# Patient Record
Sex: FEMALE | Race: WHITE | Employment: OTHER | URBAN - METROPOLITAN AREA
[De-identification: names, ages, dates, MRNs, and addresses within clinical notes are randomized per-mention and may not be internally consistent; named-entity substitution may affect disease eponyms.]

---

## 2017-07-13 PROBLEM — I50.9 CHF (CONGESTIVE HEART FAILURE) (HCC): Status: ACTIVE | Noted: 2017-07-13

## 2017-07-13 PROBLEM — I50.33 ACUTE ON CHRONIC DIASTOLIC (CONGESTIVE) HEART FAILURE (HCC): Status: ACTIVE | Noted: 2017-07-13

## 2018-02-11 PROBLEM — R06.09 DYSPNEA ON EXERTION: Status: ACTIVE | Noted: 2018-02-11

## 2018-02-12 PROBLEM — I50.30 (HFPEF) HEART FAILURE WITH PRESERVED EJECTION FRACTION (HCC): Status: ACTIVE | Noted: 2017-07-13

## 2018-02-13 LAB
LEFT VENTRICULAR EJECTION FRACTION MODE: NORMAL
LV EF: 55 %

## 2018-02-15 PROBLEM — I48.91 ATRIAL FIBRILLATION (HCC): Status: ACTIVE | Noted: 2018-02-15

## 2018-02-15 PROBLEM — R09.02 HYPOXEMIA: Status: ACTIVE | Noted: 2018-02-15

## 2018-02-17 PROBLEM — R09.02 HYPOXEMIA: Status: RESOLVED | Noted: 2018-02-15 | Resolved: 2018-02-17

## 2018-02-17 PROBLEM — R06.09 DYSPNEA ON EXERTION: Status: RESOLVED | Noted: 2018-02-11 | Resolved: 2018-02-17

## 2018-02-17 PROBLEM — I50.30 (HFPEF) HEART FAILURE WITH PRESERVED EJECTION FRACTION (HCC): Status: RESOLVED | Noted: 2017-07-13 | Resolved: 2018-02-17

## 2018-05-22 ENCOUNTER — OFFICE VISIT (OUTPATIENT)
Dept: CARDIOLOGY CLINIC | Age: 83
End: 2018-05-22
Payer: MEDICARE

## 2018-05-22 VITALS
WEIGHT: 184.4 LBS | SYSTOLIC BLOOD PRESSURE: 98 MMHG | BODY MASS INDEX: 33.93 KG/M2 | RESPIRATION RATE: 16 BRPM | DIASTOLIC BLOOD PRESSURE: 60 MMHG | HEART RATE: 81 BPM | HEIGHT: 62 IN

## 2018-05-22 DIAGNOSIS — Z79.01 CHRONIC ANTICOAGULATION: ICD-10-CM

## 2018-05-22 DIAGNOSIS — I50.32 CHRONIC DIASTOLIC CHF (CONGESTIVE HEART FAILURE) (HCC): Primary | ICD-10-CM

## 2018-05-22 DIAGNOSIS — I48.19 PERSISTENT ATRIAL FIBRILLATION (HCC): ICD-10-CM

## 2018-05-22 DIAGNOSIS — I50.33 ACUTE ON CHRONIC DIASTOLIC CONGESTIVE HEART FAILURE (HCC): ICD-10-CM

## 2018-05-22 DIAGNOSIS — I25.10 CAD IN NATIVE ARTERY: Chronic | ICD-10-CM

## 2018-05-22 DIAGNOSIS — I10 ESSENTIAL HYPERTENSION: ICD-10-CM

## 2018-05-22 PROCEDURE — 1090F PRES/ABSN URINE INCON ASSESS: CPT | Performed by: INTERNAL MEDICINE

## 2018-05-22 PROCEDURE — G8417 CALC BMI ABV UP PARAM F/U: HCPCS | Performed by: INTERNAL MEDICINE

## 2018-05-22 PROCEDURE — 93000 ELECTROCARDIOGRAM COMPLETE: CPT | Performed by: INTERNAL MEDICINE

## 2018-05-22 PROCEDURE — 1123F ACP DISCUSS/DSCN MKR DOCD: CPT | Performed by: INTERNAL MEDICINE

## 2018-05-22 PROCEDURE — G8427 DOCREV CUR MEDS BY ELIG CLIN: HCPCS | Performed by: INTERNAL MEDICINE

## 2018-05-22 PROCEDURE — 99214 OFFICE O/P EST MOD 30 MIN: CPT | Performed by: INTERNAL MEDICINE

## 2018-05-22 PROCEDURE — 1036F TOBACCO NON-USER: CPT | Performed by: INTERNAL MEDICINE

## 2018-05-22 PROCEDURE — G8598 ASA/ANTIPLAT THER USED: HCPCS | Performed by: INTERNAL MEDICINE

## 2018-05-22 PROCEDURE — 4040F PNEUMOC VAC/ADMIN/RCVD: CPT | Performed by: INTERNAL MEDICINE

## 2018-05-22 RX ORDER — AMIODARONE HYDROCHLORIDE 400 MG/1
400 TABLET ORAL DAILY
Qty: 30 TABLET | Refills: 5 | Status: SHIPPED | OUTPATIENT
Start: 2018-05-22 | End: 2018-06-08

## 2018-05-29 ENCOUNTER — NURSE ONLY (OUTPATIENT)
Dept: CARDIOLOGY CLINIC | Age: 83
End: 2018-05-29
Payer: MEDICARE

## 2018-05-29 ENCOUNTER — TELEPHONE (OUTPATIENT)
Dept: CARDIOLOGY CLINIC | Age: 83
End: 2018-05-29

## 2018-05-29 DIAGNOSIS — I48.0 PAROXYSMAL ATRIAL FIBRILLATION (HCC): Primary | ICD-10-CM

## 2018-05-29 PROCEDURE — 93000 ELECTROCARDIOGRAM COMPLETE: CPT | Performed by: INTERNAL MEDICINE

## 2018-06-08 ENCOUNTER — OFFICE VISIT (OUTPATIENT)
Dept: CARDIOLOGY CLINIC | Age: 83
End: 2018-06-08
Payer: MEDICARE

## 2018-06-08 VITALS
WEIGHT: 184.8 LBS | HEIGHT: 62 IN | RESPIRATION RATE: 18 BRPM | DIASTOLIC BLOOD PRESSURE: 72 MMHG | SYSTOLIC BLOOD PRESSURE: 132 MMHG | HEART RATE: 59 BPM | BODY MASS INDEX: 34.01 KG/M2

## 2018-06-08 DIAGNOSIS — E78.2 MIXED HYPERLIPIDEMIA: Chronic | ICD-10-CM

## 2018-06-08 DIAGNOSIS — I48.19 PERSISTENT ATRIAL FIBRILLATION (HCC): Primary | ICD-10-CM

## 2018-06-08 DIAGNOSIS — I10 ESSENTIAL HYPERTENSION: ICD-10-CM

## 2018-06-08 DIAGNOSIS — G47.33 OSA (OBSTRUCTIVE SLEEP APNEA): Chronic | ICD-10-CM

## 2018-06-08 DIAGNOSIS — I50.33 ACUTE ON CHRONIC DIASTOLIC CONGESTIVE HEART FAILURE (HCC): ICD-10-CM

## 2018-06-08 DIAGNOSIS — I50.32 CHRONIC DIASTOLIC CHF (CONGESTIVE HEART FAILURE) (HCC): ICD-10-CM

## 2018-06-08 PROCEDURE — 4040F PNEUMOC VAC/ADMIN/RCVD: CPT | Performed by: INTERNAL MEDICINE

## 2018-06-08 PROCEDURE — 93000 ELECTROCARDIOGRAM COMPLETE: CPT | Performed by: INTERNAL MEDICINE

## 2018-06-08 PROCEDURE — G8598 ASA/ANTIPLAT THER USED: HCPCS | Performed by: INTERNAL MEDICINE

## 2018-06-08 PROCEDURE — 1123F ACP DISCUSS/DSCN MKR DOCD: CPT | Performed by: INTERNAL MEDICINE

## 2018-06-08 PROCEDURE — 1036F TOBACCO NON-USER: CPT | Performed by: INTERNAL MEDICINE

## 2018-06-08 PROCEDURE — G8427 DOCREV CUR MEDS BY ELIG CLIN: HCPCS | Performed by: INTERNAL MEDICINE

## 2018-06-08 PROCEDURE — G8417 CALC BMI ABV UP PARAM F/U: HCPCS | Performed by: INTERNAL MEDICINE

## 2018-06-08 PROCEDURE — 99214 OFFICE O/P EST MOD 30 MIN: CPT | Performed by: INTERNAL MEDICINE

## 2018-06-08 PROCEDURE — 1090F PRES/ABSN URINE INCON ASSESS: CPT | Performed by: INTERNAL MEDICINE

## 2018-06-08 RX ORDER — AMIODARONE HYDROCHLORIDE 200 MG/1
200 TABLET ORAL DAILY
Qty: 30 TABLET | Refills: 11 | Status: SHIPPED | OUTPATIENT
Start: 2018-06-08 | End: 2019-01-01 | Stop reason: SDUPTHER

## 2018-06-08 RX ORDER — METOPROLOL SUCCINATE 25 MG/1
25 TABLET, EXTENDED RELEASE ORAL 2 TIMES DAILY
Qty: 60 TABLET | Refills: 11 | Status: ON HOLD | OUTPATIENT
Start: 2018-06-08 | End: 2019-01-01 | Stop reason: HOSPADM

## 2018-07-12 ENCOUNTER — OFFICE VISIT (OUTPATIENT)
Dept: ENT CLINIC | Age: 83
End: 2018-07-12
Payer: MEDICARE

## 2018-07-12 VITALS
BODY MASS INDEX: 32.2 KG/M2 | OXYGEN SATURATION: 91 % | HEART RATE: 60 BPM | SYSTOLIC BLOOD PRESSURE: 100 MMHG | DIASTOLIC BLOOD PRESSURE: 53 MMHG | WEIGHT: 175 LBS | HEIGHT: 62 IN

## 2018-07-12 DIAGNOSIS — K14.8 TONGUE LESION: Primary | ICD-10-CM

## 2018-07-12 DIAGNOSIS — J30.9 CHRONIC ALLERGIC RHINITIS, UNSPECIFIED SEASONALITY, UNSPECIFIED TRIGGER: ICD-10-CM

## 2018-07-12 PROCEDURE — 1123F ACP DISCUSS/DSCN MKR DOCD: CPT | Performed by: OTOLARYNGOLOGY

## 2018-07-12 PROCEDURE — G8427 DOCREV CUR MEDS BY ELIG CLIN: HCPCS | Performed by: OTOLARYNGOLOGY

## 2018-07-12 PROCEDURE — 4040F PNEUMOC VAC/ADMIN/RCVD: CPT | Performed by: OTOLARYNGOLOGY

## 2018-07-12 PROCEDURE — 1090F PRES/ABSN URINE INCON ASSESS: CPT | Performed by: OTOLARYNGOLOGY

## 2018-07-12 PROCEDURE — 1101F PT FALLS ASSESS-DOCD LE1/YR: CPT | Performed by: OTOLARYNGOLOGY

## 2018-07-12 PROCEDURE — 1036F TOBACCO NON-USER: CPT | Performed by: OTOLARYNGOLOGY

## 2018-07-12 PROCEDURE — G8417 CALC BMI ABV UP PARAM F/U: HCPCS | Performed by: OTOLARYNGOLOGY

## 2018-07-12 PROCEDURE — 99213 OFFICE O/P EST LOW 20 MIN: CPT | Performed by: OTOLARYNGOLOGY

## 2018-07-12 PROCEDURE — G8598 ASA/ANTIPLAT THER USED: HCPCS | Performed by: OTOLARYNGOLOGY

## 2018-07-12 RX ORDER — AZELASTINE 1 MG/ML
2 SPRAY, METERED NASAL 2 TIMES DAILY
Qty: 1 BOTTLE | Refills: 3 | Status: SHIPPED | OUTPATIENT
Start: 2018-07-12 | End: 2019-01-01 | Stop reason: ALTCHOICE

## 2018-07-12 ASSESSMENT — ENCOUNTER SYMPTOMS
RESPIRATORY NEGATIVE: 1
GASTROINTESTINAL NEGATIVE: 1
EYES NEGATIVE: 1
ABDOMINAL PAIN: 0
SHORTNESS OF BREATH: 0
COLOR CHANGE: 0

## 2018-07-12 NOTE — PROGRESS NOTES
Subjective:      Patient ID:  Suki Murphy is a 80 y.o. female. HPI:  Pt returns for recheck of allergies and tongue lesion   Nasal Steroid: Yes   Name: fluticasone (Flonase)   Still taking: Yes   reason for stopping:     Other therapy: nothing    Pt has been on therapy for 1.5 year(s) and is doing well. Her tongue lesion hasn't getting any larger since a year ago. The patient is complaining of nasal congestion and rhinorrhea    The patients worst time of year is summer, fall, winter and spring      Patient's medications, allergies, past medical, surgical, social and family histories were reviewed and updated as appropriate. Review of Systems   Constitutional: Negative. HENT: Positive for hearing loss. Eyes: Negative. Negative for visual disturbance. Respiratory: Negative. Negative for shortness of breath. Cardiovascular: Negative. Negative for chest pain. Gastrointestinal: Negative. Negative for abdominal pain. Genitourinary: Negative. Musculoskeletal: Negative. Skin: Negative. Negative for color change. Neurological: Negative. Psychiatric/Behavioral: Negative. Negative for behavioral problems and hallucinations. All other systems reviewed and are negative. Objective:   Physical Exam   Constitutional: She is oriented to person, place, and time. She appears well-developed and well-nourished. HENT:   Head: Normocephalic and atraumatic. Right Ear: Hearing, tympanic membrane, external ear and ear canal normal. No middle ear effusion. No decreased hearing is noted. Left Ear: Hearing, tympanic membrane, external ear and ear canal normal.  No middle ear effusion. No decreased hearing is noted. Nose: Mucosal edema and rhinorrhea present.    Mouth/Throat: Uvula is midline, oropharynx is clear and moist and mucous membranes are normal.       Right antirior tongue - small vascular lesion about 0.5cm - no change from last visit   Eyes: Conjunctivae and

## 2018-09-21 ENCOUNTER — OFFICE VISIT (OUTPATIENT)
Dept: CARDIOLOGY CLINIC | Age: 83
End: 2018-09-21
Payer: MEDICARE

## 2018-09-21 VITALS
SYSTOLIC BLOOD PRESSURE: 102 MMHG | HEART RATE: 69 BPM | WEIGHT: 188.4 LBS | DIASTOLIC BLOOD PRESSURE: 60 MMHG | HEIGHT: 62 IN | BODY MASS INDEX: 34.67 KG/M2 | RESPIRATION RATE: 16 BRPM

## 2018-09-21 DIAGNOSIS — I10 ESSENTIAL HYPERTENSION: ICD-10-CM

## 2018-09-21 DIAGNOSIS — I48.19 PERSISTENT ATRIAL FIBRILLATION (HCC): Primary | ICD-10-CM

## 2018-09-21 DIAGNOSIS — I25.10 CAD IN NATIVE ARTERY: Chronic | ICD-10-CM

## 2018-09-21 DIAGNOSIS — I50.32 CHRONIC DIASTOLIC (CONGESTIVE) HEART FAILURE (HCC): ICD-10-CM

## 2018-09-21 PROCEDURE — 1036F TOBACCO NON-USER: CPT | Performed by: INTERNAL MEDICINE

## 2018-09-21 PROCEDURE — 1123F ACP DISCUSS/DSCN MKR DOCD: CPT | Performed by: INTERNAL MEDICINE

## 2018-09-21 PROCEDURE — 93000 ELECTROCARDIOGRAM COMPLETE: CPT | Performed by: INTERNAL MEDICINE

## 2018-09-21 PROCEDURE — G8427 DOCREV CUR MEDS BY ELIG CLIN: HCPCS | Performed by: INTERNAL MEDICINE

## 2018-09-21 PROCEDURE — G8417 CALC BMI ABV UP PARAM F/U: HCPCS | Performed by: INTERNAL MEDICINE

## 2018-09-21 PROCEDURE — 1101F PT FALLS ASSESS-DOCD LE1/YR: CPT | Performed by: INTERNAL MEDICINE

## 2018-09-21 PROCEDURE — 1090F PRES/ABSN URINE INCON ASSESS: CPT | Performed by: INTERNAL MEDICINE

## 2018-09-21 PROCEDURE — 4040F PNEUMOC VAC/ADMIN/RCVD: CPT | Performed by: INTERNAL MEDICINE

## 2018-09-21 PROCEDURE — 99214 OFFICE O/P EST MOD 30 MIN: CPT | Performed by: INTERNAL MEDICINE

## 2018-09-21 PROCEDURE — G8598 ASA/ANTIPLAT THER USED: HCPCS | Performed by: INTERNAL MEDICINE

## 2018-09-21 NOTE — PROGRESS NOTES
taking differently: Take 137 mcg by mouth every morning (before breakfast) ) 30 tablet 3    Lactobacillus (PROBIOTIC ACIDOPHILUS) CAPS Take 1 capsule by mouth every morning      Simethicone (GAS-X ULTRA STRENGTH) 180 MG CAPS Take 1 capsule by mouth as needed      acetaminophen (TYLENOL) 325 MG tablet Take 2 tablets by mouth every 4 hours as needed for Pain 120 tablet 3    apixaban (ELIQUIS) 5 MG TABS tablet Take 1 tablet by mouth 2 times daily 60 tablet 0    docusate sodium (COLACE, DULCOLAX) 100 MG CAPS Take 100 mg by mouth 2 times daily (Patient taking differently: Take 200 mg by mouth nightly ) 30 capsule 0    furosemide (LASIX) 40 MG tablet Take 1 tablet by mouth 2 times daily 60 tablet 3    linaclotide (LINZESS) 145 MCG capsule Take 1 capsule by mouth every morning (before breakfast) 30 capsule 0    Calcium Carb-Cholecalciferol (CALCIUM-VITAMIN D) 600-400 MG-UNIT TABS Take 1 tablet by mouth every morning       hydrocortisone-pramoxine (ANALPRAM HC) 2.5-1 % rectal cream Place rectally 3 times daily 1 Tube 2    fluticasone (FLONASE) 50 MCG/ACT nasal spray 2 sprays by Nasal route daily 2 sprays in each nostril daily (Patient taking differently: 2 sprays by Nasal route nightly ) 1 Bottle 5    ranitidine (ZANTAC) 300 MG tablet TAKE ONE TABLET BY MOUTH EVERY morning  1    HYDROcodone-acetaminophen (NORCO) 7.5-325 MG per tablet Take 1 tablet by mouth every 6 hours as needed       raloxifene (EVISTA) 60 MG tablet Take 60 mg by mouth every morning       OXYGEN Inhale 2 L into the lungs nightly Indications: BMS      magnesium oxide (MAG-OX) 400 (240 MG) MG tablet Take 1 tablet by mouth daily. (Patient taking differently: Take 240 mg by mouth every morning ) 30 tablet 2    ferrous sulfate 325 (65 FE) MG tablet Take 325 mg by mouth daily (with breakfast).       lisinopril (PRINIVIL;ZESTRIL) 40 MG tablet Take 40 mg by mouth every morning       rOPINIRole (REQUIP) 0.25 MG tablet Take 0.25 mg by mouth nightly 02/18/2018     Lab Results   Component Value Date    ALT 7 02/20/2018    AST 13 02/20/2018    ALKPHOS 69 02/20/2018    BILITOT 0.8 02/20/2018     Lab Results   Component Value Date    WBC 5.2 02/21/2018    HGB 13.3 02/21/2018    HCT 40.8 02/21/2018    MCV 93.6 02/21/2018     02/21/2018     Lab Results   Component Value Date    CKTOTAL 71 10/24/2013    CKMB 0.8 10/24/2013    TROPONINI <0.01 02/15/2018    TROPONINI <0.01 02/11/2018    TROPONINI <0.01 07/13/2017     Lab Results   Component Value Date    INR 1.6 02/15/2018    INR 1.1 07/12/2017    INR 1.2 04/20/2015    PROTIME 18.0 (H) 02/15/2018    PROTIME 12.1 07/12/2017    PROTIME 13.0 (H) 04/20/2015     Lab Results   Component Value Date    TSH 0.216 (L) 02/15/2018     Lab Results   Component Value Date    CHOL 136 10/25/2013    CHOL 176 10/05/2011    CHOL 146 07/07/2011     Lab Results   Component Value Date    TRIG 108 10/25/2013    TRIG 169 (H) 10/05/2011    TRIG 108 07/07/2011     Lab Results   Component Value Date    HDL 37.0 (A) 10/25/2013    HDL 36.0 (A) 10/05/2011    HDL 34.0 (A) 07/07/2011     Lab Results   Component Value Date    LDLCALC 77 10/25/2013    LDLCALC 106 (H) 10/05/2011    [address] 90 07/07/2011     Cardiac Tests:  EKG: SR, PAC, rate 69, LAFB    Echocardiogram: 8/15/13  Technically difficult study with suboptimal echo windows. Definity contrast was used to improve endomyocardial border definition. Ejection fraction is visually estimated at 55-60%. There is doppler evidence of stage II diastolic dysfunction. Mild left ventricular concentric hypertrophy noted. The left atrium is moderately dilated. The right ventricle is not completely visualized but appears at least mildly dilated. The right ventricle appears mildly hypertrophied. Mild mitral regurgitation is present. There is mild to moderate aortic regurgitation. Mild to moderate tricuspid regurgitation.   Estimated right ventricular systolic pressure 30 mmHg assuming a right Toprol XL to 25 mg BID at last office visit (continue). 2. Chronic diastolic CHF (hospitalized in 7/2017 for acute on chronic CHF exacerbation) - lasix increased to 40 mg BID on 8/4/17 with clinical improvement, monitor renal function and electrolytes. LLE ultrasound on 7/13/17 negative for DVT. 3. Valvular heart disease - moderate AI and MR on 12/31/14 echocardiogram --> results of repeat echocardiogram (7/13/17) outlined above (stable)  4. CAD s/p MC to mid LAD in 2011 --> no new flow limiting disease on 10/24/13 cardiac catheterization. Continue current medications. 5. HTN - controlled, continue BB and ACE-I, BP today 102/60 (BP 98/60, 120/70, and 132/72 at prior office visits)  6. HLD - on statin  7. Reported ascending aortic aneurysm -- no aneurysm noted on 7/12/17 CTA chest  8. Hypothyroidism - on synthroid, normal TSH in 7/2017, TSH 0.216 in 2/2018  9. TARIK - AHI 5 on 12/2014 study (no treatment at this time)  10. Chronic LAFB  11. Hospitalized in 4/2015 for treatment of lumbar stenosis --> s/p prior epidural injections  12. Hypoxia -- CTA chest with no PE, +\"density in the lung fields compatible with atelectasis and/or possibly pneumonia. There is associated chronic pleural thickening seen. Stable pulmonary nodule. \" -- follows with pulmonary     Jayme Vallejo MD  [facility]) Cardiology

## 2018-10-27 ENCOUNTER — APPOINTMENT (OUTPATIENT)
Dept: GENERAL RADIOLOGY | Age: 83
End: 2018-10-27
Payer: MEDICARE

## 2018-10-27 ENCOUNTER — APPOINTMENT (OUTPATIENT)
Dept: ULTRASOUND IMAGING | Age: 83
End: 2018-10-27
Payer: MEDICARE

## 2018-10-27 ENCOUNTER — HOSPITAL ENCOUNTER (EMERGENCY)
Age: 83
Discharge: HOME | End: 2018-10-27
Attending: EMERGENCY MEDICINE
Payer: MEDICARE

## 2018-10-27 VITALS
HEART RATE: 62 BPM | RESPIRATION RATE: 16 BRPM | TEMPERATURE: 98.4 F | WEIGHT: 188 LBS | HEIGHT: 62 IN | DIASTOLIC BLOOD PRESSURE: 64 MMHG | SYSTOLIC BLOOD PRESSURE: 133 MMHG | BODY MASS INDEX: 34.6 KG/M2 | OXYGEN SATURATION: 96 %

## 2018-10-27 DIAGNOSIS — M79.604 RIGHT LEG PAIN: Primary | ICD-10-CM

## 2018-10-27 PROCEDURE — 93971 EXTREMITY STUDY: CPT

## 2018-10-27 PROCEDURE — 73502 X-RAY EXAM HIP UNI 2-3 VIEWS: CPT

## 2018-10-27 PROCEDURE — 6360000002 HC RX W HCPCS

## 2018-10-27 PROCEDURE — 99284 EMERGENCY DEPT VISIT MOD MDM: CPT

## 2018-10-27 PROCEDURE — 96372 THER/PROPH/DIAG INJ SC/IM: CPT

## 2018-10-27 RX ORDER — ONDANSETRON 4 MG/1
TABLET, ORALLY DISINTEGRATING ORAL
Status: COMPLETED
Start: 2018-10-27 | End: 2018-10-27

## 2018-10-27 RX ORDER — METHYLPREDNISOLONE 4 MG/1
TABLET ORAL
Qty: 21 TABLET | Status: SHIPPED | OUTPATIENT
Start: 2018-10-27 | End: 2018-11-02

## 2018-10-27 RX ORDER — ONDANSETRON 4 MG/1
4 TABLET, ORALLY DISINTEGRATING ORAL ONCE
Status: COMPLETED | OUTPATIENT
Start: 2018-10-27 | End: 2018-10-27

## 2018-10-27 RX ORDER — MORPHINE SULFATE 2 MG/ML
4 INJECTION, SOLUTION INTRAMUSCULAR; INTRAVENOUS ONCE
Status: DISCONTINUED | OUTPATIENT
Start: 2018-10-27 | End: 2018-10-27

## 2018-10-27 RX ORDER — MORPHINE SULFATE 2 MG/ML
INJECTION, SOLUTION INTRAMUSCULAR; INTRAVENOUS
Status: COMPLETED
Start: 2018-10-27 | End: 2018-10-27

## 2018-10-27 RX ORDER — ONDANSETRON 2 MG/ML
4 INJECTION INTRAMUSCULAR; INTRAVENOUS ONCE
Status: DISCONTINUED | OUTPATIENT
Start: 2018-10-27 | End: 2018-10-27

## 2018-10-27 RX ORDER — MORPHINE SULFATE 2 MG/ML
4 INJECTION, SOLUTION INTRAMUSCULAR; INTRAVENOUS ONCE
Status: COMPLETED | OUTPATIENT
Start: 2018-10-27 | End: 2018-10-27

## 2018-10-27 RX ADMIN — MORPHINE SULFATE 4 MG: 2 INJECTION, SOLUTION INTRAMUSCULAR; INTRAVENOUS at 21:18

## 2018-10-27 RX ADMIN — ONDANSETRON 4 MG: 4 TABLET, ORALLY DISINTEGRATING ORAL at 21:19

## 2018-10-27 ASSESSMENT — ENCOUNTER SYMPTOMS
ALLERGIC/IMMUNOLOGIC NEGATIVE: 1
DIARRHEA: 0
ABDOMINAL PAIN: 0
CHEST TIGHTNESS: 0
NAUSEA: 0
COUGH: 0
SHORTNESS OF BREATH: 0
VOMITING: 0
CONSTIPATION: 0

## 2018-10-27 ASSESSMENT — PAIN SCALES - GENERAL
PAINLEVEL_OUTOF10: 9
PAINLEVEL_OUTOF10: 9

## 2018-10-28 NOTE — ED PROVIDER NOTES
This is a 80-year-old female with extensive past medical history to include atrial fibrillation, coronary artery disease, CHF, breast cancer and chronic lower back pain who presents the emergency department with acute on chronic right leg pain. The patient is the primary historian. She reports over the past 3 days worsening right lower extremity pain and swelling. She notes the pain began behind her knee she also has pain in her calf. She reports it appears to be more swollen to her than normal. She reports recently having a epidural steroid injection by her pain management team this past week and was off of her eliquis for a few days for this. She is subsequently restarted her eliquis but this was after her pain had artery begun. She also reports there is tenderness to palpation over the greater trochanter of the right hip. She reports no recent falls or trauma in the affected area. Review of Systems   Constitutional: Negative for chills and fever. HENT: Negative for congestion. Eyes: Negative for visual disturbance. Respiratory: Negative for cough, chest tightness and shortness of breath. Cardiovascular: Positive for leg swelling. Negative for chest pain and palpitations. Gastrointestinal: Negative for abdominal pain, constipation, diarrhea, nausea and vomiting. Endocrine: Negative. Genitourinary: Negative for dysuria, frequency, hematuria and urgency. Musculoskeletal: Positive for arthralgias and myalgias. Skin: Negative. Allergic/Immunologic: Negative. Neurological: Negative for dizziness, weakness and headaches. Hematological: Negative. Psychiatric/Behavioral: Negative. Physical Exam   Constitutional: She is oriented to person, place, and time. She appears well-developed and well-nourished. No distress. HENT:   Head: Normocephalic and atraumatic.    Right Ear: External ear normal.   Left Ear: External ear normal.   Nose: Nose normal.   Mouth/Throat:

## 2019-01-01 ENCOUNTER — HOSPITAL ENCOUNTER (OUTPATIENT)
Age: 84
Discharge: HOME | End: 2019-05-13
Payer: MEDICARE

## 2019-01-01 ENCOUNTER — OFFICE VISIT (OUTPATIENT)
Dept: CARDIOLOGY CLINIC | Age: 84
End: 2019-01-01
Payer: MEDICARE

## 2019-01-01 ENCOUNTER — APPOINTMENT (OUTPATIENT)
Dept: GENERAL RADIOLOGY | Age: 84
DRG: 175 | End: 2019-01-01
Payer: MEDICARE

## 2019-01-01 ENCOUNTER — HOSPITAL ENCOUNTER (OUTPATIENT)
Age: 84
Discharge: HOME | End: 2019-07-05
Payer: MEDICARE

## 2019-01-01 ENCOUNTER — APPOINTMENT (OUTPATIENT)
Dept: CT IMAGING | Age: 84
End: 2019-01-01
Payer: MEDICARE

## 2019-01-01 ENCOUNTER — APPOINTMENT (OUTPATIENT)
Dept: ULTRASOUND IMAGING | Age: 84
DRG: 291 | End: 2019-01-01
Payer: MEDICARE

## 2019-01-01 ENCOUNTER — HOSPITAL ENCOUNTER (OUTPATIENT)
Dept: SLEEP CENTER | Age: 84
Discharge: HOME | End: 2019-05-26
Payer: MEDICARE

## 2019-01-01 ENCOUNTER — OFFICE VISIT (OUTPATIENT)
Dept: PODIATRY | Age: 84
End: 2019-01-01
Payer: MEDICARE

## 2019-01-01 ENCOUNTER — TELEPHONE (OUTPATIENT)
Dept: OTHER | Facility: CLINIC | Age: 84
End: 2019-01-01

## 2019-01-01 ENCOUNTER — TELEPHONE (OUTPATIENT)
Dept: CARDIOLOGY CLINIC | Age: 84
End: 2019-01-01

## 2019-01-01 ENCOUNTER — HOSPITAL ENCOUNTER (OUTPATIENT)
Age: 84
Discharge: HOME | DRG: 291 | End: 2019-08-22
Payer: MEDICARE

## 2019-01-01 ENCOUNTER — APPOINTMENT (OUTPATIENT)
Dept: CT IMAGING | Age: 84
DRG: 175 | End: 2019-01-01
Payer: MEDICARE

## 2019-01-01 ENCOUNTER — HOSPITAL ENCOUNTER (OUTPATIENT)
Dept: OTHER | Age: 84
Setting detail: THERAPIES SERIES
Discharge: HOME | End: 2019-04-25
Payer: MEDICARE

## 2019-01-01 ENCOUNTER — HOSPITAL ENCOUNTER (OUTPATIENT)
Dept: GENERAL RADIOLOGY | Age: 84
Discharge: HOME | End: 2019-07-05
Payer: MEDICARE

## 2019-01-01 ENCOUNTER — APPOINTMENT (OUTPATIENT)
Dept: GENERAL RADIOLOGY | Age: 84
DRG: 683 | End: 2019-01-01
Payer: MEDICARE

## 2019-01-01 ENCOUNTER — HOSPITAL ENCOUNTER (OUTPATIENT)
Age: 84
Discharge: HOME | End: 2019-04-03
Payer: MEDICARE

## 2019-01-01 ENCOUNTER — APPOINTMENT (OUTPATIENT)
Dept: GENERAL RADIOLOGY | Age: 84
End: 2019-01-01
Payer: MEDICARE

## 2019-01-01 ENCOUNTER — PROCEDURE VISIT (OUTPATIENT)
Dept: AUDIOLOGY | Age: 84
End: 2019-01-01
Payer: MEDICARE

## 2019-01-01 ENCOUNTER — HOSPITAL ENCOUNTER (OUTPATIENT)
Dept: SLEEP CENTER | Age: 84
Discharge: HOME | End: 2019-06-19
Payer: MEDICARE

## 2019-01-01 ENCOUNTER — HOSPITAL ENCOUNTER (OUTPATIENT)
Dept: AUDIOLOGY | Age: 84
Discharge: HOME | End: 2019-10-17
Payer: MEDICARE

## 2019-01-01 ENCOUNTER — HOSPITAL ENCOUNTER (OUTPATIENT)
Dept: SLEEP CENTER | Age: 84
Discharge: HOME | End: 2019-12-30
Payer: COMMERCIAL

## 2019-01-01 ENCOUNTER — CARE COORDINATION (OUTPATIENT)
Dept: CASE MANAGEMENT | Age: 84
End: 2019-01-01

## 2019-01-01 ENCOUNTER — HOSPITAL ENCOUNTER (OUTPATIENT)
Dept: SLEEP CENTER | Age: 84
Discharge: HOME | End: 2019-05-06
Payer: MEDICARE

## 2019-01-01 ENCOUNTER — HOSPITAL ENCOUNTER (OUTPATIENT)
Age: 84
Discharge: HOME | End: 2019-04-29
Payer: MEDICARE

## 2019-01-01 ENCOUNTER — APPOINTMENT (OUTPATIENT)
Dept: MRI IMAGING | Age: 84
DRG: 291 | End: 2019-01-01
Payer: MEDICARE

## 2019-01-01 ENCOUNTER — HOSPITAL ENCOUNTER (OUTPATIENT)
Age: 84
Discharge: HOME | End: 2019-06-18
Payer: MEDICARE

## 2019-01-01 ENCOUNTER — APPOINTMENT (OUTPATIENT)
Dept: GENERAL RADIOLOGY | Age: 84
DRG: 291 | End: 2019-01-01
Payer: MEDICARE

## 2019-01-01 ENCOUNTER — TELEPHONE (OUTPATIENT)
Dept: OTHER | Age: 84
End: 2019-01-01

## 2019-01-01 ENCOUNTER — HOSPITAL ENCOUNTER (INPATIENT)
Age: 84
LOS: 4 days | Discharge: NURSING, REHAB OR LONG-TERM CARE | DRG: 291 | End: 2019-08-30
Attending: EMERGENCY MEDICINE | Admitting: FAMILY MEDICINE
Payer: MEDICARE

## 2019-01-01 ENCOUNTER — HOSPITAL ENCOUNTER (OUTPATIENT)
Age: 84
Discharge: HOME | End: 2019-05-06
Payer: MEDICARE

## 2019-01-01 ENCOUNTER — HOSPITAL ENCOUNTER (EMERGENCY)
Age: 84
Discharge: HOME | End: 2019-05-05
Attending: EMERGENCY MEDICINE
Payer: MEDICARE

## 2019-01-01 ENCOUNTER — HOSPITAL ENCOUNTER (EMERGENCY)
Age: 84
Discharge: HOME | End: 2019-03-28
Attending: EMERGENCY MEDICINE
Payer: MEDICARE

## 2019-01-01 ENCOUNTER — HOSPITAL ENCOUNTER (EMERGENCY)
Age: 84
Discharge: HOME | End: 2019-03-11
Attending: EMERGENCY MEDICINE
Payer: MEDICARE

## 2019-01-01 ENCOUNTER — APPOINTMENT (OUTPATIENT)
Dept: CT IMAGING | Age: 84
DRG: 291 | End: 2019-01-01
Payer: MEDICARE

## 2019-01-01 ENCOUNTER — TELEPHONE (OUTPATIENT)
Dept: CARDIOLOGY | Age: 84
End: 2019-01-01

## 2019-01-01 ENCOUNTER — HOSPITAL ENCOUNTER (OUTPATIENT)
Dept: CT IMAGING | Age: 84
Discharge: HOME | End: 2019-04-03
Payer: MEDICARE

## 2019-01-01 ENCOUNTER — OFFICE VISIT (OUTPATIENT)
Dept: ENT CLINIC | Age: 84
End: 2019-01-01
Payer: MEDICARE

## 2019-01-01 ENCOUNTER — HOSPITAL ENCOUNTER (INPATIENT)
Age: 84
LOS: 15 days | Discharge: NURSING, REHAB OR LONG-TERM CARE | DRG: 175 | End: 2019-11-30
Attending: EMERGENCY MEDICINE | Admitting: FAMILY MEDICINE
Payer: MEDICARE

## 2019-01-01 ENCOUNTER — HOSPITAL ENCOUNTER (EMERGENCY)
Age: 84
Discharge: HOME | End: 2019-08-08
Attending: EMERGENCY MEDICINE
Payer: MEDICARE

## 2019-01-01 ENCOUNTER — HOSPITAL ENCOUNTER (OUTPATIENT)
Age: 84
Setting detail: OBSERVATION
Discharge: HOME | DRG: 291 | End: 2019-08-24
Attending: EMERGENCY MEDICINE | Admitting: FAMILY MEDICINE
Payer: MEDICARE

## 2019-01-01 ENCOUNTER — HOSPITAL ENCOUNTER (OUTPATIENT)
Dept: OTHER | Age: 84
Setting detail: THERAPIES SERIES
Discharge: HOME | End: 2019-05-08
Payer: MEDICARE

## 2019-01-01 ENCOUNTER — HOSPITAL ENCOUNTER (INPATIENT)
Age: 84
LOS: 7 days | Discharge: HOME | DRG: 291 | End: 2019-04-16
Attending: EMERGENCY MEDICINE | Admitting: FAMILY MEDICINE
Payer: MEDICARE

## 2019-01-01 ENCOUNTER — APPOINTMENT (OUTPATIENT)
Dept: GENERAL RADIOLOGY | Age: 84
DRG: 293 | End: 2019-01-01
Payer: MEDICARE

## 2019-01-01 ENCOUNTER — TELEPHONE (OUTPATIENT)
Dept: NON INVASIVE DIAGNOSTICS | Age: 84
End: 2019-01-01

## 2019-01-01 ENCOUNTER — APPOINTMENT (OUTPATIENT)
Dept: ULTRASOUND IMAGING | Age: 84
DRG: 175 | End: 2019-01-01
Payer: MEDICARE

## 2019-01-01 ENCOUNTER — HOSPITAL ENCOUNTER (INPATIENT)
Age: 84
LOS: 1 days | Discharge: HOME | DRG: 683 | End: 2019-05-09
Attending: EMERGENCY MEDICINE | Admitting: FAMILY MEDICINE
Payer: MEDICARE

## 2019-01-01 ENCOUNTER — HOSPITAL ENCOUNTER (OUTPATIENT)
Dept: AUDIOLOGY | Age: 84
Discharge: HOME | End: 2019-08-16
Payer: MEDICARE

## 2019-01-01 ENCOUNTER — HOSPITAL ENCOUNTER (INPATIENT)
Age: 84
LOS: 3 days | Discharge: HOME | DRG: 293 | End: 2019-03-22
Attending: EMERGENCY MEDICINE | Admitting: FAMILY MEDICINE
Payer: MEDICARE

## 2019-01-01 VITALS
OXYGEN SATURATION: 96 % | HEART RATE: 63 BPM | WEIGHT: 197.4 LBS | TEMPERATURE: 98 F | HEIGHT: 59 IN | DIASTOLIC BLOOD PRESSURE: 64 MMHG | RESPIRATION RATE: 18 BRPM | BODY MASS INDEX: 39.8 KG/M2 | SYSTOLIC BLOOD PRESSURE: 106 MMHG

## 2019-01-01 VITALS
HEIGHT: 62 IN | HEART RATE: 78 BPM | WEIGHT: 180 LBS | RESPIRATION RATE: 16 BRPM | TEMPERATURE: 97.6 F | SYSTOLIC BLOOD PRESSURE: 135 MMHG | BODY MASS INDEX: 33.13 KG/M2 | OXYGEN SATURATION: 96 % | DIASTOLIC BLOOD PRESSURE: 68 MMHG

## 2019-01-01 VITALS
DIASTOLIC BLOOD PRESSURE: 61 MMHG | HEART RATE: 61 BPM | TEMPERATURE: 98.1 F | RESPIRATION RATE: 16 BRPM | SYSTOLIC BLOOD PRESSURE: 140 MMHG | OXYGEN SATURATION: 96 %

## 2019-01-01 VITALS
RESPIRATION RATE: 16 BRPM | WEIGHT: 193 LBS | BODY MASS INDEX: 35.51 KG/M2 | SYSTOLIC BLOOD PRESSURE: 118 MMHG | HEIGHT: 62 IN | DIASTOLIC BLOOD PRESSURE: 82 MMHG | HEART RATE: 61 BPM

## 2019-01-01 VITALS
DIASTOLIC BLOOD PRESSURE: 79 MMHG | WEIGHT: 180 LBS | SYSTOLIC BLOOD PRESSURE: 123 MMHG | HEIGHT: 62 IN | BODY MASS INDEX: 33.13 KG/M2 | OXYGEN SATURATION: 92 % | HEART RATE: 66 BPM

## 2019-01-01 VITALS
RESPIRATION RATE: 14 BRPM | SYSTOLIC BLOOD PRESSURE: 104 MMHG | TEMPERATURE: 98.7 F | DIASTOLIC BLOOD PRESSURE: 62 MMHG | WEIGHT: 185 LBS | OXYGEN SATURATION: 96 % | HEART RATE: 74 BPM | HEIGHT: 62 IN | BODY MASS INDEX: 34.04 KG/M2

## 2019-01-01 VITALS
WEIGHT: 189.4 LBS | SYSTOLIC BLOOD PRESSURE: 98 MMHG | HEART RATE: 66 BPM | RESPIRATION RATE: 88 BRPM | DIASTOLIC BLOOD PRESSURE: 53 MMHG | BODY MASS INDEX: 34.64 KG/M2

## 2019-01-01 VITALS
WEIGHT: 196.9 LBS | HEART RATE: 121 BPM | HEIGHT: 60 IN | DIASTOLIC BLOOD PRESSURE: 59 MMHG | OXYGEN SATURATION: 92 % | TEMPERATURE: 97.8 F | BODY MASS INDEX: 38.66 KG/M2 | SYSTOLIC BLOOD PRESSURE: 115 MMHG | RESPIRATION RATE: 16 BRPM

## 2019-01-01 VITALS
SYSTOLIC BLOOD PRESSURE: 164 MMHG | DIASTOLIC BLOOD PRESSURE: 78 MMHG | OXYGEN SATURATION: 98 % | BODY MASS INDEX: 33.13 KG/M2 | HEART RATE: 64 BPM | TEMPERATURE: 98.1 F | RESPIRATION RATE: 16 BRPM | HEIGHT: 62 IN | WEIGHT: 180 LBS

## 2019-01-01 VITALS — HEART RATE: 67 BPM | SYSTOLIC BLOOD PRESSURE: 115 MMHG | RESPIRATION RATE: 18 BRPM | DIASTOLIC BLOOD PRESSURE: 58 MMHG

## 2019-01-01 VITALS
SYSTOLIC BLOOD PRESSURE: 84 MMHG | HEIGHT: 60 IN | BODY MASS INDEX: 38.87 KG/M2 | WEIGHT: 198 LBS | RESPIRATION RATE: 20 BRPM | HEART RATE: 87 BPM | DIASTOLIC BLOOD PRESSURE: 60 MMHG

## 2019-01-01 VITALS
DIASTOLIC BLOOD PRESSURE: 60 MMHG | SYSTOLIC BLOOD PRESSURE: 110 MMHG | BODY MASS INDEX: 36.22 KG/M2 | HEIGHT: 62 IN | RESPIRATION RATE: 16 BRPM | WEIGHT: 196.8 LBS | HEART RATE: 57 BPM

## 2019-01-01 VITALS
OXYGEN SATURATION: 93 % | WEIGHT: 188.6 LBS | SYSTOLIC BLOOD PRESSURE: 95 MMHG | BODY MASS INDEX: 34.71 KG/M2 | DIASTOLIC BLOOD PRESSURE: 50 MMHG | TEMPERATURE: 98.2 F | HEIGHT: 62 IN | RESPIRATION RATE: 18 BRPM | HEART RATE: 80 BPM

## 2019-01-01 VITALS — SYSTOLIC BLOOD PRESSURE: 122 MMHG | OXYGEN SATURATION: 52 % | HEART RATE: 89 BPM | DIASTOLIC BLOOD PRESSURE: 77 MMHG

## 2019-01-01 VITALS
DIASTOLIC BLOOD PRESSURE: 78 MMHG | BODY MASS INDEX: 35.94 KG/M2 | WEIGHT: 184 LBS | SYSTOLIC BLOOD PRESSURE: 122 MMHG | TEMPERATURE: 97.8 F

## 2019-01-01 VITALS
DIASTOLIC BLOOD PRESSURE: 76 MMHG | SYSTOLIC BLOOD PRESSURE: 124 MMHG | HEIGHT: 60 IN | HEART RATE: 75 BPM | BODY MASS INDEX: 37.11 KG/M2 | RESPIRATION RATE: 16 BRPM | WEIGHT: 189 LBS

## 2019-01-01 VITALS
HEIGHT: 60 IN | DIASTOLIC BLOOD PRESSURE: 54 MMHG | HEART RATE: 62 BPM | SYSTOLIC BLOOD PRESSURE: 107 MMHG | RESPIRATION RATE: 16 BRPM | OXYGEN SATURATION: 96 % | WEIGHT: 191.6 LBS | BODY MASS INDEX: 37.62 KG/M2 | TEMPERATURE: 98 F

## 2019-01-01 VITALS
HEART RATE: 64 BPM | DIASTOLIC BLOOD PRESSURE: 62 MMHG | OXYGEN SATURATION: 98 % | WEIGHT: 189 LBS | SYSTOLIC BLOOD PRESSURE: 104 MMHG | RESPIRATION RATE: 16 BRPM | HEIGHT: 62 IN | BODY MASS INDEX: 34.78 KG/M2

## 2019-01-01 VITALS
HEIGHT: 62 IN | OXYGEN SATURATION: 95 % | BODY MASS INDEX: 33.55 KG/M2 | RESPIRATION RATE: 18 BRPM | HEART RATE: 60 BPM | DIASTOLIC BLOOD PRESSURE: 60 MMHG | SYSTOLIC BLOOD PRESSURE: 128 MMHG | TEMPERATURE: 97.6 F | WEIGHT: 182.3 LBS

## 2019-01-01 VITALS
HEART RATE: 63 BPM | BODY MASS INDEX: 36.99 KG/M2 | RESPIRATION RATE: 12 BRPM | WEIGHT: 201 LBS | HEIGHT: 62 IN | SYSTOLIC BLOOD PRESSURE: 108 MMHG | DIASTOLIC BLOOD PRESSURE: 72 MMHG

## 2019-01-01 VITALS — TEMPERATURE: 97.4 F | SYSTOLIC BLOOD PRESSURE: 133 MMHG | DIASTOLIC BLOOD PRESSURE: 78 MMHG

## 2019-01-01 VITALS
DIASTOLIC BLOOD PRESSURE: 70 MMHG | SYSTOLIC BLOOD PRESSURE: 108 MMHG | HEIGHT: 62 IN | WEIGHT: 196.8 LBS | RESPIRATION RATE: 16 BRPM | HEART RATE: 59 BPM | BODY MASS INDEX: 36.22 KG/M2

## 2019-01-01 VITALS
WEIGHT: 187 LBS | BODY MASS INDEX: 35.3 KG/M2 | SYSTOLIC BLOOD PRESSURE: 105 MMHG | HEART RATE: 52 BPM | HEIGHT: 61 IN | DIASTOLIC BLOOD PRESSURE: 54 MMHG | OXYGEN SATURATION: 95 %

## 2019-01-01 VITALS
TEMPERATURE: 97.7 F | RESPIRATION RATE: 18 BRPM | DIASTOLIC BLOOD PRESSURE: 52 MMHG | WEIGHT: 195.3 LBS | HEIGHT: 59 IN | BODY MASS INDEX: 39.37 KG/M2 | OXYGEN SATURATION: 95 % | HEART RATE: 68 BPM | SYSTOLIC BLOOD PRESSURE: 97 MMHG

## 2019-01-01 VITALS
WEIGHT: 193.2 LBS | HEIGHT: 60 IN | BODY MASS INDEX: 37.93 KG/M2 | RESPIRATION RATE: 16 BRPM | SYSTOLIC BLOOD PRESSURE: 112 MMHG | DIASTOLIC BLOOD PRESSURE: 64 MMHG | HEART RATE: 97 BPM

## 2019-01-01 DIAGNOSIS — S09.90XA CLOSED HEAD INJURY, INITIAL ENCOUNTER: Primary | ICD-10-CM

## 2019-01-01 DIAGNOSIS — G89.29 CHRONIC MIDLINE LOW BACK PAIN WITH RIGHT-SIDED SCIATICA: ICD-10-CM

## 2019-01-01 DIAGNOSIS — I50.30 DIASTOLIC CONGESTIVE HEART FAILURE, UNSPECIFIED HF CHRONICITY (HCC): Primary | ICD-10-CM

## 2019-01-01 DIAGNOSIS — I38 VHD (VALVULAR HEART DISEASE): ICD-10-CM

## 2019-01-01 DIAGNOSIS — I50.43 CHF (CONGESTIVE HEART FAILURE), NYHA CLASS I, ACUTE ON CHRONIC, COMBINED (HCC): ICD-10-CM

## 2019-01-01 DIAGNOSIS — E03.9 HYPOTHYROIDISM, UNSPECIFIED TYPE: ICD-10-CM

## 2019-01-01 DIAGNOSIS — M54.41 CHRONIC MIDLINE LOW BACK PAIN WITH RIGHT-SIDED SCIATICA: ICD-10-CM

## 2019-01-01 DIAGNOSIS — I48.19 PERSISTENT ATRIAL FIBRILLATION (HCC): ICD-10-CM

## 2019-01-01 DIAGNOSIS — I48.0 PAROXYSMAL ATRIAL FIBRILLATION (HCC): ICD-10-CM

## 2019-01-01 DIAGNOSIS — I34.0 NON-RHEUMATIC MITRAL REGURGITATION: ICD-10-CM

## 2019-01-01 DIAGNOSIS — I10 ESSENTIAL HYPERTENSION: ICD-10-CM

## 2019-01-01 DIAGNOSIS — I48.19 PERSISTENT ATRIAL FIBRILLATION (HCC): Primary | ICD-10-CM

## 2019-01-01 DIAGNOSIS — I50.30 (HFPEF) HEART FAILURE WITH PRESERVED EJECTION FRACTION (HCC): Primary | ICD-10-CM

## 2019-01-01 DIAGNOSIS — I25.10 CAD IN NATIVE ARTERY: ICD-10-CM

## 2019-01-01 DIAGNOSIS — M47.816 DEGENERATIVE JOINT DISEASE OF CERVICAL AND LUMBAR SPINE: ICD-10-CM
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
## 2019-01-01 DIAGNOSIS — H90.3 SENSORINEURAL HEARING LOSS (SNHL) OF BOTH EARS: Primary | ICD-10-CM

## 2019-01-01 DIAGNOSIS — I50.43 CHF (CONGESTIVE HEART FAILURE), NYHA CLASS I, ACUTE ON CHRONIC, COMBINED (HCC): Primary | ICD-10-CM

## 2019-01-01 DIAGNOSIS — I25.10 CORONARY ARTERY DISEASE INVOLVING NATIVE CORONARY ARTERY OF NATIVE HEART WITHOUT ANGINA PECTORIS: Primary | ICD-10-CM

## 2019-01-01 DIAGNOSIS — I25.10 CAD IN NATIVE ARTERY: Chronic | ICD-10-CM

## 2019-01-01 DIAGNOSIS — I50.32 CHRONIC HEART FAILURE WITH PRESERVED EJECTION FRACTION (HCC): Primary | ICD-10-CM

## 2019-01-01 DIAGNOSIS — R93.5 ABNORMAL CT OF THE ABDOMEN: ICD-10-CM

## 2019-01-01 DIAGNOSIS — I50.32 CHRONIC HEART FAILURE WITH PRESERVED EJECTION FRACTION (HCC): ICD-10-CM

## 2019-01-01 DIAGNOSIS — M54.9 INTRACTABLE BACK PAIN: ICD-10-CM

## 2019-01-01 DIAGNOSIS — I48.19 ATRIAL FIBRILLATION, PERSISTENT (HCC): ICD-10-CM

## 2019-01-01 DIAGNOSIS — I48.0 PAROXYSMAL ATRIAL FIBRILLATION (HCC): Primary | ICD-10-CM

## 2019-01-01 DIAGNOSIS — I50.30 (HFPEF) HEART FAILURE WITH PRESERVED EJECTION FRACTION (HCC): ICD-10-CM

## 2019-01-01 DIAGNOSIS — I50.30 DIASTOLIC CONGESTIVE HEART FAILURE, UNSPECIFIED HF CHRONICITY (HCC): ICD-10-CM

## 2019-01-01 DIAGNOSIS — L97.511 SKIN ULCER OF RIGHT FOOT, LIMITED TO BREAKDOWN OF SKIN (HCC): Primary | ICD-10-CM

## 2019-01-01 DIAGNOSIS — I95.9 HYPOTENSION, UNSPECIFIED HYPOTENSION TYPE: ICD-10-CM

## 2019-01-01 DIAGNOSIS — G47.33 OSA (OBSTRUCTIVE SLEEP APNEA): Chronic | ICD-10-CM

## 2019-01-01 DIAGNOSIS — E86.0 DEHYDRATION: ICD-10-CM

## 2019-01-01 DIAGNOSIS — N18.9 ACUTE RENAL FAILURE SUPERIMPOSED ON CHRONIC KIDNEY DISEASE, UNSPECIFIED CKD STAGE, UNSPECIFIED ACUTE RENAL FAILURE TYPE (HCC): Primary | ICD-10-CM

## 2019-01-01 DIAGNOSIS — E87.6 HYPOKALEMIA: ICD-10-CM

## 2019-01-01 DIAGNOSIS — I48.0 PAF (PAROXYSMAL ATRIAL FIBRILLATION) (HCC): ICD-10-CM

## 2019-01-01 DIAGNOSIS — I50.33 ACUTE ON CHRONIC DIASTOLIC CHF (CONGESTIVE HEART FAILURE) (HCC): ICD-10-CM

## 2019-01-01 DIAGNOSIS — R06.02 SHORTNESS OF BREATH: ICD-10-CM

## 2019-01-01 DIAGNOSIS — G47.33 OSA (OBSTRUCTIVE SLEEP APNEA): ICD-10-CM

## 2019-01-01 DIAGNOSIS — S80.01XA CONTUSION OF RIGHT KNEE, INITIAL ENCOUNTER: Primary | ICD-10-CM

## 2019-01-01 DIAGNOSIS — M25.532 LEFT WRIST PAIN: ICD-10-CM

## 2019-01-01 DIAGNOSIS — N18.9 CHRONIC KIDNEY DISEASE, UNSPECIFIED CKD STAGE: ICD-10-CM

## 2019-01-01 DIAGNOSIS — R91.1 NODULE OF RIGHT LUNG: ICD-10-CM

## 2019-01-01 DIAGNOSIS — H11.31 SUBCONJUNCTIVAL BLEED, RIGHT: Primary | ICD-10-CM

## 2019-01-01 DIAGNOSIS — J30.2 SEASONAL ALLERGIC RHINITIS, UNSPECIFIED TRIGGER: ICD-10-CM

## 2019-01-01 DIAGNOSIS — Z86.711 HX PULMONARY EMBOLISM: ICD-10-CM

## 2019-01-01 DIAGNOSIS — N18.9 CHRONIC KIDNEY DISEASE, UNSPECIFIED CKD STAGE: Primary | ICD-10-CM

## 2019-01-01 DIAGNOSIS — N17.9 AKI (ACUTE KIDNEY INJURY) (HCC): Primary | ICD-10-CM

## 2019-01-01 DIAGNOSIS — I50.33 ACUTE ON CHRONIC DIASTOLIC CONGESTIVE HEART FAILURE (HCC): Primary | ICD-10-CM

## 2019-01-01 DIAGNOSIS — M15.9 OSTEOARTHRITIS OF MULTIPLE JOINTS, UNSPECIFIED OSTEOARTHRITIS TYPE: ICD-10-CM

## 2019-01-01 DIAGNOSIS — I25.10 CORONARY ARTERY DISEASE INVOLVING NATIVE CORONARY ARTERY OF NATIVE HEART WITHOUT ANGINA PECTORIS: ICD-10-CM

## 2019-01-01 DIAGNOSIS — R94.31 ACUTE ELECTROCARDIOGRAM CHANGES: ICD-10-CM

## 2019-01-01 DIAGNOSIS — R52 PAIN: ICD-10-CM

## 2019-01-01 DIAGNOSIS — M79.602 LEFT ARM PAIN: ICD-10-CM

## 2019-01-01 DIAGNOSIS — R07.9 CHEST PAIN, UNSPECIFIED TYPE: ICD-10-CM

## 2019-01-01 DIAGNOSIS — G47.33 OBSTRUCTIVE SLEEP APNEA: ICD-10-CM

## 2019-01-01 DIAGNOSIS — I28.8 DILATATION OF PULMONIC ARTERY (HCC): ICD-10-CM

## 2019-01-01 DIAGNOSIS — N17.9 ACUTE RENAL FAILURE SUPERIMPOSED ON CHRONIC KIDNEY DISEASE, UNSPECIFIED CKD STAGE, UNSPECIFIED ACUTE RENAL FAILURE TYPE (HCC): Primary | ICD-10-CM

## 2019-01-01 DIAGNOSIS — I38 VALVULAR HEART DISEASE: ICD-10-CM

## 2019-01-01 DIAGNOSIS — M47.812 DEGENERATIVE JOINT DISEASE OF CERVICAL AND LUMBAR SPINE: ICD-10-CM

## 2019-01-01 DIAGNOSIS — I35.1 NONRHEUMATIC AORTIC VALVE INSUFFICIENCY: ICD-10-CM

## 2019-01-01 DIAGNOSIS — Z79.01 CHRONIC ANTICOAGULATION: ICD-10-CM

## 2019-01-01 DIAGNOSIS — I26.99 PULMONARY EMBOLISM, OTHER, UNSPECIFIED CHRONICITY, UNSPECIFIED WHETHER ACUTE COR PULMONALE PRESENT (HCC): ICD-10-CM

## 2019-01-01 DIAGNOSIS — S70.01XA CONTUSION OF RIGHT HIP, INITIAL ENCOUNTER: ICD-10-CM

## 2019-01-01 DIAGNOSIS — N39.0 URINARY TRACT INFECTION IN FEMALE: ICD-10-CM

## 2019-01-01 DIAGNOSIS — R06.02 SOB (SHORTNESS OF BREATH): ICD-10-CM

## 2019-01-01 DIAGNOSIS — K14.8 TONGUE LESION: ICD-10-CM

## 2019-01-01 DIAGNOSIS — S00.03XA CONTUSION OF SCALP, INITIAL ENCOUNTER: ICD-10-CM

## 2019-01-01 DIAGNOSIS — Z79.01 ANTICOAGULANT LONG-TERM USE: ICD-10-CM

## 2019-01-01 DIAGNOSIS — S20.229A CONTUSION OF BACK, UNSPECIFIED LATERALITY, INITIAL ENCOUNTER: ICD-10-CM

## 2019-01-01 DIAGNOSIS — I50.9 ACUTE ON CHRONIC CONGESTIVE HEART FAILURE, UNSPECIFIED HEART FAILURE TYPE (HCC): Primary | ICD-10-CM

## 2019-01-01 DIAGNOSIS — M81.0 SENILE OSTEOPOROSIS: ICD-10-CM

## 2019-01-01 DIAGNOSIS — J96.01 ACUTE RESPIRATORY FAILURE WITH HYPOXIA (HCC): Primary | ICD-10-CM

## 2019-01-01 DIAGNOSIS — M54.5 LOW BACK PAIN, UNSPECIFIED BACK PAIN LATERALITY, UNSPECIFIED CHRONICITY, WITH SCIATICA PRESENCE UNSPECIFIED: ICD-10-CM

## 2019-01-01 LAB
ABO/RH: NORMAL
ADENOVIRUS BY PCR: NOT DETECTED
ADENOVIRUS BY PCR: NOT DETECTED
ALBUMIN SERPL-MCNC: 3.1 G/DL (ref 3.5–5.2)
ALBUMIN SERPL-MCNC: 3.3 G/DL (ref 3.5–5.2)
ALBUMIN SERPL-MCNC: 3.4 G/DL (ref 3.5–5.2)
ALBUMIN SERPL-MCNC: 3.6 G/DL (ref 3.5–5.2)
ALBUMIN SERPL-MCNC: 3.7 G/DL (ref 3.5–5.2)
ALBUMIN SERPL-MCNC: 3.8 G/DL (ref 3.5–5.2)
ALBUMIN SERPL-MCNC: 3.8 G/DL (ref 3.5–5.2)
ALBUMIN SERPL-MCNC: 3.9 G/DL (ref 3.5–5.2)
ALBUMIN SERPL-MCNC: 3.9 G/DL (ref 3.5–5.2)
ALP BLD-CCNC: 100 U/L (ref 35–104)
ALP BLD-CCNC: 106 U/L (ref 35–104)
ALP BLD-CCNC: 117 U/L (ref 35–104)
ALP BLD-CCNC: 52 U/L (ref 35–104)
ALP BLD-CCNC: 53 U/L (ref 35–104)
ALP BLD-CCNC: 55 U/L (ref 35–104)
ALP BLD-CCNC: 56 U/L (ref 35–104)
ALP BLD-CCNC: 57 U/L (ref 35–104)
ALP BLD-CCNC: 59 U/L (ref 35–104)
ALP BLD-CCNC: 61 U/L (ref 35–104)
ALP BLD-CCNC: 68 U/L (ref 35–104)
ALP BLD-CCNC: 74 U/L (ref 35–104)
ALP BLD-CCNC: 77 U/L (ref 35–104)
ALP BLD-CCNC: 86 U/L (ref 35–104)
ALP BLD-CCNC: 89 U/L (ref 35–104)
ALP BLD-CCNC: 92 U/L (ref 35–104)
ALP BLD-CCNC: 96 U/L (ref 35–104)
ALT SERPL-CCNC: 11 U/L (ref 0–32)
ALT SERPL-CCNC: 13 U/L (ref 0–32)
ALT SERPL-CCNC: 14 U/L (ref 0–32)
ALT SERPL-CCNC: 15 U/L (ref 0–32)
ALT SERPL-CCNC: 15 U/L (ref 0–32)
ALT SERPL-CCNC: 16 U/L (ref 0–32)
ALT SERPL-CCNC: 17 U/L (ref 0–32)
ALT SERPL-CCNC: 17 U/L (ref 0–32)
ALT SERPL-CCNC: 19 U/L (ref 0–32)
ALT SERPL-CCNC: 19 U/L (ref 0–32)
ALT SERPL-CCNC: 23 U/L (ref 0–32)
ALT SERPL-CCNC: 9 U/L (ref 0–32)
ANION GAP SERPL CALCULATED.3IONS-SCNC: 10 MMOL/L (ref 7–16)
ANION GAP SERPL CALCULATED.3IONS-SCNC: 11 MMOL/L (ref 7–16)
ANION GAP SERPL CALCULATED.3IONS-SCNC: 12 MMOL/L (ref 7–16)
ANION GAP SERPL CALCULATED.3IONS-SCNC: 13 MMOL/L (ref 7–16)
ANION GAP SERPL CALCULATED.3IONS-SCNC: 14 MMOL/L (ref 7–16)
ANION GAP SERPL CALCULATED.3IONS-SCNC: 14 MMOL/L (ref 7–16)
ANION GAP SERPL CALCULATED.3IONS-SCNC: 15 MMOL/L (ref 7–16)
ANION GAP SERPL CALCULATED.3IONS-SCNC: 6 MMOL/L (ref 7–16)
ANION GAP SERPL CALCULATED.3IONS-SCNC: 7 MMOL/L (ref 7–16)
ANION GAP SERPL CALCULATED.3IONS-SCNC: 7 MMOL/L (ref 7–16)
ANION GAP SERPL CALCULATED.3IONS-SCNC: 8 MMOL/L (ref 7–16)
ANION GAP SERPL CALCULATED.3IONS-SCNC: 9 MMOL/L (ref 7–16)
ANTIBODY SCREEN: NORMAL
APTT: 133 SEC (ref 24.5–35.1)
APTT: 153 SEC (ref 24.5–35.1)
APTT: 23.9 SEC (ref 24.5–35.1)
APTT: 28.9 SEC (ref 24.5–35.1)
APTT: 56.3 SEC (ref 24.5–35.1)
APTT: 61 SEC (ref 24.5–35.1)
APTT: 61.9 SEC (ref 24.5–35.1)
APTT: 70.6 SEC (ref 24.5–35.1)
APTT: 83.4 SEC (ref 24.5–35.1)
APTT: >240 SEC (ref 24.5–35.1)
AST SERPL-CCNC: 12 U/L (ref 0–31)
AST SERPL-CCNC: 13 U/L (ref 0–31)
AST SERPL-CCNC: 14 U/L (ref 0–31)
AST SERPL-CCNC: 14 U/L (ref 0–31)
AST SERPL-CCNC: 15 U/L (ref 0–31)
AST SERPL-CCNC: 17 U/L (ref 0–31)
AST SERPL-CCNC: 18 U/L (ref 0–31)
AST SERPL-CCNC: 18 U/L (ref 0–31)
AST SERPL-CCNC: 19 U/L (ref 0–31)
AST SERPL-CCNC: 21 U/L (ref 0–31)
AST SERPL-CCNC: 28 U/L (ref 0–31)
B.E.: 4 MMOL/L (ref -3–3)
BACTERIA: ABNORMAL /HPF
BACTERIA: ABNORMAL /HPF
BACTERIA: NORMAL /HPF
BASOPHILS ABSOLUTE: 0 E9/L (ref 0–0.2)
BASOPHILS ABSOLUTE: 0.01 E9/L (ref 0–0.2)
BASOPHILS ABSOLUTE: 0.01 E9/L (ref 0–0.2)
BASOPHILS ABSOLUTE: 0.02 E9/L (ref 0–0.2)
BASOPHILS ABSOLUTE: 0.03 E9/L (ref 0–0.2)
BASOPHILS ABSOLUTE: 0.05 E9/L (ref 0–0.2)
BASOPHILS RELATIVE PERCENT: 0 % (ref 0–2)
BASOPHILS RELATIVE PERCENT: 0.1 % (ref 0–2)
BASOPHILS RELATIVE PERCENT: 0.1 % (ref 0–2)
BASOPHILS RELATIVE PERCENT: 0.2 % (ref 0–2)
BASOPHILS RELATIVE PERCENT: 0.3 % (ref 0–2)
BASOPHILS RELATIVE PERCENT: 0.4 % (ref 0–2)
BASOPHILS RELATIVE PERCENT: 0.4 % (ref 0–2)
BASOPHILS RELATIVE PERCENT: 1 % (ref 0–2)
BILIRUB SERPL-MCNC: 0.7 MG/DL (ref 0–1.2)
BILIRUB SERPL-MCNC: 0.8 MG/DL (ref 0–1.2)
BILIRUB SERPL-MCNC: 0.9 MG/DL (ref 0–1.2)
BILIRUB SERPL-MCNC: 0.9 MG/DL (ref 0–1.2)
BILIRUB SERPL-MCNC: 1 MG/DL (ref 0–1.2)
BILIRUB SERPL-MCNC: 1.1 MG/DL (ref 0–1.2)
BILIRUB SERPL-MCNC: 1.1 MG/DL (ref 0–1.2)
BILIRUB SERPL-MCNC: 1.2 MG/DL (ref 0–1.2)
BILIRUB SERPL-MCNC: 1.2 MG/DL (ref 0–1.2)
BILIRUBIN DIRECT: 0.3 MG/DL (ref 0–0.3)
BILIRUBIN URINE: NEGATIVE
BILIRUBIN, INDIRECT: 0.7 MG/DL (ref 0–1)
BLOOD CULTURE, ROUTINE: NORMAL
BLOOD, URINE: ABNORMAL
BLOOD, URINE: ABNORMAL
BLOOD, URINE: NEGATIVE
BLOOD, URINE: NEGATIVE
BORDETELLA PARAPERTUSSIS BY PCR: NOT DETECTED
BORDETELLA PARAPERTUSSIS BY PCR: NOT DETECTED
BORDETELLA PERTUSSIS BY PCR: NOT DETECTED
BORDETELLA PERTUSSIS BY PCR: NOT DETECTED
BUN BLDV-MCNC: 16 MG/DL (ref 8–23)
BUN BLDV-MCNC: 17 MG/DL (ref 8–23)
BUN BLDV-MCNC: 17 MG/DL (ref 8–23)
BUN BLDV-MCNC: 18 MG/DL (ref 8–23)
BUN BLDV-MCNC: 18 MG/DL (ref 8–23)
BUN BLDV-MCNC: 19 MG/DL (ref 8–23)
BUN BLDV-MCNC: 20 MG/DL (ref 8–23)
BUN BLDV-MCNC: 20 MG/DL (ref 8–23)
BUN BLDV-MCNC: 21 MG/DL (ref 8–23)
BUN BLDV-MCNC: 23 MG/DL (ref 8–23)
BUN BLDV-MCNC: 23 MG/DL (ref 8–23)
BUN BLDV-MCNC: 24 MG/DL (ref 8–23)
BUN BLDV-MCNC: 24 MG/DL (ref 8–23)
BUN BLDV-MCNC: 27 MG/DL (ref 8–23)
BUN BLDV-MCNC: 27 MG/DL (ref 8–23)
BUN BLDV-MCNC: 28 MG/DL (ref 8–23)
BUN BLDV-MCNC: 28 MG/DL (ref 8–23)
BUN BLDV-MCNC: 29 MG/DL (ref 8–23)
BUN BLDV-MCNC: 32 MG/DL (ref 8–23)
BUN BLDV-MCNC: 33 MG/DL (ref 8–23)
BUN BLDV-MCNC: 33 MG/DL (ref 8–23)
BUN BLDV-MCNC: 34 MG/DL (ref 8–23)
BUN BLDV-MCNC: 35 MG/DL (ref 8–23)
BUN BLDV-MCNC: 37 MG/DL (ref 8–23)
BUN BLDV-MCNC: 39 MG/DL (ref 8–23)
BUN BLDV-MCNC: 39 MG/DL (ref 8–23)
BUN BLDV-MCNC: 41 MG/DL (ref 8–23)
BUN BLDV-MCNC: 41 MG/DL (ref 8–23)
BUN BLDV-MCNC: 43 MG/DL (ref 8–23)
BUN BLDV-MCNC: 45 MG/DL (ref 8–23)
BUN BLDV-MCNC: 45 MG/DL (ref 8–23)
BUN BLDV-MCNC: 47 MG/DL (ref 8–23)
BUN BLDV-MCNC: 48 MG/DL (ref 8–23)
BUN BLDV-MCNC: 50 MG/DL (ref 8–23)
BUN BLDV-MCNC: 53 MG/DL (ref 8–23)
BUN BLDV-MCNC: 58 MG/DL (ref 8–23)
BUN BLDV-MCNC: 59 MG/DL (ref 8–23)
CALCIUM IONIZED: 1.16 MMOL/L (ref 1.15–1.33)
CALCIUM IONIZED: 1.22 MMOL/L (ref 1.15–1.33)
CALCIUM SERPL-MCNC: 8.2 MG/DL (ref 8.6–10.2)
CALCIUM SERPL-MCNC: 8.3 MG/DL (ref 8.6–10.2)
CALCIUM SERPL-MCNC: 8.4 MG/DL (ref 8.6–10.2)
CALCIUM SERPL-MCNC: 8.5 MG/DL (ref 8.6–10.2)
CALCIUM SERPL-MCNC: 8.6 MG/DL (ref 8.6–10.2)
CALCIUM SERPL-MCNC: 8.7 MG/DL (ref 8.6–10.2)
CALCIUM SERPL-MCNC: 8.8 MG/DL (ref 8.6–10.2)
CALCIUM SERPL-MCNC: 8.9 MG/DL (ref 8.6–10.2)
CALCIUM SERPL-MCNC: 9 MG/DL (ref 8.6–10.2)
CALCIUM SERPL-MCNC: 9.1 MG/DL (ref 8.6–10.2)
CALCIUM SERPL-MCNC: 9.2 MG/DL (ref 8.6–10.2)
CALCIUM SERPL-MCNC: 9.3 MG/DL (ref 8.6–10.2)
CALCIUM SERPL-MCNC: 9.4 MG/DL (ref 8.6–10.2)
CALCIUM SERPL-MCNC: 9.4 MG/DL (ref 8.6–10.2)
CALCIUM SERPL-MCNC: 9.5 MG/DL (ref 8.6–10.2)
CASTS: ABNORMAL /LPF
CASTS: NORMAL /LPF
CHLAMYDOPHILIA PNEUMONIAE BY PCR: NOT DETECTED
CHLAMYDOPHILIA PNEUMONIAE BY PCR: NOT DETECTED
CHLORIDE BLD-SCNC: 100 MMOL/L (ref 98–107)
CHLORIDE BLD-SCNC: 101 MMOL/L (ref 98–107)
CHLORIDE BLD-SCNC: 102 MMOL/L (ref 98–107)
CHLORIDE BLD-SCNC: 102 MMOL/L (ref 98–107)
CHLORIDE BLD-SCNC: 103 MMOL/L (ref 98–107)
CHLORIDE BLD-SCNC: 104 MMOL/L (ref 98–107)
CHLORIDE BLD-SCNC: 105 MMOL/L (ref 98–107)
CHLORIDE BLD-SCNC: 89 MMOL/L (ref 98–107)
CHLORIDE BLD-SCNC: 91 MMOL/L (ref 98–107)
CHLORIDE BLD-SCNC: 92 MMOL/L (ref 98–107)
CHLORIDE BLD-SCNC: 93 MMOL/L (ref 98–107)
CHLORIDE BLD-SCNC: 94 MMOL/L (ref 98–107)
CHLORIDE BLD-SCNC: 95 MMOL/L (ref 98–107)
CHLORIDE BLD-SCNC: 95 MMOL/L (ref 98–107)
CHLORIDE BLD-SCNC: 96 MMOL/L (ref 98–107)
CHLORIDE BLD-SCNC: 97 MMOL/L (ref 98–107)
CHLORIDE BLD-SCNC: 98 MMOL/L (ref 98–107)
CHLORIDE BLD-SCNC: 99 MMOL/L (ref 98–107)
CHLORIDE URINE RANDOM: 85 MMOL/L
CLARITY: CLEAR
CO2: 24 MMOL/L (ref 22–29)
CO2: 24 MMOL/L (ref 22–29)
CO2: 25 MMOL/L (ref 22–29)
CO2: 27 MMOL/L (ref 22–29)
CO2: 27 MMOL/L (ref 22–29)
CO2: 28 MMOL/L (ref 22–29)
CO2: 29 MMOL/L (ref 22–29)
CO2: 30 MMOL/L (ref 22–29)
CO2: 31 MMOL/L (ref 22–29)
CO2: 32 MMOL/L (ref 22–29)
CO2: 33 MMOL/L (ref 22–29)
CO2: 34 MMOL/L (ref 22–29)
CO2: 35 MMOL/L (ref 22–29)
CO2: 36 MMOL/L (ref 22–29)
CO2: 37 MMOL/L (ref 22–29)
CO2: 39 MMOL/L (ref 22–29)
COHB: 1 % (ref 0–1.5)
COLOR: YELLOW
CORONAVIRUS 229E BY PCR: NOT DETECTED
CORONAVIRUS 229E BY PCR: NOT DETECTED
CORONAVIRUS HKU1 BY PCR: NOT DETECTED
CORONAVIRUS HKU1 BY PCR: NOT DETECTED
CORONAVIRUS NL63 BY PCR: NOT DETECTED
CORONAVIRUS NL63 BY PCR: NOT DETECTED
CORONAVIRUS OC43 BY PCR: NOT DETECTED
CORONAVIRUS OC43 BY PCR: NOT DETECTED
CORTISOL TOTAL: 13.92 MCG/DL (ref 2.68–18.4)
CREAT SERPL-MCNC: 0.9 MG/DL (ref 0.5–1)
CREAT SERPL-MCNC: 0.9 MG/DL (ref 0.5–1)
CREAT SERPL-MCNC: 1 MG/DL (ref 0.5–1)
CREAT SERPL-MCNC: 1.1 MG/DL (ref 0.5–1)
CREAT SERPL-MCNC: 1.2 MG/DL (ref 0.5–1)
CREAT SERPL-MCNC: 1.3 MG/DL (ref 0.5–1)
CREAT SERPL-MCNC: 1.4 MG/DL (ref 0.5–1)
CREAT SERPL-MCNC: 1.5 MG/DL (ref 0.5–1)
CREAT SERPL-MCNC: 1.6 MG/DL (ref 0.5–1)
CREAT SERPL-MCNC: 1.7 MG/DL (ref 0.5–1)
CREAT SERPL-MCNC: 1.8 MG/DL (ref 0.5–1)
CREAT SERPL-MCNC: 1.9 MG/DL (ref 0.5–1)
CREAT SERPL-MCNC: 2.2 MG/DL (ref 0.5–1)
CREAT SERPL-MCNC: 2.7 MG/DL (ref 0.5–1)
CREATININE URINE: 90 MG/DL (ref 29–226)
CRITICAL: ABNORMAL
CULTURE, BLOOD 2: NORMAL
DATE ANALYZED: ABNORMAL
DATE OF COLLECTION: ABNORMAL
EKG ATRIAL RATE: 118 BPM
EKG ATRIAL RATE: 49 BPM
EKG ATRIAL RATE: 52 BPM
EKG ATRIAL RATE: 58 BPM
EKG ATRIAL RATE: 61 BPM
EKG ATRIAL RATE: 72 BPM
EKG ATRIAL RATE: 75 BPM
EKG P AXIS: 34 DEGREES
EKG P AXIS: 55 DEGREES
EKG P AXIS: 56 DEGREES
EKG P AXIS: 60 DEGREES
EKG P AXIS: 62 DEGREES
EKG P AXIS: 70 DEGREES
EKG P AXIS: 74 DEGREES
EKG P-R INTERVAL: 156 MS
EKG P-R INTERVAL: 164 MS
EKG P-R INTERVAL: 166 MS
EKG P-R INTERVAL: 170 MS
EKG P-R INTERVAL: 232 MS
EKG Q-T INTERVAL: 286 MS
EKG Q-T INTERVAL: 364 MS
EKG Q-T INTERVAL: 438 MS
EKG Q-T INTERVAL: 452 MS
EKG Q-T INTERVAL: 510 MS
EKG Q-T INTERVAL: 514 MS
EKG Q-T INTERVAL: 552 MS
EKG QRS DURATION: 106 MS
EKG QRS DURATION: 106 MS
EKG QRS DURATION: 108 MS
EKG QRS DURATION: 108 MS
EKG QRS DURATION: 110 MS
EKG QRS DURATION: 118 MS
EKG QRS DURATION: 118 MS
EKG QTC CALCULATION (BAZETT): 366 MS
EKG QTC CALCULATION (BAZETT): 400 MS
EKG QTC CALCULATION (BAZETT): 464 MS
EKG QTC CALCULATION (BAZETT): 474 MS
EKG QTC CALCULATION (BAZETT): 489 MS
EKG QTC CALCULATION (BAZETT): 494 MS
EKG QTC CALCULATION (BAZETT): 541 MS
EKG R AXIS: -46 DEGREES
EKG R AXIS: -49 DEGREES
EKG R AXIS: -50 DEGREES
EKG R AXIS: -51 DEGREES
EKG R AXIS: -51 DEGREES
EKG R AXIS: -55 DEGREES
EKG R AXIS: -62 DEGREES
EKG T AXIS: 131 DEGREES
EKG T AXIS: 14 DEGREES
EKG T AXIS: 20 DEGREES
EKG T AXIS: 23 DEGREES
EKG T AXIS: 29 DEGREES
EKG T AXIS: 31 DEGREES
EKG T AXIS: 82 DEGREES
EKG VENTRICULAR RATE: 118 BPM
EKG VENTRICULAR RATE: 49 BPM
EKG VENTRICULAR RATE: 52 BPM
EKG VENTRICULAR RATE: 58 BPM
EKG VENTRICULAR RATE: 61 BPM
EKG VENTRICULAR RATE: 72 BPM
EKG VENTRICULAR RATE: 75 BPM
EOSINOPHILS ABSOLUTE: 0 E9/L (ref 0.05–0.5)
EOSINOPHILS ABSOLUTE: 0 E9/L (ref 0.05–0.5)
EOSINOPHILS ABSOLUTE: 0.01 E9/L (ref 0.05–0.5)
EOSINOPHILS ABSOLUTE: 0.02 E9/L (ref 0.05–0.5)
EOSINOPHILS ABSOLUTE: 0.02 E9/L (ref 0.05–0.5)
EOSINOPHILS ABSOLUTE: 0.03 E9/L (ref 0.05–0.5)
EOSINOPHILS ABSOLUTE: 0.06 E9/L (ref 0.05–0.5)
EOSINOPHILS ABSOLUTE: 0.07 E9/L (ref 0.05–0.5)
EOSINOPHILS RELATIVE PERCENT: 0 % (ref 0–6)
EOSINOPHILS RELATIVE PERCENT: 0 % (ref 0–6)
EOSINOPHILS RELATIVE PERCENT: 0.1 % (ref 0–6)
EOSINOPHILS RELATIVE PERCENT: 0.3 % (ref 0–6)
EOSINOPHILS RELATIVE PERCENT: 0.3 % (ref 0–6)
EOSINOPHILS RELATIVE PERCENT: 0.4 % (ref 0–6)
EOSINOPHILS RELATIVE PERCENT: 0.6 % (ref 0–6)
EOSINOPHILS RELATIVE PERCENT: 1.3 % (ref 0–6)
EPITHELIAL CELLS, UA: ABNORMAL /HPF
FERRITIN: 854 NG/ML
FOLATE: 15.5 NG/ML (ref 4.8–24.2)
GFR AFRICAN AMERICAN: 20
GFR AFRICAN AMERICAN: 25
GFR AFRICAN AMERICAN: 30
GFR AFRICAN AMERICAN: 32
GFR AFRICAN AMERICAN: 34
GFR AFRICAN AMERICAN: 36
GFR AFRICAN AMERICAN: 36
GFR AFRICAN AMERICAN: 37
GFR AFRICAN AMERICAN: 39
GFR AFRICAN AMERICAN: 43
GFR AFRICAN AMERICAN: 46
GFR AFRICAN AMERICAN: 51
GFR AFRICAN AMERICAN: 56
GFR AFRICAN AMERICAN: >60
GFR NON-AFRICAN AMERICAN: 17 ML/MIN/1.73
GFR NON-AFRICAN AMERICAN: 21 ML/MIN/1.73
GFR NON-AFRICAN AMERICAN: 25 ML/MIN/1.73
GFR NON-AFRICAN AMERICAN: 26 ML/MIN/1.73
GFR NON-AFRICAN AMERICAN: 28 ML/MIN/1.73
GFR NON-AFRICAN AMERICAN: 30 ML/MIN/1.73
GFR NON-AFRICAN AMERICAN: 33 ML/MIN/1.73
GFR NON-AFRICAN AMERICAN: 35 ML/MIN/1.73
GFR NON-AFRICAN AMERICAN: 38 ML/MIN/1.73
GFR NON-AFRICAN AMERICAN: 42 ML/MIN/1.73
GFR NON-AFRICAN AMERICAN: 46 ML/MIN/1.73
GFR NON-AFRICAN AMERICAN: 47 ML/MIN/1.73
GFR NON-AFRICAN AMERICAN: 47 ML/MIN/1.73
GFR NON-AFRICAN AMERICAN: 52 ML/MIN/1.73
GFR NON-AFRICAN AMERICAN: 59 ML/MIN/1.73
GFR NON-AFRICAN AMERICAN: 59 ML/MIN/1.73
GLUCOSE BLD-MCNC: 100 MG/DL (ref 74–99)
GLUCOSE BLD-MCNC: 101 MG/DL (ref 74–99)
GLUCOSE BLD-MCNC: 101 MG/DL (ref 74–99)
GLUCOSE BLD-MCNC: 102 MG/DL (ref 74–99)
GLUCOSE BLD-MCNC: 103 MG/DL (ref 74–99)
GLUCOSE BLD-MCNC: 105 MG/DL (ref 74–99)
GLUCOSE BLD-MCNC: 107 MG/DL (ref 74–99)
GLUCOSE BLD-MCNC: 116 MG/DL (ref 74–99)
GLUCOSE BLD-MCNC: 117 MG/DL (ref 74–99)
GLUCOSE BLD-MCNC: 118 MG/DL (ref 74–99)
GLUCOSE BLD-MCNC: 121 MG/DL (ref 74–99)
GLUCOSE BLD-MCNC: 131 MG/DL (ref 74–99)
GLUCOSE BLD-MCNC: 133 MG/DL (ref 74–99)
GLUCOSE BLD-MCNC: 139 MG/DL (ref 74–99)
GLUCOSE BLD-MCNC: 142 MG/DL (ref 74–99)
GLUCOSE BLD-MCNC: 143 MG/DL (ref 74–99)
GLUCOSE BLD-MCNC: 145 MG/DL (ref 74–99)
GLUCOSE BLD-MCNC: 146 MG/DL (ref 74–99)
GLUCOSE BLD-MCNC: 156 MG/DL (ref 74–99)
GLUCOSE BLD-MCNC: 157 MG/DL (ref 74–99)
GLUCOSE BLD-MCNC: 158 MG/DL (ref 74–99)
GLUCOSE BLD-MCNC: 160 MG/DL (ref 74–99)
GLUCOSE BLD-MCNC: 161 MG/DL (ref 74–99)
GLUCOSE BLD-MCNC: 162 MG/DL (ref 74–99)
GLUCOSE BLD-MCNC: 163 MG/DL (ref 74–99)
GLUCOSE BLD-MCNC: 164 MG/DL (ref 74–99)
GLUCOSE BLD-MCNC: 166 MG/DL (ref 74–99)
GLUCOSE BLD-MCNC: 172 MG/DL (ref 74–99)
GLUCOSE BLD-MCNC: 172 MG/DL (ref 74–99)
GLUCOSE BLD-MCNC: 173 MG/DL (ref 74–99)
GLUCOSE BLD-MCNC: 174 MG/DL (ref 74–99)
GLUCOSE BLD-MCNC: 178 MG/DL (ref 74–99)
GLUCOSE BLD-MCNC: 180 MG/DL (ref 74–99)
GLUCOSE BLD-MCNC: 193 MG/DL (ref 74–99)
GLUCOSE BLD-MCNC: 207 MG/DL (ref 74–99)
GLUCOSE BLD-MCNC: 209 MG/DL (ref 74–99)
GLUCOSE BLD-MCNC: 210 MG/DL (ref 74–99)
GLUCOSE BLD-MCNC: 219 MG/DL (ref 74–99)
GLUCOSE BLD-MCNC: 236 MG/DL (ref 74–99)
GLUCOSE BLD-MCNC: 244 MG/DL (ref 74–99)
GLUCOSE BLD-MCNC: 253 MG/DL (ref 74–99)
GLUCOSE BLD-MCNC: 255 MG/DL (ref 74–99)
GLUCOSE BLD-MCNC: 260 MG/DL (ref 74–99)
GLUCOSE BLD-MCNC: 270 MG/DL (ref 74–99)
GLUCOSE BLD-MCNC: 276 MG/DL (ref 74–99)
GLUCOSE BLD-MCNC: 83 MG/DL (ref 74–99)
GLUCOSE BLD-MCNC: 99 MG/DL (ref 74–99)
GLUCOSE URINE: NEGATIVE MG/DL
HCO3: 28.2 MMOL/L (ref 22–26)
HCT VFR BLD CALC: 26.7 % (ref 34–48)
HCT VFR BLD CALC: 27.7 % (ref 34–48)
HCT VFR BLD CALC: 27.9 % (ref 34–48)
HCT VFR BLD CALC: 28.1 % (ref 34–48)
HCT VFR BLD CALC: 28.6 % (ref 34–48)
HCT VFR BLD CALC: 28.9 % (ref 34–48)
HCT VFR BLD CALC: 28.9 % (ref 34–48)
HCT VFR BLD CALC: 29 % (ref 34–48)
HCT VFR BLD CALC: 29.8 % (ref 34–48)
HCT VFR BLD CALC: 29.8 % (ref 34–48)
HCT VFR BLD CALC: 30 % (ref 34–48)
HCT VFR BLD CALC: 30.1 % (ref 34–48)
HCT VFR BLD CALC: 31.1 % (ref 34–48)
HCT VFR BLD CALC: 31.1 % (ref 34–48)
HCT VFR BLD CALC: 31.6 % (ref 34–48)
HCT VFR BLD CALC: 31.7 % (ref 34–48)
HCT VFR BLD CALC: 32.9 % (ref 34–48)
HCT VFR BLD CALC: 34.5 % (ref 34–48)
HCT VFR BLD CALC: 34.5 % (ref 34–48)
HCT VFR BLD CALC: 34.7 % (ref 34–48)
HCT VFR BLD CALC: 35.2 % (ref 34–48)
HCT VFR BLD CALC: 35.5 % (ref 34–48)
HCT VFR BLD CALC: 37.4 % (ref 34–48)
HCT VFR BLD CALC: 37.7 % (ref 34–48)
HCT VFR BLD CALC: 37.8 % (ref 34–48)
HCT VFR BLD CALC: 37.9 % (ref 34–48)
HCT VFR BLD CALC: 38.2 % (ref 34–48)
HCT VFR BLD CALC: 38.6 % (ref 34–48)
HCT VFR BLD CALC: 39.7 % (ref 34–48)
HCT VFR BLD CALC: 40.7 % (ref 34–48)
HCT VFR BLD CALC: 42.1 % (ref 34–48)
HEMOGLOBIN: 10 G/DL (ref 11.5–15.5)
HEMOGLOBIN: 10.1 G/DL (ref 11.5–15.5)
HEMOGLOBIN: 10.5 G/DL (ref 11.5–15.5)
HEMOGLOBIN: 10.7 G/DL (ref 11.5–15.5)
HEMOGLOBIN: 11 G/DL (ref 11.5–15.5)
HEMOGLOBIN: 11.1 G/DL (ref 11.5–15.5)
HEMOGLOBIN: 11.2 G/DL (ref 11.5–15.5)
HEMOGLOBIN: 11.3 G/DL (ref 11.5–15.5)
HEMOGLOBIN: 11.4 G/DL (ref 11.5–15.5)
HEMOGLOBIN: 11.9 G/DL (ref 11.5–15.5)
HEMOGLOBIN: 11.9 G/DL (ref 11.5–15.5)
HEMOGLOBIN: 12 G/DL (ref 11.5–15.5)
HEMOGLOBIN: 12.1 G/DL (ref 11.5–15.5)
HEMOGLOBIN: 12.1 G/DL (ref 11.5–15.5)
HEMOGLOBIN: 12.2 G/DL (ref 11.5–15.5)
HEMOGLOBIN: 12.2 G/DL (ref 11.5–15.5)
HEMOGLOBIN: 12.5 G/DL (ref 11.5–15.5)
HEMOGLOBIN: 12.8 G/DL (ref 11.5–15.5)
HEMOGLOBIN: 13 G/DL (ref 11.5–15.5)
HEMOGLOBIN: 13 G/DL (ref 11.5–15.5)
HEMOGLOBIN: 8.5 G/DL (ref 11.5–15.5)
HEMOGLOBIN: 8.7 G/DL (ref 11.5–15.5)
HEMOGLOBIN: 8.9 G/DL (ref 11.5–15.5)
HEMOGLOBIN: 9 G/DL (ref 11.5–15.5)
HEMOGLOBIN: 9 G/DL (ref 11.5–15.5)
HEMOGLOBIN: 9.2 G/DL (ref 11.5–15.5)
HEMOGLOBIN: 9.2 G/DL (ref 11.5–15.5)
HEMOGLOBIN: 9.3 G/DL (ref 11.5–15.5)
HEMOGLOBIN: 9.4 G/DL (ref 11.5–15.5)
HEMOGLOBIN: 9.4 G/DL (ref 11.5–15.5)
HEMOGLOBIN: 9.6 G/DL (ref 11.5–15.5)
HEMOGLOBIN: 9.8 G/DL (ref 11.5–15.5)
HEMOGLOBIN: 9.8 G/DL (ref 11.5–15.5)
HHB: 6.9 % (ref 0–5)
HUMAN METAPNEUMOVIRUS BY PCR: NOT DETECTED
HUMAN METAPNEUMOVIRUS BY PCR: NOT DETECTED
HUMAN RHINOVIRUS/ENTEROVIRUS BY PCR: NOT DETECTED
HUMAN RHINOVIRUS/ENTEROVIRUS BY PCR: NOT DETECTED
IMMATURE GRANULOCYTES #: 0.06 E9/L
IMMATURE GRANULOCYTES #: 0.08 E9/L
IMMATURE GRANULOCYTES #: 0.11 E9/L
IMMATURE GRANULOCYTES #: 0.15 E9/L
IMMATURE GRANULOCYTES #: 0.15 E9/L
IMMATURE GRANULOCYTES #: 0.17 E9/L
IMMATURE GRANULOCYTES #: 0.18 E9/L
IMMATURE GRANULOCYTES #: 0.2 E9/L
IMMATURE GRANULOCYTES %: 1.1 % (ref 0–5)
IMMATURE GRANULOCYTES %: 1.1 % (ref 0–5)
IMMATURE GRANULOCYTES %: 1.4 % (ref 0–5)
IMMATURE GRANULOCYTES %: 1.5 % (ref 0–5)
IMMATURE GRANULOCYTES %: 1.7 % (ref 0–5)
IMMATURE GRANULOCYTES %: 1.9 % (ref 0–5)
IMMATURE GRANULOCYTES %: 2 % (ref 0–5)
IMMATURE GRANULOCYTES %: 2.1 % (ref 0–5)
INFLUENZA A BY PCR: NOT DETECTED
INFLUENZA B BY PCR: NOT DETECTED
INR BLD: 1
INR BLD: 1.9
IRON SATURATION: 24 % (ref 15–50)
IRON: 48 MCG/DL (ref 37–145)
KETONES, URINE: NEGATIVE MG/DL
LAB: ABNORMAL
LACTIC ACID, SEPSIS: 1.8 MMOL/L (ref 0.5–1.9)
LACTIC ACID, SEPSIS: 3.6 MMOL/L (ref 0.5–1.9)
LACTIC ACID: 0.8 MMOL/L (ref 0.5–2.2)
LACTIC ACID: 1.3 MMOL/L (ref 0.5–2.2)
LACTIC ACID: 2.1 MMOL/L (ref 0.5–2.2)
LEUKOCYTE ESTERASE, URINE: ABNORMAL
LEUKOCYTE ESTERASE, URINE: ABNORMAL
LEUKOCYTE ESTERASE, URINE: NEGATIVE
LEUKOCYTE ESTERASE, URINE: NEGATIVE
LV EF: 45 %
LV EF: 55 %
LVEF MODALITY: NORMAL
LVEF MODALITY: NORMAL
LYMPHOCYTES ABSOLUTE: 0.3 E9/L (ref 1.5–4)
LYMPHOCYTES ABSOLUTE: 0.44 E9/L (ref 1.5–4)
LYMPHOCYTES ABSOLUTE: 0.67 E9/L (ref 1.5–4)
LYMPHOCYTES ABSOLUTE: 0.7 E9/L (ref 1.5–4)
LYMPHOCYTES ABSOLUTE: 0.86 E9/L (ref 1.5–4)
LYMPHOCYTES ABSOLUTE: 0.87 E9/L (ref 1.5–4)
LYMPHOCYTES ABSOLUTE: 1.1 E9/L (ref 1.5–4)
LYMPHOCYTES ABSOLUTE: 1.17 E9/L (ref 1.5–4)
LYMPHOCYTES ABSOLUTE: 1.21 E9/L (ref 1.5–4)
LYMPHOCYTES ABSOLUTE: 1.43 E9/L (ref 1.5–4)
LYMPHOCYTES RELATIVE PERCENT: 1.9 % (ref 20–42)
LYMPHOCYTES RELATIVE PERCENT: 11.6 % (ref 20–42)
LYMPHOCYTES RELATIVE PERCENT: 16.1 % (ref 20–42)
LYMPHOCYTES RELATIVE PERCENT: 16.8 % (ref 20–42)
LYMPHOCYTES RELATIVE PERCENT: 27.2 % (ref 20–42)
LYMPHOCYTES RELATIVE PERCENT: 4 % (ref 20–42)
LYMPHOCYTES RELATIVE PERCENT: 7.1 % (ref 20–42)
LYMPHOCYTES RELATIVE PERCENT: 8 % (ref 20–42)
LYMPHOCYTES RELATIVE PERCENT: 8.9 % (ref 20–42)
LYMPHOCYTES RELATIVE PERCENT: 9 % (ref 20–42)
Lab: ABNORMAL
MAGNESIUM: 1.7 MG/DL (ref 1.6–2.6)
MAGNESIUM: 1.7 MG/DL (ref 1.6–2.6)
MAGNESIUM: 1.8 MG/DL (ref 1.6–2.6)
MAGNESIUM: 1.8 MG/DL (ref 1.6–2.6)
MAGNESIUM: 1.9 MG/DL (ref 1.6–2.6)
MAGNESIUM: 2 MG/DL (ref 1.6–2.6)
MAGNESIUM: 2.1 MG/DL (ref 1.6–2.6)
MAGNESIUM: 2.2 MG/DL (ref 1.6–2.6)
MAGNESIUM: 2.3 MG/DL (ref 1.6–2.6)
MAGNESIUM: 2.3 MG/DL (ref 1.6–2.6)
MAGNESIUM: 2.4 MG/DL (ref 1.6–2.6)
MAGNESIUM: 2.5 MG/DL (ref 1.6–2.6)
MAGNESIUM: 2.8 MG/DL (ref 1.6–2.6)
MAGNESIUM: 3 MG/DL (ref 1.6–2.6)
MCH RBC QN AUTO: 30.6 PG (ref 26–35)
MCH RBC QN AUTO: 30.7 PG (ref 26–35)
MCH RBC QN AUTO: 30.9 PG (ref 26–35)
MCH RBC QN AUTO: 31.1 PG (ref 26–35)
MCH RBC QN AUTO: 31.1 PG (ref 26–35)
MCH RBC QN AUTO: 31.2 PG (ref 26–35)
MCH RBC QN AUTO: 31.3 PG (ref 26–35)
MCH RBC QN AUTO: 31.4 PG (ref 26–35)
MCH RBC QN AUTO: 31.4 PG (ref 26–35)
MCH RBC QN AUTO: 31.5 PG (ref 26–35)
MCH RBC QN AUTO: 32.1 PG (ref 26–35)
MCH RBC QN AUTO: 32.1 PG (ref 26–35)
MCH RBC QN AUTO: 32.2 PG (ref 26–35)
MCH RBC QN AUTO: 32.3 PG (ref 26–35)
MCH RBC QN AUTO: 32.4 PG (ref 26–35)
MCH RBC QN AUTO: 32.5 PG (ref 26–35)
MCH RBC QN AUTO: 32.6 PG (ref 26–35)
MCH RBC QN AUTO: 32.7 PG (ref 26–35)
MCH RBC QN AUTO: 32.8 PG (ref 26–35)
MCH RBC QN AUTO: 32.9 PG (ref 26–35)
MCH RBC QN AUTO: 33.1 PG (ref 26–35)
MCHC RBC AUTO-ENTMCNC: 30.9 % (ref 32–34.5)
MCHC RBC AUTO-ENTMCNC: 30.9 % (ref 32–34.5)
MCHC RBC AUTO-ENTMCNC: 31.1 % (ref 32–34.5)
MCHC RBC AUTO-ENTMCNC: 31.3 % (ref 32–34.5)
MCHC RBC AUTO-ENTMCNC: 31.3 % (ref 32–34.5)
MCHC RBC AUTO-ENTMCNC: 31.4 % (ref 32–34.5)
MCHC RBC AUTO-ENTMCNC: 31.5 % (ref 32–34.5)
MCHC RBC AUTO-ENTMCNC: 31.6 % (ref 32–34.5)
MCHC RBC AUTO-ENTMCNC: 31.8 % (ref 32–34.5)
MCHC RBC AUTO-ENTMCNC: 31.9 % (ref 32–34.5)
MCHC RBC AUTO-ENTMCNC: 32 % (ref 32–34.5)
MCHC RBC AUTO-ENTMCNC: 32.1 % (ref 32–34.5)
MCHC RBC AUTO-ENTMCNC: 32.1 % (ref 32–34.5)
MCHC RBC AUTO-ENTMCNC: 32.2 % (ref 32–34.5)
MCHC RBC AUTO-ENTMCNC: 32.3 % (ref 32–34.5)
MCHC RBC AUTO-ENTMCNC: 32.4 % (ref 32–34.5)
MCHC RBC AUTO-ENTMCNC: 32.4 % (ref 32–34.5)
MCHC RBC AUTO-ENTMCNC: 32.5 % (ref 32–34.5)
MCHC RBC AUTO-ENTMCNC: 32.5 % (ref 32–34.5)
MCHC RBC AUTO-ENTMCNC: 32.7 % (ref 32–34.5)
MCHC RBC AUTO-ENTMCNC: 32.8 % (ref 32–34.5)
MCHC RBC AUTO-ENTMCNC: 33.8 % (ref 32–34.5)
MCV RBC AUTO: 100.3 FL (ref 80–99.9)
MCV RBC AUTO: 100.3 FL (ref 80–99.9)
MCV RBC AUTO: 100.5 FL (ref 80–99.9)
MCV RBC AUTO: 100.6 FL (ref 80–99.9)
MCV RBC AUTO: 101.1 FL (ref 80–99.9)
MCV RBC AUTO: 101.1 FL (ref 80–99.9)
MCV RBC AUTO: 101.2 FL (ref 80–99.9)
MCV RBC AUTO: 101.3 FL (ref 80–99.9)
MCV RBC AUTO: 101.4 FL (ref 80–99.9)
MCV RBC AUTO: 101.5 FL (ref 80–99.9)
MCV RBC AUTO: 101.6 FL (ref 80–99.9)
MCV RBC AUTO: 102.4 FL (ref 80–99.9)
MCV RBC AUTO: 102.5 FL (ref 80–99.9)
MCV RBC AUTO: 103.6 FL (ref 80–99.9)
MCV RBC AUTO: 104.2 FL (ref 80–99.9)
MCV RBC AUTO: 95.9 FL (ref 80–99.9)
MCV RBC AUTO: 96 FL (ref 80–99.9)
MCV RBC AUTO: 96 FL (ref 80–99.9)
MCV RBC AUTO: 96.2 FL (ref 80–99.9)
MCV RBC AUTO: 96.2 FL (ref 80–99.9)
MCV RBC AUTO: 97.3 FL (ref 80–99.9)
MCV RBC AUTO: 97.4 FL (ref 80–99.9)
MCV RBC AUTO: 97.5 FL (ref 80–99.9)
MCV RBC AUTO: 97.8 FL (ref 80–99.9)
MCV RBC AUTO: 98.2 FL (ref 80–99.9)
MCV RBC AUTO: 98.9 FL (ref 80–99.9)
MCV RBC AUTO: 98.9 FL (ref 80–99.9)
MCV RBC AUTO: 99.3 FL (ref 80–99.9)
MCV RBC AUTO: 99.5 FL (ref 80–99.9)
MCV RBC AUTO: 99.7 FL (ref 80–99.9)
MCV RBC AUTO: 99.7 FL (ref 80–99.9)
METAMYELOCYTES RELATIVE PERCENT: 0.9 % (ref 0–1)
METAMYELOCYTES RELATIVE PERCENT: 1 % (ref 0–1)
METER GLUCOSE: 117 MG/DL (ref 74–99)
METER GLUCOSE: 132 MG/DL (ref 74–99)
METER GLUCOSE: 138 MG/DL (ref 74–99)
METER GLUCOSE: 146 MG/DL (ref 74–99)
METER GLUCOSE: 153 MG/DL (ref 74–99)
METER GLUCOSE: 161 MG/DL (ref 74–99)
METER GLUCOSE: 162 MG/DL (ref 74–99)
METER GLUCOSE: 166 MG/DL (ref 74–99)
METER GLUCOSE: 168 MG/DL (ref 74–99)
METER GLUCOSE: 177 MG/DL (ref 74–99)
METER GLUCOSE: 189 MG/DL (ref 74–99)
METER GLUCOSE: 199 MG/DL (ref 74–99)
METER GLUCOSE: 199 MG/DL (ref 74–99)
METER GLUCOSE: 207 MG/DL (ref 74–99)
METER GLUCOSE: 213 MG/DL (ref 74–99)
METER GLUCOSE: 219 MG/DL (ref 74–99)
METER GLUCOSE: 241 MG/DL (ref 74–99)
METER GLUCOSE: 242 MG/DL (ref 74–99)
METER GLUCOSE: 248 MG/DL (ref 74–99)
METER GLUCOSE: 254 MG/DL (ref 74–99)
METER GLUCOSE: 257 MG/DL (ref 74–99)
METER GLUCOSE: 261 MG/DL (ref 74–99)
METER GLUCOSE: 271 MG/DL (ref 74–99)
METER GLUCOSE: 272 MG/DL (ref 74–99)
METER GLUCOSE: 272 MG/DL (ref 74–99)
METER GLUCOSE: 274 MG/DL (ref 74–99)
METER GLUCOSE: 275 MG/DL (ref 74–99)
METER GLUCOSE: 276 MG/DL (ref 74–99)
METER GLUCOSE: 276 MG/DL (ref 74–99)
METER GLUCOSE: 279 MG/DL (ref 74–99)
METER GLUCOSE: 288 MG/DL (ref 74–99)
METER GLUCOSE: 292 MG/DL (ref 74–99)
METER GLUCOSE: 294 MG/DL (ref 74–99)
METER GLUCOSE: 300 MG/DL (ref 74–99)
METER GLUCOSE: 322 MG/DL (ref 74–99)
METER GLUCOSE: 332 MG/DL (ref 74–99)
METER GLUCOSE: 341 MG/DL (ref 74–99)
METER GLUCOSE: 381 MG/DL (ref 74–99)
METHB: 0.1 % (ref 0–1.5)
MODE: ABNORMAL
MONOCYTES ABSOLUTE: 0.3 E9/L (ref 0.1–0.95)
MONOCYTES ABSOLUTE: 0.39 E9/L (ref 0.1–0.95)
MONOCYTES ABSOLUTE: 0.44 E9/L (ref 0.1–0.95)
MONOCYTES ABSOLUTE: 0.5 E9/L (ref 0.1–0.95)
MONOCYTES ABSOLUTE: 0.54 E9/L (ref 0.1–0.95)
MONOCYTES ABSOLUTE: 0.54 E9/L (ref 0.1–0.95)
MONOCYTES ABSOLUTE: 0.57 E9/L (ref 0.1–0.95)
MONOCYTES ABSOLUTE: 0.71 E9/L (ref 0.1–0.95)
MONOCYTES ABSOLUTE: 1.16 E9/L (ref 0.1–0.95)
MONOCYTES ABSOLUTE: 1.22 E9/L (ref 0.1–0.95)
MONOCYTES RELATIVE PERCENT: 1.8 % (ref 2–12)
MONOCYTES RELATIVE PERCENT: 10.3 % (ref 2–12)
MONOCYTES RELATIVE PERCENT: 12.9 % (ref 2–12)
MONOCYTES RELATIVE PERCENT: 4 % (ref 2–12)
MONOCYTES RELATIVE PERCENT: 5 % (ref 2–12)
MONOCYTES RELATIVE PERCENT: 5.4 % (ref 2–12)
MONOCYTES RELATIVE PERCENT: 6.7 % (ref 2–12)
MONOCYTES RELATIVE PERCENT: 7.3 % (ref 2–12)
MONOCYTES RELATIVE PERCENT: 9.6 % (ref 2–12)
MONOCYTES RELATIVE PERCENT: 9.8 % (ref 2–12)
MRSA CULTURE ONLY: NORMAL
MYCOPLASMA PNEUMONIAE BY PCR: NOT DETECTED
MYCOPLASMA PNEUMONIAE BY PCR: NOT DETECTED
NEUTROPHILS ABSOLUTE: 10.21 E9/L (ref 1.8–7.3)
NEUTROPHILS ABSOLUTE: 14.59 E9/L (ref 1.8–7.3)
NEUTROPHILS ABSOLUTE: 3.11 E9/L (ref 1.8–7.3)
NEUTROPHILS ABSOLUTE: 5.15 E9/L (ref 1.8–7.3)
NEUTROPHILS ABSOLUTE: 5.55 E9/L (ref 1.8–7.3)
NEUTROPHILS ABSOLUTE: 6.15 E9/L (ref 1.8–7.3)
NEUTROPHILS ABSOLUTE: 6.31 E9/L (ref 1.8–7.3)
NEUTROPHILS ABSOLUTE: 6.87 E9/L (ref 1.8–7.3)
NEUTROPHILS ABSOLUTE: 9.22 E9/L (ref 1.8–7.3)
NEUTROPHILS ABSOLUTE: 9.86 E9/L (ref 1.8–7.3)
NEUTROPHILS RELATIVE PERCENT: 59.1 % (ref 43–80)
NEUTROPHILS RELATIVE PERCENT: 71.5 % (ref 43–80)
NEUTROPHILS RELATIVE PERCENT: 72.7 % (ref 43–80)
NEUTROPHILS RELATIVE PERCENT: 76.5 % (ref 43–80)
NEUTROPHILS RELATIVE PERCENT: 81.4 % (ref 43–80)
NEUTROPHILS RELATIVE PERCENT: 81.6 % (ref 43–80)
NEUTROPHILS RELATIVE PERCENT: 82.1 % (ref 43–80)
NEUTROPHILS RELATIVE PERCENT: 84.9 % (ref 43–80)
NEUTROPHILS RELATIVE PERCENT: 91 % (ref 43–80)
NEUTROPHILS RELATIVE PERCENT: 95.4 % (ref 43–80)
NITRITE, URINE: NEGATIVE
NUCLEATED RED BLOOD CELLS: 0 /100 WBC
O2 CONTENT: 15.6 ML/DL
O2 SATURATION: 93 % (ref 92–98.5)
O2HB: 92 % (ref 94–97)
OPERATOR ID: 316
ORGANISM: ABNORMAL
OVALOCYTES: ABNORMAL
PARAINFLUENZA VIRUS 1 BY PCR: DETECTED
PARAINFLUENZA VIRUS 1 BY PCR: NOT DETECTED
PARAINFLUENZA VIRUS 2 BY PCR: NOT DETECTED
PARAINFLUENZA VIRUS 2 BY PCR: NOT DETECTED
PARAINFLUENZA VIRUS 3 BY PCR: NOT DETECTED
PARAINFLUENZA VIRUS 3 BY PCR: NOT DETECTED
PARAINFLUENZA VIRUS 4 BY PCR: NOT DETECTED
PARAINFLUENZA VIRUS 4 BY PCR: NOT DETECTED
PARATHYROID HORMONE INTACT: 45 PG/ML (ref 15–65)
PATIENT TEMP: 37 C
PCO2: 41.1 MMHG (ref 35–45)
PDW BLD-RTO: 12.3 FL (ref 11.5–15)
PDW BLD-RTO: 12.4 FL (ref 11.5–15)
PDW BLD-RTO: 12.5 FL (ref 11.5–15)
PDW BLD-RTO: 12.6 FL (ref 11.5–15)
PDW BLD-RTO: 12.7 FL (ref 11.5–15)
PDW BLD-RTO: 12.8 FL (ref 11.5–15)
PDW BLD-RTO: 12.9 FL (ref 11.5–15)
PDW BLD-RTO: 13.1 FL (ref 11.5–15)
PDW BLD-RTO: 13.1 FL (ref 11.5–15)
PDW BLD-RTO: 13.2 FL (ref 11.5–15)
PDW BLD-RTO: 13.6 FL (ref 11.5–15)
PDW BLD-RTO: 13.7 FL (ref 11.5–15)
PDW BLD-RTO: 13.8 FL (ref 11.5–15)
PDW BLD-RTO: 14 FL (ref 11.5–15)
PDW BLD-RTO: 14.3 FL (ref 11.5–15)
PDW BLD-RTO: 14.8 FL (ref 11.5–15)
PDW BLD-RTO: 14.9 FL (ref 11.5–15)
PDW BLD-RTO: 15.1 FL (ref 11.5–15)
PDW BLD-RTO: 15.6 FL (ref 11.5–15)
PDW BLD-RTO: 15.9 FL (ref 11.5–15)
PH BLOOD GAS: 7.46 (ref 7.35–7.45)
PH UA: 5.5 (ref 5–9)
PH UA: 7.5 (ref 5–9)
PHOSPHORUS: 2.3 MG/DL (ref 2.5–4.5)
PHOSPHORUS: 2.3 MG/DL (ref 2.5–4.5)
PHOSPHORUS: 2.8 MG/DL (ref 2.5–4.5)
PHOSPHORUS: 2.9 MG/DL (ref 2.5–4.5)
PHOSPHORUS: 3 MG/DL (ref 2.5–4.5)
PHOSPHORUS: 3 MG/DL (ref 2.5–4.5)
PHOSPHORUS: 3.2 MG/DL (ref 2.5–4.5)
PHOSPHORUS: 3.2 MG/DL (ref 2.5–4.5)
PHOSPHORUS: 3.5 MG/DL (ref 2.5–4.5)
PHOSPHORUS: 3.7 MG/DL (ref 2.5–4.5)
PHOSPHORUS: 3.8 MG/DL (ref 2.5–4.5)
PHOSPHORUS: 3.9 MG/DL (ref 2.5–4.5)
PHOSPHORUS: 4.4 MG/DL (ref 2.5–4.5)
PHOSPHORUS: 4.6 MG/DL (ref 2.5–4.5)
PHOSPHORUS: 4.8 MG/DL (ref 2.5–4.5)
PLATELET # BLD: 143 E9/L (ref 130–450)
PLATELET # BLD: 146 E9/L (ref 130–450)
PLATELET # BLD: 147 E9/L (ref 130–450)
PLATELET # BLD: 148 E9/L (ref 130–450)
PLATELET # BLD: 149 E9/L (ref 130–450)
PLATELET # BLD: 150 E9/L (ref 130–450)
PLATELET # BLD: 150 E9/L (ref 130–450)
PLATELET # BLD: 158 E9/L (ref 130–450)
PLATELET # BLD: 160 E9/L (ref 130–450)
PLATELET # BLD: 160 E9/L (ref 130–450)
PLATELET # BLD: 162 E9/L (ref 130–450)
PLATELET # BLD: 163 E9/L (ref 130–450)
PLATELET # BLD: 163 E9/L (ref 130–450)
PLATELET # BLD: 165 E9/L (ref 130–450)
PLATELET # BLD: 168 E9/L (ref 130–450)
PLATELET # BLD: 171 E9/L (ref 130–450)
PLATELET # BLD: 173 E9/L (ref 130–450)
PLATELET # BLD: 176 E9/L (ref 130–450)
PLATELET # BLD: 178 E9/L (ref 130–450)
PLATELET # BLD: 178 E9/L (ref 130–450)
PLATELET # BLD: 180 E9/L (ref 130–450)
PLATELET # BLD: 181 E9/L (ref 130–450)
PLATELET # BLD: 181 E9/L (ref 130–450)
PLATELET # BLD: 183 E9/L (ref 130–450)
PLATELET # BLD: 187 E9/L (ref 130–450)
PLATELET # BLD: 194 E9/L (ref 130–450)
PLATELET # BLD: 195 E9/L (ref 130–450)
PLATELET # BLD: 195 E9/L (ref 130–450)
PLATELET # BLD: 196 E9/L (ref 130–450)
PLATELET # BLD: 197 E9/L (ref 130–450)
PLATELET # BLD: 197 E9/L (ref 130–450)
PLATELET # BLD: 200 E9/L (ref 130–450)
PLATELET # BLD: 211 E9/L (ref 130–450)
PMV BLD AUTO: 10.4 FL (ref 7–12)
PMV BLD AUTO: 10.5 FL (ref 7–12)
PMV BLD AUTO: 10.7 FL (ref 7–12)
PMV BLD AUTO: 10.8 FL (ref 7–12)
PMV BLD AUTO: 10.9 FL (ref 7–12)
PMV BLD AUTO: 11 FL (ref 7–12)
PMV BLD AUTO: 11.1 FL (ref 7–12)
PMV BLD AUTO: 11.2 FL (ref 7–12)
PMV BLD AUTO: 11.3 FL (ref 7–12)
PMV BLD AUTO: 11.5 FL (ref 7–12)
PO2: 66.1 MMHG (ref 60–100)
POIKILOCYTES: ABNORMAL
POTASSIUM REFLEX MAGNESIUM: 3.3 MMOL/L (ref 3.5–5)
POTASSIUM REFLEX MAGNESIUM: 3.5 MMOL/L (ref 3.5–5)
POTASSIUM REFLEX MAGNESIUM: 3.5 MMOL/L (ref 3.5–5)
POTASSIUM REFLEX MAGNESIUM: 3.7 MMOL/L (ref 3.5–5)
POTASSIUM REFLEX MAGNESIUM: 4.1 MMOL/L (ref 3.5–5)
POTASSIUM SERPL-SCNC: 2.8 MMOL/L (ref 3.5–5)
POTASSIUM SERPL-SCNC: 2.9 MMOL/L (ref 3.5–5)
POTASSIUM SERPL-SCNC: 2.9 MMOL/L (ref 3.5–5)
POTASSIUM SERPL-SCNC: 3.1 MMOL/L (ref 3.5–5)
POTASSIUM SERPL-SCNC: 3.2 MMOL/L (ref 3.5–5)
POTASSIUM SERPL-SCNC: 3.3 MMOL/L (ref 3.5–5)
POTASSIUM SERPL-SCNC: 3.4 MMOL/L (ref 3.5–5)
POTASSIUM SERPL-SCNC: 3.6 MMOL/L (ref 3.5–5)
POTASSIUM SERPL-SCNC: 3.6 MMOL/L (ref 3.5–5)
POTASSIUM SERPL-SCNC: 3.7 MMOL/L (ref 3.5–5)
POTASSIUM SERPL-SCNC: 3.8 MMOL/L (ref 3.5–5)
POTASSIUM SERPL-SCNC: 3.9 MMOL/L (ref 3.5–5)
POTASSIUM SERPL-SCNC: 3.9 MMOL/L (ref 3.5–5)
POTASSIUM SERPL-SCNC: 4 MMOL/L (ref 3.5–5)
POTASSIUM SERPL-SCNC: 4.1 MMOL/L (ref 3.5–5)
POTASSIUM SERPL-SCNC: 4.2 MMOL/L (ref 3.5–5)
POTASSIUM SERPL-SCNC: 4.3 MMOL/L (ref 3.5–5)
POTASSIUM SERPL-SCNC: 4.4 MMOL/L (ref 3.5–5)
POTASSIUM SERPL-SCNC: 4.7 MMOL/L (ref 3.5–5)
POTASSIUM SERPL-SCNC: 4.8 MMOL/L (ref 3.5–5)
POTASSIUM, UR: 74.3 MMOL/L
PRO-BNP: 1249 PG/ML (ref 0–450)
PRO-BNP: 1294 PG/ML (ref 0–450)
PRO-BNP: 1792 PG/ML (ref 0–450)
PRO-BNP: 3529 PG/ML (ref 0–450)
PRO-BNP: 468 PG/ML (ref 0–450)
PRO-BNP: 483 PG/ML (ref 0–450)
PRO-BNP: 554 PG/ML (ref 0–450)
PRO-BNP: 752 PG/ML (ref 0–450)
PRO-BNP: 823 PG/ML (ref 0–450)
PRO-BNP: 865 PG/ML (ref 0–450)
PRO-BNP: 866 PG/ML (ref 0–450)
PRO-BNP: 894 PG/ML (ref 0–450)
PRO-BNP: 920 PG/ML (ref 0–450)
PRO-BNP: 969 PG/ML (ref 0–450)
PRO-BNP: 9962 PG/ML (ref 0–450)
PROCALCITONIN: 0.05 NG/ML (ref 0–0.08)
PROCALCITONIN: 0.12 NG/ML (ref 0–0.08)
PROTEIN UA: NEGATIVE MG/DL
PROTHROMBIN TIME: 11.6 SEC (ref 9.3–12.4)
PROTHROMBIN TIME: 21.3 SEC (ref 9.3–12.4)
RBC # BLD: 2.7 E12/L (ref 3.5–5.5)
RBC # BLD: 2.8 E12/L (ref 3.5–5.5)
RBC # BLD: 2.84 E12/L (ref 3.5–5.5)
RBC # BLD: 2.88 E12/L (ref 3.5–5.5)
RBC # BLD: 2.88 E12/L (ref 3.5–5.5)
RBC # BLD: 2.93 E12/L (ref 3.5–5.5)
RBC # BLD: 2.94 E12/L (ref 3.5–5.5)
RBC # BLD: 2.97 E12/L (ref 3.5–5.5)
RBC # BLD: 3.02 E12/L (ref 3.5–5.5)
RBC # BLD: 3.06 E12/L (ref 3.5–5.5)
RBC # BLD: 3.12 E12/L (ref 3.5–5.5)
RBC # BLD: 3.13 E12/L (ref 3.5–5.5)
RBC # BLD: 3.19 E12/L (ref 3.5–5.5)
RBC # BLD: 3.24 E12/L (ref 3.5–5.5)
RBC # BLD: 3.24 E12/L (ref 3.5–5.5)
RBC # BLD: 3.25 E12/L (ref 3.5–5.5)
RBC # BLD: 3.27 E12/L (ref 3.5–5.5)
RBC # BLD: 3.41 E12/L (ref 3.5–5.5)
RBC # BLD: 3.42 E12/L (ref 3.5–5.5)
RBC # BLD: 3.46 E12/L (ref 3.5–5.5)
RBC # BLD: 3.51 E12/L (ref 3.5–5.5)
RBC # BLD: 3.6 E12/L (ref 3.5–5.5)
RBC # BLD: 3.66 E12/L (ref 3.5–5.5)
RBC # BLD: 3.68 E12/L (ref 3.5–5.5)
RBC # BLD: 3.72 E12/L (ref 3.5–5.5)
RBC # BLD: 3.76 E12/L (ref 3.5–5.5)
RBC # BLD: 3.76 E12/L (ref 3.5–5.5)
RBC # BLD: 3.77 E12/L (ref 3.5–5.5)
RBC # BLD: 3.78 E12/L (ref 3.5–5.5)
RBC # BLD: 3.78 E12/L (ref 3.5–5.5)
RBC # BLD: 3.97 E12/L (ref 3.5–5.5)
RBC # BLD: 3.99 E12/L (ref 3.5–5.5)
RBC # BLD: 4.04 E12/L (ref 3.5–5.5)
RBC # BLD: NORMAL 10*6/UL
RBC UA: ABNORMAL /HPF (ref 0–2)
RBC UA: ABNORMAL /HPF (ref 0–2)
RBC UA: NORMAL /HPF (ref 0–2)
REASON FOR REJECTION: NORMAL
REJECTED TEST: NORMAL
RESPIRATORY SYNCYTIAL VIRUS BY PCR: NOT DETECTED
RESPIRATORY SYNCYTIAL VIRUS BY PCR: NOT DETECTED
SODIUM BLD-SCNC: 133 MMOL/L (ref 132–146)
SODIUM BLD-SCNC: 133 MMOL/L (ref 132–146)
SODIUM BLD-SCNC: 134 MMOL/L (ref 132–146)
SODIUM BLD-SCNC: 135 MMOL/L (ref 132–146)
SODIUM BLD-SCNC: 136 MMOL/L (ref 132–146)
SODIUM BLD-SCNC: 137 MMOL/L (ref 132–146)
SODIUM BLD-SCNC: 138 MMOL/L (ref 132–146)
SODIUM BLD-SCNC: 139 MMOL/L (ref 132–146)
SODIUM BLD-SCNC: 140 MMOL/L (ref 132–146)
SODIUM BLD-SCNC: 141 MMOL/L (ref 132–146)
SODIUM BLD-SCNC: 141 MMOL/L (ref 132–146)
SODIUM BLD-SCNC: 142 MMOL/L (ref 132–146)
SODIUM BLD-SCNC: 143 MMOL/L (ref 132–146)
SODIUM BLD-SCNC: 143 MMOL/L (ref 132–146)
SODIUM URINE: 45 MMOL/L
SOURCE, BLOOD GAS: ABNORMAL
SPECIFIC GRAVITY UA: 1.01 (ref 1–1.03)
T4 FREE: 2.09 NG/DL (ref 0.93–1.7)
THB: 12 G/DL (ref 11.5–16.5)
TIME ANALYZED: 1210
TOTAL IRON BINDING CAPACITY: 203 MCG/DL (ref 250–450)
TOTAL PROTEIN: 5.5 G/DL (ref 6.4–8.3)
TOTAL PROTEIN: 5.6 G/DL (ref 6.4–8.3)
TOTAL PROTEIN: 5.7 G/DL (ref 6.4–8.3)
TOTAL PROTEIN: 5.8 G/DL (ref 6.4–8.3)
TOTAL PROTEIN: 5.9 G/DL (ref 6.4–8.3)
TOTAL PROTEIN: 5.9 G/DL (ref 6.4–8.3)
TOTAL PROTEIN: 6 G/DL (ref 6.4–8.3)
TOTAL PROTEIN: 6 G/DL (ref 6.4–8.3)
TOTAL PROTEIN: 6.1 G/DL (ref 6.4–8.3)
TOTAL PROTEIN: 6.3 G/DL (ref 6.4–8.3)
TOTAL PROTEIN: 6.3 G/DL (ref 6.4–8.3)
TOTAL PROTEIN: 6.5 G/DL (ref 6.4–8.3)
TOTAL PROTEIN: 6.6 G/DL (ref 6.4–8.3)
TOTAL PROTEIN: 6.6 G/DL (ref 6.4–8.3)
TOTAL PROTEIN: 6.8 G/DL (ref 6.4–8.3)
TROPONIN: 0.02 NG/ML (ref 0–0.03)
TROPONIN: <0.01 NG/ML (ref 0–0.03)
TSH SERPL DL<=0.05 MIU/L-ACNC: 0.65 UIU/ML (ref 0.27–4.2)
TSH SERPL DL<=0.05 MIU/L-ACNC: 3.25 UIU/ML (ref 0.27–4.2)
UREA NITROGEN, UR: 479 MG/DL (ref 800–1666)
URINE CULTURE, ROUTINE: ABNORMAL
URINE CULTURE, ROUTINE: NORMAL
URINE CULTURE, ROUTINE: NORMAL
UROBILINOGEN, URINE: 0.2 E.U./DL
VITAMIN B-12: 511 PG/ML (ref 211–946)
VITAMIN D 25-HYDROXY: 31 NG/ML (ref 30–100)
WBC # BLD: 10.1 E9/L (ref 4.5–11.5)
WBC # BLD: 10.1 E9/L (ref 4.5–11.5)
WBC # BLD: 10.7 E9/L (ref 4.5–11.5)
WBC # BLD: 10.9 E9/L (ref 4.5–11.5)
WBC # BLD: 11.1 E9/L (ref 4.5–11.5)
WBC # BLD: 11.4 E9/L (ref 4.5–11.5)
WBC # BLD: 11.4 E9/L (ref 4.5–11.5)
WBC # BLD: 12.1 E9/L (ref 4.5–11.5)
WBC # BLD: 12.6 E9/L (ref 4.5–11.5)
WBC # BLD: 15.2 E9/L (ref 4.5–11.5)
WBC # BLD: 5.3 E9/L (ref 4.5–11.5)
WBC # BLD: 6 E9/L (ref 4.5–11.5)
WBC # BLD: 6.7 E9/L (ref 4.5–11.5)
WBC # BLD: 7.2 E9/L (ref 4.5–11.5)
WBC # BLD: 7.3 E9/L (ref 4.5–11.5)
WBC # BLD: 7.4 E9/L (ref 4.5–11.5)
WBC # BLD: 7.4 E9/L (ref 4.5–11.5)
WBC # BLD: 7.5 E9/L (ref 4.5–11.5)
WBC # BLD: 7.5 E9/L (ref 4.5–11.5)
WBC # BLD: 7.8 E9/L (ref 4.5–11.5)
WBC # BLD: 8 E9/L (ref 4.5–11.5)
WBC # BLD: 8 E9/L (ref 4.5–11.5)
WBC # BLD: 8.3 E9/L (ref 4.5–11.5)
WBC # BLD: 8.5 E9/L (ref 4.5–11.5)
WBC # BLD: 8.8 E9/L (ref 4.5–11.5)
WBC # BLD: 9.1 E9/L (ref 4.5–11.5)
WBC # BLD: 9.1 E9/L (ref 4.5–11.5)
WBC # BLD: 9.2 E9/L (ref 4.5–11.5)
WBC # BLD: 9.5 E9/L (ref 4.5–11.5)
WBC # BLD: 9.6 E9/L (ref 4.5–11.5)
WBC # BLD: 9.6 E9/L (ref 4.5–11.5)
WBC UA: ABNORMAL /HPF (ref 0–5)
WBC UA: ABNORMAL /HPF (ref 0–5)
WBC UA: NORMAL /HPF (ref 0–5)

## 2019-01-01 PROCEDURE — 73552 X-RAY EXAM OF FEMUR 2/>: CPT

## 2019-01-01 PROCEDURE — 6370000000 HC RX 637 (ALT 250 FOR IP): Performed by: INTERNAL MEDICINE

## 2019-01-01 PROCEDURE — 2580000003 HC RX 258: Performed by: FAMILY MEDICINE

## 2019-01-01 PROCEDURE — 99203 OFFICE O/P NEW LOW 30 MIN: CPT | Performed by: PODIATRIST

## 2019-01-01 PROCEDURE — 84484 ASSAY OF TROPONIN QUANT: CPT

## 2019-01-01 PROCEDURE — 6370000000 HC RX 637 (ALT 250 FOR IP): Performed by: FAMILY MEDICINE

## 2019-01-01 PROCEDURE — 99223 1ST HOSP IP/OBS HIGH 75: CPT | Performed by: INTERNAL MEDICINE

## 2019-01-01 PROCEDURE — 2700000000 HC OXYGEN THERAPY PER DAY

## 2019-01-01 PROCEDURE — 97535 SELF CARE MNGMENT TRAINING: CPT

## 2019-01-01 PROCEDURE — 6360000002 HC RX W HCPCS: Performed by: STUDENT IN AN ORGANIZED HEALTH CARE EDUCATION/TRAINING PROGRAM

## 2019-01-01 PROCEDURE — 6360000002 HC RX W HCPCS: Performed by: INTERNAL MEDICINE

## 2019-01-01 PROCEDURE — 4040F PNEUMOC VAC/ADMIN/RCVD: CPT | Performed by: INTERNAL MEDICINE

## 2019-01-01 PROCEDURE — 80053 COMPREHEN METABOLIC PANEL: CPT

## 2019-01-01 PROCEDURE — 2580000003 HC RX 258: Performed by: INTERNAL MEDICINE

## 2019-01-01 PROCEDURE — 6370000000 HC RX 637 (ALT 250 FOR IP): Performed by: EMERGENCY MEDICINE

## 2019-01-01 PROCEDURE — 36415 COLL VENOUS BLD VENIPUNCTURE: CPT

## 2019-01-01 PROCEDURE — 1111F DSCHRG MED/CURRENT MED MERGE: CPT | Performed by: NURSE PRACTITIONER

## 2019-01-01 PROCEDURE — 76770 US EXAM ABDO BACK WALL COMP: CPT

## 2019-01-01 PROCEDURE — 73502 X-RAY EXAM HIP UNI 2-3 VIEWS: CPT

## 2019-01-01 PROCEDURE — 82330 ASSAY OF CALCIUM: CPT

## 2019-01-01 PROCEDURE — 99213 OFFICE O/P EST LOW 20 MIN: CPT | Performed by: PODIATRIST

## 2019-01-01 PROCEDURE — 6360000002 HC RX W HCPCS: Performed by: EMERGENCY MEDICINE

## 2019-01-01 PROCEDURE — 72131 CT LUMBAR SPINE W/O DYE: CPT

## 2019-01-01 PROCEDURE — 93306 TTE W/DOPPLER COMPLETE: CPT

## 2019-01-01 PROCEDURE — 85027 COMPLETE CBC AUTOMATED: CPT

## 2019-01-01 PROCEDURE — 94660 CPAP INITIATION&MGMT: CPT

## 2019-01-01 PROCEDURE — 87502 INFLUENZA DNA AMP PROBE: CPT

## 2019-01-01 PROCEDURE — 85025 COMPLETE CBC W/AUTO DIFF WBC: CPT

## 2019-01-01 PROCEDURE — 1123F ACP DISCUSS/DSCN MKR DOCD: CPT | Performed by: INTERNAL MEDICINE

## 2019-01-01 PROCEDURE — 2500000003 HC RX 250 WO HCPCS: Performed by: INTERNAL MEDICINE

## 2019-01-01 PROCEDURE — 84145 PROCALCITONIN (PCT): CPT

## 2019-01-01 PROCEDURE — 36592 COLLECT BLOOD FROM PICC: CPT

## 2019-01-01 PROCEDURE — 93005 ELECTROCARDIOGRAM TRACING: CPT | Performed by: EMERGENCY MEDICINE

## 2019-01-01 PROCEDURE — G8484 FLU IMMUNIZE NO ADMIN: HCPCS | Performed by: PODIATRIST

## 2019-01-01 PROCEDURE — G0378 HOSPITAL OBSERVATION PER HR: HCPCS

## 2019-01-01 PROCEDURE — 96361 HYDRATE IV INFUSION ADD-ON: CPT

## 2019-01-01 PROCEDURE — 83880 ASSAY OF NATRIURETIC PEPTIDE: CPT

## 2019-01-01 PROCEDURE — 4040F PNEUMOC VAC/ADMIN/RCVD: CPT | Performed by: PHYSICIAN ASSISTANT

## 2019-01-01 PROCEDURE — G8599 NO ASA/ANTIPLAT THER USE RNG: HCPCS | Performed by: INTERNAL MEDICINE

## 2019-01-01 PROCEDURE — 2060000000 HC ICU INTERMEDIATE R&B

## 2019-01-01 PROCEDURE — 36591 DRAW BLOOD OFF VENOUS DEVICE: CPT

## 2019-01-01 PROCEDURE — 94640 AIRWAY INHALATION TREATMENT: CPT

## 2019-01-01 PROCEDURE — P9047 ALBUMIN (HUMAN), 25%, 50ML: HCPCS | Performed by: NURSE PRACTITIONER

## 2019-01-01 PROCEDURE — 1090F PRES/ABSN URINE INCON ASSESS: CPT | Performed by: INTERNAL MEDICINE

## 2019-01-01 PROCEDURE — 83735 ASSAY OF MAGNESIUM: CPT

## 2019-01-01 PROCEDURE — 36600 WITHDRAWAL OF ARTERIAL BLOOD: CPT

## 2019-01-01 PROCEDURE — 1123F ACP DISCUSS/DSCN MKR DOCD: CPT | Performed by: NURSE PRACTITIONER

## 2019-01-01 PROCEDURE — 84100 ASSAY OF PHOSPHORUS: CPT

## 2019-01-01 PROCEDURE — 96376 TX/PRO/DX INJ SAME DRUG ADON: CPT

## 2019-01-01 PROCEDURE — 93000 ELECTROCARDIOGRAM COMPLETE: CPT | Performed by: PHYSICIAN ASSISTANT

## 2019-01-01 PROCEDURE — 6370000000 HC RX 637 (ALT 250 FOR IP): Performed by: STUDENT IN AN ORGANIZED HEALTH CARE EDUCATION/TRAINING PROGRAM

## 2019-01-01 PROCEDURE — 97165 OT EVAL LOW COMPLEX 30 MIN: CPT

## 2019-01-01 PROCEDURE — 6360000004 HC RX CONTRAST MEDICATION: Performed by: RADIOLOGY

## 2019-01-01 PROCEDURE — 72158 MRI LUMBAR SPINE W/O & W/DYE: CPT

## 2019-01-01 PROCEDURE — 6360000002 HC RX W HCPCS: Performed by: NURSE PRACTITIONER

## 2019-01-01 PROCEDURE — 82962 GLUCOSE BLOOD TEST: CPT

## 2019-01-01 PROCEDURE — 97530 THERAPEUTIC ACTIVITIES: CPT

## 2019-01-01 PROCEDURE — 70450 CT HEAD/BRAIN W/O DYE: CPT

## 2019-01-01 PROCEDURE — 96374 THER/PROPH/DIAG INJ IV PUSH: CPT

## 2019-01-01 PROCEDURE — 94762 N-INVAS EAR/PLS OXIMTRY CONT: CPT

## 2019-01-01 PROCEDURE — 80048 BASIC METABOLIC PNL TOTAL CA: CPT

## 2019-01-01 PROCEDURE — G8598 ASA/ANTIPLAT THER USED: HCPCS | Performed by: NURSE PRACTITIONER

## 2019-01-01 PROCEDURE — 99233 SBSQ HOSP IP/OBS HIGH 50: CPT | Performed by: INTERNAL MEDICINE

## 2019-01-01 PROCEDURE — 94669 MECHANICAL CHEST WALL OSCILL: CPT

## 2019-01-01 PROCEDURE — 2500000003 HC RX 250 WO HCPCS: Performed by: FAMILY MEDICINE

## 2019-01-01 PROCEDURE — 93005 ELECTROCARDIOGRAM TRACING: CPT | Performed by: INTERNAL MEDICINE

## 2019-01-01 PROCEDURE — G8427 DOCREV CUR MEDS BY ELIG CLIN: HCPCS | Performed by: INTERNAL MEDICINE

## 2019-01-01 PROCEDURE — 93000 ELECTROCARDIOGRAM COMPLETE: CPT | Performed by: INTERNAL MEDICINE

## 2019-01-01 PROCEDURE — 6360000002 HC RX W HCPCS: Performed by: FAMILY MEDICINE

## 2019-01-01 PROCEDURE — 1123F ACP DISCUSS/DSCN MKR DOCD: CPT | Performed by: PODIATRIST

## 2019-01-01 PROCEDURE — 74176 CT ABD & PELVIS W/O CONTRAST: CPT

## 2019-01-01 PROCEDURE — 97110 THERAPEUTIC EXERCISES: CPT

## 2019-01-01 PROCEDURE — 1036F TOBACCO NON-USER: CPT | Performed by: INTERNAL MEDICINE

## 2019-01-01 PROCEDURE — G8482 FLU IMMUNIZE ORDER/ADMIN: HCPCS | Performed by: INTERNAL MEDICINE

## 2019-01-01 PROCEDURE — 99214 OFFICE O/P EST MOD 30 MIN: CPT | Performed by: NURSE PRACTITIONER

## 2019-01-01 PROCEDURE — 0BJ08ZZ INSPECTION OF TRACHEOBRONCHIAL TREE, VIA NATURAL OR ARTIFICIAL OPENING ENDOSCOPIC: ICD-10-PCS | Performed by: INTERNAL MEDICINE

## 2019-01-01 PROCEDURE — 93000 ELECTROCARDIOGRAM COMPLETE: CPT | Performed by: NURSE PRACTITIONER

## 2019-01-01 PROCEDURE — G0399 HOME SLEEP TEST/TYPE 3 PORTA: HCPCS

## 2019-01-01 PROCEDURE — 85730 THROMBOPLASTIN TIME PARTIAL: CPT

## 2019-01-01 PROCEDURE — 99232 SBSQ HOSP IP/OBS MODERATE 35: CPT | Performed by: INTERNAL MEDICINE

## 2019-01-01 PROCEDURE — 71045 X-RAY EXAM CHEST 1 VIEW: CPT

## 2019-01-01 PROCEDURE — 84443 ASSAY THYROID STIM HORMONE: CPT

## 2019-01-01 PROCEDURE — 99214 OFFICE O/P EST MOD 30 MIN: CPT | Performed by: INTERNAL MEDICINE

## 2019-01-01 PROCEDURE — 82607 VITAMIN B-12: CPT

## 2019-01-01 PROCEDURE — 99283 EMERGENCY DEPT VISIT LOW MDM: CPT

## 2019-01-01 PROCEDURE — 02HV33Z INSERTION OF INFUSION DEVICE INTO SUPERIOR VENA CAVA, PERCUTANEOUS APPROACH: ICD-10-PCS | Performed by: INTERNAL MEDICINE

## 2019-01-01 PROCEDURE — 0100U HC RESPIRPTHGN MULT REV TRANS & AMP PRB TECH 21 TRGT: CPT

## 2019-01-01 PROCEDURE — G8417 CALC BMI ABV UP PARAM F/U: HCPCS | Performed by: PODIATRIST

## 2019-01-01 PROCEDURE — APPSS60 APP SPLIT SHARED TIME 46-60 MINUTES: Performed by: NURSE PRACTITIONER

## 2019-01-01 PROCEDURE — 87186 SC STD MICRODIL/AGAR DIL: CPT

## 2019-01-01 PROCEDURE — 4040F PNEUMOC VAC/ADMIN/RCVD: CPT | Performed by: NURSE PRACTITIONER

## 2019-01-01 PROCEDURE — 73630 X-RAY EXAM OF FOOT: CPT

## 2019-01-01 PROCEDURE — 51702 INSERT TEMP BLADDER CATH: CPT

## 2019-01-01 PROCEDURE — 99214 OFFICE O/P EST MOD 30 MIN: CPT

## 2019-01-01 PROCEDURE — 1200000000 HC SEMI PRIVATE

## 2019-01-01 PROCEDURE — G8417 CALC BMI ABV UP PARAM F/U: HCPCS | Performed by: PHYSICIAN ASSISTANT

## 2019-01-01 PROCEDURE — G8417 CALC BMI ABV UP PARAM F/U: HCPCS | Performed by: NURSE PRACTITIONER

## 2019-01-01 PROCEDURE — G8417 CALC BMI ABV UP PARAM F/U: HCPCS | Performed by: INTERNAL MEDICINE

## 2019-01-01 PROCEDURE — 81003 URINALYSIS AUTO W/O SCOPE: CPT

## 2019-01-01 PROCEDURE — 83605 ASSAY OF LACTIC ACID: CPT

## 2019-01-01 PROCEDURE — 87088 URINE BACTERIA CULTURE: CPT

## 2019-01-01 PROCEDURE — 1090F PRES/ABSN URINE INCON ASSESS: CPT | Performed by: OTOLARYNGOLOGY

## 2019-01-01 PROCEDURE — 2580000003 HC RX 258: Performed by: EMERGENCY MEDICINE

## 2019-01-01 PROCEDURE — 87081 CULTURE SCREEN ONLY: CPT

## 2019-01-01 PROCEDURE — 1090F PRES/ABSN URINE INCON ASSESS: CPT | Performed by: PHYSICIAN ASSISTANT

## 2019-01-01 PROCEDURE — 2580000003 HC RX 258: Performed by: RADIOLOGY

## 2019-01-01 PROCEDURE — 99231 SBSQ HOSP IP/OBS SF/LOW 25: CPT | Performed by: INTERNAL MEDICINE

## 2019-01-01 PROCEDURE — G8598 ASA/ANTIPLAT THER USED: HCPCS | Performed by: INTERNAL MEDICINE

## 2019-01-01 PROCEDURE — 2000000000 HC ICU R&B

## 2019-01-01 PROCEDURE — 86850 RBC ANTIBODY SCREEN: CPT

## 2019-01-01 PROCEDURE — 1090F PRES/ABSN URINE INCON ASSESS: CPT | Performed by: NURSE PRACTITIONER

## 2019-01-01 PROCEDURE — 84133 ASSAY OF URINE POTASSIUM: CPT

## 2019-01-01 PROCEDURE — A9577 INJ MULTIHANCE: HCPCS | Performed by: RADIOLOGY

## 2019-01-01 PROCEDURE — 1090F PRES/ABSN URINE INCON ASSESS: CPT | Performed by: PODIATRIST

## 2019-01-01 PROCEDURE — V5160 DISPENSING FEE BINAURAL: HCPCS | Performed by: AUDIOLOGIST

## 2019-01-01 PROCEDURE — 2500000003 HC RX 250 WO HCPCS: Performed by: EMERGENCY MEDICINE

## 2019-01-01 PROCEDURE — 73564 X-RAY EXAM KNEE 4 OR MORE: CPT

## 2019-01-01 PROCEDURE — 6370000000 HC RX 637 (ALT 250 FOR IP): Performed by: NURSE PRACTITIONER

## 2019-01-01 PROCEDURE — 1036F TOBACCO NON-USER: CPT | Performed by: PHYSICIAN ASSISTANT

## 2019-01-01 PROCEDURE — 96368 THER/DIAG CONCURRENT INF: CPT

## 2019-01-01 PROCEDURE — 95810 POLYSOM 6/> YRS 4/> PARAM: CPT

## 2019-01-01 PROCEDURE — 1111F DSCHRG MED/CURRENT MED MERGE: CPT | Performed by: INTERNAL MEDICINE

## 2019-01-01 PROCEDURE — G8598 ASA/ANTIPLAT THER USED: HCPCS | Performed by: PHYSICIAN ASSISTANT

## 2019-01-01 PROCEDURE — 82436 ASSAY OF URINE CHLORIDE: CPT

## 2019-01-01 PROCEDURE — 82746 ASSAY OF FOLIC ACID SERUM: CPT

## 2019-01-01 PROCEDURE — 95811 POLYSOM 6/>YRS CPAP 4/> PARM: CPT

## 2019-01-01 PROCEDURE — 1123F ACP DISCUSS/DSCN MKR DOCD: CPT | Performed by: PHYSICIAN ASSISTANT

## 2019-01-01 PROCEDURE — 85610 PROTHROMBIN TIME: CPT

## 2019-01-01 PROCEDURE — 96365 THER/PROPH/DIAG IV INF INIT: CPT

## 2019-01-01 PROCEDURE — 73030 X-RAY EXAM OF SHOULDER: CPT

## 2019-01-01 PROCEDURE — 6370000000 HC RX 637 (ALT 250 FOR IP)

## 2019-01-01 PROCEDURE — 96375 TX/PRO/DX INJ NEW DRUG ADDON: CPT

## 2019-01-01 PROCEDURE — G8417 CALC BMI ABV UP PARAM F/U: HCPCS | Performed by: OTOLARYNGOLOGY

## 2019-01-01 PROCEDURE — 82728 ASSAY OF FERRITIN: CPT

## 2019-01-01 PROCEDURE — 93010 ELECTROCARDIOGRAM REPORT: CPT | Performed by: INTERNAL MEDICINE

## 2019-01-01 PROCEDURE — 97161 PT EVAL LOW COMPLEX 20 MIN: CPT

## 2019-01-01 PROCEDURE — 84439 ASSAY OF FREE THYROXINE: CPT

## 2019-01-01 PROCEDURE — 93971 EXTREMITY STUDY: CPT

## 2019-01-01 PROCEDURE — V5140 BEHIND EAR BINAUR HEARING AI: HCPCS | Performed by: AUDIOLOGIST

## 2019-01-01 PROCEDURE — 81001 URINALYSIS AUTO W/SCOPE: CPT

## 2019-01-01 PROCEDURE — 99284 EMERGENCY DEPT VISIT MOD MDM: CPT

## 2019-01-01 PROCEDURE — G8598 ASA/ANTIPLAT THER USED: HCPCS | Performed by: OTOLARYNGOLOGY

## 2019-01-01 PROCEDURE — C9113 INJ PANTOPRAZOLE SODIUM, VIA: HCPCS | Performed by: STUDENT IN AN ORGANIZED HEALTH CARE EDUCATION/TRAINING PROGRAM

## 2019-01-01 PROCEDURE — 86901 BLOOD TYPING SEROLOGIC RH(D): CPT

## 2019-01-01 PROCEDURE — 9990000010 HC NO CHARGE VISIT: Performed by: AUDIOLOGIST

## 2019-01-01 PROCEDURE — 4040F PNEUMOC VAC/ADMIN/RCVD: CPT | Performed by: OTOLARYNGOLOGY

## 2019-01-01 PROCEDURE — 93005 ELECTROCARDIOGRAM TRACING: CPT | Performed by: STUDENT IN AN ORGANIZED HEALTH CARE EDUCATION/TRAINING PROGRAM

## 2019-01-01 PROCEDURE — 82306 VITAMIN D 25 HYDROXY: CPT

## 2019-01-01 PROCEDURE — 99285 EMERGENCY DEPT VISIT HI MDM: CPT

## 2019-01-01 PROCEDURE — G8598 ASA/ANTIPLAT THER USED: HCPCS | Performed by: PODIATRIST

## 2019-01-01 PROCEDURE — 1101F PT FALLS ASSESS-DOCD LE1/YR: CPT | Performed by: INTERNAL MEDICINE

## 2019-01-01 PROCEDURE — 72125 CT NECK SPINE W/O DYE: CPT

## 2019-01-01 PROCEDURE — 97116 GAIT TRAINING THERAPY: CPT

## 2019-01-01 PROCEDURE — 94664 DEMO&/EVAL PT USE INHALER: CPT

## 2019-01-01 PROCEDURE — 3609027000 HC BRONCHOSCOPY: Performed by: INTERNAL MEDICINE

## 2019-01-01 PROCEDURE — 1036F TOBACCO NON-USER: CPT | Performed by: OTOLARYNGOLOGY

## 2019-01-01 PROCEDURE — 71110 X-RAY EXAM RIBS BIL 3 VIEWS: CPT

## 2019-01-01 PROCEDURE — 87040 BLOOD CULTURE FOR BACTERIA: CPT

## 2019-01-01 PROCEDURE — 83550 IRON BINDING TEST: CPT

## 2019-01-01 PROCEDURE — 4040F PNEUMOC VAC/ADMIN/RCVD: CPT | Performed by: PODIATRIST

## 2019-01-01 PROCEDURE — 99214 OFFICE O/P EST MOD 30 MIN: CPT | Performed by: PHYSICIAN ASSISTANT

## 2019-01-01 PROCEDURE — 80076 HEPATIC FUNCTION PANEL: CPT

## 2019-01-01 PROCEDURE — 99204 OFFICE O/P NEW MOD 45 MIN: CPT

## 2019-01-01 PROCEDURE — 82570 ASSAY OF URINE CREATININE: CPT

## 2019-01-01 PROCEDURE — 36569 INSJ PICC 5 YR+ W/O IMAGING: CPT

## 2019-01-01 PROCEDURE — 2580000003 HC RX 258: Performed by: STUDENT IN AN ORGANIZED HEALTH CARE EDUCATION/TRAINING PROGRAM

## 2019-01-01 PROCEDURE — 71275 CT ANGIOGRAPHY CHEST: CPT

## 2019-01-01 PROCEDURE — G8484 FLU IMMUNIZE NO ADMIN: HCPCS | Performed by: PHYSICIAN ASSISTANT

## 2019-01-01 PROCEDURE — 73200 CT UPPER EXTREMITY W/O DYE: CPT

## 2019-01-01 PROCEDURE — 2580000003 HC RX 258

## 2019-01-01 PROCEDURE — 1111F DSCHRG MED/CURRENT MED MERGE: CPT | Performed by: PHYSICIAN ASSISTANT

## 2019-01-01 PROCEDURE — 2709999900 HC NON-CHARGEABLE SUPPLY: Performed by: INTERNAL MEDICINE

## 2019-01-01 PROCEDURE — 1123F ACP DISCUSS/DSCN MKR DOCD: CPT | Performed by: OTOLARYNGOLOGY

## 2019-01-01 PROCEDURE — 82533 TOTAL CORTISOL: CPT

## 2019-01-01 PROCEDURE — 99213 OFFICE O/P EST LOW 20 MIN: CPT | Performed by: INTERNAL MEDICINE

## 2019-01-01 PROCEDURE — APPSS60 APP SPLIT SHARED TIME 46-60 MINUTES: Performed by: CLINICAL NURSE SPECIALIST

## 2019-01-01 PROCEDURE — 73090 X-RAY EXAM OF FOREARM: CPT

## 2019-01-01 PROCEDURE — 84300 ASSAY OF URINE SODIUM: CPT

## 2019-01-01 PROCEDURE — G8427 DOCREV CUR MEDS BY ELIG CLIN: HCPCS | Performed by: PODIATRIST

## 2019-01-01 PROCEDURE — 1036F TOBACCO NON-USER: CPT | Performed by: NURSE PRACTITIONER

## 2019-01-01 PROCEDURE — 84540 ASSAY OF URINE/UREA-N: CPT

## 2019-01-01 PROCEDURE — APPSS30 APP SPLIT SHARED TIME 16-30 MINUTES: Performed by: NURSE PRACTITIONER

## 2019-01-01 PROCEDURE — 1036F TOBACCO NON-USER: CPT | Performed by: PODIATRIST

## 2019-01-01 PROCEDURE — 92567 TYMPANOMETRY: CPT | Performed by: AUDIOLOGIST

## 2019-01-01 PROCEDURE — 6360000002 HC RX W HCPCS

## 2019-01-01 PROCEDURE — 83540 ASSAY OF IRON: CPT

## 2019-01-01 PROCEDURE — 99213 OFFICE O/P EST LOW 20 MIN: CPT | Performed by: OTOLARYNGOLOGY

## 2019-01-01 PROCEDURE — 73110 X-RAY EXAM OF WRIST: CPT

## 2019-01-01 PROCEDURE — G8427 DOCREV CUR MEDS BY ELIG CLIN: HCPCS | Performed by: PHYSICIAN ASSISTANT

## 2019-01-01 PROCEDURE — 71046 X-RAY EXAM CHEST 2 VIEWS: CPT

## 2019-01-01 PROCEDURE — 6360000002 HC RX W HCPCS: Performed by: CLINICAL NURSE SPECIALIST

## 2019-01-01 PROCEDURE — 6360000004 HC RX CONTRAST MEDICATION: Performed by: CLINICAL NURSE SPECIALIST

## 2019-01-01 PROCEDURE — 99291 CRITICAL CARE FIRST HOUR: CPT

## 2019-01-01 PROCEDURE — 93308 TTE F-UP OR LMTD: CPT

## 2019-01-01 PROCEDURE — 74177 CT ABD & PELVIS W/CONTRAST: CPT

## 2019-01-01 PROCEDURE — 92557 COMPREHENSIVE HEARING TEST: CPT | Performed by: AUDIOLOGIST

## 2019-01-01 PROCEDURE — 86900 BLOOD TYPING SEROLOGIC ABO: CPT

## 2019-01-01 PROCEDURE — 83970 ASSAY OF PARATHORMONE: CPT

## 2019-01-01 PROCEDURE — G8427 DOCREV CUR MEDS BY ELIG CLIN: HCPCS | Performed by: OTOLARYNGOLOGY

## 2019-01-01 PROCEDURE — G8427 DOCREV CUR MEDS BY ELIG CLIN: HCPCS | Performed by: NURSE PRACTITIONER

## 2019-01-01 PROCEDURE — 96366 THER/PROPH/DIAG IV INF ADDON: CPT

## 2019-01-01 PROCEDURE — 82805 BLOOD GASES W/O2 SATURATION: CPT

## 2019-01-01 PROCEDURE — 99222 1ST HOSP IP/OBS MODERATE 55: CPT | Performed by: INTERNAL MEDICINE

## 2019-01-01 PROCEDURE — APPSS30 APP SPLIT SHARED TIME 16-30 MINUTES: Performed by: PHYSICIAN ASSISTANT

## 2019-01-01 PROCEDURE — L3260 AMBULATORY SURGICAL BOOT EAC: HCPCS | Performed by: PODIATRIST

## 2019-01-01 RX ORDER — HEPARIN SODIUM 10000 [USP'U]/100ML
18 INJECTION, SOLUTION INTRAVENOUS CONTINUOUS
Status: DISCONTINUED | OUTPATIENT
Start: 2019-01-01 | End: 2019-01-01

## 2019-01-01 RX ORDER — SODIUM CHLORIDE 0.9 % (FLUSH) 0.9 %
10 SYRINGE (ML) INJECTION 2 TIMES DAILY
Status: DISCONTINUED | OUTPATIENT
Start: 2019-01-01 | End: 2019-01-01 | Stop reason: HOSPADM

## 2019-01-01 RX ORDER — FUROSEMIDE 10 MG/ML
20 INJECTION INTRAMUSCULAR; INTRAVENOUS ONCE
Status: COMPLETED | OUTPATIENT
Start: 2019-01-01 | End: 2019-01-01

## 2019-01-01 RX ORDER — ALBUTEROL SULFATE 2.5 MG/3ML
2.5 SOLUTION RESPIRATORY (INHALATION)
Qty: 120 EACH | Refills: 3 | DISCHARGE
Start: 2019-01-01 | End: 2019-01-01

## 2019-01-01 RX ORDER — FAMOTIDINE 20 MG/1
40 TABLET, FILM COATED ORAL DAILY
Status: DISCONTINUED | OUTPATIENT
Start: 2019-01-01 | End: 2019-01-01 | Stop reason: DRUGHIGH

## 2019-01-01 RX ORDER — KETOROLAC TROMETHAMINE 30 MG/ML
15 INJECTION, SOLUTION INTRAMUSCULAR; INTRAVENOUS ONCE
Status: COMPLETED | OUTPATIENT
Start: 2019-01-01 | End: 2019-01-01

## 2019-01-01 RX ORDER — FUROSEMIDE 40 MG/1
40 TABLET ORAL 2 TIMES DAILY
Status: DISCONTINUED | OUTPATIENT
Start: 2019-01-01 | End: 2019-01-01 | Stop reason: HOSPADM

## 2019-01-01 RX ORDER — ROPINIROLE 0.25 MG/1
0.25 TABLET, FILM COATED ORAL NIGHTLY
Status: DISCONTINUED | OUTPATIENT
Start: 2019-01-01 | End: 2019-01-01 | Stop reason: HOSPADM

## 2019-01-01 RX ORDER — POTASSIUM CHLORIDE 20 MEQ/1
40 TABLET, EXTENDED RELEASE ORAL ONCE
Status: COMPLETED | OUTPATIENT
Start: 2019-01-01 | End: 2019-01-01

## 2019-01-01 RX ORDER — FUROSEMIDE 40 MG/1
40 TABLET ORAL 2 TIMES DAILY
Status: DISCONTINUED | OUTPATIENT
Start: 2019-01-01 | End: 2019-01-01

## 2019-01-01 RX ORDER — METOPROLOL SUCCINATE 25 MG/1
25 TABLET, EXTENDED RELEASE ORAL 2 TIMES DAILY
Qty: 30 TABLET | Refills: 3 | Status: ON HOLD | OUTPATIENT
Start: 2019-01-01 | End: 2019-01-01 | Stop reason: HOSPADM

## 2019-01-01 RX ORDER — SIMVASTATIN 20 MG
10 TABLET ORAL NIGHTLY
Status: DISCONTINUED | OUTPATIENT
Start: 2019-01-01 | End: 2019-01-01 | Stop reason: HOSPADM

## 2019-01-01 RX ORDER — FUROSEMIDE 10 MG/ML
40 INJECTION INTRAMUSCULAR; INTRAVENOUS ONCE
Status: COMPLETED | OUTPATIENT
Start: 2019-01-01 | End: 2019-01-01

## 2019-01-01 RX ORDER — ASPIRIN 81 MG/1
81 TABLET, CHEWABLE ORAL DAILY
Qty: 30 TABLET | Refills: 3 | Status: SHIPPED | OUTPATIENT
Start: 2019-01-01 | End: 2019-01-01 | Stop reason: ALTCHOICE

## 2019-01-01 RX ORDER — LACTOBACILLUS RHAMNOSUS GG 10B CELL
1 CAPSULE ORAL DAILY
Status: DISCONTINUED | OUTPATIENT
Start: 2019-01-01 | End: 2019-01-01 | Stop reason: HOSPADM

## 2019-01-01 RX ORDER — ISOSORBIDE MONONITRATE 30 MG/1
30 TABLET, EXTENDED RELEASE ORAL DAILY
Status: DISCONTINUED | OUTPATIENT
Start: 2019-01-01 | End: 2019-01-01 | Stop reason: HOSPADM

## 2019-01-01 RX ORDER — ACETAMINOPHEN 325 MG/1
650 TABLET ORAL EVERY 4 HOURS PRN
Status: DISCONTINUED | OUTPATIENT
Start: 2019-01-01 | End: 2019-01-01 | Stop reason: HOSPADM

## 2019-01-01 RX ORDER — SODIUM CHLORIDE 0.9 % (FLUSH) 0.9 %
10 SYRINGE (ML) INJECTION PRN
Status: DISCONTINUED | OUTPATIENT
Start: 2019-01-01 | End: 2019-01-01 | Stop reason: HOSPADM

## 2019-01-01 RX ORDER — HYDROCODONE BITARTRATE AND ACETAMINOPHEN 5; 325 MG/1; MG/1
1 TABLET ORAL EVERY 8 HOURS
Refills: 0 | Status: ON HOLD | COMMUNITY
Start: 2019-01-01 | End: 2019-01-01 | Stop reason: HOSPADM

## 2019-01-01 RX ORDER — POTASSIUM CHLORIDE 20 MEQ/1
40 TABLET, EXTENDED RELEASE ORAL 2 TIMES DAILY
Qty: 60 TABLET | Refills: 0 | Status: SHIPPED | OUTPATIENT
Start: 2019-01-01 | End: 2019-01-01

## 2019-01-01 RX ORDER — ALBUTEROL SULFATE 2.5 MG/3ML
2.5 SOLUTION RESPIRATORY (INHALATION)
Status: DISCONTINUED | OUTPATIENT
Start: 2019-01-01 | End: 2019-01-01 | Stop reason: HOSPADM

## 2019-01-01 RX ORDER — FUROSEMIDE 10 MG/ML
20 INJECTION INTRAMUSCULAR; INTRAVENOUS 2 TIMES DAILY
Status: DISCONTINUED | OUTPATIENT
Start: 2019-01-01 | End: 2019-01-01

## 2019-01-01 RX ORDER — ROPINIROLE 0.5 MG/1
0.5 TABLET, FILM COATED ORAL DAILY
Status: DISCONTINUED | OUTPATIENT
Start: 2019-01-01 | End: 2019-01-01 | Stop reason: HOSPADM

## 2019-01-01 RX ORDER — 0.9 % SODIUM CHLORIDE 0.9 %
500 INTRAVENOUS SOLUTION INTRAVENOUS ONCE
Status: COMPLETED | OUTPATIENT
Start: 2019-01-01 | End: 2019-01-01

## 2019-01-01 RX ORDER — LEVOTHYROXINE SODIUM 0.12 MG/1
125 TABLET ORAL DAILY
Status: DISCONTINUED | OUTPATIENT
Start: 2019-01-01 | End: 2019-01-01 | Stop reason: HOSPADM

## 2019-01-01 RX ORDER — POTASSIUM CHLORIDE 20 MEQ/1
40 TABLET, EXTENDED RELEASE ORAL 2 TIMES DAILY
Status: DISCONTINUED | OUTPATIENT
Start: 2019-01-01 | End: 2019-01-01 | Stop reason: HOSPADM

## 2019-01-01 RX ORDER — SODIUM CHLORIDE 9 MG/ML
INJECTION, SOLUTION INTRAVENOUS CONTINUOUS
Status: DISCONTINUED | OUTPATIENT
Start: 2019-01-01 | End: 2019-01-01 | Stop reason: HOSPADM

## 2019-01-01 RX ORDER — POLYETHYLENE GLYCOL 3350 17 G/17G
17 POWDER, FOR SOLUTION ORAL DAILY
COMMUNITY
End: 2019-01-01

## 2019-01-01 RX ORDER — CLOTRIMAZOLE AND BETAMETHASONE DIPROPIONATE 10; .64 MG/G; MG/G
CREAM TOPICAL 2 TIMES DAILY
COMMUNITY
End: 2020-01-01

## 2019-01-01 RX ORDER — HYDROCODONE BITARTRATE AND ACETAMINOPHEN 5; 325 MG/1; MG/1
TABLET ORAL
Status: COMPLETED
Start: 2019-01-01 | End: 2019-01-01

## 2019-01-01 RX ORDER — POTASSIUM CHLORIDE 20 MEQ/1
40 TABLET, EXTENDED RELEASE ORAL DAILY
Qty: 60 TABLET | Refills: 3 | Status: ON HOLD | OUTPATIENT
Start: 2019-01-01 | End: 2019-01-01 | Stop reason: HOSPADM

## 2019-01-01 RX ORDER — CALCIUM CARBONATE 200(500)MG
1000 TABLET,CHEWABLE ORAL EVERY 4 HOURS PRN
Status: DISCONTINUED | OUTPATIENT
Start: 2019-01-01 | End: 2019-01-01 | Stop reason: HOSPADM

## 2019-01-01 RX ORDER — LISINOPRIL 20 MG/1
20 TABLET ORAL
Qty: 30 TABLET | Refills: 3 | Status: ON HOLD | OUTPATIENT
Start: 2019-01-01 | End: 2019-01-01 | Stop reason: HOSPADM

## 2019-01-01 RX ORDER — OXYCODONE HYDROCHLORIDE 5 MG/1
5 TABLET ORAL
Status: DISCONTINUED | OUTPATIENT
Start: 2019-01-01 | End: 2019-01-01

## 2019-01-01 RX ORDER — FENTANYL CITRATE 50 UG/ML
25 INJECTION, SOLUTION INTRAMUSCULAR; INTRAVENOUS ONCE
Status: COMPLETED | OUTPATIENT
Start: 2019-01-01 | End: 2019-01-01

## 2019-01-01 RX ORDER — SIMETHICONE 80 MG
160 TABLET,CHEWABLE ORAL
Status: DISCONTINUED | OUTPATIENT
Start: 2019-01-01 | End: 2019-01-01 | Stop reason: HOSPADM

## 2019-01-01 RX ORDER — PREDNISONE 1 MG/1
10 TABLET ORAL EVERY MORNING
Status: DISCONTINUED | OUTPATIENT
Start: 2019-01-01 | End: 2019-01-01 | Stop reason: HOSPADM

## 2019-01-01 RX ORDER — FENTANYL CITRATE 50 UG/ML
50 INJECTION, SOLUTION INTRAMUSCULAR; INTRAVENOUS ONCE
Status: COMPLETED | OUTPATIENT
Start: 2019-01-01 | End: 2019-01-01

## 2019-01-01 RX ORDER — 0.9 % SODIUM CHLORIDE 0.9 %
250 INTRAVENOUS SOLUTION INTRAVENOUS ONCE
Status: COMPLETED | OUTPATIENT
Start: 2019-01-01 | End: 2019-01-01

## 2019-01-01 RX ORDER — PREDNISONE 1 MG/1
10 TABLET ORAL DAILY
Status: DISCONTINUED | OUTPATIENT
Start: 2019-01-01 | End: 2019-01-01 | Stop reason: HOSPADM

## 2019-01-01 RX ORDER — LACTOBACILLUS RHAMNOSUS GG 10B CELL
1 CAPSULE ORAL
Status: DISCONTINUED | OUTPATIENT
Start: 2019-01-01 | End: 2019-01-01 | Stop reason: HOSPADM

## 2019-01-01 RX ORDER — ALBUTEROL SULFATE 2.5 MG/3ML
2.5 SOLUTION RESPIRATORY (INHALATION)
Status: COMPLETED | OUTPATIENT
Start: 2019-01-01 | End: 2019-01-01

## 2019-01-01 RX ORDER — OXYCODONE HYDROCHLORIDE 5 MG/1
5 TABLET ORAL 3 TIMES DAILY
Status: ON HOLD | COMMUNITY
End: 2019-01-01 | Stop reason: HOSPADM

## 2019-01-01 RX ORDER — HYDROCODONE BITARTRATE AND ACETAMINOPHEN 7.5; 325 MG/1; MG/1
1 TABLET ORAL EVERY 6 HOURS PRN
Status: DISCONTINUED | OUTPATIENT
Start: 2019-01-01 | End: 2019-01-01 | Stop reason: HOSPADM

## 2019-01-01 RX ORDER — CLOTRIMAZOLE 1 %
CREAM (GRAM) TOPICAL 2 TIMES DAILY PRN
Status: DISCONTINUED | OUTPATIENT
Start: 2019-01-01 | End: 2019-01-01 | Stop reason: HOSPADM

## 2019-01-01 RX ORDER — METHYLPREDNISOLONE SODIUM SUCCINATE 40 MG/ML
20 INJECTION, POWDER, LYOPHILIZED, FOR SOLUTION INTRAMUSCULAR; INTRAVENOUS EVERY 12 HOURS
Status: DISCONTINUED | OUTPATIENT
Start: 2019-01-01 | End: 2019-01-01

## 2019-01-01 RX ORDER — AMIODARONE HYDROCHLORIDE 200 MG/1
200 TABLET ORAL EVERY MORNING
Status: DISCONTINUED | OUTPATIENT
Start: 2019-01-01 | End: 2019-01-01 | Stop reason: HOSPADM

## 2019-01-01 RX ORDER — POTASSIUM CHLORIDE 20 MEQ/1
40 TABLET, EXTENDED RELEASE ORAL
Status: DISCONTINUED | OUTPATIENT
Start: 2019-01-01 | End: 2019-01-01 | Stop reason: HOSPADM

## 2019-01-01 RX ORDER — CHOLECALCIFEROL (VITAMIN D3) 50 MCG
2000 TABLET ORAL
Status: DISCONTINUED | OUTPATIENT
Start: 2019-01-01 | End: 2019-01-01 | Stop reason: HOSPADM

## 2019-01-01 RX ORDER — POTASSIUM CHLORIDE 7.45 MG/ML
10 INJECTION INTRAVENOUS ONCE
Status: COMPLETED | OUTPATIENT
Start: 2019-01-01 | End: 2019-01-01

## 2019-01-01 RX ORDER — HYDROCODONE BITARTRATE AND ACETAMINOPHEN 5; 325 MG/1; MG/1
2 TABLET ORAL EVERY 6 HOURS PRN
Status: DISCONTINUED | OUTPATIENT
Start: 2019-01-01 | End: 2019-01-01 | Stop reason: HOSPADM

## 2019-01-01 RX ORDER — METOLAZONE 2.5 MG/1
2.5 TABLET ORAL
Status: DISCONTINUED | OUTPATIENT
Start: 2019-01-01 | End: 2019-01-01 | Stop reason: HOSPADM

## 2019-01-01 RX ORDER — ONDANSETRON 4 MG/1
4 TABLET, FILM COATED ORAL EVERY 8 HOURS PRN
COMMUNITY

## 2019-01-01 RX ORDER — METOPROLOL SUCCINATE 50 MG/1
50 TABLET, EXTENDED RELEASE ORAL 2 TIMES DAILY
Qty: 30 TABLET | Refills: 3 | Status: ON HOLD | OUTPATIENT
Start: 2019-01-01 | End: 2019-01-01 | Stop reason: HOSPADM

## 2019-01-01 RX ORDER — NICOTINE POLACRILEX 4 MG
15 LOZENGE BUCCAL PRN
Status: DISCONTINUED | OUTPATIENT
Start: 2019-01-01 | End: 2019-01-01 | Stop reason: HOSPADM

## 2019-01-01 RX ORDER — HYDROCODONE BITARTRATE AND ACETAMINOPHEN 5; 325 MG/1; MG/1
1 TABLET ORAL ONCE
Status: DISCONTINUED | OUTPATIENT
Start: 2019-01-01 | End: 2019-01-01 | Stop reason: HOSPADM

## 2019-01-01 RX ORDER — ALPRAZOLAM 0.25 MG/1
0.25 TABLET ORAL NIGHTLY PRN
Status: DISCONTINUED | OUTPATIENT
Start: 2019-01-01 | End: 2019-01-01 | Stop reason: HOSPADM

## 2019-01-01 RX ORDER — IPRATROPIUM BROMIDE AND ALBUTEROL SULFATE 2.5; .5 MG/3ML; MG/3ML
3 SOLUTION RESPIRATORY (INHALATION) ONCE
Status: COMPLETED | OUTPATIENT
Start: 2019-01-01 | End: 2019-01-01

## 2019-01-01 RX ORDER — ISOSORBIDE MONONITRATE 30 MG/1
30 TABLET, EXTENDED RELEASE ORAL DAILY
Qty: 30 TABLET | Refills: 3 | Status: SHIPPED | OUTPATIENT
Start: 2019-01-01 | End: 2019-01-01 | Stop reason: SINTOL

## 2019-01-01 RX ORDER — HEPARIN SODIUM 1000 [USP'U]/ML
80 INJECTION, SOLUTION INTRAVENOUS; SUBCUTANEOUS PRN
Status: DISCONTINUED | OUTPATIENT
Start: 2019-01-01 | End: 2019-01-01

## 2019-01-01 RX ORDER — RALOXIFENE HYDROCHLORIDE 60 MG/1
60 TABLET, FILM COATED ORAL EVERY MORNING
Status: DISCONTINUED | OUTPATIENT
Start: 2019-01-01 | End: 2019-01-01 | Stop reason: HOSPADM

## 2019-01-01 RX ORDER — CALCIUM CARBONATE/VITAMIN D3 600 MG-10
1 TABLET ORAL
COMMUNITY

## 2019-01-01 RX ORDER — ACETAMINOPHEN 500 MG
1000 TABLET ORAL ONCE
Status: COMPLETED | OUTPATIENT
Start: 2019-01-01 | End: 2019-01-01

## 2019-01-01 RX ORDER — FERROUS SULFATE 325(65) MG
325 TABLET ORAL
Status: DISCONTINUED | OUTPATIENT
Start: 2019-01-01 | End: 2019-01-01 | Stop reason: HOSPADM

## 2019-01-01 RX ORDER — PANTOPRAZOLE SODIUM 40 MG/1
40 TABLET, DELAYED RELEASE ORAL
Status: DISCONTINUED | OUTPATIENT
Start: 2019-01-01 | End: 2019-01-01 | Stop reason: HOSPADM

## 2019-01-01 RX ORDER — FAMOTIDINE 20 MG/1
40 TABLET, FILM COATED ORAL DAILY
Status: DISCONTINUED | OUTPATIENT
Start: 2019-01-01 | End: 2019-01-01 | Stop reason: HOSPADM

## 2019-01-01 RX ORDER — LISINOPRIL 5 MG/1
5 TABLET ORAL NIGHTLY
Status: DISCONTINUED | OUTPATIENT
Start: 2019-01-01 | End: 2019-01-01 | Stop reason: HOSPADM

## 2019-01-01 RX ORDER — FAMOTIDINE 20 MG/1
40 TABLET, FILM COATED ORAL 2 TIMES DAILY
Status: DISCONTINUED | OUTPATIENT
Start: 2019-01-01 | End: 2019-01-01 | Stop reason: DRUGHIGH

## 2019-01-01 RX ORDER — DEXTROSE MONOHYDRATE 25 G/50ML
12.5 INJECTION, SOLUTION INTRAVENOUS PRN
Status: DISCONTINUED | OUTPATIENT
Start: 2019-01-01 | End: 2019-01-01 | Stop reason: HOSPADM

## 2019-01-01 RX ORDER — LEVOTHYROXINE SODIUM 0.12 MG/1
125 TABLET ORAL DAILY
COMMUNITY

## 2019-01-01 RX ORDER — 0.9 % SODIUM CHLORIDE 0.9 %
250 INTRAVENOUS SOLUTION INTRAVENOUS ONCE
Status: DISCONTINUED | OUTPATIENT
Start: 2019-01-01 | End: 2019-01-01

## 2019-01-01 RX ORDER — POTASSIUM CHLORIDE 7.45 MG/ML
10 INJECTION INTRAVENOUS
Status: COMPLETED | OUTPATIENT
Start: 2019-01-01 | End: 2019-01-01

## 2019-01-01 RX ORDER — METOLAZONE 2.5 MG/1
2.5 TABLET ORAL DAILY
Status: DISCONTINUED | OUTPATIENT
Start: 2019-01-01 | End: 2019-01-01 | Stop reason: HOSPADM

## 2019-01-01 RX ORDER — FUROSEMIDE 40 MG/1
40 TABLET ORAL 2 TIMES DAILY
Qty: 60 TABLET | Refills: 3 | Status: SHIPPED | OUTPATIENT
Start: 2019-01-01

## 2019-01-01 RX ORDER — OXYCODONE HYDROCHLORIDE 5 MG/1
10 TABLET ORAL
Status: DISCONTINUED | OUTPATIENT
Start: 2019-01-01 | End: 2019-01-01 | Stop reason: HOSPADM

## 2019-01-01 RX ORDER — SIMETHICONE 180 MG
1 CAPSULE ORAL
Status: DISCONTINUED | OUTPATIENT
Start: 2019-01-01 | End: 2019-01-01 | Stop reason: CLARIF

## 2019-01-01 RX ORDER — FUROSEMIDE 10 MG/ML
40 INJECTION INTRAMUSCULAR; INTRAVENOUS 2 TIMES DAILY
Status: DISPENSED | OUTPATIENT
Start: 2019-01-01 | End: 2019-01-01

## 2019-01-01 RX ORDER — HEPARIN SODIUM 1000 [USP'U]/ML
40 INJECTION, SOLUTION INTRAVENOUS; SUBCUTANEOUS PRN
Status: DISCONTINUED | OUTPATIENT
Start: 2019-01-01 | End: 2019-01-01

## 2019-01-01 RX ORDER — FLUTICASONE PROPIONATE 50 MCG
1 SPRAY, SUSPENSION (ML) NASAL DAILY
Status: DISCONTINUED | OUTPATIENT
Start: 2019-01-01 | End: 2019-01-01 | Stop reason: HOSPADM

## 2019-01-01 RX ORDER — POTASSIUM CHLORIDE 7.45 MG/ML
10 INJECTION INTRAVENOUS
Status: DISCONTINUED | OUTPATIENT
Start: 2019-01-01 | End: 2019-01-01

## 2019-01-01 RX ORDER — PREDNISONE 1 MG/1
10 TABLET ORAL DAILY
Status: DISCONTINUED | OUTPATIENT
Start: 2019-01-01 | End: 2019-01-01

## 2019-01-01 RX ORDER — OXYCODONE HYDROCHLORIDE 5 MG/1
5 TABLET ORAL EVERY 4 HOURS PRN
Status: DISCONTINUED | OUTPATIENT
Start: 2019-01-01 | End: 2019-01-01

## 2019-01-01 RX ORDER — DIGOXIN 0.25 MG/ML
500 INJECTION INTRAMUSCULAR; INTRAVENOUS ONCE
Status: COMPLETED | OUTPATIENT
Start: 2019-01-01 | End: 2019-01-01

## 2019-01-01 RX ORDER — HYDROCODONE BITARTRATE AND ACETAMINOPHEN 5; 325 MG/1; MG/1
1 TABLET ORAL ONCE
Status: COMPLETED | OUTPATIENT
Start: 2019-01-01 | End: 2019-01-01

## 2019-01-01 RX ORDER — OYSTER SHELL CALCIUM WITH VITAMIN D 500; 200 MG/1; [IU]/1
1 TABLET, FILM COATED ORAL
Status: DISCONTINUED | OUTPATIENT
Start: 2019-01-01 | End: 2019-01-01 | Stop reason: HOSPADM

## 2019-01-01 RX ORDER — MAGNESIUM SULFATE IN WATER 40 MG/ML
2 INJECTION, SOLUTION INTRAVENOUS ONCE
Status: COMPLETED | OUTPATIENT
Start: 2019-01-01 | End: 2019-01-01

## 2019-01-01 RX ORDER — FENTANYL CITRATE 50 UG/ML
50 INJECTION, SOLUTION INTRAMUSCULAR; INTRAVENOUS EVERY 4 HOURS
Status: DISCONTINUED | OUTPATIENT
Start: 2019-01-01 | End: 2019-01-01

## 2019-01-01 RX ORDER — HYDROCODONE BITARTRATE AND ACETAMINOPHEN 5; 325 MG/1; MG/1
1 TABLET ORAL EVERY 8 HOURS
Status: DISCONTINUED | OUTPATIENT
Start: 2019-01-01 | End: 2019-01-01 | Stop reason: HOSPADM

## 2019-01-01 RX ORDER — PREDNISONE 20 MG/1
20 TABLET ORAL DAILY
Status: COMPLETED | OUTPATIENT
Start: 2019-01-01 | End: 2019-01-01

## 2019-01-01 RX ORDER — FENTANYL CITRATE 50 UG/ML
50 INJECTION, SOLUTION INTRAMUSCULAR; INTRAVENOUS EVERY 4 HOURS PRN
Status: DISCONTINUED | OUTPATIENT
Start: 2019-01-01 | End: 2019-01-01

## 2019-01-01 RX ORDER — PREDNISONE 20 MG/1
20 TABLET ORAL DAILY
Status: DISCONTINUED | OUTPATIENT
Start: 2019-01-01 | End: 2019-01-01 | Stop reason: HOSPADM

## 2019-01-01 RX ORDER — METHYLPREDNISOLONE SODIUM SUCCINATE 125 MG/2ML
125 INJECTION, POWDER, LYOPHILIZED, FOR SOLUTION INTRAMUSCULAR; INTRAVENOUS ONCE
Status: COMPLETED | OUTPATIENT
Start: 2019-01-01 | End: 2019-01-01

## 2019-01-01 RX ORDER — HYDROCORTISONE ACETATE 25 MG/1
25 SUPPOSITORY RECTAL DAILY
Status: DISCONTINUED | OUTPATIENT
Start: 2019-01-01 | End: 2019-01-01 | Stop reason: HOSPADM

## 2019-01-01 RX ORDER — OXYCODONE HYDROCHLORIDE 5 MG/1
5 TABLET ORAL EVERY 4 HOURS PRN
Status: ON HOLD | COMMUNITY
End: 2019-01-01 | Stop reason: HOSPADM

## 2019-01-01 RX ORDER — SODIUM CHLORIDE 0.9 % (FLUSH) 0.9 %
10 SYRINGE (ML) INJECTION 2 TIMES DAILY
Status: DISCONTINUED | OUTPATIENT
Start: 2019-01-01 | End: 2019-01-01 | Stop reason: SDUPTHER

## 2019-01-01 RX ORDER — SENNA PLUS 8.6 MG/1
1 TABLET ORAL EVERY MORNING
Status: DISCONTINUED | OUTPATIENT
Start: 2019-01-01 | End: 2019-01-01 | Stop reason: HOSPADM

## 2019-01-01 RX ORDER — METOPROLOL SUCCINATE 25 MG/1
25 TABLET, EXTENDED RELEASE ORAL 2 TIMES DAILY
Status: DISCONTINUED | OUTPATIENT
Start: 2019-01-01 | End: 2019-01-01 | Stop reason: HOSPADM

## 2019-01-01 RX ORDER — RALOXIFENE HYDROCHLORIDE 60 MG/1
60 TABLET, FILM COATED ORAL EVERY MORNING
Status: DISCONTINUED | OUTPATIENT
Start: 2019-01-01 | End: 2019-01-01 | Stop reason: CLARIF

## 2019-01-01 RX ORDER — AMIODARONE HYDROCHLORIDE 200 MG/1
TABLET ORAL
Qty: 30 TABLET | Refills: 10 | Status: ON HOLD | OUTPATIENT
Start: 2019-01-01 | End: 2019-01-01 | Stop reason: HOSPADM

## 2019-01-01 RX ORDER — CALCIUM CARBONATE/VITAMIN D3 600 MG-10
1 TABLET ORAL
Status: DISCONTINUED | OUTPATIENT
Start: 2019-01-01 | End: 2019-01-01 | Stop reason: CLARIF

## 2019-01-01 RX ORDER — AMIODARONE HYDROCHLORIDE 200 MG/1
200 TABLET ORAL EVERY MORNING
Status: DISCONTINUED | OUTPATIENT
Start: 2019-01-01 | End: 2019-01-01

## 2019-01-01 RX ORDER — NYSTATIN 100000 [USP'U]/G
POWDER TOPICAL 4 TIMES DAILY
Status: ON HOLD | COMMUNITY
End: 2019-01-01 | Stop reason: HOSPADM

## 2019-01-01 RX ORDER — ROPINIROLE 0.5 MG/1
0.5 TABLET, FILM COATED ORAL ONCE
Status: COMPLETED | OUTPATIENT
Start: 2019-01-01 | End: 2019-01-01

## 2019-01-01 RX ORDER — HYDROCORTISONE ACETATE 25 MG/1
25 SUPPOSITORY RECTAL 2 TIMES DAILY PRN
DISCHARGE
Start: 2019-01-01

## 2019-01-01 RX ORDER — LISINOPRIL 20 MG/1
20 TABLET ORAL
Status: DISCONTINUED | OUTPATIENT
Start: 2019-01-01 | End: 2019-01-01 | Stop reason: HOSPADM

## 2019-01-01 RX ORDER — SODIUM CHLORIDE 9 MG/ML
INJECTION, SOLUTION INTRAVENOUS CONTINUOUS
Status: ACTIVE | OUTPATIENT
Start: 2019-01-01 | End: 2019-01-01

## 2019-01-01 RX ORDER — DEXTROSE MONOHYDRATE 50 MG/ML
100 INJECTION, SOLUTION INTRAVENOUS PRN
Status: DISCONTINUED | OUTPATIENT
Start: 2019-01-01 | End: 2019-01-01 | Stop reason: HOSPADM

## 2019-01-01 RX ORDER — ONDANSETRON 2 MG/ML
4 INJECTION INTRAMUSCULAR; INTRAVENOUS ONCE
Status: COMPLETED | OUTPATIENT
Start: 2019-01-01 | End: 2019-01-01

## 2019-01-01 RX ORDER — HYDROCODONE BITARTRATE AND ACETAMINOPHEN 10; 325 MG/1; MG/1
1 TABLET ORAL EVERY 6 HOURS PRN
Qty: 1 TABLET | Refills: 0
Start: 2019-01-01 | End: 2019-01-01

## 2019-01-01 RX ORDER — ASPIRIN 81 MG/1
324 TABLET, CHEWABLE ORAL ONCE
Status: COMPLETED | OUTPATIENT
Start: 2019-01-01 | End: 2019-01-01

## 2019-01-01 RX ORDER — FAMOTIDINE 20 MG/1
10 TABLET, FILM COATED ORAL DAILY
Status: DISCONTINUED | OUTPATIENT
Start: 2019-01-01 | End: 2019-01-01 | Stop reason: HOSPADM

## 2019-01-01 RX ORDER — SODIUM CHLORIDE 0.9 % (FLUSH) 0.9 %
10 SYRINGE (ML) INJECTION PRN
Status: COMPLETED | OUTPATIENT
Start: 2019-01-01 | End: 2019-01-01

## 2019-01-01 RX ORDER — MIDAZOLAM HYDROCHLORIDE 1 MG/ML
INJECTION INTRAMUSCULAR; INTRAVENOUS
Status: DISCONTINUED
Start: 2019-01-01 | End: 2019-01-01

## 2019-01-01 RX ORDER — HYDROCORTISONE ACETATE 25 MG/1
25 SUPPOSITORY RECTAL DAILY
Status: DISCONTINUED | OUTPATIENT
Start: 2019-01-01 | End: 2019-01-01

## 2019-01-01 RX ORDER — HYDROCODONE BITARTRATE AND ACETAMINOPHEN 10; 325 MG/1; MG/1
1 TABLET ORAL EVERY 6 HOURS PRN
Status: DISCONTINUED | OUTPATIENT
Start: 2019-01-01 | End: 2019-01-01 | Stop reason: HOSPADM

## 2019-01-01 RX ORDER — SODIUM CHLORIDE 0.9 % (FLUSH) 0.9 %
10 SYRINGE (ML) INJECTION PRN
Status: DISCONTINUED | OUTPATIENT
Start: 2019-01-01 | End: 2019-01-01 | Stop reason: SDUPTHER

## 2019-01-01 RX ORDER — POTASSIUM CHLORIDE 29.8 MG/ML
10 INJECTION INTRAVENOUS
Status: COMPLETED | OUTPATIENT
Start: 2019-01-01 | End: 2019-01-01

## 2019-01-01 RX ORDER — FUROSEMIDE 10 MG/ML
INJECTION INTRAMUSCULAR; INTRAVENOUS
Status: COMPLETED
Start: 2019-01-01 | End: 2019-01-01

## 2019-01-01 RX ORDER — BISACODYL 10 MG
10 SUPPOSITORY, RECTAL RECTAL DAILY PRN
DISCHARGE
Start: 2019-01-01 | End: 2019-01-01

## 2019-01-01 RX ORDER — FUROSEMIDE 10 MG/ML
40 INJECTION INTRAMUSCULAR; INTRAVENOUS 2 TIMES DAILY
Status: DISCONTINUED | OUTPATIENT
Start: 2019-01-01 | End: 2019-01-01

## 2019-01-01 RX ORDER — METOPROLOL SUCCINATE 50 MG/1
50 TABLET, EXTENDED RELEASE ORAL 2 TIMES DAILY
Status: DISCONTINUED | OUTPATIENT
Start: 2019-01-01 | End: 2019-01-01 | Stop reason: HOSPADM

## 2019-01-01 RX ORDER — PREDNISONE 1 MG/1
10 TABLET ORAL SEE ADMIN INSTRUCTIONS
Status: ON HOLD | COMMUNITY
End: 2019-01-01 | Stop reason: HOSPADM

## 2019-01-01 RX ORDER — OXYCODONE HYDROCHLORIDE 10 MG/1
10 TABLET ORAL
Qty: 9 TABLET | Refills: 0 | Status: SHIPPED | OUTPATIENT
Start: 2019-01-01 | End: 2019-01-01

## 2019-01-01 RX ORDER — BUDESONIDE 0.5 MG/2ML
1000 INHALANT ORAL 2 TIMES DAILY
Status: DISCONTINUED | OUTPATIENT
Start: 2019-01-01 | End: 2019-01-01 | Stop reason: HOSPADM

## 2019-01-01 RX ORDER — SODIUM CHLORIDE 0.9 % (FLUSH) 0.9 %
10 SYRINGE (ML) INJECTION EVERY 12 HOURS SCHEDULED
Status: DISCONTINUED | OUTPATIENT
Start: 2019-01-01 | End: 2019-01-01 | Stop reason: SDUPTHER

## 2019-01-01 RX ORDER — METHYLPREDNISOLONE SODIUM SUCCINATE 40 MG/ML
40 INJECTION, POWDER, LYOPHILIZED, FOR SOLUTION INTRAMUSCULAR; INTRAVENOUS EVERY 12 HOURS
Status: DISCONTINUED | OUTPATIENT
Start: 2019-01-01 | End: 2019-01-01

## 2019-01-01 RX ORDER — IPRATROPIUM BROMIDE AND ALBUTEROL SULFATE 2.5; .5 MG/3ML; MG/3ML
1 SOLUTION RESPIRATORY (INHALATION) EVERY 4 HOURS
Status: DISCONTINUED | OUTPATIENT
Start: 2019-01-01 | End: 2019-01-01

## 2019-01-01 RX ORDER — FORMOTEROL FUMARATE 20 UG/2ML
20 SOLUTION RESPIRATORY (INHALATION) EVERY 12 HOURS
DISCHARGE
Start: 2019-01-01

## 2019-01-01 RX ORDER — BUMETANIDE 0.25 MG/ML
1 INJECTION, SOLUTION INTRAMUSCULAR; INTRAVENOUS ONCE
Status: COMPLETED | OUTPATIENT
Start: 2019-01-01 | End: 2019-01-01

## 2019-01-01 RX ORDER — ROPINIROLE 0.5 MG/1
0.5 TABLET, FILM COATED ORAL DAILY
Status: ON HOLD | COMMUNITY
End: 2019-01-01 | Stop reason: HOSPADM

## 2019-01-01 RX ORDER — ASPIRIN 81 MG/1
81 TABLET, CHEWABLE ORAL DAILY
Status: DISCONTINUED | OUTPATIENT
Start: 2019-01-01 | End: 2019-01-01 | Stop reason: HOSPADM

## 2019-01-01 RX ORDER — PREDNISONE 1 MG/1
10 TABLET ORAL SEE ADMIN INSTRUCTIONS
Status: DISCONTINUED | OUTPATIENT
Start: 2019-01-01 | End: 2019-01-01

## 2019-01-01 RX ORDER — HYDROCODONE BITARTRATE AND ACETAMINOPHEN 10; 325 MG/1; MG/1
1 TABLET ORAL EVERY 6 HOURS PRN
Qty: 30 TABLET | Refills: 0
Start: 2019-01-01 | End: 2019-01-01 | Stop reason: DRUGHIGH

## 2019-01-01 RX ORDER — SODIUM CHLORIDE 9 MG/ML
INJECTION, SOLUTION INTRAVENOUS ONCE
Status: COMPLETED | OUTPATIENT
Start: 2019-01-01 | End: 2019-01-01

## 2019-01-01 RX ORDER — HYDROCODONE BITARTRATE AND ACETAMINOPHEN 5; 325 MG/1; MG/1
1 TABLET ORAL EVERY 6 HOURS PRN
Status: DISCONTINUED | OUTPATIENT
Start: 2019-01-01 | End: 2019-01-01 | Stop reason: HOSPADM

## 2019-01-01 RX ORDER — HYDROCODONE BITARTRATE AND ACETAMINOPHEN 5; 325 MG/1; MG/1
1 TABLET ORAL EVERY 6 HOURS PRN
Qty: 1 TABLET | Refills: 0
Start: 2019-01-01 | End: 2019-01-01

## 2019-01-01 RX ORDER — DOXYCYCLINE HYCLATE 100 MG/1
100 CAPSULE ORAL 2 TIMES DAILY
COMMUNITY
End: 2019-01-01

## 2019-01-01 RX ORDER — BUDESONIDE 0.5 MG/2ML
1000 INHALANT ORAL 2 TIMES DAILY
Qty: 60 AMPULE | Refills: 3 | DISCHARGE
Start: 2019-01-01

## 2019-01-01 RX ORDER — METOLAZONE 2.5 MG/1
2.5 TABLET ORAL WEEKLY
Status: DISCONTINUED | OUTPATIENT
Start: 2019-01-01 | End: 2019-01-01

## 2019-01-01 RX ORDER — ONDANSETRON 2 MG/ML
4 INJECTION INTRAMUSCULAR; INTRAVENOUS EVERY 6 HOURS PRN
Status: DISCONTINUED | OUTPATIENT
Start: 2019-01-01 | End: 2019-01-01 | Stop reason: HOSPADM

## 2019-01-01 RX ORDER — LACTULOSE 10 G/15ML
20 SOLUTION ORAL NIGHTLY PRN
Status: DISCONTINUED | OUTPATIENT
Start: 2019-01-01 | End: 2019-01-01 | Stop reason: HOSPADM

## 2019-01-01 RX ORDER — PREDNISONE 10 MG/1
10 TABLET ORAL DAILY
COMMUNITY

## 2019-01-01 RX ORDER — CETIRIZINE HYDROCHLORIDE 10 MG/1
10 TABLET ORAL DAILY
Status: DISCONTINUED | OUTPATIENT
Start: 2019-01-01 | End: 2019-01-01 | Stop reason: HOSPADM

## 2019-01-01 RX ORDER — FUROSEMIDE 40 MG/1
40 TABLET ORAL DAILY
Status: DISCONTINUED | OUTPATIENT
Start: 2019-01-01 | End: 2019-01-01

## 2019-01-01 RX ORDER — LEVOTHYROXINE SODIUM 0.12 MG/1
125 TABLET ORAL DAILY
Status: ON HOLD | COMMUNITY
End: 2019-01-01 | Stop reason: HOSPADM

## 2019-01-01 RX ORDER — AMIODARONE HYDROCHLORIDE 200 MG/1
200 TABLET ORAL EVERY OTHER DAY
Qty: 30 TABLET | Refills: 3 | Status: SHIPPED | OUTPATIENT
Start: 2019-01-01 | End: 2019-01-01 | Stop reason: DRUGHIGH

## 2019-01-01 RX ORDER — METOPROLOL TARTRATE 5 MG/5ML
2.5 INJECTION INTRAVENOUS ONCE
Status: COMPLETED | OUTPATIENT
Start: 2019-01-01 | End: 2019-01-01

## 2019-01-01 RX ORDER — FEXOFENADINE HCL 180 MG/1
TABLET ORAL
Refills: 5 | Status: ON HOLD | COMMUNITY
Start: 2019-01-01 | End: 2019-01-01 | Stop reason: HOSPADM

## 2019-01-01 RX ORDER — LISINOPRIL 20 MG/1
40 TABLET ORAL NIGHTLY
Status: DISCONTINUED | OUTPATIENT
Start: 2019-01-01 | End: 2019-01-01 | Stop reason: HOSPADM

## 2019-01-01 RX ORDER — FUROSEMIDE 10 MG/ML
40 INJECTION INTRAMUSCULAR; INTRAVENOUS DAILY
Status: DISCONTINUED | OUTPATIENT
Start: 2019-01-01 | End: 2019-01-01

## 2019-01-01 RX ORDER — FOLIC ACID/VIT B COMPLEX AND C 0.8 MG
1 TABLET ORAL EVERY MORNING
COMMUNITY

## 2019-01-01 RX ORDER — TRAMADOL HYDROCHLORIDE 50 MG/1
25 TABLET ORAL EVERY 6 HOURS PRN
Status: DISCONTINUED | OUTPATIENT
Start: 2019-01-01 | End: 2019-01-01 | Stop reason: HOSPADM

## 2019-01-01 RX ORDER — FAMOTIDINE 20 MG/1
40 TABLET, FILM COATED ORAL EVERY EVENING
Status: DISCONTINUED | OUTPATIENT
Start: 2019-01-01 | End: 2019-01-01 | Stop reason: HOSPADM

## 2019-01-01 RX ORDER — PANTOPRAZOLE SODIUM 40 MG/10ML
40 INJECTION, POWDER, LYOPHILIZED, FOR SOLUTION INTRAVENOUS DAILY
Status: DISCONTINUED | OUTPATIENT
Start: 2019-01-01 | End: 2019-01-01

## 2019-01-01 RX ORDER — AMIODARONE HYDROCHLORIDE 200 MG/1
200 TABLET ORAL DAILY
Qty: 30 TABLET | Refills: 0 | Status: SHIPPED
Start: 2019-01-01

## 2019-01-01 RX ORDER — AMIODARONE HYDROCHLORIDE 200 MG/1
200 TABLET ORAL DAILY
Status: DISCONTINUED | OUTPATIENT
Start: 2019-01-01 | End: 2019-01-01

## 2019-01-01 RX ORDER — TRAMADOL HYDROCHLORIDE 50 MG/1
25 TABLET ORAL EVERY 6 HOURS PRN
Qty: 5 TABLET | Refills: 0 | Status: SHIPPED | OUTPATIENT
Start: 2019-01-01 | End: 2019-01-01

## 2019-01-01 RX ORDER — OXYCODONE AND ACETAMINOPHEN 10; 325 MG/1; MG/1
1 TABLET ORAL EVERY 4 HOURS PRN
COMMUNITY
End: 2020-01-01

## 2019-01-01 RX ORDER — PREDNISONE 20 MG/1
40 TABLET ORAL DAILY
Status: COMPLETED | OUTPATIENT
Start: 2019-01-01 | End: 2019-01-01

## 2019-01-01 RX ORDER — BUMETANIDE 2 MG/1
2 TABLET ORAL DAILY
Qty: 30 TABLET | Refills: 3 | Status: ON HOLD | OUTPATIENT
Start: 2019-01-01 | End: 2019-01-01 | Stop reason: HOSPADM

## 2019-01-01 RX ORDER — KETOROLAC TROMETHAMINE 30 MG/ML
15 INJECTION, SOLUTION INTRAMUSCULAR; INTRAVENOUS ONCE
Status: DISPENSED | OUTPATIENT
Start: 2019-01-01 | End: 2019-01-01

## 2019-01-01 RX ORDER — DOCUSATE SODIUM 100 MG/1
200 CAPSULE, LIQUID FILLED ORAL EVERY MORNING
Status: DISCONTINUED | OUTPATIENT
Start: 2019-01-01 | End: 2019-01-01 | Stop reason: HOSPADM

## 2019-01-01 RX ORDER — PREDNISONE 10 MG/1
10 TABLET ORAL DAILY
Qty: 3 TABLET | Refills: 0 | Status: SHIPPED | OUTPATIENT
Start: 2019-01-01 | End: 2019-01-01

## 2019-01-01 RX ORDER — BISACODYL 10 MG
10 SUPPOSITORY, RECTAL RECTAL DAILY PRN
Status: DISCONTINUED | OUTPATIENT
Start: 2019-01-01 | End: 2019-01-01 | Stop reason: HOSPADM

## 2019-01-01 RX ORDER — OXYCODONE HYDROCHLORIDE 5 MG/1
10 TABLET ORAL
Status: DISCONTINUED | OUTPATIENT
Start: 2019-01-01 | End: 2019-01-01

## 2019-01-01 RX ORDER — ROPINIROLE 0.5 MG/1
0.5 TABLET, FILM COATED ORAL DAILY
Qty: 90 TABLET | Refills: 3 | DISCHARGE
Start: 2019-01-01

## 2019-01-01 RX ORDER — HEPARIN SODIUM 1000 [USP'U]/ML
80 INJECTION, SOLUTION INTRAVENOUS; SUBCUTANEOUS ONCE
Status: COMPLETED | OUTPATIENT
Start: 2019-01-01 | End: 2019-01-01

## 2019-01-01 RX ORDER — CALCIUM CARBONATE 500(1250)
500 TABLET ORAL 2 TIMES DAILY
Status: DISCONTINUED | OUTPATIENT
Start: 2019-01-01 | End: 2019-01-01 | Stop reason: HOSPADM

## 2019-01-01 RX ORDER — PHENOL 1.4 %
1 AEROSOL, SPRAY (ML) MUCOUS MEMBRANE
COMMUNITY
End: 2019-01-01

## 2019-01-01 RX ORDER — FLUTICASONE PROPIONATE 50 MCG
1 SPRAY, SUSPENSION (ML) NASAL DAILY
Qty: 1 BOTTLE | Refills: 3 | DISCHARGE
Start: 2019-01-01

## 2019-01-01 RX ORDER — SODIUM CHLORIDE 0.9 % (FLUSH) 0.9 %
10 SYRINGE (ML) INJECTION EVERY 12 HOURS SCHEDULED
Status: DISCONTINUED | OUTPATIENT
Start: 2019-01-01 | End: 2019-01-01 | Stop reason: HOSPADM

## 2019-01-01 RX ORDER — NITROGLYCERIN 0.4 MG/1
0.4 TABLET SUBLINGUAL ONCE
Status: COMPLETED | OUTPATIENT
Start: 2019-01-01 | End: 2019-01-01

## 2019-01-01 RX ORDER — FORMOTEROL FUMARATE 20 UG/2ML
20 SOLUTION RESPIRATORY (INHALATION) EVERY 12 HOURS
Status: DISCONTINUED | OUTPATIENT
Start: 2019-01-01 | End: 2019-01-01 | Stop reason: HOSPADM

## 2019-01-01 RX ORDER — ROPINIROLE 0.25 MG/1
0.25 TABLET, FILM COATED ORAL ONCE
Status: COMPLETED | OUTPATIENT
Start: 2019-01-01 | End: 2019-01-01

## 2019-01-01 RX ORDER — METHYLPREDNISOLONE SODIUM SUCCINATE 40 MG/ML
40 INJECTION, POWDER, LYOPHILIZED, FOR SOLUTION INTRAMUSCULAR; INTRAVENOUS EVERY 6 HOURS
Status: COMPLETED | OUTPATIENT
Start: 2019-01-01 | End: 2019-01-01

## 2019-01-01 RX ORDER — AMIODARONE HYDROCHLORIDE 200 MG/1
200 TABLET ORAL DAILY
Status: DISCONTINUED | OUTPATIENT
Start: 2019-01-01 | End: 2019-01-01 | Stop reason: HOSPADM

## 2019-01-01 RX ORDER — METOPROLOL TARTRATE 5 MG/5ML
2.5 INJECTION INTRAVENOUS EVERY 6 HOURS
Status: DISCONTINUED | OUTPATIENT
Start: 2019-01-01 | End: 2019-01-01

## 2019-01-01 RX ORDER — DOXYCYCLINE HYCLATE 100 MG/1
100 CAPSULE ORAL 2 TIMES DAILY
Status: ON HOLD | COMMUNITY
Start: 2019-01-01 | End: 2019-01-01 | Stop reason: HOSPADM

## 2019-01-01 RX ORDER — POVIDONE-IODINE 10 MG/G
OINTMENT TOPICAL DAILY
Status: DISCONTINUED | OUTPATIENT
Start: 2019-01-01 | End: 2019-01-01 | Stop reason: HOSPADM

## 2019-01-01 RX ORDER — CHOLECALCIFEROL (VITAMIN D3) 125 MCG
1 CAPSULE ORAL NIGHTLY
Status: DISCONTINUED | OUTPATIENT
Start: 2019-01-01 | End: 2019-01-01 | Stop reason: CLARIF

## 2019-01-01 RX ORDER — METOLAZONE 2.5 MG/1
2.5 TABLET ORAL EVERY OTHER DAY
Qty: 30 TABLET | Refills: 3 | Status: ON HOLD | OUTPATIENT
Start: 2019-01-01 | End: 2019-01-01 | Stop reason: ALTCHOICE

## 2019-01-01 RX ORDER — POTASSIUM CHLORIDE 20 MEQ/1
20 TABLET, EXTENDED RELEASE ORAL 2 TIMES DAILY
Status: DISCONTINUED | OUTPATIENT
Start: 2019-01-01 | End: 2019-01-01

## 2019-01-01 RX ORDER — METOLAZONE 2.5 MG/1
5 TABLET ORAL DAILY
Status: DISCONTINUED | OUTPATIENT
Start: 2019-01-01 | End: 2019-01-01

## 2019-01-01 RX ORDER — LISINOPRIL 20 MG/1
40 TABLET ORAL
Status: DISCONTINUED | OUTPATIENT
Start: 2019-01-01 | End: 2019-01-01

## 2019-01-01 RX ORDER — METHYLPREDNISOLONE SODIUM SUCCINATE 40 MG/ML
40 INJECTION, POWDER, LYOPHILIZED, FOR SOLUTION INTRAMUSCULAR; INTRAVENOUS DAILY
Status: DISCONTINUED | OUTPATIENT
Start: 2019-01-01 | End: 2019-01-01

## 2019-01-01 RX ORDER — 0.9 % SODIUM CHLORIDE 0.9 %
1000 INTRAVENOUS SOLUTION INTRAVENOUS ONCE
Status: COMPLETED | OUTPATIENT
Start: 2019-01-01 | End: 2019-01-01

## 2019-01-01 RX ORDER — METOLAZONE 2.5 MG/1
2.5 TABLET ORAL DAILY
Qty: 30 TABLET | Refills: 3 | Status: SHIPPED | OUTPATIENT
Start: 2019-01-01 | End: 2019-01-01 | Stop reason: SDUPTHER

## 2019-01-01 RX ORDER — LEVOTHYROXINE SODIUM 137 UG/1
125 TABLET ORAL EVERY MORNING
COMMUNITY
End: 2019-01-01

## 2019-01-01 RX ORDER — POTASSIUM CHLORIDE 20 MEQ/1
TABLET, EXTENDED RELEASE ORAL
Status: COMPLETED
Start: 2019-01-01 | End: 2019-01-01

## 2019-01-01 RX ORDER — METHYLPREDNISOLONE SODIUM SUCCINATE 125 MG/2ML
80 INJECTION, POWDER, LYOPHILIZED, FOR SOLUTION INTRAMUSCULAR; INTRAVENOUS ONCE
Status: COMPLETED | OUTPATIENT
Start: 2019-01-01 | End: 2019-01-01

## 2019-01-01 RX ORDER — NORTRIPTYLINE HYDROCHLORIDE 10 MG/1
20 CAPSULE ORAL NIGHTLY
Qty: 30 CAPSULE | Refills: 3 | Status: SHIPPED | OUTPATIENT
Start: 2019-01-01 | End: 2019-01-01

## 2019-01-01 RX ORDER — POTASSIUM CHLORIDE 20 MEQ/1
20 TABLET, EXTENDED RELEASE ORAL ONCE
Status: COMPLETED | OUTPATIENT
Start: 2019-01-01 | End: 2019-01-01

## 2019-01-01 RX ORDER — SENNA PLUS 8.6 MG/1
1 TABLET ORAL EVERY MORNING
COMMUNITY
End: 2020-01-01

## 2019-01-01 RX ORDER — METOLAZONE 2.5 MG/1
2.5 TABLET ORAL SEE ADMIN INSTRUCTIONS
Qty: 30 TABLET | Refills: 3 | Status: ON HOLD | OUTPATIENT
Start: 2019-01-01 | End: 2019-01-01 | Stop reason: HOSPADM

## 2019-01-01 RX ORDER — AMIODARONE HYDROCHLORIDE 200 MG/1
100 TABLET ORAL EVERY MORNING
Status: DISCONTINUED | OUTPATIENT
Start: 2019-01-01 | End: 2019-01-01 | Stop reason: HOSPADM

## 2019-01-01 RX ORDER — ALBUTEROL SULFATE 2.5 MG/3ML
2.5 SOLUTION RESPIRATORY (INHALATION) EVERY 6 HOURS PRN
Status: COMPLETED | OUTPATIENT
Start: 2019-01-01 | End: 2019-01-01

## 2019-01-01 RX ORDER — HYDROCODONE BITARTRATE AND ACETAMINOPHEN 7.5; 325 MG/1; MG/1
TABLET ORAL
Status: COMPLETED
Start: 2019-01-01 | End: 2019-01-01

## 2019-01-01 RX ORDER — LISINOPRIL 40 MG/1
40 TABLET ORAL
Status: ON HOLD | COMMUNITY
End: 2019-01-01 | Stop reason: HOSPADM

## 2019-01-01 RX ORDER — NITROGLYCERIN 0.4 MG/1
TABLET SUBLINGUAL
Status: DISPENSED
Start: 2019-01-01 | End: 2019-01-01

## 2019-01-01 RX ORDER — NORTRIPTYLINE HYDROCHLORIDE 10 MG/1
10 CAPSULE ORAL NIGHTLY
Status: DISCONTINUED | OUTPATIENT
Start: 2019-01-01 | End: 2019-01-01 | Stop reason: HOSPADM

## 2019-01-01 RX ORDER — POTASSIUM CHLORIDE 20 MEQ/1
40 TABLET, EXTENDED RELEASE ORAL 2 TIMES DAILY
Status: ON HOLD | COMMUNITY
End: 2019-01-01 | Stop reason: SDUPTHER

## 2019-01-01 RX ORDER — PREDNISONE 1 MG/1
10 TABLET ORAL SEE ADMIN INSTRUCTIONS
Status: DISCONTINUED | OUTPATIENT
Start: 2019-01-01 | End: 2019-01-01 | Stop reason: CLARIF

## 2019-01-01 RX ORDER — LISINOPRIL 20 MG/1
20 TABLET ORAL NIGHTLY
Status: DISCONTINUED | OUTPATIENT
Start: 2019-01-01 | End: 2019-01-01

## 2019-01-01 RX ORDER — FUROSEMIDE 10 MG/ML
40 INJECTION INTRAMUSCULAR; INTRAVENOUS ONCE
Status: DISCONTINUED | OUTPATIENT
Start: 2019-01-01 | End: 2019-01-01

## 2019-01-01 RX ORDER — BENZONATATE 100 MG/1
200 CAPSULE ORAL 3 TIMES DAILY PRN
Status: DISCONTINUED | OUTPATIENT
Start: 2019-01-01 | End: 2019-01-01

## 2019-01-01 RX ORDER — POTASSIUM CHLORIDE 20 MEQ/1
20 TABLET, EXTENDED RELEASE ORAL 2 TIMES DAILY WITH MEALS
Status: DISCONTINUED | OUTPATIENT
Start: 2019-01-01 | End: 2019-01-01 | Stop reason: HOSPADM

## 2019-01-01 RX ORDER — FUROSEMIDE 40 MG/1
40 TABLET ORAL ONCE
Status: COMPLETED | OUTPATIENT
Start: 2019-01-01 | End: 2019-01-01

## 2019-01-01 RX ORDER — LACTULOSE 10 G/15ML
20 SOLUTION ORAL EVERY MORNING
Status: DISCONTINUED | OUTPATIENT
Start: 2019-01-01 | End: 2019-01-01 | Stop reason: HOSPADM

## 2019-01-01 RX ORDER — ATORVASTATIN CALCIUM 20 MG/1
20 TABLET, FILM COATED ORAL NIGHTLY
Status: DISCONTINUED | OUTPATIENT
Start: 2019-01-01 | End: 2019-01-01 | Stop reason: HOSPADM

## 2019-01-01 RX ORDER — METOLAZONE 2.5 MG/1
2.5 TABLET ORAL WEEKLY
Qty: 30 TABLET | Refills: 3 | Status: ON HOLD | OUTPATIENT
Start: 2019-01-01 | End: 2019-01-01 | Stop reason: HOSPADM

## 2019-01-01 RX ORDER — CALCIUM CARBONATE 500(1250)
1 TABLET ORAL
Status: DISCONTINUED | OUTPATIENT
Start: 2019-01-01 | End: 2019-01-01 | Stop reason: HOSPADM

## 2019-01-01 RX ORDER — SIMVASTATIN 20 MG
20 TABLET ORAL NIGHTLY
Status: DISCONTINUED | OUTPATIENT
Start: 2019-01-01 | End: 2019-01-01 | Stop reason: HOSPADM

## 2019-01-01 RX ORDER — BUMETANIDE 1 MG/1
1 TABLET ORAL 2 TIMES DAILY
Status: DISCONTINUED | OUTPATIENT
Start: 2019-01-01 | End: 2019-01-01

## 2019-01-01 RX ORDER — LANOLIN ALCOHOL/MO/W.PET/CERES
400 CREAM (GRAM) TOPICAL DAILY
Status: DISCONTINUED | OUTPATIENT
Start: 2019-01-01 | End: 2019-01-01 | Stop reason: SDUPTHER

## 2019-01-01 RX ORDER — LACTOBACILLUS RHAMNOSUS GG 10B CELL
1 CAPSULE ORAL NIGHTLY
Status: DISCONTINUED | OUTPATIENT
Start: 2019-01-01 | End: 2019-01-01 | Stop reason: HOSPADM

## 2019-01-01 RX ORDER — CALCIUM CARBONATE 500(1250)
500 TABLET ORAL
Status: DISCONTINUED | OUTPATIENT
Start: 2019-01-01 | End: 2019-01-01 | Stop reason: HOSPADM

## 2019-01-01 RX ORDER — LACTULOSE 10 G/15ML
20 SOLUTION ORAL DAILY PRN
Status: DISCONTINUED | OUTPATIENT
Start: 2019-01-01 | End: 2019-01-01 | Stop reason: HOSPADM

## 2019-01-01 RX ORDER — IPRATROPIUM BROMIDE AND ALBUTEROL SULFATE 2.5; .5 MG/3ML; MG/3ML
1 SOLUTION RESPIRATORY (INHALATION) ONCE
Status: COMPLETED | OUTPATIENT
Start: 2019-01-01 | End: 2019-01-01

## 2019-01-01 RX ORDER — CETIRIZINE HYDROCHLORIDE 10 MG/1
5 TABLET ORAL DAILY
Status: DISCONTINUED | OUTPATIENT
Start: 2019-01-01 | End: 2019-01-01 | Stop reason: HOSPADM

## 2019-01-01 RX ORDER — POTASSIUM CHLORIDE 20 MEQ/1
40 TABLET, EXTENDED RELEASE ORAL 2 TIMES DAILY
Qty: 60 TABLET | Refills: 3 | Status: SHIPPED | OUTPATIENT
Start: 2019-01-01

## 2019-01-01 RX ORDER — METOLAZONE 2.5 MG/1
2.5 TABLET ORAL EVERY OTHER DAY
Status: DISCONTINUED | OUTPATIENT
Start: 2019-01-01 | End: 2019-01-01

## 2019-01-01 RX ORDER — CYANOCOBALAMIN 1000 UG/ML
1000 INJECTION INTRAMUSCULAR; SUBCUTANEOUS
Status: DISCONTINUED | OUTPATIENT
Start: 2019-01-01 | End: 2019-01-01 | Stop reason: HOSPADM

## 2019-01-01 RX ORDER — PREDNISONE 10 MG/1
10 TABLET ORAL SEE ADMIN INSTRUCTIONS
Status: ON HOLD | COMMUNITY
Start: 2019-01-01 | End: 2019-01-01 | Stop reason: HOSPADM

## 2019-01-01 RX ORDER — BENZONATATE 100 MG/1
200 CAPSULE ORAL 3 TIMES DAILY
Status: DISCONTINUED | OUTPATIENT
Start: 2019-01-01 | End: 2019-01-01 | Stop reason: HOSPADM

## 2019-01-01 RX ORDER — SODIUM CHLORIDE, SODIUM LACTATE, POTASSIUM CHLORIDE, CALCIUM CHLORIDE 600; 310; 30; 20 MG/100ML; MG/100ML; MG/100ML; MG/100ML
INJECTION, SOLUTION INTRAVENOUS CONTINUOUS
Status: DISCONTINUED | OUTPATIENT
Start: 2019-01-01 | End: 2019-01-01

## 2019-01-01 RX ORDER — ALBUMIN (HUMAN) 12.5 G/50ML
25 SOLUTION INTRAVENOUS 2 TIMES DAILY
Status: COMPLETED | OUTPATIENT
Start: 2019-01-01 | End: 2019-01-01

## 2019-01-01 RX ORDER — AMIODARONE HYDROCHLORIDE 200 MG/1
200 TABLET ORAL EVERY OTHER DAY
Status: DISCONTINUED | OUTPATIENT
Start: 2019-01-01 | End: 2019-01-01 | Stop reason: HOSPADM

## 2019-01-01 RX ORDER — CLOTRIMAZOLE 1 %
CREAM (GRAM) TOPICAL 2 TIMES DAILY
Status: DISCONTINUED | OUTPATIENT
Start: 2019-01-01 | End: 2019-01-01 | Stop reason: HOSPADM

## 2019-01-01 RX ORDER — LISINOPRIL 5 MG/1
5 TABLET ORAL NIGHTLY
Qty: 30 TABLET | Refills: 3 | Status: ON HOLD | OUTPATIENT
Start: 2019-01-01 | End: 2019-01-01 | Stop reason: HOSPADM

## 2019-01-01 RX ORDER — HYDROCODONE BITARTRATE AND ACETAMINOPHEN 5; 325 MG/1; MG/1
1 TABLET ORAL EVERY 6 HOURS PRN
Qty: 30 TABLET | Refills: 0
Start: 2019-01-01 | End: 2019-01-01

## 2019-01-01 RX ORDER — BACITRACIN, NEOMYCIN, POLYMYXIN B 400; 3.5; 5 [USP'U]/G; MG/G; [USP'U]/G
OINTMENT TOPICAL 2 TIMES DAILY
Status: DISCONTINUED | OUTPATIENT
Start: 2019-01-01 | End: 2019-01-01

## 2019-01-01 RX ORDER — BENZONATATE 200 MG/1
200 CAPSULE ORAL 3 TIMES DAILY
Qty: 21 CAPSULE | Refills: 0 | DISCHARGE
Start: 2019-01-01 | End: 2019-01-01

## 2019-01-01 RX ADMIN — ALBUTEROL SULFATE 2.5 MG: 2.5 SOLUTION RESPIRATORY (INHALATION) at 16:10

## 2019-01-01 RX ADMIN — MAGNESIUM OXIDE TAB 400 MG (241.3 MG ELEMENTAL MG) 200 MG: 400 (241.3 MG) TAB at 10:41

## 2019-01-01 RX ADMIN — BENZONATATE 200 MG: 100 CAPSULE ORAL at 14:34

## 2019-01-01 RX ADMIN — ONDANSETRON 4 MG: 2 INJECTION INTRAMUSCULAR; INTRAVENOUS at 10:04

## 2019-01-01 RX ADMIN — ROPINIROLE HYDROCHLORIDE 0.5 MG: 0.5 TABLET, FILM COATED ORAL at 08:35

## 2019-01-01 RX ADMIN — FLUTICASONE PROPIONATE 1 SPRAY: 50 SPRAY, METERED NASAL at 08:21

## 2019-01-01 RX ADMIN — ENOXAPARIN SODIUM 30 MG: 30 INJECTION SUBCUTANEOUS at 08:20

## 2019-01-01 RX ADMIN — BUDESONIDE 1000 MCG: 0.5 SUSPENSION RESPIRATORY (INHALATION) at 09:17

## 2019-01-01 RX ADMIN — ROPINIROLE HYDROCHLORIDE 0.5 MG: 0.5 TABLET, FILM COATED ORAL at 10:40

## 2019-01-01 RX ADMIN — Medication 1 TABLET: at 11:30

## 2019-01-01 RX ADMIN — ATORVASTATIN CALCIUM 20 MG: 20 TABLET, FILM COATED ORAL at 21:39

## 2019-01-01 RX ADMIN — FAMOTIDINE 40 MG: 20 TABLET ORAL at 08:22

## 2019-01-01 RX ADMIN — METOPROLOL TARTRATE 12.5 MG: 25 TABLET ORAL at 08:49

## 2019-01-01 RX ADMIN — PREDNISONE 10 MG: 5 TABLET ORAL at 09:58

## 2019-01-01 RX ADMIN — BUDESONIDE 1000 MCG: 0.5 SUSPENSION RESPIRATORY (INHALATION) at 09:16

## 2019-01-01 RX ADMIN — Medication 5 ML: at 08:14

## 2019-01-01 RX ADMIN — AMIODARONE HYDROCHLORIDE 200 MG: 200 TABLET ORAL at 08:51

## 2019-01-01 RX ADMIN — FORMOTEROL FUMARATE DIHYDRATE 20 MCG: 20 SOLUTION RESPIRATORY (INHALATION) at 20:49

## 2019-01-01 RX ADMIN — METHYLPREDNISOLONE SODIUM SUCCINATE 40 MG: 40 INJECTION, POWDER, LYOPHILIZED, FOR SOLUTION INTRAMUSCULAR; INTRAVENOUS at 23:01

## 2019-01-01 RX ADMIN — ISOSORBIDE MONONITRATE 30 MG: 30 TABLET, EXTENDED RELEASE ORAL at 08:21

## 2019-01-01 RX ADMIN — LACTULOSE 20 G: 20 SOLUTION ORAL at 08:16

## 2019-01-01 RX ADMIN — METHYLPREDNISOLONE SODIUM SUCCINATE 125 MG: 125 INJECTION, POWDER, LYOPHILIZED, FOR SOLUTION INTRAMUSCULAR; INTRAVENOUS at 18:53

## 2019-01-01 RX ADMIN — METHYLPREDNISOLONE SODIUM SUCCINATE 40 MG: 40 INJECTION, POWDER, LYOPHILIZED, FOR SOLUTION INTRAMUSCULAR; INTRAVENOUS at 02:00

## 2019-01-01 RX ADMIN — SODIUM CHLORIDE 1000 ML: 9 INJECTION, SOLUTION INTRAVENOUS at 10:03

## 2019-01-01 RX ADMIN — APIXABAN 5 MG: 5 TABLET, FILM COATED ORAL at 20:44

## 2019-01-01 RX ADMIN — APIXABAN 5 MG: 5 TABLET, FILM COATED ORAL at 08:20

## 2019-01-01 RX ADMIN — Medication 1 TABLET: at 12:14

## 2019-01-01 RX ADMIN — INSULIN LISPRO 2 UNITS: 100 INJECTION, SOLUTION INTRAVENOUS; SUBCUTANEOUS at 22:40

## 2019-01-01 RX ADMIN — ALBUTEROL SULFATE 2.5 MG: 2.5 SOLUTION RESPIRATORY (INHALATION) at 20:14

## 2019-01-01 RX ADMIN — Medication 10 ML: at 21:14

## 2019-01-01 RX ADMIN — DARBEPOETIN ALFA 60 MCG: 60 INJECTION, SOLUTION INTRAVENOUS; SUBCUTANEOUS at 08:16

## 2019-01-01 RX ADMIN — SIMETHICONE CHEW TAB 80 MG 160 MG: 80 TABLET ORAL at 12:05

## 2019-01-01 RX ADMIN — FORMOTEROL FUMARATE DIHYDRATE 20 MCG: 20 SOLUTION RESPIRATORY (INHALATION) at 08:28

## 2019-01-01 RX ADMIN — SODIUM CHLORIDE: 9 INJECTION, SOLUTION INTRAVENOUS at 05:43

## 2019-01-01 RX ADMIN — Medication 10 ML: at 09:24

## 2019-01-01 RX ADMIN — ROPINIROLE HYDROCHLORIDE 0.25 MG: 0.5 TABLET, FILM COATED ORAL at 20:45

## 2019-01-01 RX ADMIN — HYDROCODONE BITARTRATE AND ACETAMINOPHEN 1 TABLET: 7.5; 325 TABLET ORAL at 20:09

## 2019-01-01 RX ADMIN — KETOROLAC TROMETHAMINE 15 MG: 30 INJECTION, SOLUTION INTRAMUSCULAR at 20:04

## 2019-01-01 RX ADMIN — BENZONATATE 200 MG: 100 CAPSULE ORAL at 19:59

## 2019-01-01 RX ADMIN — POTASSIUM CHLORIDE 20 MEQ: 20 TABLET, EXTENDED RELEASE ORAL at 09:57

## 2019-01-01 RX ADMIN — Medication 10 ML: at 09:39

## 2019-01-01 RX ADMIN — Medication 5 ML: at 00:49

## 2019-01-01 RX ADMIN — AMIODARONE HYDROCHLORIDE 200 MG: 200 TABLET ORAL at 08:20

## 2019-01-01 RX ADMIN — METOPROLOL TARTRATE 12.5 MG: 25 TABLET ORAL at 09:52

## 2019-01-01 RX ADMIN — SENNOSIDES 8.6 MG: 8.6 TABLET, FILM COATED ORAL at 08:23

## 2019-01-01 RX ADMIN — Medication 10 ML: at 20:09

## 2019-01-01 RX ADMIN — INSULIN LISPRO 2 UNITS: 100 INJECTION, SOLUTION INTRAVENOUS; SUBCUTANEOUS at 16:56

## 2019-01-01 RX ADMIN — Medication 10 ML: at 08:17

## 2019-01-01 RX ADMIN — HYDROCORTISONE ACETATE 25 MG: 25 SUPPOSITORY RECTAL at 09:45

## 2019-01-01 RX ADMIN — BUDESONIDE 1000 MCG: 0.5 SUSPENSION RESPIRATORY (INHALATION) at 09:51

## 2019-01-01 RX ADMIN — MICONAZOLE NITRATE: 20 POWDER TOPICAL at 09:21

## 2019-01-01 RX ADMIN — METHYLPREDNISOLONE SODIUM SUCCINATE 40 MG: 40 INJECTION, POWDER, LYOPHILIZED, FOR SOLUTION INTRAMUSCULAR; INTRAVENOUS at 11:26

## 2019-01-01 RX ADMIN — FORMOTEROL FUMARATE DIHYDRATE 20 MCG: 20 SOLUTION RESPIRATORY (INHALATION) at 20:00

## 2019-01-01 RX ADMIN — POTASSIUM CHLORIDE 20 MEQ: 20 TABLET, EXTENDED RELEASE ORAL at 08:33

## 2019-01-01 RX ADMIN — Medication 1 CAPSULE: at 08:37

## 2019-01-01 RX ADMIN — ROPINIROLE HYDROCHLORIDE 0.5 MG: 0.5 TABLET, FILM COATED ORAL at 14:45

## 2019-01-01 RX ADMIN — OXYCODONE HYDROCHLORIDE 5 MG: 5 TABLET ORAL at 08:52

## 2019-01-01 RX ADMIN — POTASSIUM CHLORIDE 40 MEQ: 20 TABLET, EXTENDED RELEASE ORAL at 08:20

## 2019-01-01 RX ADMIN — SENNOSIDES 8.6 MG: 8.6 TABLET, FILM COATED ORAL at 12:05

## 2019-01-01 RX ADMIN — ROPINIROLE HYDROCHLORIDE 0.5 MG: 0.5 TABLET, FILM COATED ORAL at 15:04

## 2019-01-01 RX ADMIN — APIXABAN 10 MG: 5 TABLET, FILM COATED ORAL at 20:37

## 2019-01-01 RX ADMIN — ALBUTEROL SULFATE 2.5 MG: 2.5 SOLUTION RESPIRATORY (INHALATION) at 12:50

## 2019-01-01 RX ADMIN — CHOLECALCIFEROL TAB 50 MCG (2000 UNIT) 2000 UNITS: 50 TAB at 10:42

## 2019-01-01 RX ADMIN — CALCIUM 500 MG: 500 TABLET ORAL at 10:42

## 2019-01-01 RX ADMIN — ROPINIROLE HYDROCHLORIDE 0.25 MG: 0.25 TABLET, FILM COATED ORAL at 20:25

## 2019-01-01 RX ADMIN — SIMETHICONE CHEW TAB 80 MG 160 MG: 80 TABLET ORAL at 11:46

## 2019-01-01 RX ADMIN — LEVOTHYROXINE SODIUM 137 MCG: 25 TABLET ORAL at 06:53

## 2019-01-01 RX ADMIN — HYDROCODONE BITARTRATE AND ACETAMINOPHEN 1 TABLET: 7.5; 325 TABLET ORAL at 08:18

## 2019-01-01 RX ADMIN — POTASSIUM CHLORIDE 20 MEQ: 20 TABLET, EXTENDED RELEASE ORAL at 16:57

## 2019-01-01 RX ADMIN — INSULIN LISPRO 3 UNITS: 100 INJECTION, SOLUTION INTRAVENOUS; SUBCUTANEOUS at 20:47

## 2019-01-01 RX ADMIN — HYDROCODONE BITARTRATE AND ACETAMINOPHEN 1 TABLET: 7.5; 325 TABLET ORAL at 10:12

## 2019-01-01 RX ADMIN — Medication 5 ML: at 08:09

## 2019-01-01 RX ADMIN — POTASSIUM CHLORIDE 40 MEQ: 20 TABLET, EXTENDED RELEASE ORAL at 15:26

## 2019-01-01 RX ADMIN — POTASSIUM CHLORIDE 20 MEQ: 20 TABLET, EXTENDED RELEASE ORAL at 18:08

## 2019-01-01 RX ADMIN — PREDNISONE 10 MG: 5 TABLET ORAL at 08:29

## 2019-01-01 RX ADMIN — HYDROCODONE BITARTRATE AND ACETAMINOPHEN 1 TABLET: 10; 325 TABLET ORAL at 20:15

## 2019-01-01 RX ADMIN — ROPINIROLE HYDROCHLORIDE 0.5 MG: 0.5 TABLET, FILM COATED ORAL at 15:01

## 2019-01-01 RX ADMIN — ASPIRIN 81 MG 81 MG: 81 TABLET ORAL at 08:33

## 2019-01-01 RX ADMIN — Medication 325 MG: at 12:38

## 2019-01-01 RX ADMIN — Medication 10 ML: at 08:59

## 2019-01-01 RX ADMIN — PANTOPRAZOLE SODIUM 40 MG: 40 TABLET, DELAYED RELEASE ORAL at 06:26

## 2019-01-01 RX ADMIN — HYDROCORTISONE ACETATE 25 MG: 25 SUPPOSITORY RECTAL at 09:10

## 2019-01-01 RX ADMIN — SIMETHICONE CHEW TAB 80 MG 160 MG: 80 TABLET ORAL at 11:49

## 2019-01-01 RX ADMIN — AMIODARONE HYDROCHLORIDE 100 MG: 200 TABLET ORAL at 08:33

## 2019-01-01 RX ADMIN — OXYCODONE HYDROCHLORIDE 10 MG: 5 TABLET ORAL at 17:08

## 2019-01-01 RX ADMIN — HYDROCORTISONE ACETATE 25 MG: 25 SUPPOSITORY RECTAL at 22:39

## 2019-01-01 RX ADMIN — ROPINIROLE HYDROCHLORIDE 0.5 MG: 0.5 TABLET, FILM COATED ORAL at 16:11

## 2019-01-01 RX ADMIN — INSULIN LISPRO 2 UNITS: 100 INJECTION, SOLUTION INTRAVENOUS; SUBCUTANEOUS at 11:46

## 2019-01-01 RX ADMIN — CHOLECALCIFEROL TAB 50 MCG (2000 UNIT) 2000 UNITS: 50 TAB at 11:39

## 2019-01-01 RX ADMIN — SENNOSIDES 8.6 MG: 8.6 TABLET, FILM COATED ORAL at 08:20

## 2019-01-01 RX ADMIN — WATER 2 ML: 1 INJECTION INTRAMUSCULAR; INTRAVENOUS; SUBCUTANEOUS at 18:53

## 2019-01-01 RX ADMIN — OXYCODONE HYDROCHLORIDE 10 MG: 5 TABLET ORAL at 11:42

## 2019-01-01 RX ADMIN — METOPROLOL TARTRATE 12.5 MG: 25 TABLET ORAL at 08:21

## 2019-01-01 RX ADMIN — ROPINIROLE HYDROCHLORIDE 0.5 MG: 0.5 TABLET, FILM COATED ORAL at 15:22

## 2019-01-01 RX ADMIN — POVIDONE-IODINE: 10 OINTMENT TOPICAL at 09:02

## 2019-01-01 RX ADMIN — SODIUM CHLORIDE, POTASSIUM CHLORIDE, SODIUM LACTATE AND CALCIUM CHLORIDE: 600; 310; 30; 20 INJECTION, SOLUTION INTRAVENOUS at 05:22

## 2019-01-01 RX ADMIN — APIXABAN 5 MG: 5 TABLET, FILM COATED ORAL at 21:54

## 2019-01-01 RX ADMIN — HYDROCODONE BITARTRATE AND ACETAMINOPHEN 1 TABLET: 5; 325 TABLET ORAL at 15:01

## 2019-01-01 RX ADMIN — METOPROLOL SUCCINATE 50 MG: 50 TABLET, EXTENDED RELEASE ORAL at 09:40

## 2019-01-01 RX ADMIN — INSULIN LISPRO 2 UNITS: 100 INJECTION, SOLUTION INTRAVENOUS; SUBCUTANEOUS at 19:01

## 2019-01-01 RX ADMIN — ALBUTEROL SULFATE 2.5 MG: 2.5 SOLUTION RESPIRATORY (INHALATION) at 11:31

## 2019-01-01 RX ADMIN — FUROSEMIDE 40 MG: 40 TABLET ORAL at 08:58

## 2019-01-01 RX ADMIN — LEVOTHYROXINE SODIUM 125 MCG: 125 TABLET ORAL at 06:23

## 2019-01-01 RX ADMIN — METOPROLOL TARTRATE 12.5 MG: 25 TABLET ORAL at 20:11

## 2019-01-01 RX ADMIN — SIMVASTATIN 10 MG: 20 TABLET, FILM COATED ORAL at 20:53

## 2019-01-01 RX ADMIN — INSULIN LISPRO 2 UNITS: 100 INJECTION, SOLUTION INTRAVENOUS; SUBCUTANEOUS at 20:43

## 2019-01-01 RX ADMIN — INSULIN LISPRO 3 UNITS: 100 INJECTION, SOLUTION INTRAVENOUS; SUBCUTANEOUS at 07:53

## 2019-01-01 RX ADMIN — ROPINIROLE HYDROCHLORIDE 0.25 MG: 0.25 TABLET, FILM COATED ORAL at 20:40

## 2019-01-01 RX ADMIN — FENTANYL CITRATE 50 MCG: 50 INJECTION INTRAMUSCULAR; INTRAVENOUS at 08:45

## 2019-01-01 RX ADMIN — ALBUTEROL SULFATE 2.5 MG: 2.5 SOLUTION RESPIRATORY (INHALATION) at 16:48

## 2019-01-01 RX ADMIN — ALBUTEROL SULFATE 2.5 MG: 2.5 SOLUTION RESPIRATORY (INHALATION) at 09:00

## 2019-01-01 RX ADMIN — POTASSIUM CHLORIDE 40 MEQ: 20 TABLET, EXTENDED RELEASE ORAL at 09:19

## 2019-01-01 RX ADMIN — FUROSEMIDE 20 MG: 10 INJECTION, SOLUTION INTRAVENOUS at 18:49

## 2019-01-01 RX ADMIN — Medication 10 ML: at 08:56

## 2019-01-01 RX ADMIN — POTASSIUM CHLORIDE 20 MEQ: 20 TABLET, EXTENDED RELEASE ORAL at 08:29

## 2019-01-01 RX ADMIN — ROPINIROLE HYDROCHLORIDE 0.25 MG: 0.5 TABLET, FILM COATED ORAL at 20:26

## 2019-01-01 RX ADMIN — HYDROCORTISONE ACETATE 25 MG: 25 SUPPOSITORY RECTAL at 08:53

## 2019-01-01 RX ADMIN — APIXABAN 10 MG: 5 TABLET, FILM COATED ORAL at 20:30

## 2019-01-01 RX ADMIN — BENZONATATE 200 MG: 100 CAPSULE ORAL at 20:11

## 2019-01-01 RX ADMIN — APIXABAN 5 MG: 5 TABLET, FILM COATED ORAL at 20:24

## 2019-01-01 RX ADMIN — SIMVASTATIN 20 MG: 20 TABLET, FILM COATED ORAL at 20:40

## 2019-01-01 RX ADMIN — DOCUSATE SODIUM 200 MG: 100 CAPSULE, LIQUID FILLED ORAL at 12:05

## 2019-01-01 RX ADMIN — OXYCODONE HYDROCHLORIDE 5 MG: 5 TABLET ORAL at 17:27

## 2019-01-01 RX ADMIN — BENZONATATE 200 MG: 100 CAPSULE ORAL at 20:03

## 2019-01-01 RX ADMIN — TRAMADOL HYDROCHLORIDE 25 MG: 50 TABLET, FILM COATED ORAL at 17:28

## 2019-01-01 RX ADMIN — Medication 5 ML: at 00:03

## 2019-01-01 RX ADMIN — RALOXIFENE HYDROCHLORIDE 60 MG: 60 TABLET, FILM COATED ORAL at 11:02

## 2019-01-01 RX ADMIN — PREDNISONE 10 MG: 5 TABLET ORAL at 08:18

## 2019-01-01 RX ADMIN — INSULIN LISPRO 5 UNITS: 100 INJECTION, SOLUTION INTRAVENOUS; SUBCUTANEOUS at 12:19

## 2019-01-01 RX ADMIN — HYDROCORTISONE ACETATE 25 MG: 25 SUPPOSITORY RECTAL at 21:09

## 2019-01-01 RX ADMIN — CETIRIZINE HYDROCHLORIDE 10 MG: 10 TABLET, FILM COATED ORAL at 09:19

## 2019-01-01 RX ADMIN — METOPROLOL TARTRATE 12.5 MG: 25 TABLET ORAL at 08:53

## 2019-01-01 RX ADMIN — OXYCODONE HYDROCHLORIDE 5 MG: 5 TABLET ORAL at 08:54

## 2019-01-01 RX ADMIN — HEPARIN SODIUM 11.7 UNITS/KG/HR: 10000 INJECTION, SOLUTION INTRAVENOUS at 07:31

## 2019-01-01 RX ADMIN — BACITRACIN ZINC, NEOMYCIN SULFATE, POLYMYXIN B SULFATE: 3.5; 5000; 4 OINTMENT TOPICAL at 09:00

## 2019-01-01 RX ADMIN — BENZONATATE 200 MG: 100 CAPSULE ORAL at 08:58

## 2019-01-01 RX ADMIN — SIMETHICONE CHEW TAB 80 MG 160 MG: 80 TABLET ORAL at 12:02

## 2019-01-01 RX ADMIN — Medication 10 ML: at 20:45

## 2019-01-01 RX ADMIN — SIMETHICONE CHEW TAB 80 MG 160 MG: 80 TABLET ORAL at 11:59

## 2019-01-01 RX ADMIN — FAMOTIDINE 10 MG: 20 TABLET ORAL at 09:36

## 2019-01-01 RX ADMIN — Medication 325 MG: at 11:39

## 2019-01-01 RX ADMIN — PREDNISONE 10 MG: 5 TABLET ORAL at 08:35

## 2019-01-01 RX ADMIN — Medication 10 ML: at 11:04

## 2019-01-01 RX ADMIN — POVIDONE-IODINE: 10 OINTMENT TOPICAL at 15:18

## 2019-01-01 RX ADMIN — ASPIRIN 81 MG 81 MG: 81 TABLET ORAL at 08:18

## 2019-01-01 RX ADMIN — FENTANYL CITRATE 50 MCG: 50 INJECTION INTRAMUSCULAR; INTRAVENOUS at 07:04

## 2019-01-01 RX ADMIN — CALCIUM 500 MG: 500 TABLET ORAL at 11:32

## 2019-01-01 RX ADMIN — ALBUTEROL SULFATE 2.5 MG: 2.5 SOLUTION RESPIRATORY (INHALATION) at 21:08

## 2019-01-01 RX ADMIN — SENNOSIDES 8.6 MG: 8.6 TABLET, FILM COATED ORAL at 08:54

## 2019-01-01 RX ADMIN — BENZONATATE 200 MG: 100 CAPSULE ORAL at 21:07

## 2019-01-01 RX ADMIN — BUDESONIDE 1000 MCG: 0.5 SUSPENSION RESPIRATORY (INHALATION) at 22:12

## 2019-01-01 RX ADMIN — Medication 5 ML: at 07:51

## 2019-01-01 RX ADMIN — FORMOTEROL FUMARATE DIHYDRATE 20 MCG: 20 SOLUTION RESPIRATORY (INHALATION) at 08:38

## 2019-01-01 RX ADMIN — OXYCODONE HYDROCHLORIDE 5 MG: 5 TABLET ORAL at 21:49

## 2019-01-01 RX ADMIN — FENTANYL CITRATE 25 MCG: 50 INJECTION INTRAMUSCULAR; INTRAVENOUS at 19:32

## 2019-01-01 RX ADMIN — METOPROLOL SUCCINATE 25 MG: 25 TABLET, EXTENDED RELEASE ORAL at 08:45

## 2019-01-01 RX ADMIN — Medication 400 MG: at 08:46

## 2019-01-01 RX ADMIN — Medication 10 ML: at 08:16

## 2019-01-01 RX ADMIN — Medication 10 ML: at 22:00

## 2019-01-01 RX ADMIN — FUROSEMIDE 40 MG: 10 INJECTION, SOLUTION INTRAMUSCULAR; INTRAVENOUS at 08:48

## 2019-01-01 RX ADMIN — AMIODARONE HYDROCHLORIDE 200 MG: 200 TABLET ORAL at 08:54

## 2019-01-01 RX ADMIN — ALBUTEROL SULFATE 2.5 MG: 2.5 SOLUTION RESPIRATORY (INHALATION) at 23:08

## 2019-01-01 RX ADMIN — TRAMADOL HYDROCHLORIDE 25 MG: 50 TABLET, FILM COATED ORAL at 08:54

## 2019-01-01 RX ADMIN — ASPIRIN 81 MG 81 MG: 81 TABLET ORAL at 09:23

## 2019-01-01 RX ADMIN — SIMETHICONE CHEW TAB 80 MG 160 MG: 80 TABLET ORAL at 12:18

## 2019-01-01 RX ADMIN — LEVOTHYROXINE SODIUM 125 MCG: 125 TABLET ORAL at 06:35

## 2019-01-01 RX ADMIN — BENZONATATE 200 MG: 100 CAPSULE ORAL at 20:44

## 2019-01-01 RX ADMIN — LACTULOSE 20 G: 20 SOLUTION ORAL at 08:59

## 2019-01-01 RX ADMIN — AMIODARONE HYDROCHLORIDE 200 MG: 200 TABLET ORAL at 08:50

## 2019-01-01 RX ADMIN — GADOBENATE DIMEGLUMINE 17 ML: 529 INJECTION, SOLUTION INTRAVENOUS at 16:37

## 2019-01-01 RX ADMIN — ROPINIROLE HYDROCHLORIDE 0.5 MG: 0.5 TABLET, FILM COATED ORAL at 15:26

## 2019-01-01 RX ADMIN — ROPINIROLE HYDROCHLORIDE 0.25 MG: 0.25 TABLET, FILM COATED ORAL at 20:20

## 2019-01-01 RX ADMIN — BENZONATATE 200 MG: 100 CAPSULE ORAL at 15:11

## 2019-01-01 RX ADMIN — LISINOPRIL 40 MG: 20 TABLET ORAL at 20:44

## 2019-01-01 RX ADMIN — BUDESONIDE 1000 MCG: 0.5 SUSPENSION RESPIRATORY (INHALATION) at 08:27

## 2019-01-01 RX ADMIN — ALBUTEROL SULFATE 2.5 MG: 2.5 SOLUTION RESPIRATORY (INHALATION) at 16:02

## 2019-01-01 RX ADMIN — Medication 5 ML: at 04:09

## 2019-01-01 RX ADMIN — RALOXIFENE HYDROCHLORIDE 60 MG: 60 TABLET, FILM COATED ORAL at 08:43

## 2019-01-01 RX ADMIN — SENNOSIDES 8.6 MG: 8.6 TABLET, FILM COATED ORAL at 08:43

## 2019-01-01 RX ADMIN — POTASSIUM CHLORIDE 20 MEQ: 20 TABLET, EXTENDED RELEASE ORAL at 09:09

## 2019-01-01 RX ADMIN — METOPROLOL TARTRATE 12.5 MG: 25 TABLET ORAL at 20:42

## 2019-01-01 RX ADMIN — SIMETHICONE CHEW TAB 80 MG 160 MG: 80 TABLET ORAL at 12:47

## 2019-01-01 RX ADMIN — NYSTATIN AND TRIAMCINOLONE ACETONIDE: 100000; 1 CREAM TOPICAL at 11:21

## 2019-01-01 RX ADMIN — POVIDONE-IODINE: 10 OINTMENT TOPICAL at 09:29

## 2019-01-01 RX ADMIN — POTASSIUM CHLORIDE 10 MEQ: 10 INJECTION, SOLUTION INTRAVENOUS at 11:07

## 2019-01-01 RX ADMIN — POTASSIUM CHLORIDE 40 MEQ: 20 TABLET, EXTENDED RELEASE ORAL at 08:35

## 2019-01-01 RX ADMIN — CETIRIZINE HYDROCHLORIDE 10 MG: 10 TABLET, FILM COATED ORAL at 08:22

## 2019-01-01 RX ADMIN — POTASSIUM CHLORIDE 20 MEQ: 20 TABLET, EXTENDED RELEASE ORAL at 20:52

## 2019-01-01 RX ADMIN — ROPINIROLE HYDROCHLORIDE 0.5 MG: 0.5 TABLET, FILM COATED ORAL at 15:41

## 2019-01-01 RX ADMIN — Medication 1 CAPSULE: at 11:59

## 2019-01-01 RX ADMIN — APIXABAN 5 MG: 5 TABLET, FILM COATED ORAL at 20:41

## 2019-01-01 RX ADMIN — BACITRACIN ZINC, NEOMYCIN SULFATE, POLYMYXIN B SULFATE 1 G: 3.5; 5000; 4 OINTMENT TOPICAL at 08:51

## 2019-01-01 RX ADMIN — MAGNESIUM OXIDE TAB 400 MG (241.3 MG ELEMENTAL MG) 200 MG: 400 (241.3 MG) TAB at 11:32

## 2019-01-01 RX ADMIN — MICONAZOLE NITRATE: 20 POWDER TOPICAL at 08:22

## 2019-01-01 RX ADMIN — OXYCODONE HYDROCHLORIDE 10 MG: 5 TABLET ORAL at 08:49

## 2019-01-01 RX ADMIN — DOCUSATE SODIUM 200 MG: 100 CAPSULE, LIQUID FILLED ORAL at 08:50

## 2019-01-01 RX ADMIN — CALCIUM 500 MG: 500 TABLET ORAL at 08:35

## 2019-01-01 RX ADMIN — LACTULOSE 20 G: 20 SOLUTION ORAL at 19:30

## 2019-01-01 RX ADMIN — FUROSEMIDE 40 MG: 10 INJECTION, SOLUTION INTRAMUSCULAR; INTRAVENOUS at 18:25

## 2019-01-01 RX ADMIN — BACITRACIN ZINC, NEOMYCIN SULFATE, POLYMYXIN B SULFATE 1 G: 3.5; 5000; 4 OINTMENT TOPICAL at 21:30

## 2019-01-01 RX ADMIN — BENZONATATE 200 MG: 100 CAPSULE ORAL at 09:45

## 2019-01-01 RX ADMIN — FUROSEMIDE 40 MG: 10 INJECTION, SOLUTION INTRAMUSCULAR; INTRAVENOUS at 15:17

## 2019-01-01 RX ADMIN — INSULIN LISPRO 3 UNITS: 100 INJECTION, SOLUTION INTRAVENOUS; SUBCUTANEOUS at 11:36

## 2019-01-01 RX ADMIN — Medication 5 ML: at 09:26

## 2019-01-01 RX ADMIN — Medication 5 ML: at 15:01

## 2019-01-01 RX ADMIN — POVIDONE-IODINE: 10 OINTMENT TOPICAL at 15:32

## 2019-01-01 RX ADMIN — INSULIN LISPRO 4 UNITS: 100 INJECTION, SOLUTION INTRAVENOUS; SUBCUTANEOUS at 16:14

## 2019-01-01 RX ADMIN — FUROSEMIDE 40 MG: 40 TABLET ORAL at 08:19

## 2019-01-01 RX ADMIN — CHOLECALCIFEROL TAB 50 MCG (2000 UNIT) 2000 UNITS: 50 TAB at 11:59

## 2019-01-01 RX ADMIN — BUMETANIDE 1 MG: 0.25 INJECTION INTRAMUSCULAR; INTRAVENOUS at 22:12

## 2019-01-01 RX ADMIN — AMIODARONE HYDROCHLORIDE 200 MG: 200 TABLET ORAL at 08:15

## 2019-01-01 RX ADMIN — ALBUTEROL SULFATE 2.5 MG: 2.5 SOLUTION RESPIRATORY (INHALATION) at 16:14

## 2019-01-01 RX ADMIN — Medication 10 ML: at 08:23

## 2019-01-01 RX ADMIN — POTASSIUM CHLORIDE 20 MEQ: 20 TABLET, EXTENDED RELEASE ORAL at 17:21

## 2019-01-01 RX ADMIN — Medication 10 ML: at 10:49

## 2019-01-01 RX ADMIN — LEVOTHYROXINE SODIUM 125 MCG: 125 TABLET ORAL at 06:21

## 2019-01-01 RX ADMIN — FERROUS SULFATE TAB 325 MG (65 MG ELEMENTAL FE) 325 MG: 325 (65 FE) TAB at 11:59

## 2019-01-01 RX ADMIN — LISINOPRIL 40 MG: 20 TABLET ORAL at 21:55

## 2019-01-01 RX ADMIN — METOPROLOL TARTRATE 2.5 MG: 5 INJECTION, SOLUTION INTRAVENOUS at 13:09

## 2019-01-01 RX ADMIN — OXYCODONE HYDROCHLORIDE 10 MG: 5 TABLET ORAL at 08:14

## 2019-01-01 RX ADMIN — SODIUM CHLORIDE 1000 ML: 9 INJECTION, SOLUTION INTRAVENOUS at 11:57

## 2019-01-01 RX ADMIN — FUROSEMIDE 40 MG: 40 TABLET ORAL at 16:49

## 2019-01-01 RX ADMIN — BENZONATATE 200 MG: 100 CAPSULE ORAL at 08:48

## 2019-01-01 RX ADMIN — FORMOTEROL FUMARATE DIHYDRATE 20 MCG: 20 SOLUTION RESPIRATORY (INHALATION) at 07:41

## 2019-01-01 RX ADMIN — APIXABAN 5 MG: 5 TABLET, FILM COATED ORAL at 09:37

## 2019-01-01 RX ADMIN — OXYCODONE HYDROCHLORIDE 10 MG: 5 TABLET ORAL at 17:10

## 2019-01-01 RX ADMIN — HYDROCODONE BITARTRATE AND ACETAMINOPHEN 1 TABLET: 7.5; 325 TABLET ORAL at 16:48

## 2019-01-01 RX ADMIN — ROPINIROLE HYDROCHLORIDE 0.25 MG: 0.5 TABLET, FILM COATED ORAL at 20:42

## 2019-01-01 RX ADMIN — DORNASE ALFA 2.5 MG: 1 SOLUTION RESPIRATORY (INHALATION) at 07:28

## 2019-01-01 RX ADMIN — ROPINIROLE HYDROCHLORIDE 0.25 MG: 0.5 TABLET, FILM COATED ORAL at 20:44

## 2019-01-01 RX ADMIN — LEVOTHYROXINE SODIUM 137 MCG: 25 TABLET ORAL at 06:50

## 2019-01-01 RX ADMIN — IPRATROPIUM BROMIDE AND ALBUTEROL SULFATE 1 AMPULE: .5; 3 SOLUTION RESPIRATORY (INHALATION) at 20:12

## 2019-01-01 RX ADMIN — Medication 10 ML: at 20:00

## 2019-01-01 RX ADMIN — Medication 5 ML: at 00:38

## 2019-01-01 RX ADMIN — METHYLPREDNISOLONE SODIUM SUCCINATE 40 MG: 40 INJECTION, POWDER, LYOPHILIZED, FOR SOLUTION INTRAMUSCULAR; INTRAVENOUS at 14:10

## 2019-01-01 RX ADMIN — METHYLPREDNISOLONE SODIUM SUCCINATE 40 MG: 40 INJECTION, POWDER, LYOPHILIZED, FOR SOLUTION INTRAMUSCULAR; INTRAVENOUS at 02:07

## 2019-01-01 RX ADMIN — Medication 10 ML: at 20:44

## 2019-01-01 RX ADMIN — METOPROLOL SUCCINATE 50 MG: 50 TABLET, EXTENDED RELEASE ORAL at 08:21

## 2019-01-01 RX ADMIN — HYDROCODONE BITARTRATE AND ACETAMINOPHEN 1 TABLET: 5; 325 TABLET ORAL at 19:01

## 2019-01-01 RX ADMIN — Medication 5 ML: at 04:37

## 2019-01-01 RX ADMIN — CHOLECALCIFEROL TAB 50 MCG (2000 UNIT) 2000 UNITS: 50 TAB at 11:30

## 2019-01-01 RX ADMIN — CHOLECALCIFEROL TAB 50 MCG (2000 UNIT) 2000 UNITS: 50 TAB at 11:10

## 2019-01-01 RX ADMIN — PREDNISONE 20 MG: 20 TABLET ORAL at 09:19

## 2019-01-01 RX ADMIN — POTASSIUM CHLORIDE 20 MEQ: 20 TABLET, EXTENDED RELEASE ORAL at 17:39

## 2019-01-01 RX ADMIN — METOPROLOL TARTRATE 12.5 MG: 25 TABLET ORAL at 22:40

## 2019-01-01 RX ADMIN — Medication 10 ML: at 20:48

## 2019-01-01 RX ADMIN — FUROSEMIDE 40 MG: 40 TABLET ORAL at 11:42

## 2019-01-01 RX ADMIN — LISINOPRIL 20 MG: 20 TABLET ORAL at 20:54

## 2019-01-01 RX ADMIN — RALOXIFENE HYDROCHLORIDE 60 MG: 60 TABLET, FILM COATED ORAL at 11:07

## 2019-01-01 RX ADMIN — BENZONATATE 200 MG: 100 CAPSULE ORAL at 09:52

## 2019-01-01 RX ADMIN — METHYLPREDNISOLONE SODIUM SUCCINATE 40 MG: 40 INJECTION, POWDER, LYOPHILIZED, FOR SOLUTION INTRAMUSCULAR; INTRAVENOUS at 09:28

## 2019-01-01 RX ADMIN — FENTANYL CITRATE 50 MCG: 50 INJECTION INTRAMUSCULAR; INTRAVENOUS at 08:44

## 2019-01-01 RX ADMIN — FLUTICASONE PROPIONATE 1 SPRAY: 50 SPRAY, METERED NASAL at 08:56

## 2019-01-01 RX ADMIN — ROPINIROLE HYDROCHLORIDE 0.5 MG: 0.5 TABLET, FILM COATED ORAL at 18:12

## 2019-01-01 RX ADMIN — Medication 10 ML: at 20:11

## 2019-01-01 RX ADMIN — SIMVASTATIN 20 MG: 20 TABLET, FILM COATED ORAL at 20:44

## 2019-01-01 RX ADMIN — HYDROCORTISONE ACETATE 25 MG: 25 SUPPOSITORY RECTAL at 20:44

## 2019-01-01 RX ADMIN — Medication 5 ML: at 20:34

## 2019-01-01 RX ADMIN — FORMOTEROL FUMARATE DIHYDRATE 20 MCG: 20 SOLUTION RESPIRATORY (INHALATION) at 19:35

## 2019-01-01 RX ADMIN — ENOXAPARIN SODIUM 30 MG: 30 INJECTION SUBCUTANEOUS at 09:19

## 2019-01-01 RX ADMIN — ROPINIROLE HYDROCHLORIDE 0.5 MG: 0.5 TABLET, FILM COATED ORAL at 14:04

## 2019-01-01 RX ADMIN — INSULIN LISPRO 4 UNITS: 100 INJECTION, SOLUTION INTRAVENOUS; SUBCUTANEOUS at 11:10

## 2019-01-01 RX ADMIN — ALBUTEROL SULFATE 2.5 MG: 2.5 SOLUTION RESPIRATORY (INHALATION) at 19:37

## 2019-01-01 RX ADMIN — ROPINIROLE HYDROCHLORIDE 0.5 MG: 0.5 TABLET, FILM COATED ORAL at 14:33

## 2019-01-01 RX ADMIN — PREDNISONE 40 MG: 20 TABLET ORAL at 08:21

## 2019-01-01 RX ADMIN — Medication 5 ML: at 16:26

## 2019-01-01 RX ADMIN — Medication 10 ML: at 08:52

## 2019-01-01 RX ADMIN — BACITRACIN ZINC, NEOMYCIN SULFATE, POLYMYXIN B SULFATE 1 G: 3.5; 5000; 4 OINTMENT TOPICAL at 08:17

## 2019-01-01 RX ADMIN — OXYCODONE HYDROCHLORIDE 10 MG: 5 TABLET ORAL at 16:48

## 2019-01-01 RX ADMIN — FUROSEMIDE 40 MG: 40 TABLET ORAL at 08:36

## 2019-01-01 RX ADMIN — CEFEPIME HYDROCHLORIDE 2 G: 2 INJECTION, POWDER, FOR SOLUTION INTRAVENOUS at 12:02

## 2019-01-01 RX ADMIN — POTASSIUM CHLORIDE 10 MEQ: 10 INJECTION, SOLUTION INTRAVENOUS at 15:25

## 2019-01-01 RX ADMIN — Medication 10 ML: at 09:28

## 2019-01-01 RX ADMIN — SENNOSIDES 8.6 MG: 8.6 TABLET, FILM COATED ORAL at 08:45

## 2019-01-01 RX ADMIN — OXYCODONE HYDROCHLORIDE 10 MG: 5 TABLET ORAL at 12:49

## 2019-01-01 RX ADMIN — FUROSEMIDE 40 MG: 10 INJECTION, SOLUTION INTRAMUSCULAR; INTRAVENOUS at 09:45

## 2019-01-01 RX ADMIN — CHOLECALCIFEROL TAB 50 MCG (2000 UNIT) 2000 UNITS: 50 TAB at 11:02

## 2019-01-01 RX ADMIN — RALOXIFENE HYDROCHLORIDE 60 MG: 60 TABLET, FILM COATED ORAL at 08:36

## 2019-01-01 RX ADMIN — Medication 5 ML: at 01:11

## 2019-01-01 RX ADMIN — PREDNISONE 10 MG: 5 TABLET ORAL at 15:01

## 2019-01-01 RX ADMIN — INSULIN LISPRO 2 UNITS: 100 INJECTION, SOLUTION INTRAVENOUS; SUBCUTANEOUS at 21:44

## 2019-01-01 RX ADMIN — SODIUM CHLORIDE, POTASSIUM CHLORIDE, SODIUM LACTATE AND CALCIUM CHLORIDE: 600; 310; 30; 20 INJECTION, SOLUTION INTRAVENOUS at 19:07

## 2019-01-01 RX ADMIN — POTASSIUM CHLORIDE 20 MEQ: 20 TABLET, EXTENDED RELEASE ORAL at 21:43

## 2019-01-01 RX ADMIN — FORMOTEROL FUMARATE DIHYDRATE 20 MCG: 20 SOLUTION RESPIRATORY (INHALATION) at 19:19

## 2019-01-01 RX ADMIN — PREDNISONE 10 MG: 5 TABLET ORAL at 08:33

## 2019-01-01 RX ADMIN — HYDROCODONE BITARTRATE AND ACETAMINOPHEN 1 TABLET: 7.5; 325 TABLET ORAL at 06:53

## 2019-01-01 RX ADMIN — Medication 5 ML: at 00:14

## 2019-01-01 RX ADMIN — PREDNISONE 10 MG: 5 TABLET ORAL at 09:52

## 2019-01-01 RX ADMIN — Medication 10 ML: at 08:34

## 2019-01-01 RX ADMIN — Medication 5 ML: at 12:13

## 2019-01-01 RX ADMIN — Medication 1 CAPSULE: at 12:27

## 2019-01-01 RX ADMIN — Medication 10 ML: at 20:13

## 2019-01-01 RX ADMIN — CETIRIZINE HYDROCHLORIDE 10 MG: 10 TABLET, FILM COATED ORAL at 10:40

## 2019-01-01 RX ADMIN — SIMETHICONE CHEW TAB 80 MG 160 MG: 80 TABLET ORAL at 22:05

## 2019-01-01 RX ADMIN — FERROUS SULFATE TAB 325 MG (65 MG ELEMENTAL FE) 325 MG: 325 (65 FE) TAB at 12:14

## 2019-01-01 RX ADMIN — Medication 5 ML: at 04:33

## 2019-01-01 RX ADMIN — BENZONATATE 200 MG: 100 CAPSULE ORAL at 13:32

## 2019-01-01 RX ADMIN — METOLAZONE 5 MG: 2.5 TABLET ORAL at 08:17

## 2019-01-01 RX ADMIN — ATORVASTATIN CALCIUM 20 MG: 20 TABLET, FILM COATED ORAL at 20:08

## 2019-01-01 RX ADMIN — FORMOTEROL FUMARATE DIHYDRATE 20 MCG: 20 SOLUTION RESPIRATORY (INHALATION) at 09:33

## 2019-01-01 RX ADMIN — LISINOPRIL 40 MG: 20 TABLET ORAL at 22:23

## 2019-01-01 RX ADMIN — FLUTICASONE PROPIONATE 1 SPRAY: 50 SPRAY, METERED NASAL at 08:51

## 2019-01-01 RX ADMIN — NORTRIPTYLINE HYDROCHLORIDE 10 MG: 10 CAPSULE ORAL at 20:15

## 2019-01-01 RX ADMIN — LACTULOSE 20 G: 20 SOLUTION ORAL at 09:52

## 2019-01-01 RX ADMIN — HEPARIN SODIUM 11.7 UNITS/KG/HR: 10000 INJECTION, SOLUTION INTRAVENOUS at 20:39

## 2019-01-01 RX ADMIN — RALOXIFENE HYDROCHLORIDE 60 MG: 60 TABLET, FILM COATED ORAL at 09:35

## 2019-01-01 RX ADMIN — ROPINIROLE HYDROCHLORIDE 0.25 MG: 0.5 TABLET, FILM COATED ORAL at 20:37

## 2019-01-01 RX ADMIN — HYDROCODONE BITARTRATE AND ACETAMINOPHEN 2 TABLET: 5; 325 TABLET ORAL at 03:18

## 2019-01-01 RX ADMIN — POVIDONE-IODINE: 10 OINTMENT TOPICAL at 15:56

## 2019-01-01 RX ADMIN — FUROSEMIDE 40 MG: 40 TABLET ORAL at 13:52

## 2019-01-01 RX ADMIN — PANTOPRAZOLE SODIUM 40 MG: 40 TABLET, DELAYED RELEASE ORAL at 06:32

## 2019-01-01 RX ADMIN — Medication 10 ML: at 20:26

## 2019-01-01 RX ADMIN — Medication 5 ML: at 04:15

## 2019-01-01 RX ADMIN — INSULIN LISPRO 2 UNITS: 100 INJECTION, SOLUTION INTRAVENOUS; SUBCUTANEOUS at 06:05

## 2019-01-01 RX ADMIN — HYDROCODONE BITARTRATE AND ACETAMINOPHEN 1 TABLET: 10; 325 TABLET ORAL at 08:19

## 2019-01-01 RX ADMIN — INSULIN LISPRO 4 UNITS: 100 INJECTION, SOLUTION INTRAVENOUS; SUBCUTANEOUS at 17:03

## 2019-01-01 RX ADMIN — PANTOPRAZOLE SODIUM 40 MG: 40 TABLET, DELAYED RELEASE ORAL at 06:25

## 2019-01-01 RX ADMIN — METHYLPREDNISOLONE SODIUM SUCCINATE 40 MG: 40 INJECTION, POWDER, LYOPHILIZED, FOR SOLUTION INTRAMUSCULAR; INTRAVENOUS at 19:12

## 2019-01-01 RX ADMIN — BENZONATATE 200 MG: 100 CAPSULE ORAL at 08:45

## 2019-01-01 RX ADMIN — FUROSEMIDE 40 MG: 10 INJECTION, SOLUTION INTRAMUSCULAR; INTRAVENOUS at 08:16

## 2019-01-01 RX ADMIN — INSULIN LISPRO 2 UNITS: 100 INJECTION, SOLUTION INTRAVENOUS; SUBCUTANEOUS at 12:04

## 2019-01-01 RX ADMIN — FAMOTIDINE 40 MG: 20 TABLET ORAL at 08:21

## 2019-01-01 RX ADMIN — AMIODARONE HYDROCHLORIDE 200 MG: 200 TABLET ORAL at 09:45

## 2019-01-01 RX ADMIN — SENNOSIDES 8.6 MG: 8.6 TABLET, FILM COATED ORAL at 08:33

## 2019-01-01 RX ADMIN — OXYCODONE HYDROCHLORIDE 5 MG: 5 TABLET ORAL at 01:53

## 2019-01-01 RX ADMIN — LEVOTHYROXINE SODIUM 137 MCG: 25 TABLET ORAL at 06:31

## 2019-01-01 RX ADMIN — FLUTICASONE PROPIONATE 1 SPRAY: 50 SPRAY, METERED NASAL at 08:58

## 2019-01-01 RX ADMIN — Medication 5 ML: at 11:46

## 2019-01-01 RX ADMIN — Medication 10 ML: at 21:50

## 2019-01-01 RX ADMIN — OXYCODONE HYDROCHLORIDE 10 MG: 5 TABLET ORAL at 08:52

## 2019-01-01 RX ADMIN — BUDESONIDE 1000 MCG: 0.5 SUSPENSION RESPIRATORY (INHALATION) at 20:00

## 2019-01-01 RX ADMIN — TRAMADOL HYDROCHLORIDE 25 MG: 50 TABLET, FILM COATED ORAL at 10:14

## 2019-01-01 RX ADMIN — PREDNISONE 10 MG: 5 TABLET ORAL at 08:59

## 2019-01-01 RX ADMIN — IPRATROPIUM BROMIDE AND ALBUTEROL SULFATE 1 AMPULE: .5; 3 SOLUTION RESPIRATORY (INHALATION) at 08:28

## 2019-01-01 RX ADMIN — FORMOTEROL FUMARATE DIHYDRATE 20 MCG: 20 SOLUTION RESPIRATORY (INHALATION) at 07:28

## 2019-01-01 RX ADMIN — OXYCODONE HYDROCHLORIDE 10 MG: 5 TABLET ORAL at 17:32

## 2019-01-01 RX ADMIN — CYANOCOBALAMIN 1000 MCG: 1000 INJECTION, SOLUTION INTRAMUSCULAR at 08:34

## 2019-01-01 RX ADMIN — IPRATROPIUM BROMIDE AND ALBUTEROL SULFATE 3 AMPULE: .5; 3 SOLUTION RESPIRATORY (INHALATION) at 09:50

## 2019-01-01 RX ADMIN — SIMETHICONE CHEW TAB 80 MG 160 MG: 80 TABLET ORAL at 12:55

## 2019-01-01 RX ADMIN — FORMOTEROL FUMARATE DIHYDRATE 20 MCG: 20 SOLUTION RESPIRATORY (INHALATION) at 21:08

## 2019-01-01 RX ADMIN — CALCIUM CARBONATE (ANTACID) CHEW TAB 500 MG 1000 MG: 500 CHEW TAB at 11:02

## 2019-01-01 RX ADMIN — ATORVASTATIN CALCIUM 20 MG: 20 TABLET, FILM COATED ORAL at 20:20

## 2019-01-01 RX ADMIN — LEVOTHYROXINE SODIUM 137 MCG: 25 TABLET ORAL at 09:36

## 2019-01-01 RX ADMIN — APIXABAN 5 MG: 5 TABLET, FILM COATED ORAL at 20:19

## 2019-01-01 RX ADMIN — CETIRIZINE HYDROCHLORIDE 5 MG: 10 TABLET, FILM COATED ORAL at 22:07

## 2019-01-01 RX ADMIN — INSULIN LISPRO 6 UNITS: 100 INJECTION, SOLUTION INTRAVENOUS; SUBCUTANEOUS at 17:05

## 2019-01-01 RX ADMIN — FORMOTEROL FUMARATE DIHYDRATE 20 MCG: 20 SOLUTION RESPIRATORY (INHALATION) at 08:45

## 2019-01-01 RX ADMIN — FAMOTIDINE 10 MG: 20 TABLET ORAL at 08:20

## 2019-01-01 RX ADMIN — ALBUTEROL SULFATE 2.5 MG: 2.5 SOLUTION RESPIRATORY (INHALATION) at 09:17

## 2019-01-01 RX ADMIN — METOPROLOL TARTRATE 2.5 MG: 5 INJECTION, SOLUTION INTRAVENOUS at 11:14

## 2019-01-01 RX ADMIN — ALBUTEROL SULFATE 2.5 MG: 2.5 SOLUTION RESPIRATORY (INHALATION) at 22:12

## 2019-01-01 RX ADMIN — HYDROCODONE BITARTRATE AND ACETAMINOPHEN 1 TABLET: 7.5; 325 TABLET ORAL at 00:39

## 2019-01-01 RX ADMIN — ALBUTEROL SULFATE 2.5 MG: 2.5 SOLUTION RESPIRATORY (INHALATION) at 08:27

## 2019-01-01 RX ADMIN — AMIODARONE HYDROCHLORIDE 200 MG: 200 TABLET ORAL at 09:27

## 2019-01-01 RX ADMIN — PREDNISONE 20 MG: 20 TABLET ORAL at 08:20

## 2019-01-01 RX ADMIN — LACTULOSE 20 G: 20 SOLUTION ORAL at 09:26

## 2019-01-01 RX ADMIN — Medication 10 ML: at 22:42

## 2019-01-01 RX ADMIN — Medication 5 ML: at 10:36

## 2019-01-01 RX ADMIN — Medication 400 MG: at 21:50

## 2019-01-01 RX ADMIN — AMIODARONE HYDROCHLORIDE 200 MG: 200 TABLET ORAL at 09:58

## 2019-01-01 RX ADMIN — OXYCODONE HYDROCHLORIDE 10 MG: 5 TABLET ORAL at 12:04

## 2019-01-01 RX ADMIN — FUROSEMIDE 40 MG: 40 TABLET ORAL at 09:52

## 2019-01-01 RX ADMIN — FAMOTIDINE 10 MG: 20 TABLET ORAL at 09:19

## 2019-01-01 RX ADMIN — DOCUSATE SODIUM 200 MG: 100 CAPSULE, LIQUID FILLED ORAL at 08:45

## 2019-01-01 RX ADMIN — HYDROCODONE BITARTRATE AND ACETAMINOPHEN 1 TABLET: 7.5; 325 TABLET ORAL at 15:40

## 2019-01-01 RX ADMIN — BACITRACIN ZINC, NEOMYCIN SULFATE, POLYMYXIN B SULFATE: 3.5; 5000; 4 OINTMENT TOPICAL at 22:41

## 2019-01-01 RX ADMIN — CHOLECALCIFEROL TAB 50 MCG (2000 UNIT) 2000 UNITS: 50 TAB at 11:13

## 2019-01-01 RX ADMIN — Medication 10 ML: at 22:18

## 2019-01-01 RX ADMIN — POVIDONE-IODINE: 10 OINTMENT TOPICAL at 08:16

## 2019-01-01 RX ADMIN — Medication 1 CAPSULE: at 21:39

## 2019-01-01 RX ADMIN — SENNOSIDES 8.6 MG: 8.6 TABLET, FILM COATED ORAL at 09:36

## 2019-01-01 RX ADMIN — FUROSEMIDE 40 MG: 40 TABLET ORAL at 08:21

## 2019-01-01 RX ADMIN — LISINOPRIL 20 MG: 20 TABLET ORAL at 18:00

## 2019-01-01 RX ADMIN — ALBUTEROL SULFATE 2.5 MG: 2.5 SOLUTION RESPIRATORY (INHALATION) at 15:59

## 2019-01-01 RX ADMIN — OXYCODONE HYDROCHLORIDE 10 MG: 5 TABLET ORAL at 08:45

## 2019-01-01 RX ADMIN — PREDNISONE 40 MG: 20 TABLET ORAL at 10:42

## 2019-01-01 RX ADMIN — AMIODARONE HYDROCHLORIDE 200 MG: 200 TABLET ORAL at 08:35

## 2019-01-01 RX ADMIN — Medication 10 ML: at 08:45

## 2019-01-01 RX ADMIN — ALBUTEROL SULFATE 2.5 MG: 2.5 SOLUTION RESPIRATORY (INHALATION) at 21:07

## 2019-01-01 RX ADMIN — SENNOSIDES 8.6 MG: 8.6 TABLET, FILM COATED ORAL at 08:56

## 2019-01-01 RX ADMIN — FAMOTIDINE 40 MG: 20 TABLET ORAL at 17:39

## 2019-01-01 RX ADMIN — ROPINIROLE HYDROCHLORIDE 0.75 MG: 0.5 TABLET, FILM COATED ORAL at 15:21

## 2019-01-01 RX ADMIN — MAGNESIUM OXIDE TAB 400 MG (241.3 MG ELEMENTAL MG) 200 MG: 400 (241.3 MG) TAB at 11:13

## 2019-01-01 RX ADMIN — ALBUTEROL SULFATE 2.5 MG: 2.5 SOLUTION RESPIRATORY (INHALATION) at 21:04

## 2019-01-01 RX ADMIN — PANTOPRAZOLE SODIUM 40 MG: 40 INJECTION, POWDER, LYOPHILIZED, FOR SOLUTION INTRAVENOUS at 08:59

## 2019-01-01 RX ADMIN — FUROSEMIDE 20 MG: 10 INJECTION, SOLUTION INTRAVENOUS at 11:15

## 2019-01-01 RX ADMIN — LACTULOSE 20 G: 20 SOLUTION ORAL at 08:30

## 2019-01-01 RX ADMIN — SENNOSIDES 8.6 MG: 8.6 TABLET, FILM COATED ORAL at 11:28

## 2019-01-01 RX ADMIN — Medication 10 ML: at 09:00

## 2019-01-01 RX ADMIN — Medication 1 CAPSULE: at 09:58

## 2019-01-01 RX ADMIN — BUDESONIDE 1000 MCG: 0.5 SUSPENSION RESPIRATORY (INHALATION) at 19:37

## 2019-01-01 RX ADMIN — ACETAMINOPHEN 650 MG: 325 TABLET ORAL at 01:11

## 2019-01-01 RX ADMIN — APIXABAN 5 MG: 5 TABLET, FILM COATED ORAL at 08:53

## 2019-01-01 RX ADMIN — METOPROLOL SUCCINATE 25 MG: 25 TABLET, EXTENDED RELEASE ORAL at 22:21

## 2019-01-01 RX ADMIN — BENZONATATE 200 MG: 100 CAPSULE ORAL at 08:52

## 2019-01-01 RX ADMIN — ROPINIROLE HYDROCHLORIDE 0.25 MG: 0.25 TABLET, FILM COATED ORAL at 21:54

## 2019-01-01 RX ADMIN — BUDESONIDE 1000 MCG: 0.5 SUSPENSION RESPIRATORY (INHALATION) at 19:34

## 2019-01-01 RX ADMIN — PREDNISONE 10 MG: 5 TABLET ORAL at 08:54

## 2019-01-01 RX ADMIN — BENZONATATE 200 MG: 100 CAPSULE ORAL at 08:15

## 2019-01-01 RX ADMIN — PANTOPRAZOLE SODIUM 40 MG: 40 TABLET, DELAYED RELEASE ORAL at 06:43

## 2019-01-01 RX ADMIN — ROPINIROLE HYDROCHLORIDE 0.25 MG: 0.5 TABLET, FILM COATED ORAL at 19:59

## 2019-01-01 RX ADMIN — FUROSEMIDE 40 MG: 10 INJECTION, SOLUTION INTRAMUSCULAR; INTRAVENOUS at 15:59

## 2019-01-01 RX ADMIN — METOPROLOL SUCCINATE 25 MG: 25 TABLET, EXTENDED RELEASE ORAL at 09:07

## 2019-01-01 RX ADMIN — BUDESONIDE 1000 MCG: 0.5 SUSPENSION RESPIRATORY (INHALATION) at 20:12

## 2019-01-01 RX ADMIN — Medication 10 ML: at 08:26

## 2019-01-01 RX ADMIN — METOPROLOL TARTRATE 2.5 MG: 5 INJECTION, SOLUTION INTRAVENOUS at 17:40

## 2019-01-01 RX ADMIN — ALBUTEROL SULFATE 2.5 MG: 2.5 SOLUTION RESPIRATORY (INHALATION) at 20:30

## 2019-01-01 RX ADMIN — LACTULOSE 20 G: 20 SOLUTION ORAL at 08:48

## 2019-01-01 RX ADMIN — ROPINIROLE HYDROCHLORIDE 0.5 MG: 0.5 TABLET, FILM COATED ORAL at 15:47

## 2019-01-01 RX ADMIN — Medication 10 ML: at 08:36

## 2019-01-01 RX ADMIN — INSULIN LISPRO 1 UNITS: 100 INJECTION, SOLUTION INTRAVENOUS; SUBCUTANEOUS at 06:32

## 2019-01-01 RX ADMIN — ROPINIROLE HYDROCHLORIDE 0.5 MG: 0.5 TABLET, FILM COATED ORAL at 22:51

## 2019-01-01 RX ADMIN — Medication 1 TABLET: at 14:08

## 2019-01-01 RX ADMIN — Medication 5 ML: at 08:54

## 2019-01-01 RX ADMIN — PREDNISONE 10 MG: 5 TABLET ORAL at 08:45

## 2019-01-01 RX ADMIN — RALOXIFENE HYDROCHLORIDE 60 MG: 60 TABLET, FILM COATED ORAL at 09:18

## 2019-01-01 RX ADMIN — Medication 10 ML: at 14:35

## 2019-01-01 RX ADMIN — HYDROCODONE BITARTRATE AND ACETAMINOPHEN 1 TABLET: 7.5; 325 TABLET ORAL at 20:47

## 2019-01-01 RX ADMIN — POTASSIUM CHLORIDE 40 MEQ: 20 TABLET, EXTENDED RELEASE ORAL at 12:03

## 2019-01-01 RX ADMIN — ALBUMIN (HUMAN) 25 G: 0.25 INJECTION, SOLUTION INTRAVENOUS at 14:35

## 2019-01-01 RX ADMIN — VANCOMYCIN HYDROCHLORIDE 2000 MG: 10 INJECTION, POWDER, LYOPHILIZED, FOR SOLUTION INTRAVENOUS at 12:22

## 2019-01-01 RX ADMIN — POTASSIUM CHLORIDE 40 MEQ: 20 TABLET, EXTENDED RELEASE ORAL at 09:37

## 2019-01-01 RX ADMIN — OXYCODONE HYDROCHLORIDE 10 MG: 5 TABLET ORAL at 17:12

## 2019-01-01 RX ADMIN — INSULIN LISPRO 2 UNITS: 100 INJECTION, SOLUTION INTRAVENOUS; SUBCUTANEOUS at 12:03

## 2019-01-01 RX ADMIN — ROPINIROLE HYDROCHLORIDE 0.5 MG: 0.5 TABLET, FILM COATED ORAL at 16:47

## 2019-01-01 RX ADMIN — APIXABAN 5 MG: 5 TABLET, FILM COATED ORAL at 09:58

## 2019-01-01 RX ADMIN — OXYCODONE HYDROCHLORIDE 5 MG: 5 TABLET ORAL at 17:13

## 2019-01-01 RX ADMIN — HYDROCORTISONE ACETATE 25 MG: 25 SUPPOSITORY RECTAL at 12:13

## 2019-01-01 RX ADMIN — MAGNESIUM OXIDE TAB 400 MG (241.3 MG ELEMENTAL MG) 200 MG: 400 (241.3 MG) TAB at 11:59

## 2019-01-01 RX ADMIN — BENZONATATE 200 MG: 100 CAPSULE ORAL at 14:46

## 2019-01-01 RX ADMIN — AMIODARONE HYDROCHLORIDE 200 MG: 200 TABLET ORAL at 08:37

## 2019-01-01 RX ADMIN — LISINOPRIL 40 MG: 20 TABLET ORAL at 20:45

## 2019-01-01 RX ADMIN — SIMVASTATIN 10 MG: 20 TABLET, FILM COATED ORAL at 20:15

## 2019-01-01 RX ADMIN — PANTOPRAZOLE SODIUM 40 MG: 40 TABLET, DELAYED RELEASE ORAL at 06:15

## 2019-01-01 RX ADMIN — SENNOSIDES 8.6 MG: 8.6 TABLET, FILM COATED ORAL at 10:41

## 2019-01-01 RX ADMIN — METOPROLOL TARTRATE 12.5 MG: 25 TABLET ORAL at 19:59

## 2019-01-01 RX ADMIN — BENZONATATE 200 MG: 100 CAPSULE ORAL at 14:09

## 2019-01-01 RX ADMIN — SIMETHICONE CHEW TAB 80 MG 160 MG: 80 TABLET ORAL at 12:15

## 2019-01-01 RX ADMIN — METHYLPREDNISOLONE SODIUM SUCCINATE 40 MG: 40 INJECTION, POWDER, LYOPHILIZED, FOR SOLUTION INTRAMUSCULAR; INTRAVENOUS at 15:04

## 2019-01-01 RX ADMIN — BISMUTH SUBSALICYLATE 30 ML: 262 LIQUID ORAL at 15:43

## 2019-01-01 RX ADMIN — Medication 400 MG: at 20:44

## 2019-01-01 RX ADMIN — HYDROCORTISONE ACETATE 25 MG: 25 SUPPOSITORY RECTAL at 20:47

## 2019-01-01 RX ADMIN — SIMETHICONE CHEW TAB 80 MG 160 MG: 80 TABLET ORAL at 12:00

## 2019-01-01 RX ADMIN — POTASSIUM CHLORIDE 20 MEQ: 20 TABLET, EXTENDED RELEASE ORAL at 17:24

## 2019-01-01 RX ADMIN — ALBUTEROL SULFATE 2.5 MG: 2.5 SOLUTION RESPIRATORY (INHALATION) at 11:55

## 2019-01-01 RX ADMIN — METOPROLOL TARTRATE 2.5 MG: 5 INJECTION INTRAVENOUS at 18:18

## 2019-01-01 RX ADMIN — APIXABAN 5 MG: 5 TABLET, FILM COATED ORAL at 09:52

## 2019-01-01 RX ADMIN — OXYCODONE HYDROCHLORIDE 10 MG: 5 TABLET ORAL at 12:02

## 2019-01-01 RX ADMIN — AMIODARONE HYDROCHLORIDE 200 MG: 200 TABLET ORAL at 09:41

## 2019-01-01 RX ADMIN — LISINOPRIL 40 MG: 20 TABLET ORAL at 20:20

## 2019-01-01 RX ADMIN — Medication 1 CAPSULE: at 20:20

## 2019-01-01 RX ADMIN — FAMOTIDINE 40 MG: 20 TABLET ORAL at 17:21

## 2019-01-01 RX ADMIN — PREDNISONE 10 MG: 5 TABLET ORAL at 08:36

## 2019-01-01 RX ADMIN — PREDNISONE 30 MG: 5 TABLET ORAL at 09:36

## 2019-01-01 RX ADMIN — CHOLECALCIFEROL TAB 50 MCG (2000 UNIT) 2000 UNITS: 50 TAB at 11:49

## 2019-01-01 RX ADMIN — ENOXAPARIN SODIUM 30 MG: 30 INJECTION SUBCUTANEOUS at 09:09

## 2019-01-01 RX ADMIN — BENZONATATE 200 MG: 100 CAPSULE ORAL at 14:33

## 2019-01-01 RX ADMIN — MICONAZOLE NITRATE: 20 POWDER TOPICAL at 10:25

## 2019-01-01 RX ADMIN — BUDESONIDE 1000 MCG: 0.5 SUSPENSION RESPIRATORY (INHALATION) at 11:47

## 2019-01-01 RX ADMIN — FUROSEMIDE 40 MG: 10 INJECTION, SOLUTION INTRAMUSCULAR; INTRAVENOUS at 12:02

## 2019-01-01 RX ADMIN — METOPROLOL TARTRATE 12.5 MG: 25 TABLET ORAL at 12:55

## 2019-01-01 RX ADMIN — BENZONATATE 200 MG: 100 CAPSULE ORAL at 15:03

## 2019-01-01 RX ADMIN — ISOSORBIDE MONONITRATE 30 MG: 30 TABLET, EXTENDED RELEASE ORAL at 12:38

## 2019-01-01 RX ADMIN — METHYLPREDNISOLONE SODIUM SUCCINATE 40 MG: 40 INJECTION, POWDER, LYOPHILIZED, FOR SOLUTION INTRAMUSCULAR; INTRAVENOUS at 14:35

## 2019-01-01 RX ADMIN — Medication 325 MG: at 11:28

## 2019-01-01 RX ADMIN — PANTOPRAZOLE SODIUM 40 MG: 40 INJECTION, POWDER, LYOPHILIZED, FOR SOLUTION INTRAVENOUS at 08:52

## 2019-01-01 RX ADMIN — Medication 10 ML: at 08:58

## 2019-01-01 RX ADMIN — Medication 5 ML: at 21:08

## 2019-01-01 RX ADMIN — ACETAMINOPHEN 650 MG: 325 TABLET ORAL at 22:57

## 2019-01-01 RX ADMIN — Medication 10 ML: at 09:13

## 2019-01-01 RX ADMIN — DOCUSATE SODIUM 200 MG: 100 CAPSULE, LIQUID FILLED ORAL at 08:51

## 2019-01-01 RX ADMIN — LEVOTHYROXINE SODIUM 137 MCG: 25 TABLET ORAL at 06:27

## 2019-01-01 RX ADMIN — HYDROCODONE BITARTRATE AND ACETAMINOPHEN 1 TABLET: 7.5; 325 TABLET ORAL at 08:24

## 2019-01-01 RX ADMIN — INSULIN LISPRO 1 UNITS: 100 INJECTION, SOLUTION INTRAVENOUS; SUBCUTANEOUS at 20:31

## 2019-01-01 RX ADMIN — FORMOTEROL FUMARATE DIHYDRATE 20 MCG: 20 SOLUTION RESPIRATORY (INHALATION) at 07:49

## 2019-01-01 RX ADMIN — APIXABAN 5 MG: 5 TABLET, FILM COATED ORAL at 09:40

## 2019-01-01 RX ADMIN — SIMETHICONE CHEW TAB 80 MG 160 MG: 80 TABLET ORAL at 11:32

## 2019-01-01 RX ADMIN — LACTULOSE 20 G: 20 SOLUTION ORAL at 09:23

## 2019-01-01 RX ADMIN — ALBUTEROL SULFATE 2.5 MG: 2.5 SOLUTION RESPIRATORY (INHALATION) at 20:00

## 2019-01-01 RX ADMIN — NORTRIPTYLINE HYDROCHLORIDE 10 MG: 10 CAPSULE ORAL at 21:41

## 2019-01-01 RX ADMIN — POTASSIUM CHLORIDE 40 MEQ: 20 TABLET, EXTENDED RELEASE ORAL at 09:52

## 2019-01-01 RX ADMIN — Medication 400 MG: at 09:38

## 2019-01-01 RX ADMIN — APIXABAN 5 MG: 5 TABLET, FILM COATED ORAL at 08:43

## 2019-01-01 RX ADMIN — ALBUTEROL SULFATE 2.5 MG: 2.5 SOLUTION RESPIRATORY (INHALATION) at 15:28

## 2019-01-01 RX ADMIN — METOPROLOL TARTRATE 12.5 MG: 25 TABLET ORAL at 20:45

## 2019-01-01 RX ADMIN — ROPINIROLE HYDROCHLORIDE 0.25 MG: 0.25 TABLET, FILM COATED ORAL at 21:43

## 2019-01-01 RX ADMIN — CHOLECALCIFEROL TAB 50 MCG (2000 UNIT) 2000 UNITS: 50 TAB at 12:14

## 2019-01-01 RX ADMIN — SENNOSIDES 8.6 MG: 8.6 TABLET, FILM COATED ORAL at 09:27

## 2019-01-01 RX ADMIN — BACITRACIN ZINC, NEOMYCIN SULFATE, POLYMYXIN B SULFATE 1 G: 3.5; 5000; 4 OINTMENT TOPICAL at 20:26

## 2019-01-01 RX ADMIN — APIXABAN 10 MG: 5 TABLET, FILM COATED ORAL at 20:10

## 2019-01-01 RX ADMIN — INSULIN LISPRO 3 UNITS: 100 INJECTION, SOLUTION INTRAVENOUS; SUBCUTANEOUS at 06:33

## 2019-01-01 RX ADMIN — POTASSIUM CHLORIDE 20 MEQ: 20 TABLET, EXTENDED RELEASE ORAL at 08:23

## 2019-01-01 RX ADMIN — PANTOPRAZOLE SODIUM 40 MG: 40 TABLET, DELAYED RELEASE ORAL at 05:58

## 2019-01-01 RX ADMIN — SIMETHICONE CHEW TAB 80 MG 160 MG: 80 TABLET ORAL at 11:40

## 2019-01-01 RX ADMIN — Medication 400 MG: at 08:22

## 2019-01-01 RX ADMIN — METOPROLOL TARTRATE 2.5 MG: 5 INJECTION, SOLUTION INTRAVENOUS at 05:57

## 2019-01-01 RX ADMIN — HYDROCODONE BITARTRATE AND HOMATROPINE METHYLBROMIDE 5 ML: 5; 1.5 SOLUTION ORAL at 13:49

## 2019-01-01 RX ADMIN — METOPROLOL SUCCINATE 50 MG: 50 TABLET, EXTENDED RELEASE ORAL at 21:54

## 2019-01-01 RX ADMIN — FORMOTEROL FUMARATE DIHYDRATE 20 MCG: 20 SOLUTION RESPIRATORY (INHALATION) at 09:51

## 2019-01-01 RX ADMIN — METOPROLOL SUCCINATE 50 MG: 50 TABLET, EXTENDED RELEASE ORAL at 08:18

## 2019-01-01 RX ADMIN — FORMOTEROL FUMARATE DIHYDRATE 20 MCG: 20 SOLUTION RESPIRATORY (INHALATION) at 22:12

## 2019-01-01 RX ADMIN — FUROSEMIDE 40 MG: 40 TABLET ORAL at 17:02

## 2019-01-01 RX ADMIN — ALBUTEROL SULFATE 2.5 MG: 2.5 SOLUTION RESPIRATORY (INHALATION) at 08:13

## 2019-01-01 RX ADMIN — Medication 10 ML: at 20:36

## 2019-01-01 RX ADMIN — Medication 1 TABLET: at 12:47

## 2019-01-01 RX ADMIN — CALCIUM 500 MG: 500 TABLET ORAL at 11:59

## 2019-01-01 RX ADMIN — POTASSIUM CHLORIDE 40 MEQ: 20 TABLET, EXTENDED RELEASE ORAL at 10:40

## 2019-01-01 RX ADMIN — OXYCODONE HYDROCHLORIDE 10 MG: 5 TABLET ORAL at 16:13

## 2019-01-01 RX ADMIN — APIXABAN 5 MG: 5 TABLET, FILM COATED ORAL at 20:48

## 2019-01-01 RX ADMIN — SENNOSIDES 8.6 MG: 8.6 TABLET, FILM COATED ORAL at 09:44

## 2019-01-01 RX ADMIN — INSULIN LISPRO 3 UNITS: 100 INJECTION, SOLUTION INTRAVENOUS; SUBCUTANEOUS at 15:51

## 2019-01-01 RX ADMIN — RALOXIFENE HYDROCHLORIDE 60 MG: 60 TABLET, FILM COATED ORAL at 08:21

## 2019-01-01 RX ADMIN — POTASSIUM CHLORIDE 20 MEQ: 20 TABLET, EXTENDED RELEASE ORAL at 20:44

## 2019-01-01 RX ADMIN — BUDESONIDE 1000 MCG: 0.5 SUSPENSION RESPIRATORY (INHALATION) at 19:20

## 2019-01-01 RX ADMIN — SODIUM CHLORIDE: 9 INJECTION, SOLUTION INTRAVENOUS at 21:49

## 2019-01-01 RX ADMIN — HYDROCODONE BITARTRATE AND ACETAMINOPHEN 1 TABLET: 10; 325 TABLET ORAL at 08:16

## 2019-01-01 RX ADMIN — LACTULOSE 20 G: 20 SOLUTION ORAL at 14:19

## 2019-01-01 RX ADMIN — IPRATROPIUM BROMIDE AND ALBUTEROL SULFATE 1 AMPULE: .5; 3 SOLUTION RESPIRATORY (INHALATION) at 12:32

## 2019-01-01 RX ADMIN — FERROUS SULFATE TAB 325 MG (65 MG ELEMENTAL FE) 325 MG: 325 (65 FE) TAB at 22:14

## 2019-01-01 RX ADMIN — FUROSEMIDE 40 MG: 10 INJECTION, SOLUTION INTRAMUSCULAR; INTRAVENOUS at 16:13

## 2019-01-01 RX ADMIN — FAMOTIDINE 40 MG: 20 TABLET ORAL at 22:06

## 2019-01-01 RX ADMIN — INSULIN LISPRO 2 UNITS: 100 INJECTION, SOLUTION INTRAVENOUS; SUBCUTANEOUS at 08:45

## 2019-01-01 RX ADMIN — BENZONATATE 200 MG: 100 CAPSULE ORAL at 20:45

## 2019-01-01 RX ADMIN — RALOXIFENE HYDROCHLORIDE 60 MG: 60 TABLET, FILM COATED ORAL at 09:58

## 2019-01-01 RX ADMIN — POTASSIUM CHLORIDE 10 MEQ: 29.8 INJECTION, SOLUTION INTRAVENOUS at 13:15

## 2019-01-01 RX ADMIN — ROPINIROLE HYDROCHLORIDE 0.75 MG: 0.5 TABLET, FILM COATED ORAL at 14:14

## 2019-01-01 RX ADMIN — SIMETHICONE CHEW TAB 80 MG 160 MG: 80 TABLET ORAL at 11:25

## 2019-01-01 RX ADMIN — CALCIUM 500 MG: 500 TABLET ORAL at 21:51

## 2019-01-01 RX ADMIN — FERROUS SULFATE TAB 325 MG (65 MG ELEMENTAL FE) 325 MG: 325 (65 FE) TAB at 10:42

## 2019-01-01 RX ADMIN — SIMETHICONE CHEW TAB 80 MG 160 MG: 80 TABLET ORAL at 12:14

## 2019-01-01 RX ADMIN — OXYCODONE HYDROCHLORIDE 5 MG: 5 TABLET ORAL at 11:43

## 2019-01-01 RX ADMIN — METOPROLOL SUCCINATE 25 MG: 25 TABLET, EXTENDED RELEASE ORAL at 20:45

## 2019-01-01 RX ADMIN — SODIUM CHLORIDE: 9 INJECTION, SOLUTION INTRAVENOUS at 15:25

## 2019-01-01 RX ADMIN — PERFLUTREN 1.65 MG: 6.52 INJECTION, SUSPENSION INTRAVENOUS at 15:22

## 2019-01-01 RX ADMIN — Medication 5 ML: at 12:05

## 2019-01-01 RX ADMIN — INSULIN LISPRO 2 UNITS: 100 INJECTION, SOLUTION INTRAVENOUS; SUBCUTANEOUS at 20:08

## 2019-01-01 RX ADMIN — METOPROLOL TARTRATE 12.5 MG: 25 TABLET ORAL at 08:45

## 2019-01-01 RX ADMIN — ALBUTEROL SULFATE 2.5 MG: 2.5 SOLUTION RESPIRATORY (INHALATION) at 19:43

## 2019-01-01 RX ADMIN — SENNOSIDES 8.6 MG: 8.6 TABLET, FILM COATED ORAL at 09:52

## 2019-01-01 RX ADMIN — POTASSIUM CHLORIDE 40 MEQ: 20 TABLET, EXTENDED RELEASE ORAL at 08:59

## 2019-01-01 RX ADMIN — SODIUM CHLORIDE 500 ML: 9 INJECTION, SOLUTION INTRAVENOUS at 14:13

## 2019-01-01 RX ADMIN — HEPARIN SODIUM 6530 UNITS: 1000 INJECTION, SOLUTION INTRAVENOUS; SUBCUTANEOUS at 15:03

## 2019-01-01 RX ADMIN — ROPINIROLE HYDROCHLORIDE 0.5 MG: 0.5 TABLET, FILM COATED ORAL at 14:58

## 2019-01-01 RX ADMIN — FAMOTIDINE 40 MG: 20 TABLET ORAL at 09:41

## 2019-01-01 RX ADMIN — OXYCODONE HYDROCHLORIDE 10 MG: 5 TABLET ORAL at 09:26

## 2019-01-01 RX ADMIN — SIMETHICONE CHEW TAB 80 MG 160 MG: 80 TABLET ORAL at 12:03

## 2019-01-01 RX ADMIN — INSULIN LISPRO 4 UNITS: 100 INJECTION, SOLUTION INTRAVENOUS; SUBCUTANEOUS at 22:11

## 2019-01-01 RX ADMIN — Medication 5 ML: at 15:47

## 2019-01-01 RX ADMIN — INSULIN LISPRO 2 UNITS: 100 INJECTION, SOLUTION INTRAVENOUS; SUBCUTANEOUS at 06:43

## 2019-01-01 RX ADMIN — Medication 5 ML: at 00:55

## 2019-01-01 RX ADMIN — AMIODARONE HYDROCHLORIDE 100 MG: 200 TABLET ORAL at 08:46

## 2019-01-01 RX ADMIN — APIXABAN 10 MG: 5 TABLET, FILM COATED ORAL at 09:44

## 2019-01-01 RX ADMIN — BACITRACIN ZINC, NEOMYCIN SULFATE, POLYMYXIN B SULFATE: 3.5; 5000; 4 OINTMENT TOPICAL at 20:45

## 2019-01-01 RX ADMIN — ROPINIROLE HYDROCHLORIDE 0.25 MG: 0.25 TABLET, FILM COATED ORAL at 20:44

## 2019-01-01 RX ADMIN — FAMOTIDINE 40 MG: 20 TABLET ORAL at 08:33

## 2019-01-01 RX ADMIN — AMIODARONE HYDROCHLORIDE 200 MG: 200 TABLET ORAL at 21:51

## 2019-01-01 RX ADMIN — FENTANYL CITRATE 25 MCG: 50 INJECTION INTRAMUSCULAR; INTRAVENOUS at 14:14

## 2019-01-01 RX ADMIN — CLOTRIMAZOLE: 10 CREAM TOPICAL at 09:37

## 2019-01-01 RX ADMIN — BENZONATATE 200 MG: 100 CAPSULE ORAL at 08:51

## 2019-01-01 RX ADMIN — SENNOSIDES 8.6 MG: 8.6 TABLET, FILM COATED ORAL at 08:52

## 2019-01-01 RX ADMIN — OXYCODONE HYDROCHLORIDE 10 MG: 5 TABLET ORAL at 17:02

## 2019-01-01 RX ADMIN — AMIODARONE HYDROCHLORIDE 200 MG: 200 TABLET ORAL at 09:52

## 2019-01-01 RX ADMIN — HEPARIN SODIUM 18 UNITS/KG/HR: 10000 INJECTION, SOLUTION INTRAVENOUS at 15:03

## 2019-01-01 RX ADMIN — METHYLPREDNISOLONE SODIUM SUCCINATE 80 MG: 125 INJECTION, POWDER, FOR SOLUTION INTRAMUSCULAR; INTRAVENOUS at 11:54

## 2019-01-01 RX ADMIN — BENZONATATE 200 MG: 100 CAPSULE ORAL at 20:26

## 2019-01-01 RX ADMIN — FUROSEMIDE 40 MG: 10 INJECTION, SOLUTION INTRAMUSCULAR; INTRAVENOUS at 14:35

## 2019-01-01 RX ADMIN — SENNOSIDES 8.6 MG: 8.6 TABLET, FILM COATED ORAL at 08:50

## 2019-01-01 RX ADMIN — ACETAMINOPHEN 650 MG: 325 TABLET ORAL at 09:22

## 2019-01-01 RX ADMIN — PREDNISONE 10 MG: 5 TABLET ORAL at 08:52

## 2019-01-01 RX ADMIN — METOPROLOL SUCCINATE 25 MG: 25 TABLET, EXTENDED RELEASE ORAL at 08:21

## 2019-01-01 RX ADMIN — PREDNISONE 10 MG: 5 TABLET ORAL at 09:37

## 2019-01-01 RX ADMIN — IOPAMIDOL 110 ML: 755 INJECTION, SOLUTION INTRAVENOUS at 13:55

## 2019-01-01 RX ADMIN — FUROSEMIDE 40 MG: 10 INJECTION, SOLUTION INTRAMUSCULAR; INTRAVENOUS at 13:32

## 2019-01-01 RX ADMIN — PREDNISONE 20 MG: 20 TABLET ORAL at 09:08

## 2019-01-01 RX ADMIN — ALBUTEROL SULFATE 2.5 MG: 2.5 SOLUTION RESPIRATORY (INHALATION) at 19:19

## 2019-01-01 RX ADMIN — HYDROCODONE BITARTRATE AND ACETAMINOPHEN 1 TABLET: 5; 325 TABLET ORAL at 12:15

## 2019-01-01 RX ADMIN — OXYCODONE HYDROCHLORIDE 5 MG: 5 TABLET ORAL at 08:23

## 2019-01-01 RX ADMIN — FLUTICASONE PROPIONATE 1 SPRAY: 50 SPRAY, METERED NASAL at 12:02

## 2019-01-01 RX ADMIN — BACITRACIN ZINC, NEOMYCIN SULFATE, POLYMYXIN B SULFATE 1 G: 3.5; 5000; 4 OINTMENT TOPICAL at 08:59

## 2019-01-01 RX ADMIN — Medication 1 CAPSULE: at 11:13

## 2019-01-01 RX ADMIN — POTASSIUM CHLORIDE 20 MEQ: 20 TABLET, EXTENDED RELEASE ORAL at 09:36

## 2019-01-01 RX ADMIN — FORMOTEROL FUMARATE DIHYDRATE 20 MCG: 20 SOLUTION RESPIRATORY (INHALATION) at 19:37

## 2019-01-01 RX ADMIN — FERROUS SULFATE TAB 325 MG (65 MG ELEMENTAL FE) 325 MG: 325 (65 FE) TAB at 11:32

## 2019-01-01 RX ADMIN — FERROUS SULFATE TAB 325 MG (65 MG ELEMENTAL FE) 325 MG: 325 (65 FE) TAB at 11:13

## 2019-01-01 RX ADMIN — BENZONATATE 200 MG: 100 CAPSULE ORAL at 08:20

## 2019-01-01 RX ADMIN — SENNOSIDES 8.6 MG: 8.6 TABLET, FILM COATED ORAL at 08:59

## 2019-01-01 RX ADMIN — METOPROLOL SUCCINATE 50 MG: 50 TABLET, EXTENDED RELEASE ORAL at 21:58

## 2019-01-01 RX ADMIN — DIGOXIN 500 MCG: 0.25 INJECTION INTRAMUSCULAR; INTRAVENOUS at 21:18

## 2019-01-01 RX ADMIN — POTASSIUM CHLORIDE 20 MEQ: 20 TABLET, EXTENDED RELEASE ORAL at 16:55

## 2019-01-01 RX ADMIN — Medication 1 TABLET: at 11:49

## 2019-01-01 RX ADMIN — NOREPINEPHRINE BITARTRATE 8 MCG/MIN: 1 INJECTION INTRAVENOUS at 11:58

## 2019-01-01 RX ADMIN — APIXABAN 10 MG: 5 TABLET, FILM COATED ORAL at 08:50

## 2019-01-01 RX ADMIN — SENNOSIDES 8.6 MG: 8.6 TABLET, FILM COATED ORAL at 08:16

## 2019-01-01 RX ADMIN — APIXABAN 5 MG: 5 TABLET, FILM COATED ORAL at 20:45

## 2019-01-01 RX ADMIN — FORMOTEROL FUMARATE DIHYDRATE 20 MCG: 20 SOLUTION RESPIRATORY (INHALATION) at 20:33

## 2019-01-01 RX ADMIN — LISINOPRIL 40 MG: 20 TABLET ORAL at 20:40

## 2019-01-01 RX ADMIN — SIMETHICONE CHEW TAB 80 MG 160 MG: 80 TABLET ORAL at 12:13

## 2019-01-01 RX ADMIN — ROPINIROLE HYDROCHLORIDE 0.25 MG: 0.5 TABLET, FILM COATED ORAL at 20:35

## 2019-01-01 RX ADMIN — SIMETHICONE CHEW TAB 80 MG 160 MG: 80 TABLET ORAL at 11:10

## 2019-01-01 RX ADMIN — APIXABAN 5 MG: 5 TABLET, FILM COATED ORAL at 08:59

## 2019-01-01 RX ADMIN — RALOXIFENE HYDROCHLORIDE 60 MG: 60 TABLET, FILM COATED ORAL at 08:33

## 2019-01-01 RX ADMIN — SIMVASTATIN 20 MG: 20 TABLET, FILM COATED ORAL at 23:24

## 2019-01-01 RX ADMIN — POTASSIUM CHLORIDE 10 MEQ: 29.8 INJECTION, SOLUTION INTRAVENOUS at 11:08

## 2019-01-01 RX ADMIN — HYDROCODONE BITARTRATE AND ACETAMINOPHEN 1 TABLET: 5; 325 TABLET ORAL at 02:19

## 2019-01-01 RX ADMIN — RALOXIFENE HYDROCHLORIDE 60 MG: 60 TABLET, FILM COATED ORAL at 10:40

## 2019-01-01 RX ADMIN — SODIUM CHLORIDE: 9 INJECTION, SOLUTION INTRAVENOUS at 19:32

## 2019-01-01 RX ADMIN — LACTULOSE 20 G: 20 SOLUTION ORAL at 12:05

## 2019-01-01 RX ADMIN — FORMOTEROL FUMARATE DIHYDRATE 20 MCG: 20 SOLUTION RESPIRATORY (INHALATION) at 19:50

## 2019-01-01 RX ADMIN — APIXABAN 10 MG: 5 TABLET, FILM COATED ORAL at 20:35

## 2019-01-01 RX ADMIN — HYDROCORTISONE ACETATE 25 MG: 25 SUPPOSITORY RECTAL at 20:33

## 2019-01-01 RX ADMIN — Medication 400 MG: at 09:36

## 2019-01-01 RX ADMIN — LEVOTHYROXINE SODIUM 125 MCG: 125 TABLET ORAL at 06:24

## 2019-01-01 RX ADMIN — FAMOTIDINE 40 MG: 20 TABLET ORAL at 09:36

## 2019-01-01 RX ADMIN — Medication 5 ML: at 16:18

## 2019-01-01 RX ADMIN — Medication 400 MG: at 08:18

## 2019-01-01 RX ADMIN — METOPROLOL SUCCINATE 25 MG: 25 TABLET, EXTENDED RELEASE ORAL at 09:19

## 2019-01-01 RX ADMIN — INSULIN LISPRO 2 UNITS: 100 INJECTION, SOLUTION INTRAVENOUS; SUBCUTANEOUS at 20:12

## 2019-01-01 RX ADMIN — PANTOPRAZOLE SODIUM 40 MG: 40 TABLET, DELAYED RELEASE ORAL at 06:05

## 2019-01-01 RX ADMIN — POTASSIUM CHLORIDE 20 MEQ: 20 TABLET, EXTENDED RELEASE ORAL at 17:01

## 2019-01-01 RX ADMIN — ROPINIROLE HYDROCHLORIDE 0.5 MG: 0.5 TABLET, FILM COATED ORAL at 13:12

## 2019-01-01 RX ADMIN — DORNASE ALFA 2.5 MG: 1 SOLUTION RESPIRATORY (INHALATION) at 07:49

## 2019-01-01 RX ADMIN — FUROSEMIDE 20 MG: 10 INJECTION, SOLUTION INTRAVENOUS at 09:35

## 2019-01-01 RX ADMIN — BUDESONIDE 1000 MCG: 0.5 SUSPENSION RESPIRATORY (INHALATION) at 20:33

## 2019-01-01 RX ADMIN — METHYLPREDNISOLONE SODIUM SUCCINATE 40 MG: 40 INJECTION, POWDER, LYOPHILIZED, FOR SOLUTION INTRAMUSCULAR; INTRAVENOUS at 14:33

## 2019-01-01 RX ADMIN — ROPINIROLE HYDROCHLORIDE 0.75 MG: 0.5 TABLET, FILM COATED ORAL at 15:40

## 2019-01-01 RX ADMIN — HYDROCODONE BITARTRATE AND ACETAMINOPHEN 1 TABLET: 7.5; 325 TABLET ORAL at 22:09

## 2019-01-01 RX ADMIN — POVIDONE-IODINE: 10 OINTMENT TOPICAL at 08:54

## 2019-01-01 RX ADMIN — METOPROLOL TARTRATE 2.5 MG: 5 INJECTION, SOLUTION INTRAVENOUS at 23:52

## 2019-01-01 RX ADMIN — ROPINIROLE HYDROCHLORIDE 0.75 MG: 0.5 TABLET, FILM COATED ORAL at 15:25

## 2019-01-01 RX ADMIN — SIMETHICONE CHEW TAB 80 MG 160 MG: 80 TABLET ORAL at 11:43

## 2019-01-01 RX ADMIN — SODIUM CHLORIDE 250 ML: 9 INJECTION, SOLUTION INTRAVENOUS at 14:55

## 2019-01-01 RX ADMIN — METOPROLOL SUCCINATE 25 MG: 25 TABLET, EXTENDED RELEASE ORAL at 20:44

## 2019-01-01 RX ADMIN — SODIUM CHLORIDE, POTASSIUM CHLORIDE, SODIUM LACTATE AND CALCIUM CHLORIDE: 600; 310; 30; 20 INJECTION, SOLUTION INTRAVENOUS at 20:26

## 2019-01-01 RX ADMIN — Medication 10 ML: at 09:35

## 2019-01-01 RX ADMIN — BENZONATATE 200 MG: 100 CAPSULE ORAL at 20:37

## 2019-01-01 RX ADMIN — Medication 1 CAPSULE: at 21:50

## 2019-01-01 RX ADMIN — Medication 325 MG: at 11:10

## 2019-01-01 RX ADMIN — POTASSIUM CHLORIDE 40 MEQ: 20 TABLET, EXTENDED RELEASE ORAL at 06:53

## 2019-01-01 RX ADMIN — OXYCODONE HYDROCHLORIDE 10 MG: 5 TABLET ORAL at 11:46

## 2019-01-01 RX ADMIN — ALBUTEROL SULFATE 2.5 MG: 2.5 SOLUTION RESPIRATORY (INHALATION) at 17:13

## 2019-01-01 RX ADMIN — SIMETHICONE CHEW TAB 80 MG 160 MG: 80 TABLET ORAL at 11:13

## 2019-01-01 RX ADMIN — PREDNISONE 10 MG: 5 TABLET ORAL at 08:21

## 2019-01-01 RX ADMIN — DORNASE ALFA 2.5 MG: 1 SOLUTION RESPIRATORY (INHALATION) at 19:43

## 2019-01-01 RX ADMIN — Medication 1 TABLET: at 12:38

## 2019-01-01 RX ADMIN — ALBUTEROL SULFATE 2.5 MG: 2.5 SOLUTION RESPIRATORY (INHALATION) at 20:33

## 2019-01-01 RX ADMIN — SIMVASTATIN 10 MG: 20 TABLET, FILM COATED ORAL at 20:44

## 2019-01-01 RX ADMIN — INSULIN LISPRO 2 UNITS: 100 INJECTION, SOLUTION INTRAVENOUS; SUBCUTANEOUS at 20:29

## 2019-01-01 RX ADMIN — Medication 1 CAPSULE: at 08:43

## 2019-01-01 RX ADMIN — LEVOTHYROXINE SODIUM 137 MCG: 25 TABLET ORAL at 05:43

## 2019-01-01 RX ADMIN — FAMOTIDINE 40 MG: 20 TABLET ORAL at 10:42

## 2019-01-01 RX ADMIN — DORNASE ALFA 2.5 MG: 1 SOLUTION RESPIRATORY (INHALATION) at 20:30

## 2019-01-01 RX ADMIN — FENTANYL CITRATE 50 MCG: 50 INJECTION INTRAMUSCULAR; INTRAVENOUS at 12:18

## 2019-01-01 RX ADMIN — FENTANYL CITRATE 50 MCG: 50 INJECTION INTRAMUSCULAR; INTRAVENOUS at 17:48

## 2019-01-01 RX ADMIN — ROPINIROLE HYDROCHLORIDE 0.5 MG: 0.5 TABLET, FILM COATED ORAL at 16:19

## 2019-01-01 RX ADMIN — Medication 1 TABLET: at 11:25

## 2019-01-01 RX ADMIN — BISMUTH SUBSALICYLATE 30 ML: 262 LIQUID ORAL at 08:54

## 2019-01-01 RX ADMIN — Medication 400 MG: at 09:23

## 2019-01-01 RX ADMIN — SIMVASTATIN 10 MG: 20 TABLET, FILM COATED ORAL at 22:22

## 2019-01-01 RX ADMIN — SENNOSIDES 8.6 MG: 8.6 TABLET, FILM COATED ORAL at 08:18

## 2019-01-01 RX ADMIN — OXYCODONE HYDROCHLORIDE 10 MG: 5 TABLET ORAL at 08:59

## 2019-01-01 RX ADMIN — FAMOTIDINE 40 MG: 20 TABLET ORAL at 09:22

## 2019-01-01 RX ADMIN — POTASSIUM CHLORIDE 10 MEQ: 10 INJECTION, SOLUTION INTRAVENOUS at 15:22

## 2019-01-01 RX ADMIN — Medication 5 ML: at 16:13

## 2019-01-01 RX ADMIN — FORMOTEROL FUMARATE DIHYDRATE 20 MCG: 20 SOLUTION RESPIRATORY (INHALATION) at 20:14

## 2019-01-01 RX ADMIN — ROPINIROLE HYDROCHLORIDE 0.25 MG: 0.5 TABLET, FILM COATED ORAL at 20:11

## 2019-01-01 RX ADMIN — LEVOTHYROXINE SODIUM 137 MCG: 25 TABLET ORAL at 06:14

## 2019-01-01 RX ADMIN — KETOROLAC TROMETHAMINE 15 MG: 30 INJECTION, SOLUTION INTRAMUSCULAR at 12:56

## 2019-01-01 RX ADMIN — LEVOTHYROXINE SODIUM 125 MCG: 125 TABLET ORAL at 05:37

## 2019-01-01 RX ADMIN — BENZONATATE 200 MG: 100 CAPSULE ORAL at 09:27

## 2019-01-01 RX ADMIN — ACETAMINOPHEN 1000 MG: 500 TABLET ORAL at 11:10

## 2019-01-01 RX ADMIN — SENNOSIDES 8.6 MG: 8.6 TABLET, FILM COATED ORAL at 09:19

## 2019-01-01 RX ADMIN — FUROSEMIDE 40 MG: 10 INJECTION, SOLUTION INTRAMUSCULAR; INTRAVENOUS at 18:30

## 2019-01-01 RX ADMIN — NORTRIPTYLINE HYDROCHLORIDE 10 MG: 10 CAPSULE ORAL at 20:52

## 2019-01-01 RX ADMIN — POTASSIUM CHLORIDE 20 MEQ: 20 TABLET, EXTENDED RELEASE ORAL at 11:43

## 2019-01-01 RX ADMIN — POTASSIUM CHLORIDE 20 MEQ: 20 TABLET, EXTENDED RELEASE ORAL at 08:45

## 2019-01-01 RX ADMIN — HYDROCODONE BITARTRATE AND ACETAMINOPHEN 2 TABLET: 5; 325 TABLET ORAL at 04:52

## 2019-01-01 RX ADMIN — MAGNESIUM SULFATE HEPTAHYDRATE 2 G: 40 INJECTION, SOLUTION INTRAVENOUS at 10:19

## 2019-01-01 RX ADMIN — CHOLECALCIFEROL TAB 50 MCG (2000 UNIT) 2000 UNITS: 50 TAB at 11:32

## 2019-01-01 RX ADMIN — Medication 5 ML: at 23:51

## 2019-01-01 RX ADMIN — Medication 5 ML: at 08:57

## 2019-01-01 RX ADMIN — METOLAZONE 2.5 MG: 2.5 TABLET ORAL at 11:42

## 2019-01-01 RX ADMIN — ASPIRIN 81 MG 324 MG: 81 TABLET ORAL at 16:31

## 2019-01-01 RX ADMIN — Medication 325 MG: at 11:43

## 2019-01-01 RX ADMIN — BUDESONIDE 1000 MCG: 0.5 SUSPENSION RESPIRATORY (INHALATION) at 21:26

## 2019-01-01 RX ADMIN — OXYCODONE HYDROCHLORIDE 5 MG: 5 TABLET ORAL at 18:26

## 2019-01-01 RX ADMIN — BENZONATATE 200 MG: 100 CAPSULE ORAL at 15:24

## 2019-01-01 RX ADMIN — POTASSIUM CHLORIDE 40 MEQ: 20 TABLET, EXTENDED RELEASE ORAL at 21:51

## 2019-01-01 RX ADMIN — HYDROCODONE BITARTRATE AND ACETAMINOPHEN 1 TABLET: 7.5; 325 TABLET ORAL at 17:43

## 2019-01-01 RX ADMIN — PANTOPRAZOLE SODIUM 40 MG: 40 INJECTION, POWDER, LYOPHILIZED, FOR SOLUTION INTRAVENOUS at 08:16

## 2019-01-01 RX ADMIN — HYDROCODONE BITARTRATE AND ACETAMINOPHEN 1 TABLET: 5; 325 TABLET ORAL at 22:07

## 2019-01-01 RX ADMIN — CHOLECALCIFEROL TAB 50 MCG (2000 UNIT) 2000 UNITS: 50 TAB at 12:46

## 2019-01-01 RX ADMIN — Medication 5 ML: at 20:11

## 2019-01-01 RX ADMIN — FUROSEMIDE 40 MG: 10 INJECTION, SOLUTION INTRAMUSCULAR; INTRAVENOUS at 17:43

## 2019-01-01 RX ADMIN — BUDESONIDE 1000 MCG: 0.5 SUSPENSION RESPIRATORY (INHALATION) at 20:14

## 2019-01-01 RX ADMIN — Medication 10 ML: at 21:54

## 2019-01-01 RX ADMIN — FUROSEMIDE 40 MG: 40 TABLET ORAL at 08:26

## 2019-01-01 RX ADMIN — PANTOPRAZOLE SODIUM 40 MG: 40 TABLET, DELAYED RELEASE ORAL at 07:10

## 2019-01-01 RX ADMIN — METOLAZONE 2.5 MG: 2.5 TABLET ORAL at 08:51

## 2019-01-01 RX ADMIN — INSULIN LISPRO 2 UNITS: 100 INJECTION, SOLUTION INTRAVENOUS; SUBCUTANEOUS at 06:26

## 2019-01-01 RX ADMIN — APIXABAN 10 MG: 5 TABLET, FILM COATED ORAL at 08:59

## 2019-01-01 RX ADMIN — METOPROLOL TARTRATE 2.5 MG: 5 INJECTION, SOLUTION INTRAVENOUS at 00:55

## 2019-01-01 RX ADMIN — SIMETHICONE CHEW TAB 80 MG 160 MG: 80 TABLET ORAL at 11:26

## 2019-01-01 RX ADMIN — FUROSEMIDE 40 MG: 40 TABLET ORAL at 08:38

## 2019-01-01 RX ADMIN — METOLAZONE 5 MG: 2.5 TABLET ORAL at 18:10

## 2019-01-01 RX ADMIN — FENTANYL CITRATE 50 MCG: 50 INJECTION INTRAMUSCULAR; INTRAVENOUS at 11:03

## 2019-01-01 RX ADMIN — LACTULOSE 20 G: 20 SOLUTION ORAL at 08:22

## 2019-01-01 RX ADMIN — BENZONATATE 200 MG: 100 CAPSULE ORAL at 14:26

## 2019-01-01 RX ADMIN — APIXABAN 5 MG: 5 TABLET, FILM COATED ORAL at 19:59

## 2019-01-01 RX ADMIN — ALBUTEROL SULFATE 2.5 MG: 2.5 SOLUTION RESPIRATORY (INHALATION) at 09:16

## 2019-01-01 RX ADMIN — ISOSORBIDE MONONITRATE 30 MG: 30 TABLET, EXTENDED RELEASE ORAL at 11:25

## 2019-01-01 RX ADMIN — Medication 5 ML: at 17:04

## 2019-01-01 RX ADMIN — CETIRIZINE HYDROCHLORIDE 10 MG: 10 TABLET, FILM COATED ORAL at 08:19

## 2019-01-01 RX ADMIN — POTASSIUM CHLORIDE 40 MEQ: 20 TABLET, EXTENDED RELEASE ORAL at 06:31

## 2019-01-01 RX ADMIN — FUROSEMIDE 40 MG: 10 INJECTION, SOLUTION INTRAMUSCULAR; INTRAVENOUS at 08:51

## 2019-01-01 RX ADMIN — OXYCODONE HYDROCHLORIDE 10 MG: 5 TABLET ORAL at 08:21

## 2019-01-01 RX ADMIN — METOPROLOL TARTRATE 12.5 MG: 25 TABLET ORAL at 08:55

## 2019-01-01 RX ADMIN — APIXABAN 5 MG: 5 TABLET, FILM COATED ORAL at 22:15

## 2019-01-01 RX ADMIN — SIMETHICONE CHEW TAB 80 MG 160 MG: 80 TABLET ORAL at 11:42

## 2019-01-01 RX ADMIN — ROPINIROLE HYDROCHLORIDE 0.75 MG: 0.5 TABLET, FILM COATED ORAL at 15:20

## 2019-01-01 RX ADMIN — Medication 10 ML: at 11:09

## 2019-01-01 RX ADMIN — Medication 5 ML: at 08:48

## 2019-01-01 RX ADMIN — OXYCODONE HYDROCHLORIDE 5 MG: 5 TABLET ORAL at 12:40

## 2019-01-01 RX ADMIN — OXYCODONE HYDROCHLORIDE 5 MG: 5 TABLET ORAL at 11:31

## 2019-01-01 RX ADMIN — LACTULOSE 20 G: 20 SOLUTION ORAL at 08:45

## 2019-01-01 RX ADMIN — FUROSEMIDE 40 MG: 40 TABLET ORAL at 18:00

## 2019-01-01 RX ADMIN — OXYCODONE HYDROCHLORIDE 5 MG: 5 TABLET ORAL at 07:51

## 2019-01-01 RX ADMIN — DOCUSATE SODIUM 200 MG: 100 CAPSULE, LIQUID FILLED ORAL at 09:52

## 2019-01-01 RX ADMIN — INSULIN LISPRO 2 UNITS: 100 INJECTION, SOLUTION INTRAVENOUS; SUBCUTANEOUS at 11:29

## 2019-01-01 RX ADMIN — AMIODARONE HYDROCHLORIDE 200 MG: 200 TABLET ORAL at 08:22

## 2019-01-01 RX ADMIN — APIXABAN 10 MG: 5 TABLET, FILM COATED ORAL at 09:27

## 2019-01-01 RX ADMIN — FUROSEMIDE 40 MG: 10 INJECTION, SOLUTION INTRAMUSCULAR; INTRAVENOUS at 08:59

## 2019-01-01 RX ADMIN — POTASSIUM CHLORIDE 40 MEQ: 20 TABLET, EXTENDED RELEASE ORAL at 20:44

## 2019-01-01 RX ADMIN — LEVOTHYROXINE SODIUM 125 MCG: 125 TABLET ORAL at 08:38

## 2019-01-01 RX ADMIN — APIXABAN 10 MG: 5 TABLET, FILM COATED ORAL at 08:45

## 2019-01-01 RX ADMIN — ATORVASTATIN CALCIUM 20 MG: 20 TABLET, FILM COATED ORAL at 21:54

## 2019-01-01 RX ADMIN — SIMVASTATIN 10 MG: 20 TABLET, FILM COATED ORAL at 21:42

## 2019-01-01 RX ADMIN — FORMOTEROL FUMARATE DIHYDRATE 20 MCG: 20 SOLUTION RESPIRATORY (INHALATION) at 09:17

## 2019-01-01 RX ADMIN — BUDESONIDE 1000 MCG: 0.5 SUSPENSION RESPIRATORY (INHALATION) at 08:14

## 2019-01-01 RX ADMIN — Medication 1 CAPSULE: at 20:09

## 2019-01-01 RX ADMIN — BUDESONIDE 1000 MCG: 0.5 SUSPENSION RESPIRATORY (INHALATION) at 08:46

## 2019-01-01 RX ADMIN — NITROGLYCERIN 0.4 MG: 0.4 TABLET, ORALLY DISINTEGRATING SUBLINGUAL at 10:48

## 2019-01-01 RX ADMIN — FUROSEMIDE 40 MG: 10 INJECTION, SOLUTION INTRAMUSCULAR; INTRAVENOUS at 08:44

## 2019-01-01 RX ADMIN — SIMETHICONE CHEW TAB 80 MG 160 MG: 80 TABLET ORAL at 20:43

## 2019-01-01 RX ADMIN — FUROSEMIDE 20 MG: 10 INJECTION, SOLUTION INTRAVENOUS at 08:17

## 2019-01-01 RX ADMIN — ROPINIROLE HYDROCHLORIDE 0.5 MG: 0.5 TABLET, FILM COATED ORAL at 15:11

## 2019-01-01 RX ADMIN — OXYCODONE HYDROCHLORIDE 10 MG: 5 TABLET ORAL at 17:40

## 2019-01-01 RX ADMIN — AMIODARONE HYDROCHLORIDE 200 MG: 200 TABLET ORAL at 09:08

## 2019-01-01 RX ADMIN — Medication 5 ML: at 08:16

## 2019-01-01 RX ADMIN — ALBUTEROL SULFATE 2.5 MG: 2.5 SOLUTION RESPIRATORY (INHALATION) at 12:39

## 2019-01-01 RX ADMIN — ROPINIROLE HYDROCHLORIDE 0.75 MG: 0.5 TABLET, FILM COATED ORAL at 15:26

## 2019-01-01 RX ADMIN — ROPINIROLE HYDROCHLORIDE 0.25 MG: 0.5 TABLET, FILM COATED ORAL at 20:30

## 2019-01-01 RX ADMIN — AMIODARONE HYDROCHLORIDE 100 MG: 200 TABLET ORAL at 08:18

## 2019-01-01 RX ADMIN — AMIODARONE HYDROCHLORIDE 200 MG: 200 TABLET ORAL at 08:45

## 2019-01-01 RX ADMIN — LISINOPRIL 40 MG: 20 TABLET ORAL at 20:09

## 2019-01-01 RX ADMIN — FORMOTEROL FUMARATE DIHYDRATE 20 MCG: 20 SOLUTION RESPIRATORY (INHALATION) at 21:07

## 2019-01-01 RX ADMIN — SODIUM CHLORIDE, POTASSIUM CHLORIDE, SODIUM LACTATE AND CALCIUM CHLORIDE: 600; 310; 30; 20 INJECTION, SOLUTION INTRAVENOUS at 14:11

## 2019-01-01 RX ADMIN — HYDROCORTISONE ACETATE 25 MG: 25 SUPPOSITORY RECTAL at 20:19

## 2019-01-01 RX ADMIN — BUDESONIDE 1000 MCG: 0.5 SUSPENSION RESPIRATORY (INHALATION) at 07:49

## 2019-01-01 RX ADMIN — HYDROCODONE BITARTRATE AND ACETAMINOPHEN 1 TABLET: 7.5; 325 TABLET ORAL at 20:13

## 2019-01-01 RX ADMIN — SENNOSIDES 8.6 MG: 8.6 TABLET, FILM COATED ORAL at 09:41

## 2019-01-01 RX ADMIN — BUDESONIDE 1000 MCG: 0.5 SUSPENSION RESPIRATORY (INHALATION) at 21:08

## 2019-01-01 RX ADMIN — AMIODARONE HYDROCHLORIDE 200 MG: 200 TABLET ORAL at 15:26

## 2019-01-01 RX ADMIN — APIXABAN 5 MG: 5 TABLET, FILM COATED ORAL at 08:37

## 2019-01-01 RX ADMIN — TRAMADOL HYDROCHLORIDE 25 MG: 50 TABLET, FILM COATED ORAL at 04:12

## 2019-01-01 RX ADMIN — BISACODYL 10 MG: 10 SUPPOSITORY RECTAL at 12:27

## 2019-01-01 RX ADMIN — ALBUTEROL SULFATE 2.5 MG: 2.5 SOLUTION RESPIRATORY (INHALATION) at 20:49

## 2019-01-01 RX ADMIN — IPRATROPIUM BROMIDE AND ALBUTEROL SULFATE 1 AMPULE: .5; 3 SOLUTION RESPIRATORY (INHALATION) at 12:03

## 2019-01-01 RX ADMIN — Medication 5 ML: at 20:30

## 2019-01-01 RX ADMIN — ALBUTEROL SULFATE 2.5 MG: 2.5 SOLUTION RESPIRATORY (INHALATION) at 12:16

## 2019-01-01 RX ADMIN — FORMOTEROL FUMARATE DIHYDRATE 20 MCG: 20 SOLUTION RESPIRATORY (INHALATION) at 20:12

## 2019-01-01 RX ADMIN — METOPROLOL SUCCINATE 25 MG: 25 TABLET, EXTENDED RELEASE ORAL at 20:54

## 2019-01-01 RX ADMIN — FORMOTEROL FUMARATE DIHYDRATE 20 MCG: 20 SOLUTION RESPIRATORY (INHALATION) at 08:13

## 2019-01-01 RX ADMIN — HYDROCODONE BITARTRATE AND ACETAMINOPHEN 1 TABLET: 7.5; 325 TABLET ORAL at 09:53

## 2019-01-01 RX ADMIN — METOPROLOL SUCCINATE 25 MG: 25 TABLET, EXTENDED RELEASE ORAL at 20:16

## 2019-01-01 RX ADMIN — BENZONATATE 200 MG: 100 CAPSULE ORAL at 22:39

## 2019-01-01 RX ADMIN — ROPINIROLE HYDROCHLORIDE 0.25 MG: 0.25 TABLET, FILM COATED ORAL at 20:09

## 2019-01-01 RX ADMIN — CHOLECALCIFEROL TAB 50 MCG (2000 UNIT) 2000 UNITS: 50 TAB at 21:51

## 2019-01-01 RX ADMIN — HYDROCODONE BITARTRATE AND ACETAMINOPHEN 2 TABLET: 5; 325 TABLET ORAL at 20:44

## 2019-01-01 RX ADMIN — METOPROLOL SUCCINATE 25 MG: 25 TABLET, EXTENDED RELEASE ORAL at 10:42

## 2019-01-01 RX ADMIN — Medication 5 ML: at 05:23

## 2019-01-01 RX ADMIN — FUROSEMIDE 40 MG: 40 TABLET ORAL at 10:42

## 2019-01-01 RX ADMIN — INSULIN LISPRO 4 UNITS: 100 INJECTION, SOLUTION INTRAVENOUS; SUBCUTANEOUS at 20:53

## 2019-01-01 RX ADMIN — BUDESONIDE 1000 MCG: 0.5 SUSPENSION RESPIRATORY (INHALATION) at 09:01

## 2019-01-01 RX ADMIN — SENNOSIDES 8.6 MG: 8.6 TABLET, FILM COATED ORAL at 09:47

## 2019-01-01 RX ADMIN — HYDROCODONE BITARTRATE AND ACETAMINOPHEN 1 TABLET: 5; 325 TABLET ORAL at 05:43

## 2019-01-01 RX ADMIN — METHYLPREDNISOLONE SODIUM SUCCINATE 40 MG: 40 INJECTION, POWDER, LYOPHILIZED, FOR SOLUTION INTRAMUSCULAR; INTRAVENOUS at 05:46

## 2019-01-01 RX ADMIN — Medication 10 ML: at 09:45

## 2019-01-01 RX ADMIN — ROPINIROLE HYDROCHLORIDE 0.25 MG: 0.25 TABLET, FILM COATED ORAL at 02:19

## 2019-01-01 RX ADMIN — ISOSORBIDE MONONITRATE 30 MG: 30 TABLET, EXTENDED RELEASE ORAL at 08:24

## 2019-01-01 RX ADMIN — DOCUSATE SODIUM 200 MG: 100 CAPSULE, LIQUID FILLED ORAL at 08:16

## 2019-01-01 RX ADMIN — SIMETHICONE CHEW TAB 80 MG 160 MG: 80 TABLET ORAL at 11:30

## 2019-01-01 RX ADMIN — Medication 400 MG: at 09:58

## 2019-01-01 RX ADMIN — BENZONATATE 200 MG: 100 CAPSULE ORAL at 15:17

## 2019-01-01 RX ADMIN — CEFTRIAXONE 1 G: 1 INJECTION, POWDER, FOR SOLUTION INTRAMUSCULAR; INTRAVENOUS at 18:11

## 2019-01-01 RX ADMIN — BENZONATATE 200 MG: 100 CAPSULE ORAL at 20:25

## 2019-01-01 RX ADMIN — ROPINIROLE HYDROCHLORIDE 0.25 MG: 0.5 TABLET, FILM COATED ORAL at 20:28

## 2019-01-01 RX ADMIN — FENTANYL CITRATE 25 MCG: 50 INJECTION INTRAMUSCULAR; INTRAVENOUS at 18:29

## 2019-01-01 RX ADMIN — METOPROLOL TARTRATE 2.5 MG: 5 INJECTION, SOLUTION INTRAVENOUS at 17:52

## 2019-01-01 RX ADMIN — SIMETHICONE CHEW TAB 80 MG 160 MG: 80 TABLET ORAL at 12:40

## 2019-01-01 RX ADMIN — Medication 5 ML: at 20:36

## 2019-01-01 RX ADMIN — CALCIUM CARBONATE (ANTACID) CHEW TAB 500 MG 1000 MG: 500 CHEW TAB at 17:00

## 2019-01-01 RX ADMIN — ALBUTEROL SULFATE 2.5 MG: 2.5 SOLUTION RESPIRATORY (INHALATION) at 16:37

## 2019-01-01 RX ADMIN — METOPROLOL SUCCINATE 50 MG: 50 TABLET, EXTENDED RELEASE ORAL at 20:19

## 2019-01-01 RX ADMIN — APIXABAN 5 MG: 5 TABLET, FILM COATED ORAL at 20:42

## 2019-01-01 RX ADMIN — BENZONATATE 200 MG: 100 CAPSULE ORAL at 20:30

## 2019-01-01 RX ADMIN — Medication 10 ML: at 20:29

## 2019-01-01 RX ADMIN — CHOLECALCIFEROL TAB 50 MCG (2000 UNIT) 2000 UNITS: 50 TAB at 12:27

## 2019-01-01 RX ADMIN — Medication 1 CAPSULE: at 11:31

## 2019-01-01 RX ADMIN — RALOXIFENE HYDROCHLORIDE 60 MG: 60 TABLET, FILM COATED ORAL at 11:27

## 2019-01-01 RX ADMIN — POTASSIUM CHLORIDE 10 MEQ: 10 INJECTION, SOLUTION INTRAVENOUS at 13:12

## 2019-01-01 RX ADMIN — FUROSEMIDE 40 MG: 40 TABLET ORAL at 08:22

## 2019-01-01 RX ADMIN — ROPINIROLE HYDROCHLORIDE 0.25 MG: 0.5 TABLET, FILM COATED ORAL at 21:08

## 2019-01-01 RX ADMIN — ALBUTEROL SULFATE 2.5 MG: 2.5 SOLUTION RESPIRATORY (INHALATION) at 09:33

## 2019-01-01 RX ADMIN — ALBUTEROL SULFATE 2.5 MG: 2.5 SOLUTION RESPIRATORY (INHALATION) at 19:50

## 2019-01-01 RX ADMIN — FAMOTIDINE 10 MG: 20 TABLET ORAL at 10:25

## 2019-01-01 RX ADMIN — FERROUS SULFATE TAB 325 MG (65 MG ELEMENTAL FE) 325 MG: 325 (65 FE) TAB at 11:30

## 2019-01-01 RX ADMIN — BENZONATATE 200 MG: 100 CAPSULE ORAL at 14:04

## 2019-01-01 RX ADMIN — CALCIUM 500 MG: 500 TABLET ORAL at 12:00

## 2019-01-01 RX ADMIN — ALBUMIN (HUMAN) 25 G: 0.25 INJECTION, SOLUTION INTRAVENOUS at 13:32

## 2019-01-01 RX ADMIN — RALOXIFENE HYDROCHLORIDE 60 MG: 60 TABLET, FILM COATED ORAL at 08:22

## 2019-01-01 RX ADMIN — Medication 10 ML: at 13:54

## 2019-01-01 RX ADMIN — BISACODYL 10 MG: 10 SUPPOSITORY RECTAL at 19:19

## 2019-01-01 RX ADMIN — PANTOPRAZOLE SODIUM 40 MG: 40 TABLET, DELAYED RELEASE ORAL at 06:31

## 2019-01-01 RX ADMIN — LEVOTHYROXINE SODIUM 137 MCG: 25 TABLET ORAL at 06:11

## 2019-01-01 RX ADMIN — ALBUTEROL SULFATE 2.5 MG: 2.5 SOLUTION RESPIRATORY (INHALATION) at 09:51

## 2019-01-01 RX ADMIN — FAMOTIDINE 40 MG: 20 TABLET ORAL at 08:19

## 2019-01-01 RX ADMIN — Medication 10 ML: at 20:42

## 2019-01-01 RX ADMIN — ROPINIROLE HYDROCHLORIDE 0.25 MG: 0.25 TABLET, FILM COATED ORAL at 20:53

## 2019-01-01 RX ADMIN — BISACODYL 10 MG: 10 SUPPOSITORY RECTAL at 10:51

## 2019-01-01 RX ADMIN — INSULIN LISPRO 2 UNITS: 100 INJECTION, SOLUTION INTRAVENOUS; SUBCUTANEOUS at 11:40

## 2019-01-01 RX ADMIN — ALBUTEROL SULFATE 2.5 MG: 2.5 SOLUTION RESPIRATORY (INHALATION) at 11:50

## 2019-01-01 RX ADMIN — POTASSIUM CHLORIDE 40 MEQ: 20 TABLET, EXTENDED RELEASE ORAL at 08:21

## 2019-01-01 RX ADMIN — FUROSEMIDE 40 MG: 10 INJECTION, SOLUTION INTRAMUSCULAR; INTRAVENOUS at 08:56

## 2019-01-01 RX ADMIN — Medication 5 ML: at 16:12

## 2019-01-01 RX ADMIN — ROPINIROLE HYDROCHLORIDE 0.5 MG: 0.5 TABLET, FILM COATED ORAL at 14:27

## 2019-01-01 RX ADMIN — MICONAZOLE NITRATE: 20 POWDER TOPICAL at 08:23

## 2019-01-01 RX ADMIN — SENNOSIDES 8.6 MG: 8.6 TABLET, FILM COATED ORAL at 08:37

## 2019-01-01 RX ADMIN — HYDROCODONE BITARTRATE AND ACETAMINOPHEN 1 TABLET: 7.5; 325 TABLET ORAL at 22:27

## 2019-01-01 RX ADMIN — Medication 10 ML: at 08:22

## 2019-01-01 RX ADMIN — FORMOTEROL FUMARATE DIHYDRATE 20 MCG: 20 SOLUTION RESPIRATORY (INHALATION) at 21:26

## 2019-01-01 RX ADMIN — APIXABAN 10 MG: 5 TABLET, FILM COATED ORAL at 08:14

## 2019-01-01 RX ADMIN — FENTANYL CITRATE 50 MCG: 50 INJECTION INTRAMUSCULAR; INTRAVENOUS at 14:09

## 2019-01-01 RX ADMIN — Medication 5 ML: at 03:59

## 2019-01-01 RX ADMIN — Medication 1 CAPSULE: at 12:15

## 2019-01-01 RX ADMIN — AMIODARONE HYDROCHLORIDE 200 MG: 200 TABLET ORAL at 08:58

## 2019-01-01 RX ADMIN — FORMOTEROL FUMARATE DIHYDRATE 20 MCG: 20 SOLUTION RESPIRATORY (INHALATION) at 09:01

## 2019-01-01 RX ADMIN — WATER 2 ML: 1 INJECTION INTRAMUSCULAR; INTRAVENOUS; SUBCUTANEOUS at 19:13

## 2019-01-01 RX ADMIN — FAMOTIDINE 40 MG: 20 TABLET ORAL at 08:45

## 2019-01-01 RX ADMIN — METOPROLOL SUCCINATE 50 MG: 50 TABLET, EXTENDED RELEASE ORAL at 20:09

## 2019-01-01 RX ADMIN — POTASSIUM CHLORIDE 40 MEQ: 20 TABLET, EXTENDED RELEASE ORAL at 06:26

## 2019-01-01 RX ADMIN — ATORVASTATIN CALCIUM 20 MG: 20 TABLET, FILM COATED ORAL at 20:13

## 2019-01-01 RX ADMIN — CALCIUM 500 MG: 500 TABLET ORAL at 11:13

## 2019-01-01 RX ADMIN — ROPINIROLE HYDROCHLORIDE 0.25 MG: 0.25 TABLET, FILM COATED ORAL at 20:45

## 2019-01-01 RX ADMIN — APIXABAN 10 MG: 5 TABLET, FILM COATED ORAL at 20:04

## 2019-01-01 RX ADMIN — INSULIN LISPRO 2 UNITS: 100 INJECTION, SOLUTION INTRAVENOUS; SUBCUTANEOUS at 11:44

## 2019-01-01 RX ADMIN — Medication 1 CAPSULE: at 21:54

## 2019-01-01 RX ADMIN — DOCUSATE SODIUM 200 MG: 100 CAPSULE, LIQUID FILLED ORAL at 08:59

## 2019-01-01 RX ADMIN — METOPROLOL SUCCINATE 50 MG: 50 TABLET, EXTENDED RELEASE ORAL at 20:13

## 2019-01-01 RX ADMIN — AMIODARONE HYDROCHLORIDE 200 MG: 200 TABLET ORAL at 10:31

## 2019-01-01 RX ADMIN — Medication 5 ML: at 12:03

## 2019-01-01 RX ADMIN — BACITRACIN ZINC, NEOMYCIN SULFATE, POLYMYXIN B SULFATE: 3.5; 5000; 4 OINTMENT TOPICAL at 08:44

## 2019-01-01 RX ADMIN — ASPIRIN 81 MG 81 MG: 81 TABLET ORAL at 08:45

## 2019-01-01 RX ADMIN — HYDROCODONE BITARTRATE AND ACETAMINOPHEN 1 TABLET: 5; 325 TABLET ORAL at 14:39

## 2019-01-01 RX ADMIN — Medication 400 MG: at 09:40

## 2019-01-01 RX ADMIN — ALBUTEROL SULFATE 2.5 MG: 2.5 SOLUTION RESPIRATORY (INHALATION) at 21:26

## 2019-01-01 RX ADMIN — BENZOCAINE, BUTAMBEN, AND TETRACAINE HYDROCHLORIDE: .028; .004; .004 AEROSOL, SPRAY TOPICAL at 13:11

## 2019-01-01 RX ADMIN — LISINOPRIL 5 MG: 5 TABLET ORAL at 20:15

## 2019-01-01 RX ADMIN — Medication 5 ML: at 13:01

## 2019-01-01 RX ADMIN — Medication 1 TABLET: at 14:16

## 2019-01-01 RX ADMIN — ALBUTEROL SULFATE 2.5 MG: 2.5 SOLUTION RESPIRATORY (INHALATION) at 16:20

## 2019-01-01 RX ADMIN — APIXABAN 5 MG: 5 TABLET, FILM COATED ORAL at 20:13

## 2019-01-01 RX ADMIN — FUROSEMIDE 40 MG: 40 TABLET ORAL at 17:13

## 2019-01-01 RX ADMIN — DOCUSATE SODIUM 200 MG: 100 CAPSULE, LIQUID FILLED ORAL at 09:27

## 2019-01-01 RX ADMIN — METHYLPREDNISOLONE SODIUM SUCCINATE 20 MG: 40 INJECTION, POWDER, LYOPHILIZED, FOR SOLUTION INTRAMUSCULAR; INTRAVENOUS at 23:52

## 2019-01-01 RX ADMIN — SENNOSIDES 8.6 MG: 8.6 TABLET, FILM COATED ORAL at 09:58

## 2019-01-01 RX ADMIN — ASPIRIN 81 MG 81 MG: 81 TABLET ORAL at 09:36

## 2019-01-01 RX ADMIN — Medication 325 MG: at 11:49

## 2019-01-01 RX ADMIN — METOPROLOL TARTRATE 2.5 MG: 5 INJECTION, SOLUTION INTRAVENOUS at 06:59

## 2019-01-01 RX ADMIN — PANTOPRAZOLE SODIUM 40 MG: 40 TABLET, DELAYED RELEASE ORAL at 06:44

## 2019-01-01 RX ADMIN — INSULIN LISPRO 6 UNITS: 100 INJECTION, SOLUTION INTRAVENOUS; SUBCUTANEOUS at 16:08

## 2019-01-01 RX ADMIN — METOPROLOL TARTRATE 12.5 MG: 25 TABLET ORAL at 21:08

## 2019-01-01 RX ADMIN — APIXABAN 5 MG: 5 TABLET, FILM COATED ORAL at 08:19

## 2019-01-01 RX ADMIN — METOPROLOL TARTRATE 12.5 MG: 25 TABLET ORAL at 09:44

## 2019-01-01 RX ADMIN — SIMETHICONE CHEW TAB 80 MG 160 MG: 80 TABLET ORAL at 12:04

## 2019-01-01 RX ADMIN — INSULIN LISPRO 3 UNITS: 100 INJECTION, SOLUTION INTRAVENOUS; SUBCUTANEOUS at 17:10

## 2019-01-01 RX ADMIN — METOPROLOL SUCCINATE 25 MG: 25 TABLET, EXTENDED RELEASE ORAL at 08:36

## 2019-01-01 RX ADMIN — ALBUTEROL SULFATE 2.5 MG: 2.5 SOLUTION RESPIRATORY (INHALATION) at 11:47

## 2019-01-01 RX ADMIN — LACTULOSE 20 G: 20 SOLUTION ORAL at 08:44

## 2019-01-01 RX ADMIN — BENZONATATE 200 MG: 100 CAPSULE ORAL at 14:35

## 2019-01-01 RX ADMIN — METHYLPREDNISOLONE SODIUM SUCCINATE 20 MG: 40 INJECTION, POWDER, LYOPHILIZED, FOR SOLUTION INTRAMUSCULAR; INTRAVENOUS at 13:08

## 2019-01-01 RX ADMIN — HYDROCODONE BITARTRATE AND ACETAMINOPHEN 1 TABLET: 7.5; 325 TABLET ORAL at 08:29

## 2019-01-01 RX ADMIN — LISINOPRIL 40 MG: 20 TABLET ORAL at 20:13

## 2019-01-01 RX ADMIN — BENZONATATE 200 MG: 100 CAPSULE ORAL at 08:59

## 2019-01-01 RX ADMIN — HYDROCODONE BITARTRATE AND ACETAMINOPHEN 1 TABLET: 7.5; 325 TABLET ORAL at 16:07

## 2019-01-01 RX ADMIN — IPRATROPIUM BROMIDE AND ALBUTEROL SULFATE 1 AMPULE: .5; 3 SOLUTION RESPIRATORY (INHALATION) at 13:18

## 2019-01-01 RX ADMIN — CETIRIZINE HYDROCHLORIDE 10 MG: 10 TABLET, FILM COATED ORAL at 09:36

## 2019-01-01 RX ADMIN — Medication 10 ML: at 06:53

## 2019-01-01 RX ADMIN — Medication 5 ML: at 15:26

## 2019-01-01 RX ADMIN — SODIUM CHLORIDE 500 ML: 9 INJECTION, SOLUTION INTRAVENOUS at 19:01

## 2019-01-01 RX ADMIN — LEVOTHYROXINE SODIUM 137 MCG: 25 TABLET ORAL at 05:55

## 2019-01-01 RX ADMIN — ROPINIROLE HYDROCHLORIDE 0.25 MG: 0.25 TABLET, FILM COATED ORAL at 20:13

## 2019-01-01 RX ADMIN — POTASSIUM & SODIUM PHOSPHATES POWDER PACK 280-160-250 MG 500 MG: 280-160-250 PACK at 14:45

## 2019-01-01 RX ADMIN — POTASSIUM CHLORIDE 40 MEQ: 20 TABLET, EXTENDED RELEASE ORAL at 22:21

## 2019-01-01 RX ADMIN — POTASSIUM CHLORIDE 40 MEQ: 20 TABLET, EXTENDED RELEASE ORAL at 20:15

## 2019-01-01 RX ADMIN — BENZONATATE 200 MG: 100 CAPSULE ORAL at 14:36

## 2019-01-01 RX ADMIN — FUROSEMIDE 40 MG: 10 INJECTION, SOLUTION INTRAMUSCULAR; INTRAVENOUS at 16:18

## 2019-01-01 RX ADMIN — APIXABAN 5 MG: 5 TABLET, FILM COATED ORAL at 20:09

## 2019-01-01 RX ADMIN — NYSTATIN AND TRIAMCINOLONE ACETONIDE: 100000; 1 CREAM TOPICAL at 09:41

## 2019-01-01 RX ADMIN — ALBUTEROL SULFATE 2.5 MG: 2.5 SOLUTION RESPIRATORY (INHALATION) at 16:36

## 2019-01-01 RX ADMIN — METOPROLOL TARTRATE 12.5 MG: 25 TABLET ORAL at 08:52

## 2019-01-01 RX ADMIN — BUDESONIDE 1000 MCG: 0.5 SUSPENSION RESPIRATORY (INHALATION) at 07:41

## 2019-01-01 RX ADMIN — BENZONATATE 200 MG: 100 CAPSULE ORAL at 21:08

## 2019-01-01 RX ADMIN — PREDNISONE 10 MG: 5 TABLET ORAL at 08:20

## 2019-01-01 RX ADMIN — FUROSEMIDE 40 MG: 10 INJECTION, SOLUTION INTRAMUSCULAR; INTRAVENOUS at 06:14

## 2019-01-01 RX ADMIN — APIXABAN 5 MG: 5 TABLET, FILM COATED ORAL at 08:46

## 2019-01-01 RX ADMIN — HYDROCODONE BITARTRATE AND HOMATROPINE METHYLBROMIDE 5 ML: 5; 1.5 SOLUTION ORAL at 06:59

## 2019-01-01 RX ADMIN — MICONAZOLE NITRATE: 20 POWDER TOPICAL at 12:20

## 2019-01-01 RX ADMIN — HYDROCODONE BITARTRATE AND ACETAMINOPHEN 1 TABLET: 7.5; 325 TABLET ORAL at 09:58

## 2019-01-01 RX ADMIN — Medication 5 ML: at 12:18

## 2019-01-01 RX ADMIN — ALBUTEROL SULFATE 2.5 MG: 2.5 SOLUTION RESPIRATORY (INHALATION) at 12:58

## 2019-01-01 RX ADMIN — FERROUS SULFATE TAB 325 MG (65 MG ELEMENTAL FE) 325 MG: 325 (65 FE) TAB at 12:27

## 2019-01-01 RX ADMIN — BENZONATATE 200 MG: 100 CAPSULE ORAL at 08:55

## 2019-01-01 RX ADMIN — FUROSEMIDE 20 MG: 10 INJECTION, SOLUTION INTRAVENOUS at 18:10

## 2019-01-01 RX ADMIN — AMIODARONE HYDROCHLORIDE 200 MG: 200 TABLET ORAL at 09:36

## 2019-01-01 RX ADMIN — Medication 1 TABLET: at 11:39

## 2019-01-01 RX ADMIN — FORMOTEROL FUMARATE DIHYDRATE 20 MCG: 20 SOLUTION RESPIRATORY (INHALATION) at 08:27

## 2019-01-01 RX ADMIN — INSULIN LISPRO 3 UNITS: 100 INJECTION, SOLUTION INTRAVENOUS; SUBCUTANEOUS at 07:14

## 2019-01-01 RX ADMIN — CETIRIZINE HYDROCHLORIDE 10 MG: 10 TABLET, FILM COATED ORAL at 09:08

## 2019-01-01 RX ADMIN — BUDESONIDE 1000 MCG: 0.5 SUSPENSION RESPIRATORY (INHALATION) at 21:07

## 2019-01-01 RX ADMIN — FUROSEMIDE 40 MG: 10 INJECTION, SOLUTION INTRAMUSCULAR; INTRAVENOUS at 06:52

## 2019-01-01 RX ADMIN — BUDESONIDE 1000 MCG: 0.5 SUSPENSION RESPIRATORY (INHALATION) at 19:49

## 2019-01-01 RX ADMIN — HYDROCODONE BITARTRATE AND ACETAMINOPHEN 1 TABLET: 10; 325 TABLET ORAL at 08:37

## 2019-01-01 RX ADMIN — METOPROLOL TARTRATE 2.5 MG: 5 INJECTION, SOLUTION INTRAVENOUS at 05:20

## 2019-01-01 RX ADMIN — ALBUTEROL SULFATE 2.5 MG: 2.5 SOLUTION RESPIRATORY (INHALATION) at 12:25

## 2019-01-01 RX ADMIN — SIMVASTATIN 20 MG: 20 TABLET, FILM COATED ORAL at 20:45

## 2019-01-01 RX ADMIN — METOPROLOL TARTRATE 12.5 MG: 25 TABLET ORAL at 20:04

## 2019-01-01 RX ADMIN — SIMETHICONE CHEW TAB 80 MG 160 MG: 80 TABLET ORAL at 14:09

## 2019-01-01 RX ADMIN — POTASSIUM CHLORIDE 20 MEQ: 20 TABLET, EXTENDED RELEASE ORAL at 08:19

## 2019-01-01 RX ADMIN — METOPROLOL TARTRATE 12.5 MG: 25 TABLET ORAL at 09:27

## 2019-01-01 RX ADMIN — AMIODARONE HYDROCHLORIDE 100 MG: 200 TABLET ORAL at 08:22

## 2019-01-01 RX ADMIN — METHYLPREDNISOLONE SODIUM SUCCINATE 40 MG: 40 INJECTION, POWDER, LYOPHILIZED, FOR SOLUTION INTRAMUSCULAR; INTRAVENOUS at 02:38

## 2019-01-01 RX ADMIN — POTASSIUM CHLORIDE 20 MEQ: 20 TABLET, EXTENDED RELEASE ORAL at 08:37

## 2019-01-01 RX ADMIN — SENNOSIDES 8.6 MG: 8.6 TABLET, FILM COATED ORAL at 09:08

## 2019-01-01 RX ADMIN — SENNOSIDES 8.6 MG: 8.6 TABLET, FILM COATED ORAL at 08:51

## 2019-01-01 RX ADMIN — FUROSEMIDE 40 MG: 10 INJECTION, SOLUTION INTRAMUSCULAR; INTRAVENOUS at 15:48

## 2019-01-01 RX ADMIN — POTASSIUM CHLORIDE 40 MEQ: 20 TABLET, EXTENDED RELEASE ORAL at 17:11

## 2019-01-01 RX ADMIN — HYDROCODONE BITARTRATE AND ACETAMINOPHEN 1 TABLET: 7.5; 325 TABLET ORAL at 08:44

## 2019-01-01 RX ADMIN — SENNOSIDES 8.6 MG: 8.6 TABLET, FILM COATED ORAL at 08:22

## 2019-01-01 RX ADMIN — INSULIN LISPRO 2 UNITS: 100 INJECTION, SOLUTION INTRAVENOUS; SUBCUTANEOUS at 06:15

## 2019-01-01 RX ADMIN — BUDESONIDE 1000 MCG: 0.5 SUSPENSION RESPIRATORY (INHALATION) at 08:38

## 2019-01-01 RX ADMIN — ALBUTEROL SULFATE 2.5 MG: 2.5 SOLUTION RESPIRATORY (INHALATION) at 16:13

## 2019-01-01 RX ADMIN — FORMOTEROL FUMARATE DIHYDRATE 20 MCG: 20 SOLUTION RESPIRATORY (INHALATION) at 21:04

## 2019-01-01 RX ADMIN — POTASSIUM CHLORIDE 40 MEQ: 20 TABLET, EXTENDED RELEASE ORAL at 10:18

## 2019-01-01 RX ADMIN — APIXABAN 10 MG: 5 TABLET, FILM COATED ORAL at 21:09

## 2019-01-01 RX ADMIN — BUDESONIDE 1000 MCG: 0.5 SUSPENSION RESPIRATORY (INHALATION) at 20:49

## 2019-01-01 RX ADMIN — BUDESONIDE 1000 MCG: 0.5 SUSPENSION RESPIRATORY (INHALATION) at 21:04

## 2019-01-01 RX ADMIN — ALBUTEROL SULFATE 2.5 MG: 2.5 SOLUTION RESPIRATORY (INHALATION) at 08:46

## 2019-01-01 RX ADMIN — FUROSEMIDE 40 MG: 10 INJECTION, SOLUTION INTRAMUSCULAR; INTRAVENOUS at 16:55

## 2019-01-01 RX ADMIN — ALBUTEROL SULFATE 2.5 MG: 2.5 SOLUTION RESPIRATORY (INHALATION) at 12:42

## 2019-01-01 RX ADMIN — BUDESONIDE 1000 MCG: 0.5 SUSPENSION RESPIRATORY (INHALATION) at 08:40

## 2019-01-01 RX ADMIN — Medication 5 ML: at 11:41

## 2019-01-01 RX ADMIN — HYDROCORTISONE ACETATE 25 MG: 25 SUPPOSITORY RECTAL at 20:25

## 2019-01-01 RX ADMIN — FORMOTEROL FUMARATE DIHYDRATE 20 MCG: 20 SOLUTION RESPIRATORY (INHALATION) at 20:30

## 2019-01-01 RX ADMIN — INSULIN LISPRO 4 UNITS: 100 INJECTION, SOLUTION INTRAVENOUS; SUBCUTANEOUS at 16:31

## 2019-01-01 RX ADMIN — SIMETHICONE CHEW TAB 80 MG 160 MG: 80 TABLET ORAL at 10:18

## 2019-01-01 RX ADMIN — INSULIN LISPRO 2 UNITS: 100 INJECTION, SOLUTION INTRAVENOUS; SUBCUTANEOUS at 11:27

## 2019-01-01 RX ADMIN — METHYLPREDNISOLONE SODIUM SUCCINATE 125 MG: 125 INJECTION, POWDER, FOR SOLUTION INTRAMUSCULAR; INTRAVENOUS at 12:21

## 2019-01-01 RX ADMIN — BENZONATATE 200 MG: 100 CAPSULE ORAL at 11:24

## 2019-01-01 RX ADMIN — POTASSIUM CHLORIDE 40 MEQ: 20 TABLET, EXTENDED RELEASE ORAL at 08:52

## 2019-01-01 RX ADMIN — Medication 400 MG: at 08:43

## 2019-01-01 RX ADMIN — TRAMADOL HYDROCHLORIDE 25 MG: 50 TABLET, FILM COATED ORAL at 21:06

## 2019-01-01 RX ADMIN — ROPINIROLE HYDROCHLORIDE 0.25 MG: 0.5 TABLET, FILM COATED ORAL at 22:41

## 2019-01-01 RX ADMIN — RALOXIFENE HYDROCHLORIDE 60 MG: 60 TABLET, FILM COATED ORAL at 09:47

## 2019-01-01 RX ADMIN — SODIUM CHLORIDE, POTASSIUM CHLORIDE, SODIUM LACTATE AND CALCIUM CHLORIDE: 600; 310; 30; 20 INJECTION, SOLUTION INTRAVENOUS at 04:52

## 2019-01-01 RX ADMIN — ALBUTEROL SULFATE 2.5 MG: 2.5 SOLUTION RESPIRATORY (INHALATION) at 07:41

## 2019-01-01 RX ADMIN — FENTANYL CITRATE 50 MCG: 50 INJECTION, SOLUTION INTRAMUSCULAR; INTRAVENOUS at 10:04

## 2019-01-01 RX ADMIN — Medication 10 ML: at 20:37

## 2019-01-01 RX ADMIN — ROPINIROLE HYDROCHLORIDE 0.25 MG: 0.25 TABLET, FILM COATED ORAL at 22:16

## 2019-01-01 RX ADMIN — CETIRIZINE HYDROCHLORIDE 5 MG: 10 TABLET, FILM COATED ORAL at 08:35

## 2019-01-01 RX ADMIN — Medication 10 ML: at 22:51

## 2019-01-01 RX ADMIN — ALBUTEROL SULFATE 2.5 MG: 2.5 SOLUTION RESPIRATORY (INHALATION) at 12:57

## 2019-01-01 RX ADMIN — ALBUTEROL SULFATE 2.5 MG: 2.5 SOLUTION RESPIRATORY (INHALATION) at 13:17

## 2019-01-01 RX ADMIN — LACTULOSE 20 G: 20 SOLUTION ORAL at 09:00

## 2019-01-01 RX ADMIN — BUDESONIDE 1000 MCG: 0.5 SUSPENSION RESPIRATORY (INHALATION) at 07:28

## 2019-01-01 RX ADMIN — FORMOTEROL FUMARATE DIHYDRATE 20 MCG: 20 SOLUTION RESPIRATORY (INHALATION) at 09:16

## 2019-01-01 RX ADMIN — INSULIN LISPRO 2 UNITS: 100 INJECTION, SOLUTION INTRAVENOUS; SUBCUTANEOUS at 21:04

## 2019-01-01 RX ADMIN — DORNASE ALFA 2.5 MG: 1 SOLUTION RESPIRATORY (INHALATION) at 07:41

## 2019-01-01 RX ADMIN — CLOTRIMAZOLE: 10 CREAM TOPICAL at 22:14

## 2019-01-01 RX ADMIN — FUROSEMIDE 40 MG: 10 INJECTION, SOLUTION INTRAMUSCULAR; INTRAVENOUS at 08:34

## 2019-01-01 RX ADMIN — METOPROLOL SUCCINATE 50 MG: 50 TABLET, EXTENDED RELEASE ORAL at 09:36

## 2019-01-01 RX ADMIN — METHYLPREDNISOLONE SODIUM SUCCINATE 40 MG: 40 INJECTION, POWDER, LYOPHILIZED, FOR SOLUTION INTRAMUSCULAR; INTRAVENOUS at 04:32

## 2019-01-01 RX ADMIN — FUROSEMIDE 40 MG: 10 INJECTION, SOLUTION INTRAMUSCULAR; INTRAVENOUS at 08:39

## 2019-01-01 RX ADMIN — RALOXIFENE HYDROCHLORIDE 60 MG: 60 TABLET, FILM COATED ORAL at 09:41

## 2019-01-01 RX ADMIN — RALOXIFENE HYDROCHLORIDE 60 MG: 60 TABLET, FILM COATED ORAL at 08:18

## 2019-01-01 RX ADMIN — APIXABAN 5 MG: 5 TABLET, FILM COATED ORAL at 21:07

## 2019-01-01 RX ADMIN — APIXABAN 5 MG: 5 TABLET, FILM COATED ORAL at 09:23

## 2019-01-01 RX ADMIN — Medication 5 ML: at 22:47

## 2019-01-01 RX ADMIN — TRAMADOL HYDROCHLORIDE 25 MG: 50 TABLET, FILM COATED ORAL at 16:30

## 2019-01-01 RX ADMIN — LEVOTHYROXINE SODIUM 137 MCG: 25 TABLET ORAL at 06:57

## 2019-01-01 RX ADMIN — BENZONATATE 200 MG: 100 CAPSULE ORAL at 20:35

## 2019-01-01 RX ADMIN — METOPROLOL TARTRATE 25 MG: 25 TABLET ORAL at 20:30

## 2019-01-01 RX ADMIN — METOPROLOL SUCCINATE 50 MG: 50 TABLET, EXTENDED RELEASE ORAL at 09:23

## 2019-01-01 RX ADMIN — FUROSEMIDE 40 MG: 40 TABLET ORAL at 17:09

## 2019-01-01 RX ADMIN — RALOXIFENE HYDROCHLORIDE 60 MG: 60 TABLET, FILM COATED ORAL at 08:20

## 2019-01-01 RX ADMIN — ASPIRIN 81 MG 81 MG: 81 TABLET ORAL at 09:40

## 2019-01-01 RX ADMIN — BUDESONIDE 1000 MCG: 0.5 SUSPENSION RESPIRATORY (INHALATION) at 09:33

## 2019-01-01 RX ADMIN — METOPROLOL SUCCINATE 25 MG: 25 TABLET, EXTENDED RELEASE ORAL at 09:58

## 2019-01-01 RX ADMIN — MAGNESIUM CITRATE 296 ML: 1.75 LIQUID ORAL at 14:55

## 2019-01-01 RX ADMIN — AMIODARONE HYDROCHLORIDE 200 MG: 200 TABLET ORAL at 09:22

## 2019-01-01 RX ADMIN — APIXABAN 10 MG: 5 TABLET, FILM COATED ORAL at 22:40

## 2019-01-01 RX ADMIN — BUDESONIDE 1000 MCG: 0.5 SUSPENSION RESPIRATORY (INHALATION) at 13:16

## 2019-01-01 RX ADMIN — SODIUM CHLORIDE, POTASSIUM CHLORIDE, SODIUM LACTATE AND CALCIUM CHLORIDE: 600; 310; 30; 20 INJECTION, SOLUTION INTRAVENOUS at 10:38

## 2019-01-01 RX ADMIN — DOCUSATE SODIUM 200 MG: 100 CAPSULE, LIQUID FILLED ORAL at 08:54

## 2019-01-01 RX ADMIN — APIXABAN 5 MG: 5 TABLET, FILM COATED ORAL at 08:22

## 2019-01-01 RX ADMIN — CYANOCOBALAMIN 1000 MCG: 1000 INJECTION, SOLUTION INTRAMUSCULAR at 11:27

## 2019-01-01 RX ADMIN — BENZONATATE 200 MG: 100 CAPSULE ORAL at 20:42

## 2019-01-01 RX ADMIN — METOPROLOL SUCCINATE 25 MG: 25 TABLET, EXTENDED RELEASE ORAL at 20:41

## 2019-01-01 RX ADMIN — POTASSIUM CHLORIDE 20 MEQ: 20 TABLET, EXTENDED RELEASE ORAL at 16:49

## 2019-01-01 RX ADMIN — AMIODARONE HYDROCHLORIDE 200 MG: 200 TABLET ORAL at 10:40

## 2019-01-01 RX ADMIN — Medication 10 ML: at 13:36

## 2019-01-01 RX ADMIN — ENOXAPARIN SODIUM 30 MG: 30 INJECTION SUBCUTANEOUS at 09:35

## 2019-01-01 RX ADMIN — CHOLECALCIFEROL TAB 50 MCG (2000 UNIT) 2000 UNITS: 50 TAB at 11:40

## 2019-01-01 RX ADMIN — ROPINIROLE HYDROCHLORIDE 0.25 MG: 0.25 TABLET, FILM COATED ORAL at 20:15

## 2019-01-01 RX ADMIN — POTASSIUM CHLORIDE 10 MEQ: 29.8 INJECTION, SOLUTION INTRAVENOUS at 10:02

## 2019-01-01 RX ADMIN — POTASSIUM CHLORIDE 40 MEQ: 20 TABLET, EXTENDED RELEASE ORAL at 08:55

## 2019-01-01 RX ADMIN — Medication 5 ML: at 16:48

## 2019-01-01 RX ADMIN — ATORVASTATIN CALCIUM 20 MG: 20 TABLET, FILM COATED ORAL at 22:15

## 2019-01-01 RX ADMIN — Medication 400 MG: at 08:37

## 2019-01-01 RX ADMIN — Medication 10 ML: at 08:46

## 2019-01-01 RX ADMIN — ROPINIROLE HYDROCHLORIDE 0.5 MG: 0.5 TABLET, FILM COATED ORAL at 15:17

## 2019-01-01 RX ADMIN — POTASSIUM CHLORIDE 10 MEQ: 10 INJECTION, SOLUTION INTRAVENOUS at 08:00

## 2019-01-01 RX ADMIN — ASPIRIN 81 MG 81 MG: 81 TABLET ORAL at 08:22

## 2019-01-01 RX ADMIN — WATER 10 ML: 1 INJECTION INTRAMUSCULAR; INTRAVENOUS; SUBCUTANEOUS at 14:09

## 2019-01-01 RX ADMIN — LACTULOSE 20 G: 20 SOLUTION ORAL at 08:51

## 2019-01-01 RX ADMIN — FUROSEMIDE 40 MG: 10 INJECTION, SOLUTION INTRAMUSCULAR; INTRAVENOUS at 06:27

## 2019-01-01 RX ADMIN — DOCUSATE SODIUM 200 MG: 100 CAPSULE, LIQUID FILLED ORAL at 08:20

## 2019-01-01 RX ADMIN — ROPINIROLE HYDROCHLORIDE 0.25 MG: 0.5 TABLET, FILM COATED ORAL at 20:21

## 2019-01-01 RX ADMIN — METOLAZONE 5 MG: 2.5 TABLET ORAL at 08:21

## 2019-01-01 RX ADMIN — OXYCODONE HYDROCHLORIDE 10 MG: 5 TABLET ORAL at 12:13

## 2019-01-01 RX ADMIN — MICONAZOLE NITRATE: 20 POWDER TOPICAL at 09:09

## 2019-01-01 RX ADMIN — POVIDONE-IODINE: 10 OINTMENT TOPICAL at 09:45

## 2019-01-01 RX ADMIN — DOCUSATE SODIUM 200 MG: 100 CAPSULE, LIQUID FILLED ORAL at 08:15

## 2019-01-01 RX ADMIN — LACTULOSE 20 G: 20 SOLUTION ORAL at 08:58

## 2019-01-01 RX ADMIN — FENTANYL CITRATE 50 MCG: 50 INJECTION, SOLUTION INTRAMUSCULAR; INTRAVENOUS at 13:04

## 2019-01-01 RX ADMIN — Medication 400 MG: at 08:33

## 2019-01-01 RX ADMIN — Medication 1 CAPSULE: at 22:16

## 2019-01-01 RX ADMIN — ALBUMIN (HUMAN) 25 G: 0.25 INJECTION, SOLUTION INTRAVENOUS at 08:51

## 2019-01-01 RX ADMIN — ALBUTEROL SULFATE 2.5 MG: 2.5 SOLUTION RESPIRATORY (INHALATION) at 07:49

## 2019-01-01 RX ADMIN — APIXABAN 5 MG: 5 TABLET, FILM COATED ORAL at 21:50

## 2019-01-01 RX ADMIN — SIMETHICONE CHEW TAB 80 MG 160 MG: 80 TABLET ORAL at 11:39

## 2019-01-01 RX ADMIN — CHOLECALCIFEROL TAB 50 MCG (2000 UNIT) 2000 UNITS: 50 TAB at 11:26

## 2019-01-01 RX ADMIN — INSULIN LISPRO 4 UNITS: 100 INJECTION, SOLUTION INTRAVENOUS; SUBCUTANEOUS at 12:04

## 2019-01-01 RX ADMIN — DORNASE ALFA 2.5 MG: 1 SOLUTION RESPIRATORY (INHALATION) at 20:00

## 2019-01-01 RX ADMIN — AMIODARONE HYDROCHLORIDE 100 MG: 200 TABLET ORAL at 11:41

## 2019-01-01 RX ADMIN — APIXABAN 5 MG: 5 TABLET, FILM COATED ORAL at 08:54

## 2019-01-01 RX ADMIN — ROPINIROLE HYDROCHLORIDE 0.25 MG: 0.5 TABLET, FILM COATED ORAL at 20:03

## 2019-01-01 RX ADMIN — FENTANYL CITRATE 50 MCG: 50 INJECTION INTRAMUSCULAR; INTRAVENOUS at 13:14

## 2019-01-01 RX ADMIN — METOPROLOL TARTRATE 12.5 MG: 25 TABLET ORAL at 20:31

## 2019-01-01 RX ADMIN — ALBUTEROL SULFATE 2.5 MG: 2.5 SOLUTION RESPIRATORY (INHALATION) at 08:40

## 2019-01-01 RX ADMIN — FUROSEMIDE 40 MG: 10 INJECTION, SOLUTION INTRAMUSCULAR; INTRAVENOUS at 17:46

## 2019-01-01 RX ADMIN — APIXABAN 10 MG: 5 TABLET, FILM COATED ORAL at 10:18

## 2019-01-01 RX ADMIN — HYDROCODONE BITARTRATE AND ACETAMINOPHEN 1 TABLET: 10; 325 TABLET ORAL at 20:54

## 2019-01-01 RX ADMIN — Medication 325 MG: at 12:47

## 2019-01-01 RX ADMIN — BACITRACIN ZINC, NEOMYCIN SULFATE, POLYMYXIN B SULFATE 1 G: 3.5; 5000; 4 OINTMENT TOPICAL at 20:37

## 2019-01-01 RX ADMIN — BUDESONIDE 1000 MCG: 0.5 SUSPENSION RESPIRATORY (INHALATION) at 20:30

## 2019-01-01 RX ADMIN — Medication 1 CAPSULE: at 20:13

## 2019-01-01 RX ADMIN — BISMUTH SUBSALICYLATE 30 ML: 262 LIQUID ORAL at 20:44

## 2019-01-01 RX ADMIN — FUROSEMIDE 40 MG: 10 INJECTION, SOLUTION INTRAMUSCULAR; INTRAVENOUS at 16:19

## 2019-01-01 RX ADMIN — POTASSIUM CHLORIDE 10 MEQ: 29.8 INJECTION, SOLUTION INTRAVENOUS at 12:20

## 2019-01-01 RX ADMIN — DARBEPOETIN ALFA 60 MCG: 60 INJECTION, SOLUTION INTRAVENOUS; SUBCUTANEOUS at 15:25

## 2019-01-01 RX ADMIN — INSULIN LISPRO 3 UNITS: 100 INJECTION, SOLUTION INTRAVENOUS; SUBCUTANEOUS at 16:17

## 2019-01-01 RX ADMIN — ROPINIROLE HYDROCHLORIDE 0.25 MG: 0.25 TABLET, FILM COATED ORAL at 21:39

## 2019-01-01 RX ADMIN — HYDROCODONE BITARTRATE AND ACETAMINOPHEN 2 TABLET: 5; 325 TABLET ORAL at 22:21

## 2019-01-01 RX ADMIN — ALBUMIN (HUMAN) 25 G: 0.25 INJECTION, SOLUTION INTRAVENOUS at 08:48

## 2019-01-01 RX ADMIN — METOPROLOL SUCCINATE 25 MG: 25 TABLET, EXTENDED RELEASE ORAL at 08:20

## 2019-01-01 RX ADMIN — APIXABAN 5 MG: 5 TABLET, FILM COATED ORAL at 08:33

## 2019-01-01 RX ADMIN — LEVOTHYROXINE SODIUM 137 MCG: 25 TABLET ORAL at 06:08

## 2019-01-01 RX ADMIN — APIXABAN 5 MG: 5 TABLET, FILM COATED ORAL at 21:39

## 2019-01-01 RX ADMIN — METOPROLOL SUCCINATE 50 MG: 50 TABLET, EXTENDED RELEASE ORAL at 08:33

## 2019-01-01 RX ADMIN — FUROSEMIDE 40 MG: 10 INJECTION, SOLUTION INTRAMUSCULAR; INTRAVENOUS at 09:28

## 2019-01-01 RX ADMIN — Medication 5 ML: at 04:36

## 2019-01-01 RX ADMIN — BENZONATATE 200 MG: 100 CAPSULE ORAL at 13:11

## 2019-01-01 RX ADMIN — HYDROCODONE BITARTRATE AND ACETAMINOPHEN 1 TABLET: 10; 325 TABLET ORAL at 11:13

## 2019-01-01 RX ADMIN — SIMETHICONE CHEW TAB 80 MG 160 MG: 80 TABLET ORAL at 12:27

## 2019-01-01 RX ADMIN — OXYCODONE HYDROCHLORIDE 10 MG: 5 TABLET ORAL at 12:08

## 2019-01-01 RX ADMIN — SIMETHICONE CHEW TAB 80 MG 160 MG: 80 TABLET ORAL at 12:38

## 2019-01-01 RX ADMIN — Medication 5 ML: at 20:26

## 2019-01-01 RX ADMIN — IPRATROPIUM BROMIDE AND ALBUTEROL SULFATE 1 AMPULE: .5; 3 SOLUTION RESPIRATORY (INHALATION) at 16:25

## 2019-01-01 RX ADMIN — METOPROLOL TARTRATE 12.5 MG: 25 TABLET ORAL at 20:35

## 2019-01-01 RX ADMIN — FORMOTEROL FUMARATE DIHYDRATE 20 MCG: 20 SOLUTION RESPIRATORY (INHALATION) at 08:40

## 2019-01-01 RX ADMIN — ALBUTEROL SULFATE 2.5 MG: 2.5 SOLUTION RESPIRATORY (INHALATION) at 08:38

## 2019-01-01 RX ADMIN — FORMOTEROL FUMARATE DIHYDRATE 20 MCG: 20 SOLUTION RESPIRATORY (INHALATION) at 11:47

## 2019-01-01 SDOH — HEALTH STABILITY: MENTAL HEALTH: HOW OFTEN DO YOU HAVE A DRINK CONTAINING ALCOHOL?: NEVER

## 2019-01-01 ASSESSMENT — PAIN SCALES - GENERAL
PAINLEVEL_OUTOF10: 2
PAINLEVEL_OUTOF10: 0
PAINLEVEL_OUTOF10: 4
PAINLEVEL_OUTOF10: 8
PAINLEVEL_OUTOF10: 0
PAINLEVEL_OUTOF10: 3
PAINLEVEL_OUTOF10: 8
PAINLEVEL_OUTOF10: 4
PAINLEVEL_OUTOF10: 6
PAINLEVEL_OUTOF10: 10
PAINLEVEL_OUTOF10: 0
PAINLEVEL_OUTOF10: 8
PAINLEVEL_OUTOF10: 3
PAINLEVEL_OUTOF10: 8
PAINLEVEL_OUTOF10: 6
PAINLEVEL_OUTOF10: 9
PAINLEVEL_OUTOF10: 8
PAINLEVEL_OUTOF10: 0
PAINLEVEL_OUTOF10: 8
PAINLEVEL_OUTOF10: 8
PAINLEVEL_OUTOF10: 5
PAINLEVEL_OUTOF10: 2
PAINLEVEL_OUTOF10: 0
PAINLEVEL_OUTOF10: 8
PAINLEVEL_OUTOF10: 0
PAINLEVEL_OUTOF10: 1
PAINLEVEL_OUTOF10: 5
PAINLEVEL_OUTOF10: 8
PAINLEVEL_OUTOF10: 8
PAINLEVEL_OUTOF10: 0
PAINLEVEL_OUTOF10: 6
PAINLEVEL_OUTOF10: 0
PAINLEVEL_OUTOF10: 0
PAINLEVEL_OUTOF10: 8
PAINLEVEL_OUTOF10: 0
PAINLEVEL_OUTOF10: 7
PAINLEVEL_OUTOF10: 8
PAINLEVEL_OUTOF10: 6
PAINLEVEL_OUTOF10: 0
PAINLEVEL_OUTOF10: 8
PAINLEVEL_OUTOF10: 0
PAINLEVEL_OUTOF10: 0
PAINLEVEL_OUTOF10: 8
PAINLEVEL_OUTOF10: 4
PAINLEVEL_OUTOF10: 7
PAINLEVEL_OUTOF10: 8
PAINLEVEL_OUTOF10: 0
PAINLEVEL_OUTOF10: 7
PAINLEVEL_OUTOF10: 6
PAINLEVEL_OUTOF10: 9
PAINLEVEL_OUTOF10: 0
PAINLEVEL_OUTOF10: 8
PAINLEVEL_OUTOF10: 5
PAINLEVEL_OUTOF10: 8
PAINLEVEL_OUTOF10: 0
PAINLEVEL_OUTOF10: 4
PAINLEVEL_OUTOF10: 6
PAINLEVEL_OUTOF10: 3
PAINLEVEL_OUTOF10: 8
PAINLEVEL_OUTOF10: 2
PAINLEVEL_OUTOF10: 0
PAINLEVEL_OUTOF10: 8
PAINLEVEL_OUTOF10: 6
PAINLEVEL_OUTOF10: 0
PAINLEVEL_OUTOF10: 8
PAINLEVEL_OUTOF10: 0
PAINLEVEL_OUTOF10: 5
PAINLEVEL_OUTOF10: 0
PAINLEVEL_OUTOF10: 7
PAINLEVEL_OUTOF10: 0
PAINLEVEL_OUTOF10: 3
PAINLEVEL_OUTOF10: 0
PAINLEVEL_OUTOF10: 0
PAINLEVEL_OUTOF10: 9
PAINLEVEL_OUTOF10: 0
PAINLEVEL_OUTOF10: 0
PAINLEVEL_OUTOF10: 3
PAINLEVEL_OUTOF10: 7
PAINLEVEL_OUTOF10: 0
PAINLEVEL_OUTOF10: 3
PAINLEVEL_OUTOF10: 0
PAINLEVEL_OUTOF10: 6
PAINLEVEL_OUTOF10: 4
PAINLEVEL_OUTOF10: 7
PAINLEVEL_OUTOF10: 6
PAINLEVEL_OUTOF10: 7
PAINLEVEL_OUTOF10: 8
PAINLEVEL_OUTOF10: 0
PAINLEVEL_OUTOF10: 3
PAINLEVEL_OUTOF10: 0
PAINLEVEL_OUTOF10: 8
PAINLEVEL_OUTOF10: 6
PAINLEVEL_OUTOF10: 6
PAINLEVEL_OUTOF10: 2
PAINLEVEL_OUTOF10: 8
PAINLEVEL_OUTOF10: 8
PAINLEVEL_OUTOF10: 0
PAINLEVEL_OUTOF10: 0
PAINLEVEL_OUTOF10: 7
PAINLEVEL_OUTOF10: 3
PAINLEVEL_OUTOF10: 6
PAINLEVEL_OUTOF10: 0
PAINLEVEL_OUTOF10: 0
PAINLEVEL_OUTOF10: 8
PAINLEVEL_OUTOF10: 0
PAINLEVEL_OUTOF10: 5
PAINLEVEL_OUTOF10: 0
PAINLEVEL_OUTOF10: 8
PAINLEVEL_OUTOF10: 7
PAINLEVEL_OUTOF10: 10
PAINLEVEL_OUTOF10: 8
PAINLEVEL_OUTOF10: 0
PAINLEVEL_OUTOF10: 5
PAINLEVEL_OUTOF10: 8
PAINLEVEL_OUTOF10: 0
PAINLEVEL_OUTOF10: 6
PAINLEVEL_OUTOF10: 0
PAINLEVEL_OUTOF10: 0
PAINLEVEL_OUTOF10: 8
PAINLEVEL_OUTOF10: 0
PAINLEVEL_OUTOF10: 8
PAINLEVEL_OUTOF10: 8
PAINLEVEL_OUTOF10: 0
PAINLEVEL_OUTOF10: 7
PAINLEVEL_OUTOF10: 0
PAINLEVEL_OUTOF10: 2
PAINLEVEL_OUTOF10: 0
PAINLEVEL_OUTOF10: 8
PAINLEVEL_OUTOF10: 8
PAINLEVEL_OUTOF10: 1
PAINLEVEL_OUTOF10: 7
PAINLEVEL_OUTOF10: 0
PAINLEVEL_OUTOF10: 8
PAINLEVEL_OUTOF10: 0
PAINLEVEL_OUTOF10: 9
PAINLEVEL_OUTOF10: 6
PAINLEVEL_OUTOF10: 8
PAINLEVEL_OUTOF10: 0
PAINLEVEL_OUTOF10: 8
PAINLEVEL_OUTOF10: 6
PAINLEVEL_OUTOF10: 0
PAINLEVEL_OUTOF10: 9
PAINLEVEL_OUTOF10: 0
PAINLEVEL_OUTOF10: 3
PAINLEVEL_OUTOF10: 0
PAINLEVEL_OUTOF10: 8
PAINLEVEL_OUTOF10: 7
PAINLEVEL_OUTOF10: 10
PAINLEVEL_OUTOF10: 0
PAINLEVEL_OUTOF10: 8
PAINLEVEL_OUTOF10: 6
PAINLEVEL_OUTOF10: 7
PAINLEVEL_OUTOF10: 5
PAINLEVEL_OUTOF10: 8
PAINLEVEL_OUTOF10: 4
PAINLEVEL_OUTOF10: 6
PAINLEVEL_OUTOF10: 8
PAINLEVEL_OUTOF10: 0
PAINLEVEL_OUTOF10: 7
PAINLEVEL_OUTOF10: 7
PAINLEVEL_OUTOF10: 8
PAINLEVEL_OUTOF10: 0
PAINLEVEL_OUTOF10: 6

## 2019-01-01 ASSESSMENT — PAIN DESCRIPTION - FREQUENCY
FREQUENCY: CONTINUOUS
FREQUENCY: INTERMITTENT
FREQUENCY: CONTINUOUS
FREQUENCY: INTERMITTENT
FREQUENCY: CONTINUOUS
FREQUENCY: INTERMITTENT
FREQUENCY: CONTINUOUS
FREQUENCY: INTERMITTENT
FREQUENCY: INTERMITTENT
FREQUENCY: CONTINUOUS
FREQUENCY: INTERMITTENT
FREQUENCY: CONTINUOUS
FREQUENCY: INTERMITTENT
FREQUENCY: CONTINUOUS
FREQUENCY: INTERMITTENT
FREQUENCY: CONTINUOUS
FREQUENCY: CONTINUOUS
FREQUENCY: INTERMITTENT
FREQUENCY: CONTINUOUS
FREQUENCY: INTERMITTENT
FREQUENCY: CONTINUOUS
FREQUENCY: CONTINUOUS
FREQUENCY: INTERMITTENT
FREQUENCY: CONTINUOUS

## 2019-01-01 ASSESSMENT — PAIN DESCRIPTION - LOCATION
LOCATION: ABDOMEN
LOCATION: BACK
LOCATION: WRIST
LOCATION: BACK
LOCATION: BACK
LOCATION: RIB CAGE
LOCATION: BACK
LOCATION: WRIST
LOCATION: BACK
LOCATION: RIB CAGE
LOCATION: BACK
LOCATION: HEAD
LOCATION: ARM
LOCATION: BACK
LOCATION: CHEST
LOCATION: WRIST
LOCATION: RIB CAGE
LOCATION: BACK;ARM
LOCATION: BACK;ARM
LOCATION: BACK
LOCATION: HIP
LOCATION: BACK
LOCATION: GENERALIZED
LOCATION: GENERALIZED
LOCATION: BACK
LOCATION: WRIST
LOCATION: BACK
LOCATION: RIB CAGE
LOCATION: CHEST;OTHER (COMMENT)
LOCATION: BACK
LOCATION: RIB CAGE
LOCATION: BACK
LOCATION: RIB CAGE
LOCATION: BACK
LOCATION: BACK;WRIST
LOCATION: BACK
LOCATION: BACK
LOCATION: WRIST
LOCATION: BACK
LOCATION: BACK
LOCATION: GENERALIZED
LOCATION: BACK
LOCATION: BACK
LOCATION: HAND
LOCATION: BACK
LOCATION: OTHER (COMMENT)
LOCATION: BACK
LOCATION: BACK

## 2019-01-01 ASSESSMENT — PAIN DESCRIPTION - DESCRIPTORS
DESCRIPTORS: ACHING
DESCRIPTORS: ACHING;DISCOMFORT;SORE
DESCRIPTORS: ACHING;DISCOMFORT
DESCRIPTORS: DISCOMFORT
DESCRIPTORS: DISCOMFORT
DESCRIPTORS: ACHING;CONSTANT
DESCRIPTORS: ACHING;DISCOMFORT
DESCRIPTORS: DISCOMFORT
DESCRIPTORS: ACHING;DISCOMFORT
DESCRIPTORS: ACHING;DISCOMFORT
DESCRIPTORS: ACHING;DULL;DISCOMFORT
DESCRIPTORS: DISCOMFORT
DESCRIPTORS: DISCOMFORT
DESCRIPTORS: ACHING;DISCOMFORT;DULL
DESCRIPTORS: ACHING;DISCOMFORT
DESCRIPTORS: ACHING;NAGGING;DISCOMFORT
DESCRIPTORS: ACHING
DESCRIPTORS: ACHING;DULL
DESCRIPTORS: DISCOMFORT
DESCRIPTORS: ACHING;DISCOMFORT
DESCRIPTORS: ACHING;DULL
DESCRIPTORS: ACHING;DISCOMFORT;SORE
DESCRIPTORS: ACHING;DISCOMFORT
DESCRIPTORS: ACHING;DISCOMFORT
DESCRIPTORS: ACHING;DISCOMFORT;CONSTANT
DESCRIPTORS: ACHING;DISCOMFORT;DULL
DESCRIPTORS: SHARP
DESCRIPTORS: ACHING
DESCRIPTORS: DISCOMFORT;CONSTANT
DESCRIPTORS: ACHING;CONSTANT;DISCOMFORT
DESCRIPTORS: ACHING;DISCOMFORT
DESCRIPTORS: ACHING;DULL
DESCRIPTORS: ACHING
DESCRIPTORS: ACHING;DULL
DESCRIPTORS: ACHING;DISCOMFORT
DESCRIPTORS: ACHING;DISCOMFORT
DESCRIPTORS: ACHING;DISCOMFORT;SORE
DESCRIPTORS: ACHING
DESCRIPTORS: ACHING;DISCOMFORT;SORE
DESCRIPTORS: ACHING;DISCOMFORT
DESCRIPTORS: ACHING;CONSTANT
DESCRIPTORS: ACHING;DISCOMFORT
DESCRIPTORS: ACHING;DISCOMFORT
DESCRIPTORS: DISCOMFORT
DESCRIPTORS: ACHING;DISCOMFORT

## 2019-01-01 ASSESSMENT — PAIN DESCRIPTION - PROGRESSION
CLINICAL_PROGRESSION: GRADUALLY IMPROVING
CLINICAL_PROGRESSION: GRADUALLY WORSENING
CLINICAL_PROGRESSION: GRADUALLY WORSENING
CLINICAL_PROGRESSION: NOT CHANGED
CLINICAL_PROGRESSION: GRADUALLY WORSENING
CLINICAL_PROGRESSION: GRADUALLY WORSENING
CLINICAL_PROGRESSION: NOT CHANGED
CLINICAL_PROGRESSION: GRADUALLY WORSENING
CLINICAL_PROGRESSION: NOT CHANGED
CLINICAL_PROGRESSION: NOT CHANGED
CLINICAL_PROGRESSION: GRADUALLY WORSENING
CLINICAL_PROGRESSION: GRADUALLY IMPROVING
CLINICAL_PROGRESSION: NOT CHANGED
CLINICAL_PROGRESSION: GRADUALLY IMPROVING
CLINICAL_PROGRESSION: GRADUALLY WORSENING
CLINICAL_PROGRESSION: GRADUALLY IMPROVING
CLINICAL_PROGRESSION: GRADUALLY WORSENING
CLINICAL_PROGRESSION: NOT CHANGED
CLINICAL_PROGRESSION: GRADUALLY WORSENING
CLINICAL_PROGRESSION: GRADUALLY WORSENING

## 2019-01-01 ASSESSMENT — PAIN DESCRIPTION - ONSET
ONSET: PROGRESSIVE
ONSET: ON-GOING
ONSET: GRADUAL
ONSET: ON-GOING
ONSET: PROGRESSIVE
ONSET: ON-GOING
ONSET: PROGRESSIVE
ONSET: ON-GOING
ONSET: PROGRESSIVE
ONSET: ON-GOING
ONSET: ON-GOING

## 2019-01-01 ASSESSMENT — PAIN DESCRIPTION - PAIN TYPE
TYPE: CHRONIC PAIN
TYPE: CHRONIC PAIN;ACUTE PAIN
TYPE: CHRONIC PAIN
TYPE: ACUTE PAIN
TYPE: CHRONIC PAIN
TYPE: ACUTE PAIN
TYPE: CHRONIC PAIN
TYPE: CHRONIC PAIN
TYPE: ACUTE PAIN
TYPE: CHRONIC PAIN
TYPE: ACUTE PAIN
TYPE: ACUTE PAIN
TYPE: CHRONIC PAIN
TYPE: ACUTE PAIN
TYPE: CHRONIC PAIN
TYPE: ACUTE PAIN
TYPE: CHRONIC PAIN
TYPE: CHRONIC PAIN;ACUTE PAIN
TYPE: CHRONIC PAIN
TYPE: CHRONIC PAIN
TYPE: ACUTE PAIN
TYPE: CHRONIC PAIN
TYPE: CHRONIC PAIN
TYPE: ACUTE PAIN;CHRONIC PAIN
TYPE: CHRONIC PAIN
TYPE: ACUTE PAIN
TYPE: ACUTE PAIN
TYPE: CHRONIC PAIN
TYPE: ACUTE PAIN
TYPE: CHRONIC PAIN
TYPE: ACUTE PAIN
TYPE: CHRONIC PAIN
TYPE: ACUTE PAIN
TYPE: CHRONIC PAIN;ACUTE PAIN
TYPE: CHRONIC PAIN
TYPE: CHRONIC PAIN;ACUTE PAIN
TYPE: CHRONIC PAIN
TYPE: CHRONIC PAIN
TYPE: ACUTE PAIN
TYPE: CHRONIC PAIN
TYPE: CHRONIC PAIN
TYPE: ACUTE PAIN
TYPE: CHRONIC PAIN
TYPE: ACUTE PAIN
TYPE: CHRONIC PAIN

## 2019-01-01 ASSESSMENT — PAIN DESCRIPTION - ORIENTATION
ORIENTATION: LEFT;LOWER
ORIENTATION: LOWER
ORIENTATION: LEFT
ORIENTATION: RIGHT
ORIENTATION: RIGHT;LEFT
ORIENTATION: LOWER
ORIENTATION: RIGHT;LEFT
ORIENTATION: MID
ORIENTATION: MID
ORIENTATION: LEFT
ORIENTATION: LOWER
ORIENTATION: RIGHT;LEFT
ORIENTATION: LEFT
ORIENTATION: LOWER
ORIENTATION: RIGHT
ORIENTATION: LOWER
ORIENTATION: LOWER
ORIENTATION: LEFT
ORIENTATION: RIGHT;LEFT;MID
ORIENTATION: LOWER
ORIENTATION: LEFT
ORIENTATION: RIGHT
ORIENTATION: RIGHT;LEFT
ORIENTATION: LOWER
ORIENTATION: RIGHT;LEFT;MID
ORIENTATION: LOWER
ORIENTATION: RIGHT;LEFT
ORIENTATION: LOWER
ORIENTATION: LEFT
ORIENTATION: LOWER
ORIENTATION: UPPER;LOWER
ORIENTATION: LOWER
ORIENTATION: LEFT
ORIENTATION: LEFT
ORIENTATION: LOWER

## 2019-01-01 ASSESSMENT — ENCOUNTER SYMPTOMS
VOMITING: 0
SHORTNESS OF BREATH: 0
NAUSEA: 0
SHORTNESS OF BREATH: 0
NAUSEA: 0
SINUS PAIN: 0
SORE THROAT: 0
RHINORRHEA: 0
VOMITING: 0
ABDOMINAL DISTENTION: 1
COUGH: 1
HEARTBURN: 0
WHEEZING: 0
NAUSEA: 0
SHORTNESS OF BREATH: 1
BLURRED VISION: 0
BACK PAIN: 1
WHEEZING: 1
DIARRHEA: 0
SHORTNESS OF BREATH: 1
WHEEZING: 0
BACK PAIN: 1
SPUTUM PRODUCTION: 0
COUGH: 0
COUGH: 0
SINUS PRESSURE: 0
GASTROINTESTINAL NEGATIVE: 1
SHORTNESS OF BREATH: 1
SORE THROAT: 0
TROUBLE SWALLOWING: 0
SORE THROAT: 0
COLOR CHANGE: 0
COUGH: 0
NAUSEA: 0
SINUS PAIN: 0
BACK PAIN: 1
CONSTIPATION: 0
EYES NEGATIVE: 1
VOICE CHANGE: 0
SHORTNESS OF BREATH: 0
DIARRHEA: 0
VOMITING: 0
ABDOMINAL PAIN: 1
BLOOD IN STOOL: 0
COLOR CHANGE: 0
ABDOMINAL PAIN: 0
COUGH: 1
ABDOMINAL PAIN: 0
SORE THROAT: 0
ABDOMINAL PAIN: 0
NAUSEA: 0
BLOOD IN STOOL: 0
ABDOMINAL PAIN: 0
EYE PAIN: 0
ABDOMINAL PAIN: 0
BACK PAIN: 0
VOMITING: 0
ORTHOPNEA: 0
COUGH: 0
VOMITING: 0
BACK PAIN: 0

## 2019-01-01 ASSESSMENT — SLEEP AND FATIGUE QUESTIONNAIRES
HOW LIKELY ARE YOU TO NOD OFF OR FALL ASLEEP WHILE SITTING AND TALKING TO SOMEONE: 0
HOW LIKELY ARE YOU TO NOD OFF OR FALL ASLEEP WHEN YOU ARE A PASSENGER IN A CAR FOR AN HOUR WITHOUT A BREAK: 1
HOW LIKELY ARE YOU TO NOD OFF OR FALL ASLEEP WHILE WATCHING TV: 3
HOW LIKELY ARE YOU TO NOD OFF OR FALL ASLEEP WHILE SITTING INACTIVE IN A PUBLIC PLACE: 0
HOW LIKELY ARE YOU TO NOD OFF OR FALL ASLEEP WHILE SITTING QUIETLY AFTER LUNCH WITHOUT ALCOHOL: 3
HOW LIKELY ARE YOU TO NOD OFF OR FALL ASLEEP WHILE SITTING AND READING: 3
HOW LIKELY ARE YOU TO NOD OFF OR FALL ASLEEP WHILE LYING DOWN TO REST IN THE AFTERNOON WHEN CIRCUMSTANCES PERMIT: 3
ESS TOTAL SCORE: 13
HOW LIKELY ARE YOU TO NOD OFF OR FALL ASLEEP IN A CAR, WHILE STOPPED FOR A FEW MINUTES IN TRAFFIC: 0

## 2019-01-01 ASSESSMENT — PAIN - FUNCTIONAL ASSESSMENT
PAIN_FUNCTIONAL_ASSESSMENT: ACTIVITIES ARE NOT PREVENTED
PAIN_FUNCTIONAL_ASSESSMENT: PREVENTS OR INTERFERES WITH ALL ACTIVE AND SOME PASSIVE ACTIVITIES
PAIN_FUNCTIONAL_ASSESSMENT: PREVENTS OR INTERFERES SOME ACTIVE ACTIVITIES AND ADLS
PAIN_FUNCTIONAL_ASSESSMENT: ACTIVITIES ARE NOT PREVENTED
PAIN_FUNCTIONAL_ASSESSMENT: PREVENTS OR INTERFERES WITH ALL ACTIVE AND SOME PASSIVE ACTIVITIES
PAIN_FUNCTIONAL_ASSESSMENT: PREVENTS OR INTERFERES SOME ACTIVE ACTIVITIES AND ADLS
PAIN_FUNCTIONAL_ASSESSMENT: PREVENTS OR INTERFERES SOME ACTIVE ACTIVITIES AND ADLS
PAIN_FUNCTIONAL_ASSESSMENT: ACTIVITIES ARE NOT PREVENTED
PAIN_FUNCTIONAL_ASSESSMENT: PREVENTS OR INTERFERES SOME ACTIVE ACTIVITIES AND ADLS
PAIN_FUNCTIONAL_ASSESSMENT: ACTIVITIES ARE NOT PREVENTED
PAIN_FUNCTIONAL_ASSESSMENT: PREVENTS OR INTERFERES WITH MANY ACTIVE NOT PASSIVE ACTIVITIES
PAIN_FUNCTIONAL_ASSESSMENT: PREVENTS OR INTERFERES WITH MANY ACTIVE NOT PASSIVE ACTIVITIES
PAIN_FUNCTIONAL_ASSESSMENT: PREVENTS OR INTERFERES SOME ACTIVE ACTIVITIES AND ADLS
PAIN_FUNCTIONAL_ASSESSMENT: PREVENTS OR INTERFERES WITH MANY ACTIVE NOT PASSIVE ACTIVITIES
PAIN_FUNCTIONAL_ASSESSMENT: PREVENTS OR INTERFERES SOME ACTIVE ACTIVITIES AND ADLS
PAIN_FUNCTIONAL_ASSESSMENT: PREVENTS OR INTERFERES WITH MANY ACTIVE NOT PASSIVE ACTIVITIES
PAIN_FUNCTIONAL_ASSESSMENT: ACTIVITIES ARE NOT PREVENTED
PAIN_FUNCTIONAL_ASSESSMENT: ACTIVITIES ARE NOT PREVENTED
PAIN_FUNCTIONAL_ASSESSMENT: PREVENTS OR INTERFERES SOME ACTIVE ACTIVITIES AND ADLS
PAIN_FUNCTIONAL_ASSESSMENT: ACTIVITIES ARE NOT PREVENTED
PAIN_FUNCTIONAL_ASSESSMENT: PREVENTS OR INTERFERES WITH MANY ACTIVE NOT PASSIVE ACTIVITIES
PAIN_FUNCTIONAL_ASSESSMENT: PREVENTS OR INTERFERES SOME ACTIVE ACTIVITIES AND ADLS
PAIN_FUNCTIONAL_ASSESSMENT: PREVENTS OR INTERFERES SOME ACTIVE ACTIVITIES AND ADLS
PAIN_FUNCTIONAL_ASSESSMENT: ACTIVITIES ARE NOT PREVENTED

## 2019-01-01 ASSESSMENT — EJECTION FRACTION: EF_SOURCE: 2D ECHO

## 2019-03-19 PROBLEM — I50.43 CHF (CONGESTIVE HEART FAILURE), NYHA CLASS I, ACUTE ON CHRONIC, COMBINED (HCC): Status: ACTIVE | Noted: 2019-01-01

## 2019-03-19 NOTE — ED PROVIDER NOTES
49-year-old female history of CHF atrial fibrillation COPD and pneumonia presents to the emergency department with shortness of breath that started this morning. Patient went to see her eye doctor notices shortness of breath got worse she called EMS and she was transported here. Is chronically on 2 L of oxygen. She states she also has some lower extremity swelling bilaterally. Patient is on Eliquis for A. Fib. She states no fevers or chills no chest pain no nausea vomiting diarrhea or abdominal pain. Patient received 2 DuoNeb's from EMS and stated improvement with this. Patient has no other complaints at this time. The history is provided by the patient. Shortness of Breath   Severity:  Moderate  Onset quality:  Gradual  Duration:  1 day  Timing:  Intermittent  Progression:  Waxing and waning  Chronicity:  New  Relieved by: duonebs. Worsened by:  Nothing  Associated symptoms: no abdominal pain, no chest pain, no cough, no ear pain, no fever, no headaches, no rash, no sore throat, no sputum production, no vomiting and no wheezing        Review of Systems   Constitutional: Negative for chills and fever. HENT: Negative for ear pain and sore throat. Respiratory: Positive for shortness of breath. Negative for cough, sputum production and wheezing. Cardiovascular: Positive for leg swelling. Negative for chest pain. Gastrointestinal: Negative for abdominal pain, blood in stool, nausea and vomiting. Genitourinary: Negative for dysuria and frequency. Musculoskeletal: Negative for back pain. Skin: Negative for rash. Neurological: Negative for weakness and headaches. All other systems reviewed and are negative. Physical Exam   Constitutional: She is oriented to person, place, and time. She appears well-developed and well-nourished. No distress. HENT:   Head: Normocephalic and atraumatic. Eyes: Pupils are equal, round, and reactive to light.  EOM are normal.   Neck: Normal range of disease. Past Surgical History:  has a past surgical history that includes Artery Biopsy (2008); cervical fusion (1994); Toe Surgery; Wrist surgery (Right, YRS AGO); Foot surgery (2010,2011); Hysterectomy (1960's); Colon surgery (1980's); Cardiac catheterization (2010); Coronary angioplasty with stent (06/2011); eye surgery (Bilateral, 2004); hernia repair (1996, 1998); Cholecystectomy (1980's); back surgery (1996); Dilatation, esophagus; transesophageal echocardiogram (02/13/2018); Cardioversion (02/13/2018); and Breast surgery (Right, 1993). Social History:  reports that she has never smoked. She has never used smokeless tobacco. She reports that she does not drink alcohol or use drugs. Family History: family history includes Brain Cancer in her mother; Heart Attack in her father; Heart Surgery in her maternal aunt; Lung Cancer in her brother; Stroke in her maternal aunt. The patients home medications have been reviewed. Allergies: Judit-d [diphenhydramine hcl]; Benadryl [diphenhydramine];  Phenergan [promethazine hcl]; and Ibuprofen    -------------------------------------------------- RESULTS -------------------------------------------------    LABS:  Results for orders placed or performed during the hospital encounter of 03/19/19   Rapid influenza A/B antigens   Result Value Ref Range    Influenza A by PCR Not Detected Not Detected    Influenza B by PCR Not Detected Not Detected   CBC Auto Differential   Result Value Ref Range    WBC 7.3 4.5 - 11.5 E9/L    RBC 3.97 3.50 - 5.50 E12/L    Hemoglobin 12.8 11.5 - 15.5 g/dL    Hematocrit 40.7 34.0 - 48.0 %    .5 (H) 80.0 - 99.9 fL    MCH 32.2 26.0 - 35.0 pg    MCHC 31.4 (L) 32.0 - 34.5 %    RDW 12.8 11.5 - 15.0 fL    Platelets 691 745 - 794 E9/L    MPV 10.8 7.0 - 12.0 fL    Neutrophils % 76.5 43.0 - 80.0 %    Immature Granulocytes % 1.5 0.0 - 5.0 %    Lymphocytes % 16.1 (L) 20.0 - 42.0 %    Monocytes % 5.4 2.0 - 12.0 %    Eosinophils % 0.1 0.0 - 6.0 %    Basophils % 0.4 0.0 - 2.0 %    Neutrophils # 5.55 1.80 - 7.30 E9/L    Immature Granulocytes # 0.11 E9/L    Lymphocytes # 1.17 (L) 1.50 - 4.00 E9/L    Monocytes # 0.39 0.10 - 0.95 E9/L    Eosinophils # 0.01 (L) 0.05 - 0.50 E9/L    Basophils # 0.03 0.00 - 0.20 E9/L   Comprehensive Metabolic Panel   Result Value Ref Range    Sodium 140 132 - 146 mmol/L    Potassium 4.1 3.5 - 5.0 mmol/L    Chloride 101 98 - 107 mmol/L    CO2 29 22 - 29 mmol/L    Anion Gap 10 7 - 16 mmol/L    Glucose 117 (H) 74 - 99 mg/dL    BUN 17 8 - 23 mg/dL    CREATININE 0.9 0.5 - 1.0 mg/dL    GFR Non-African American 59 >=60 mL/min/1.73    GFR African American >60     Calcium 8.8 8.6 - 10.2 mg/dL    Total Protein 6.6 6.4 - 8.3 g/dL    Alb 3.8 3.5 - 5.2 g/dL    Total Bilirubin 1.0 0.0 - 1.2 mg/dL    Alkaline Phosphatase 61 35 - 104 U/L    ALT 19 0 - 32 U/L    AST 21 0 - 31 U/L   Brain Natriuretic Peptide   Result Value Ref Range    Pro-BNP 1,294 (H) 0 - 450 pg/mL   Troponin   Result Value Ref Range    Troponin <0.01 0.00 - 0.03 ng/mL   Urinalysis   Result Value Ref Range    Color, UA Yellow Straw/Yellow    Clarity, UA Clear Clear    Glucose, Ur Negative Negative mg/dL    Bilirubin Urine Negative Negative    Ketones, Urine Negative Negative mg/dL    Specific Gravity, UA 1.010 1.005 - 1.030    Blood, Urine MODERATE (A) Negative    pH, UA 7.5 5.0 - 9.0    Protein, UA Negative Negative mg/dL    Urobilinogen, Urine 0.2 <2.0 E.U./dL    Nitrite, Urine Negative Negative    Leukocyte Esterase, Urine SMALL (A) Negative   Lactic Acid, Plasma   Result Value Ref Range    Lactic Acid 2.1 0.5 - 2.2 mmol/L   Microscopic Urinalysis   Result Value Ref Range    WBC, UA 2-5 0 - 5 /HPF    RBC, UA 2-5 0 - 2 /HPF    Epi Cells MODERATE /HPF    Bacteria, UA RARE (A) /HPF   EKG 12 Lead   Result Value Ref Range    Ventricular Rate 72 BPM    Atrial Rate 72 BPM    P-R Interval 166 ms    QRS Duration 106 ms    Q-T Interval 452 ms    QTc Calculation (Bazett) 494 ms    P Axis 70 degrees    R Axis -51 degrees    T Axis 31 degrees       RADIOLOGY:  Xr Femur Right (min 2 Views)    Result Date: 3/11/2019  Patient MRN: [id number] : 1927 Age:  80 years Gender: Female Order Date: 3/11/2019 3:00 PM Exam: XR FEMUR RIGHT (MIN 2 VIEWS) Number of Images: 4 views Indication:   fall fall Comparison: None. Findings: Multiple views demonstrate no fracture or other acute bony abnormality. There is narrowing superomedial joint space right hip. There are severe degenerative changes visualized right knee with extensive tricompartmental involvement. No acute bony or soft tissue abnormality, however, is identified. Changes of relatively severe degenerative osteoarthritis; no acute process     Ct Head Wo Contrast    Result Date: 3/11/2019  Patient MRN:  [id number] : 1927 Age: 80 years Gender: Female Order Date:  3/11/2019 3:00 PM Exam: CT HEAD WO CONTRAST Indication:  HEAD TRAUMA, CLOSED, MILD, GCS >= 13, NO RISK FACTORS, NEURO EXAM NORMAL Number of images:  150 Contrast: None. Comparison: Head CT dated 2017. Brain MRI dated 10/22/2014. Technique:  Computed tomography of the head was performed without intravenous contrast. Images were constructed in the axial plane. Multiplanar reformation of the axial images was performed in coronal and sagittal planes. Low-dose CT acquisition technique included one of following options: (1) automated exposure control;  (2) adjustment of mA and/or kV according to patient's size; or (3) use of iterative reconstruction. FINDINGS: No acute intracranial hemorrhage is seen. There is no intracranial mass effect. There are patchy areas of decreased attenuation in the bilateral periventricular and deep white matter, most likely due to chronic microvascular ischemia. A tiny hypodensity is noted in the left lenticular nuclei and another in the left external capsule.  There appear to be correlates to these lesions on prior MRI, suggestive of chronic lacunar infarcts. Atherosclerotic calcification is seen in the internal carotid and vertebral arteries. There is moderate cerebral and cerebellar atrophy with associated ventricular prominence. No calvarial fracture is seen. Focal mucosal thickening with peripheral calcification is seen in the right frontal sinus, suggestive of a mucocele. The paranasal sinuses and mastoid air cells are otherwise patent. No acute intracranial hemorrhage or mass effect. Chronic lacunar infarcts in the left thalamus and external capsule. Atherosclerosis. Evidence of chronic microvascular ischemia. Moderate cerebral atrophy. Approximately 1-cm mucocele in the right frontal sinus. Ct Lumbar Spine Wo Contrast    Result Date: 3/11/2019  Patient MRN: [id number] : 1927 Age:  80 years Gender: Female Order Date: 3/11/2019 3:00 PM Exam: CT LUMBAR SPINE WO CONTRAST Number of Images: 386 views Indication:   INJURY TO TRUNK trauma Comparison: Plain films 2017 Findings: Scans are obtained from thoracolumbar junction through lumbosacral junction without contrast with additional reformatted images submitted as well. Coronal reformatted images demonstrate no definite evidence for spondylolysis. Parasagittal reformatted images demonstrate almost 10 mm anterior subluxation L4 on L5 with associated disc space narrowing and vacuum disc formation. There is compression superior endplate L1 which is old. There is prominent spur formation T12, L1. Axial soft tissue windows demonstrate no paraspinal soft tissue abnormality. Multilevel areas of degenerative disc bulging noted without definite disc herniation. Moderate spinal stenosis evident L3, L4. Severe spinal stenosis at L4, L5 secondary to the spondylolisthesis. Axial bone windows demonstrate old compression fracture as noted. No acute fracture identified. No bony destruction evident. Posterior spur formation at T12, L1 causes minimal spinal stenosis.  There is some narrowing of the left neural evident. Chronic and degenerative findings as noted. Continued follow-up advised should symptoms persist in any event.     ------------------------- NURSING NOTES AND VITALS REVIEWED ---------------------------  Date / Time Roomed:  3/19/2019 11:06 AM  ED Bed Assignment:  16/16    The nursing notes within the ED encounter and vital signs as below have been reviewed. Patient Vitals for the past 24 hrs:   BP Temp Temp src Pulse Resp SpO2 Height Weight   03/19/19 1439 (!) 150/93 98.1 °F (36.7 °C) Oral 63 16 97 % -- --   03/19/19 1414 (!) 175/80 97.6 °F (36.4 °C) Oral 79 16 98 % -- --   03/19/19 1120 (!) 147/95 97.9 °F (36.6 °C) Oral 72 16 96 % 5' 2\" (1.575 m) 180 lb (81.6 kg)       Oxygen Saturation Interpretation: Normal    ------------------------------------------ PROGRESS NOTES ------------------------------------------  Counseling:  I have spoken with the patient and discussed todays results, in addition to providing specific details for the plan of care and counseling regarding the diagnosis and prognosis. Their questions are answered at this time and they are agreeable with the plan of admission.    --------------------------------- ADDITIONAL PROVIDER NOTES ---------------------------------  This patient's ED course included: a personal history and physicial examination, re-evaluation prior to disposition, multiple bedside re-evaluations, IV medications, cardiac monitoring and continuous pulse oximetry    This patient has remained hemodynamically stable during their ED course. Diagnosis:  1. Acute on chronic congestive heart failure, unspecified heart failure type ([address]        Disposition:  Patient's disposition: Admit to telemetry  Patient's condition is stable.              Tanner Brand DO  Resident  03/19/19 2420

## 2019-03-19 NOTE — PROGRESS NOTES
Database complete. Medications reconciled. Care plans and education initiated. Wears 2L 02 nightly. Uses quad cane mostly and walker. Dental soft diet.  Cardiology is Dr. Paiz Notice.

## 2019-03-19 NOTE — CONSULTS
Full Consult     HPI:  Patient seen in consultation for congestive heart failure. Patient with a history of chronic diastolic CHF. Patient noted decreased exercise tolerance recently and leg edema. Today she suddenly became very short of breath. Chest x-ray with evidence for congestive heart failure. Patient relates to being compliant with medications. Denies any chest pain. Past Medical History:   Diagnosis Date    A-fib ([address]     AC (acromioclavicular) joint bone spurs     in feet    Acid reflux     Ascending aortic aneurysm (HCC)     4.0 cm,(SEE LETTERS DR. PENALOZA) IS BEING MONITORED    Atrial fibrillation (HCC)     CAD (coronary artery disease)     Cancer (HCC) 16 YRS AGO    breast RIGHT    CHF (congestive heart failure) (HCC)     Chronic back pain     stenosis, receives epidurals bi-monthly    DJD (degenerative joint disease)     Hyperlipidemia     Hypertension     Hypothyroidism     Iron deficiency anemia     Lung nodule     RIGHT LOWER LOBE, BEING MONITORED BY DR. Tom Miranda, LAST CT EPIC 3/2014    MVP (mitral valve prolapse)     Osteoarthritis     Osteoporosis     Partial bowel obstruction ([address]     Pneumonia 2008    [facility])     Scoliosis     Sinus arrhythmia     1/2014 last ekg, nsr with pacs, epic    Sleep apnea     uses cpap    Spinal stenosis     Thoracic ascending [facility]     Thyroid disease     hypothyroidism        Social History     Socioeconomic History    Marital status:       Spouse name: Not on file    Number of children: Not on file    Years of education: Not on file    Highest education level: Not on file   Occupational History    Occupation: retired-   Social Needs    Financial resource strain: Not on file    Food insecurity:     Worry: Not on file     Inability: Not on file    Transportation needs:     Medical: Not on file     Non-medical: Not on file   Tobacco Use    Smoking status: Never Smoker    Smokeless tobacco: Never Used   Substance and Sexual Activity    Alcohol use: No     Alcohol/week: 0.0 oz    Drug use: No    Sexual activity: Never   Lifestyle    Physical activity:     Days per week: Not on file     Minutes per session: Not on file    Stress: Not on file   Relationships    Social connections:     Talks on phone: Not on file     Gets together: Not on file     Attends Islam service: Not on file     Active member of club or organization: Not on file     Attends meetings of clubs or organizations: Not on file     Relationship status: Not on file    Intimate partner violence:     Fear of current or ex partner: Not on file     Emotionally abused: Not on file     Physically abused: Not on file     Forced sexual activity: Not on file   Other Topics Concern    Not on file   Social History Narrative    Not on file        Family History   Problem Relation Age of Onset    Brain Cancer Mother     Lung Cancer Brother     Heart Attack Father     Heart Surgery Maternal Aunt     Stroke Maternal Aunt        ROS:   General: No unusual weight gain,  change in exercise tolerance  Skin: No rash or itching  EENT: No vision changes or nosebleeds  Cardiovascular: No orthopnea or paroxysmal nocturnal dyspnea  Respiratory: No cough or hemoptysis  Gastrointestinal: No hematemesis or recent changes in bowel habits  Genitourinary: No hematuria, urgency or frequency  Musculoskeletal: No muscular weakness or joint swelling   Neurologic / Psychiatric: No incoordination or convulsions  Allergic / Immunologic/ Lymphatic / Endocrine: No anemia or bleeding tendency    Physical Exam:   Vitals:    03/19/19 1120 03/19/19 1414 03/19/19 1439   BP: (!) 147/95 (!) 175/80 (!) 150/93   Pulse: 72 79 63   Resp: 16 16 16   Temp: 97.9 °F (36.6 °C) 97.6 °F (36.4 °C) 98.1 °F (36.7 °C)   TempSrc: Oral Oral Oral   SpO2: 96% 98% 97%   Weight: 180 lb (81.6 kg)     Height: 5' 2\" (1.575 m)       HEAD & FACE: Normocephalic. Symmetric. EYES: No xanthelasma. Conjunctivae not injected. EARS, NOSE, MOUTH & THROAT: Good dentition. No oral pallor or cyanosis. NECK: No JVD at 30 degrees. No thyromegaly. RESPIRATORY: Few crackles in bases bilaterally. .  No use of accessory muscle or intercostal retractions. CARDIOVASCULAR: Regular rate and rhythm. No lifts or thrills on palpitation. Auscultation with normal S1-S2 in intensity and splitting. Grade 4-0/0 early diastolic murmur noted best at the right upper sternal border as well as a grade 2/6 holosystolic murmur noted best at the apex. No carotid bruits. Abdominal aorta not enlarged. Femoral arteries without bruits. Pedal pulses 1+. 2+ pretibial bilateral edema. ABDOMEN: Soft without hepatic or splenic enlargement. No tenderness. MUSCULOSKELETAL: No kyphosis or scoliosis of the back. Good muscle strength and tone. No muscle atrophy. EXTREMITIES: No clubbing or cyanosis. SKIN: No Xanthomas or ulcerations. NEUROLOGIC: Oriented to time, place and person. Normal mood and affect. LYMPHATIC:  No palpable neck or supraclavicular nodes. No spleno        EKG: No acute changes    Impression:  1. Acute on chronic diastolic heart failure  2. PAF: currently in SR on Amiodarone  3. Chronic anticoagulation with Eliquis  4. Coronary artery disease         Plan:  1. IV diuretic therapy  2. Monitor for arrhythmias  3.  Check 2D echo         Beula Beam

## 2019-03-19 NOTE — CONSULTS
Inpatient Cardiology Consultation      Reason for Consult:  CHF    Consulting Physician: Dr. Bette Paredes    Requesting Physician:  Dr. Ge Story    Date of Consultation: 3/19/2019    History of Present Illness:    Mrs. Benjamin Morton is a 80year old lady who is followed at our practice by Dr. Jeff Nieto and was last seen on March 5, 2019. Her past medical history is inclusive for chronic diastolic heart failure and persistent atrial fibrillation, for which she is treated with amiodarone and eliquis. She has had progressively worsening dyspnea over the past few months, and describes episodes of \"chest pressure\" with activity, especially with vaccuuming, which typically resolves with rest. An echocardiogram was ordered on her recent visit with Dr. Jeff Nieto for further evaluation. This morning, she attended an eye doctor appointment and became significantly short of breath while there and once returning home, called her daughter, who reports she had difficulty speaking due to dyspnea and wheezing. EMS was called, and her SpO2 was 95% on 4 L of oxygen (which she typically wears only at night). On arrival to the ED, she was medicated with Solumedrol, Lasix, and Duoneb. ProBNP was 1294 and initial troponin negative. [facility] was consulted regarding her congestive heart failure. She continues to appear dyspneic with slight activity (standing up at bedside) and talking. Past Cardiac Tests:  Echocardiogram: 8/15/13  Technically difficult study with suboptimal echo windows. Definity contrast was used to improve endomyocardial border definition. Ejection fraction is visually estimated at 55-60%. There is doppler evidence of stage II diastolic dysfunction. Mild left ventricular concentric hypertrophy noted. The left atrium is moderately dilated. The right ventricle is not completely visualized but appears at least mildly dilated. The right ventricle appears mildly hypertrophied.   Mild mitral regurgitation is (she is now agreeable), eliquis 5 mg BID added 2/12/18 prior to [place] 1579 (continue) --> maintaining SR, amiodarone started in 5/2017. 2. Chronic diastolic heart CHF (hospitalized in 7/2017 for acute on chronic CHF exacerbation). 3. CAD s/p MC to mid LAD in 2011 -- no new flow limiting disease on 1024/2013 cardiac catheteriztaion   4. Hypertension  5. Hyperlipidemia  6. Reported ascending aortic aneurysm -- no aneurysm noted on 7/12/17 CTA chest  7. Hypothyroidism: on replacement therapy  8. TARIK - AHI 5 on 12/2014 study (no treatment at this time)  9. Chronic left anterior fascicular block   10. Lumbar stenosis s/p prior epidural injection s      Past Surgical History:    Past Surgical History:   Procedure Laterality Date    ARTERY BIOPSY  2008    temporal   [facility],  BONE SPURS    BREAST SURGERY Right 1993    CANCER    CARDIAC CATHETERIZATION  2010    CARDIOVERSION  02/13/2018    CERVICAL FUSION  1994    c3-4    CHOLECYSTECTOMY  1980's    OPEN    COLON SURGERY  1980's    repair of partial bowel obstruction    CORONARY ANGIOPLASTY WITH STENT PLACEMENT  06/2011    Dr. Sonja Gandara, ESOPHAGUS      EYE SURGERY Bilateral 2004    CATARACTS WITH LENS IMPLANTS    FOOT SURGERY  2010,2011    ct guided injections of feet, toenail removal    HERNIA REPAIR  7855, 1560    UMBILICAL, RIH    HYSTERECTOMY  1960's    TOE SURGERY      Removal of bony outgrowth, right great toe    TRANSESOPHAGEAL ECHOCARDIOGRAM  02/13/2018    WRIST SURGERY Right YRS AGO       Medications Prior to Admit:  Prior to Admission medications    Medication Sig Start Date End Date Taking?  Authorizing Provider   rOPINIRole (REQUIP) 0.5 MG tablet Take 0.75 mg by mouth every evening    Yes Historical Provider, MD   senna (SENOKOT) 8.6 MG tablet Take 1 tablet by mouth every morning   Yes Historical Provider, MD   predniSONE (DELTASONE) 5 MG tablet Take 10 mg by mouth every morning    Yes Historical Provider, MD amiodarone (CORDARONE) 200 MG tablet Take 1 tablet by mouth daily  Patient taking differently: Take 200 mg by mouth every morning  6/8/18  Yes Ankur Asher MD   metoprolol succinate (TOPROL XL) 25 MG extended release tablet Take 1 tablet by mouth 2 times daily  Patient taking differently: Take 50 mg by mouth 2 times daily  6/8/18  Yes Ankur Asher MD   levothyroxine (SYNTHROID) 125 MCG tablet Take 1 tablet by mouth every morning (before breakfast)  Patient taking differently: Take 139 mcg by mouth every morning (before breakfast)  2/22/18  Yes Kerline Simon MD   lactulose (CHRONULAC) 10 GM/15ML solution Take 20 g by mouth nightly as needed   Yes Historical Provider, MD   Lactobacillus (PROBIOTIC ACIDOPHILUS) CAPS Take 1 capsule by mouth nightly    Yes Historical Provider, MD   Simethicone (GAS-X ULTRA STRENGTH) 180 MG CAPS Take 1 capsule by mouth Daily with lunch    Yes Historical Provider, MD   apixaban (ELIQUIS) 5 MG TABS tablet Take 1 tablet by mouth 2 times daily  Patient taking differently: Take 10 mg by mouth 2 times daily  2/14/18  Yes Kerline Simon MD   furosemide (LASIX) 40 MG tablet Take 1 tablet by mouth 2 times daily 2/14/18  Yes Kerline Simon MD   Calcium Carb-Cholecalciferol (CALCIUM-VITAMIN D) 600-400 MG-UNIT TABS Take 1 tablet by mouth Daily with lunch    Yes Historical Provider, MD   ranitidine (ZANTAC) 300 MG tablet TAKE ONE TABLET BY MOUTH EVERY MORNING 3/21/17  Yes Historical Provider, MD   HYDROcodone-acetaminophen (Gretelerevijaya [place]) 7.5-325 MG per tablet Take 1 tablet by mouth every 6 hours as needed  10/21/16  Yes Historical Provider, MD   raloxifene (EVISTA) 60 MG tablet Take 60 mg by mouth every morning  9/29/16  Yes Historical Provider, MD   OXYGEN Inhale 2 L into the lungs nightly Indications: BMS   Yes Historical Provider, MD   magnesium oxide (MAG-OX) 400 (240 MG) MG tablet Take 1 tablet by mouth daily.   Patient taking differently: Take 240 mg by mouth Daily with lunch  1/6/15  Yes Jose Nelson Soha Kumari MD   ferrous sulfate 325 (65 FE) MG tablet Take 325 mg by mouth Daily with lunch    Yes Historical Provider, MD   lisinopril (PRINIVIL;ZESTRIL) 40 MG tablet Take 40 mg by mouth nightly  11/17/14  Yes Historical Provider, MD   rOPINIRole (REQUIP) 0.25 MG tablet Take 0.25 mg by mouth nightly    Yes Historical Provider, MD   Coenzyme Q10 (COQ-10) 200 MG CAPS Take 1 capsule by mouth Daily with lunch    Yes Historical Provider, MD   cyanocobalamin 1000 MCG/ML injection Inject 1,000 mcg into the muscle every 30 days    Yes Historical Provider, MD   lovastatin (MEVACOR) 20 MG tablet Take 40 mg by mouth nightly    Yes Historical Provider, MD   Cholecalciferol (VITAMIN D) 2000 UNITS TABS Take 1 tablet by mouth Daily with lunch    Yes Historical Provider, MD   potassium chloride (KLOR-CON) 10 MEQ CR tablet Take 20 mEq by mouth 2 times daily    Yes Historical Provider, MD       Current Medications:    Current Facility-Administered Medications: sodium chloride flush 0.9 % injection 10 mL, 10 mL, Intravenous, PRN    Allergies:  [place]-d [diphenhydramine hcl]; Benadryl [diphenhydramine]; Phenergan [promethazine hcl]; and Ibuprofen    Social History:    Social History     Socioeconomic History    Marital status:       Spouse name: Not on file    Number of children: Not on file    Years of education: Not on file    Highest education level: Not on file   Occupational History    Occupation: retired-   Social Needs    Financial resource strain: Not on file    Food insecurity:     Worry: Not on file     Inability: Not on file    Transportation needs:     Medical: Not on file     Non-medical: Not on file   Tobacco Use    Smoking status: Never Smoker    Smokeless tobacco: Never Used   Substance and Sexual Activity    Alcohol use: No     Alcohol/week: 0.0 oz    Drug use: No    Sexual activity: Never   Lifestyle    Physical activity:     Days per week: Not on file     Minutes per session: Not on file    · Hematologic/Lymphatic: Denies abnormal bruising or bleeding. Physical Exam:   BP (!) 160/71   Pulse 59   Temp 98.1 °F (36.7 °C)   Resp 16   Ht 5' 2\" (1.575 m)   Wt 198 lb 12.8 oz (90.2 kg)   SpO2 96%   BMI 36.36 kg/m²   CONST:  Well developed, well nourished elderly lady who appears younger than her stated age. Awake, alert and cooperative. HEENT:   Head- Normocephalic, atraumatic   Throat- Oral mucosa pink and moist  Neck-  No stridor. + JVD. No carotid bruits. CHEST: Accessory muscle use and tachypnea with slight activity. RESPIRATORY: Lung sounds - bibasilar crackles, right more than left. CARDIOVASCULAR:     Heart Ausculation- Regular rate and rhythm, grade 2/6 SM heard loudest at RUSB. PV: 2+ pitting bilateral lower extremity edema, right more than left. Feet warm to touch. ABDOMEN: Soft, non-tender to light palpation. Bowel sounds present. No palpable masses no organomegaly; no abdominal bruit  MS: Good muscle strength and tone. No atrophy or abnormal movements. : Deferred  SKIN: Warm and dry   NEURO / PSYCH: Oriented to person, place and time. Speech clear and appropriate. Follows all commands. Pleasant affect     ECG: SR with no acute changes     No intake or output data in the 24 hours ending 03/19/19 1714    Labs:   CBC:   Recent Labs     03/19/19  1126   WBC 7.3   HGB 12.8   HCT 40.7        BMP:   Recent Labs     03/19/19  1126      K 4.1   CO2 29   BUN 17   CREATININE 0.9   LABGLOM 59   CALCIUM 8.8     proBNP:   Recent Labs     03/19/19  1126   PROBNP 1,294*     CARDIAC ENZYMES:  Recent Labs     03/19/19  1126   TROPONINI <0.01     FASTING LIPID PANEL:  Lab Results   Component Value Date    CHOL 136 10/25/2013    HDL 37.0 10/25/2013    LDLCALC 77 10/25/2013    TRIG 108 10/25/2013     LIVER PROFILE:  Recent Labs     03/19/19  1126   AST 21   ALT 19   LABALBU 3.8     CXR 3/19/2019:  Impression:     1.  Stable, enlarged cardiomediastinal silhouette with underlying ekg, nsr with pacs, epic    Sleep apnea       uses cpap    Spinal stenosis      Thoracic ascending aortic aneurysm (HCC)      Thyroid disease       hypothyroidism            Social History               Socioeconomic History    Marital status:        Spouse name: Not on file    Number of children: Not on file    Years of education: Not on file    Highest education level: Not on file   Occupational History    Occupation: retired-   Social Needs    Financial resource strain: Not on file    Food insecurity:       Worry: Not on file       Inability: Not on file    Transportation needs:       Medical: Not on file       Non-medical: Not on file   Tobacco Use    Smoking status: Never Smoker    Smokeless tobacco: Never Used   Substance and Sexual Activity    Alcohol use:  No       Alcohol/week: 0.0 oz    Drug use: No    Sexual activity: Never   Lifestyle    Physical activity:       Days per week: Not on file       Minutes per session: Not on file    Stress: Not on file   Relationships    Social connections:       Talks on phone: Not on file       Gets together: Not on file       Attends Advent service: Not on file       Active member of club or organization: Not on file       Attends meetings of clubs or organizations: Not on file       Relationship status: Not on file    Intimate partner violence:       Fear of current or ex partner: Not on file       Emotionally abused: Not on file       Physically abused: Not on file       Forced sexual activity: Not on file   Other Topics Concern    Not on file   Social History Narrative    Not on file            Family History         Family History   Problem Relation Age of Onset    Brain Cancer Mother      Lung Cancer Brother      Heart Attack Father      Heart Surgery Maternal Aunt      Stroke Maternal Aunt              ROS:   General: No unusual weight gain,  change in exercise tolerance  Skin: No rash or itching  EENT: No vision changes or nosebleeds  Cardiovascular: No orthopnea or paroxysmal nocturnal dyspnea  Respiratory: No cough or hemoptysis  Gastrointestinal: No hematemesis or recent changes in bowel habits  Genitourinary: No hematuria, urgency or frequency  Musculoskeletal: No muscular weakness or joint swelling   Neurologic / Psychiatric: No incoordination or convulsions  Allergic / Immunologic/ Lymphatic / Endocrine: No anemia or bleeding tendency     Physical Exam:   Vitals         Vitals:     03/19/19 1120 03/19/19 1414 03/19/19 1439   BP: (!) 147/95 (!) 175/80 (!) 150/93   Pulse: 72 79 63   Resp: 16 16 16   Temp: 97.9 °F (36.6 °C) 97.6 °F (36.4 °C) 98.1 °F (36.7 °C)   TempSrc: Oral Oral Oral   SpO2: 96% 98% 97%   Weight: 180 lb (81.6 kg)       Height: 5' 2\" (1.575 m)             HEAD & FACE: Normocephalic. Symmetric. EYES: No xanthelasma. Conjunctivae not injected. EARS, NOSE, MOUTH & THROAT: Good dentition. No oral pallor or cyanosis. NECK: No JVD at 30 degrees. No thyromegaly. RESPIRATORY: Few crackles in bases bilaterally. .  No use of accessory muscle or intercostal retractions. CARDIOVASCULAR: Regular rate and rhythm. No lifts or thrills on palpitation. Auscultation with normal S1-S2 in intensity and splitting. Grade 7-4/3 early diastolic murmur noted best at the right upper sternal border as well as a grade 2/6 holosystolic murmur noted best at the apex. No carotid bruits. Abdominal aorta not enlarged. Femoral arteries without bruits. Pedal pulses 1+. 2+ pretibial bilateral edema. ABDOMEN: Soft without hepatic or splenic enlargement. No tenderness. MUSCULOSKELETAL: No kyphosis or scoliosis of the back. Good muscle strength and tone. No muscle atrophy. EXTREMITIES: No clubbing or cyanosis. SKIN: No Xanthomas or ulcerations. NEUROLOGIC: Oriented to time, place and person. Normal mood and affect. LYMPHATIC:  No palpable neck or supraclavicular nodes.   No spleno           EKG: No acute changes     Impression:  1. Acute on chronic diastolic heart failure  2. PAF: currently in SR on Amiodarone  3. Chronic anticoagulation with Eliquis  4. Coronary artery disease            Plan:  1. IV diuretic therapy  2. Monitor for arrhythmias  3. Check 2D echo            Veronica Lupe tejada documentation has been prepared under my direction and personally reviewed by me in its entirety. I discussed patient case in detail with SPENCER and reviewed SPENCER's documentation including HPI, ROS, medical/surgical history, family history, social history, allergies, and medications. I confirm that HPI, Physical Exam, Assessment and Plan are performed in their entirety by me.    Sue Ga MD          Revision History

## 2019-03-19 NOTE — PROGRESS NOTES
Pharmacy Note    Alcides Edwards was ordered Co-Q 10. Per the [address], this medication is non-formulary and not stocked by pharmacy for the reason indicated below. The medication can be reordered at discharge. Thank Erma Zamarripa Pharm. D 3/19/2019 6:36 PM

## 2019-03-20 NOTE — PROGRESS NOTES
Pt hallucinating. Reports that starting Monday, a video program was installed in him and it gives him a written message when he closes his eyes. He is worried it will happen to other patients.   Will notify Dr. Jacoby Cedeno,

## 2019-03-20 NOTE — PROGRESS NOTES
S1-S2 in intensity and splitting. Grade 4-9/9 early diastolic murmur noted best at the right upper sternal border as well as a grade 2/6 holosystolic murmur noted best at the apex. No carotid bruits.  Abdominal aorta not enlarged.  Femoral arteries without bruits.  Pedal pulses 1+. 2+ pretibial bilateral edema.    ABDOMEN: Soft without hepatic or splenic enlargement.  No tenderness.    MUSCULOSKELETAL: No kyphosis or scoliosis of the back.  Good muscle strength and tone.  No muscle atrophy.    EXTREMITIES: No clubbing or cyanosis.    SKIN: No Xanthomas or ulcerations. NEUROLOGIC: Oriented to time, place and person.  Normal mood and affect.    LYMPHATIC:  No palpable neck or supraclavicular nodes.  No spleno              Current Inpatient Medications:   amiodarone  200 mg Oral QAM    apixaban  5 mg Oral BID    vitamin D  2,000 Units Oral Lunch    ferrous sulfate  325 mg Oral Lunch    levothyroxine  137 mcg Oral QAM AC    lisinopril  40 mg Oral Nightly    simvastatin  20 mg Oral Nightly    magnesium oxide  400 mg Oral Daily    metoprolol succinate  25 mg Oral BID    potassium chloride  20 mEq Oral BID WC    predniSONE  10 mg Oral QAM    raloxifene  60 mg Oral QAM    famotidine  40 mg Oral QPM    senna  1 tablet Oral QAM    calcium-vitamin D  1 tablet Oral Lunch    lactobacillus  1 capsule Oral Daily    simethicone  160 mg Oral Lunch    rOPINIRole  0.5 mg Oral Daily    rOPINIRole  0.25 mg Oral Nightly    furosemide  40 mg Intravenous Daily       IV Infusions (if any):      LABS:    CBC:   Recent Labs     03/19/19  1126 03/20/19  0445   WBC 7.3 9.1   HGB 12.8 12.2   HCT 40.7 37.8    181     BMP:   Recent Labs     03/19/19  1126 03/20/19  0445    142   K 4.1 4.0   CO2 29 31*   BUN 17 18   CREATININE 0.9 1.0   LABGLOM 59 52   GLUCOSE 117* 166*     BNP: No results for input(s): BNP in the last 72 hours. PT/INR: No results for input(s): PROTIME, INR in the last 72 hours.   Mag: No results for input(s): MG in the last 72 hours. Phos: No results for input(s): PHOS in the last 72 hours. TSH: No results for input(s): TSH in the last 72 hours. HgA1c: No results found for: LABA1C  No results found for: EAG  Cardiac Enzymes:   Recent Labs     03/19/19  1126   TROPONINI <0.01     Fasting Lipid Panel:   Lab Results   Component Value Date    HDL 37.0 10/25/2013    LDLCALC 77 10/25/2013    TRIG 108 10/25/2013     Liver Profile:   Recent Labs     03/19/19  1126 03/20/19  0445   AST 21 18   ALT 19 17   LABALBU 3.8 3.6       Diagnostics:      ECHO: EF 55%      Telemetry: Sinus    ASSESSMENT:   1. Acute on chronic diastolic heart failure  2. PAF: currently in SR on Amiodarone  3. Chronic anticoagulation with Eliquis  4. Coronary artery disease     PLAN:  1. IV diuretic therapy  2.  Continue present other cardiac meds      Electronically signed by Geoffrey Monique MD on 3/20/2019 at 9:31 AM

## 2019-03-20 NOTE — PROGRESS NOTES
I provided Shari Rico with the Heart Failure book, the HF Zones, and the Sodium Content pamphlet. We reviewed the HF zones, signs and symptoms to report on day 1 of onset, medication compliance, daily weights, low sodium diet (label reading)   I advised patient you can reduce the risk for heart failure exacerbations by modifying/controlling the risk factors they have. We discussed self-managed care which includes the following: to take medications as prescribed- to call the doctor with any side effects do not just stop taking the medication, follow a cardiac heart healthy / low sodium diet, do daily weights, exercise regularly- per doctor recommendation and not to smoke. I stressed the importance of informing the cardiologist the first day of onset of signs and symptoms in the \"Yellow Zone\" of the HF Zones. Verbalizes understanding     She said she was gaining a lb for \"many days in a row\" she was unaware of the HF zones and didn't know she could call the doctors for that    Echocardiogram / EF: 3/19/2019   Summary   Left ventricular size is grossly normal.   Mild left ventricular concentric hypertrophy noted.   No regional wall motion abnormalities seen.  Marion Factor is doppler evidence of stage II diastolic dysfunction.   Moderate mitral regurgitation is present.   Mild-to-moderate aortic regurgitation is noted.       Lab Results   Component Value Date     (H) 04/18/2011      Lab Results   Component Value Date    PROBNP 1,294 (H) 03/19/2019        History of:  Thyroid disease, sleep apnea, pneumonia, MVP, HLD, CAD, afib, CHF     Medications:    amiodarone  200 mg Oral QAM    apixaban  5 mg Oral BID    vitamin D  2,000 Units Oral Lunch    ferrous sulfate  325 mg Oral Lunch    levothyroxine  137 mcg Oral QAM AC    lisinopril  40 mg Oral Nightly    simvastatin  20 mg Oral Nightly    magnesium oxide  400 mg Oral Daily    metoprolol succinate  25 mg Oral BID    potassium chloride  20 mEq Oral BID WC  predniSONE  10 mg Oral QAM    raloxifene  60 mg Oral QAM    famotidine  40 mg Oral QPM    senna  1 tablet Oral QAM    calcium-vitamin D  1 tablet Oral Lunch    lactobacillus  1 capsule Oral Daily    simethicone  160 mg Oral Lunch    rOPINIRole  0.5 mg Oral Daily    rOPINIRole  0.25 mg Oral Nightly    furosemide  40 mg Intravenous Daily       ALPRAZolam, sodium chloride flush, HYDROcodone-acetaminophen    Code Status: Prior     The patient is ordered:  Diet (sodium restriction): cardiac low sodium 2gm  Sodium/fluid restriction daily ordered (fluid restriction recommended if patient is hyponatremic and/or diuretic is initiated or increased): No  FR: not ordered  Daily Weights:   Patient Vitals for the past 96 hrs (Last 3 readings):   Weight   03/20/19 0447 190 lb 9.6 oz (86.5 kg)   03/19/19 1644 198 lb 12.8 oz (90.2 kg)   03/19/19 1120 180 lb (81.6 kg)     I/O:     Intake/Output Summary (Last 24 hours) at 3/20/2019 1050  Last data filed at 3/20/2019 0808  Gross per 24 hour   Intake 420 ml   Output 1300 ml   Net -880 ml        I provided the patient with the following:   The Heart Failure Book, \"Caring for Your Heart: Living Well with Heart Failure\"    The Heart Failure Zones    Sodium-Free Flavoring Tips    Low-Sodium Nutrition Therapy    Low salt cookbook    scale   ?   The Heart Failure Booklet was given to the patient, which addresses:   Signs and symptoms of HF to report    Home Self Management- activity, weight tracking, taking medications as prescribed, meals/diet planning (sodium and fluid restriction), how to read food labels, keeping physician follow ups, smoking cessation, follow the Heart Failure Zones      Instructed to call 911 if you have any of the following symptoms: ?   Struggling to breathe unrelieved with rest,    Having chest pain, confusion or can't think clearly    Have confusion or cant think clearly  Discharge Plan: I placed HF Home Instructions in the discharge instructions. Readmission Risk Score: 16       Reminder: Discharge Instructions: activity, daily weights, diet, S/S, discharge medications & when to call the doctor.     Care plan and education added     Electronically signed by Orlando Muhammad RN on 3/20/2019 at 10:50 AM

## 2019-03-20 NOTE — PROGRESS NOTES
Family reporting dizziness when pt got up to use restroom. Pt sitting in chair in bathroom for hygiene and BP sitting and standing checked. No change. Pt getting MARSH, however SaO2 96-98%. Will continue to monitor.

## 2019-03-20 NOTE — H&P
History and Physical    CHIEF COMPLAINT: Dyspnea    HISTORY OF PRESENT ILLNESS:    The patient is a 80 y.o. female patient who presented to the ER by EMS with dyspnea. She was at her eye doctors office when she became dyspneic. She then drove herself home and called her daughter. She was having such dyspnea that she called EMS. In the ER she was found to be hypoxic. She was therefore admitted for further evaluation and treatment. Currently feels well after IV Lasix. Patient denies headache, change of vision, chest pain, palpitations, syncope, lower extremity edema, and/or any neurological signs or symptoms. Past Medical History:    Past Medical History:   Diagnosis Date    A-fib ([address]     AC (acromioclavicular) joint bone spurs     in feet    Acid reflux     Ascending aortic aneurysm (HCC)     4.0 cm,(SEE LETTERS DR. PENALOZA) IS BEING MONITORED    Atrial fibrillation ([address]     CAD (coronary artery disease)     Cancer ([address] 16 YRS AGO    breast RIGHT    CHF (congestive heart failure) (HCC)     Chronic back pain     stenosis, receives epidurals bi-monthly    DJD (degenerative joint disease)     Hyperlipidemia     Hypertension     Hypothyroidism     Iron deficiency anemia     Lung nodule     RIGHT LOWER LOBE, BEING MONITORED BY DR. Levi Stoner, LAST CT EPIC 3/2014    MVP (mitral valve prolapse)     Osteoarthritis     Osteoporosis     Partial bowel obstruction ([address]     Pneumonia 2008    Polymyalgia rheumatica ([address]     Scoliosis     Sinus arrhythmia     1/2014 last ekg, nsr with pacs, epic    Sleep apnea     uses cpap    Spinal stenosis     Thoracic ascending aortic aneurysm ([address]     Thyroid disease     hypothyroidism     Past Surgical History:    Past Surgical History:   Procedure Laterality Date    ARTERY BIOPSY  2008    temporal    [address],  [place] [id number] [facility]  2010    CARDIOVERSION  02/13/2018    CERVICAL FUSION  1994    c3-4    CHOLECYSTECTOMY  1980's    OPEN    COLON SURGERY  1980's    repair of partial bowel obstruction    CORONARY ANGIOPLASTY WITH STENT PLACEMENT  06/2011    Dr. Norma Short, ESOPHAGUS      EYE SURGERY Bilateral 2004    CATARACTS WITH LENS IMPLANTS    FOOT SURGERY  2010,2011    ct guided injections of feet, toenail removal    HERNIA REPAIR  13[address]    UMBILICAL, RIH    HYSTERECTOMY  1960's    TOE SURGERY      Removal of bony outgrowth, right great toe    TRANSESOPHAGEAL ECHOCARDIOGRAM  02/13/2018    WRIST SURGERY Right YRS AGO     Medications Prior to Admission:    Medications Prior to Admission: rOPINIRole (REQUIP) 0.5 MG tablet, Take 0.75 mg by mouth every evening   senna (SENOKOT) 8.6 MG tablet, Take 1 tablet by mouth every morning  predniSONE (DELTASONE) 5 MG tablet, Take 10 mg by mouth every morning   amiodarone (CORDARONE) 200 MG tablet, Take 1 tablet by mouth daily (Patient taking differently: Take 200 mg by mouth every morning )  metoprolol succinate (TOPROL XL) 25 MG extended release tablet, Take 1 tablet by mouth 2 times daily (Patient taking differently: Take 50 mg by mouth 2 times daily )  levothyroxine (SYNTHROID) 125 MCG tablet, Take 1 tablet by mouth every morning (before breakfast) (Patient taking differently: Take 137 mcg by mouth every morning (before breakfast) )  lactulose (CHRONULAC) 10 GM/15ML solution, Take 20 g by mouth nightly as needed  Lactobacillus (PROBIOTIC ACIDOPHILUS) CAPS, Take 1 capsule by mouth nightly   Simethicone (GAS-X ULTRA STRENGTH) 180 MG CAPS, Take 1 capsule by mouth Daily with lunch   apixaban (ELIQUIS) 5 MG TABS tablet, Take 1 tablet by mouth 2 times daily  furosemide (LASIX) 40 MG tablet, Take 1 tablet by mouth 2 times daily  Calcium Carb-Cholecalciferol (CALCIUM-VITAMIN D) 600-400 MG-UNIT TABS, Take 1 tablet by mouth Daily with lunch   ranitidine (ZANTAC) 300 MG tablet, TAKE ONE TABLET BY MOUTH EVERY and nasal mucosa normal in appearance. Oropharynx is clear. MMM. Neck:  Supple without LAD, thyromegaly, or bruits. Heart:  RRR without murmurs, gallops, rubs  Lungs:  CTA bilaterally without wheezes, rales, or rhonchi. Symmetrical expansion. Abdomen: Soft, NT/ND, no masses, or organomegaly. Bowel sounds present and normoactive. Extremities:  No clubbing, cyanosis, or edema. No tenderness to palpation. Skin:  Warm and dry, no open lesions or rash  Neuro:  Cranial nerves 2-12 intact, no focal deficits  Breast: deferred  Rectal: deferred  Genitalia:  deferred    LABS:  As per chart and reviewed    ASSESSMENT:    1. Acute on chronic diastolic CHF   2. Atrial fibrillation  3. CAD  4. HTN  5. Hypothyroidism   6. Hyperlipidemia  7. Lung nodule - follows with pulmonary as outpatient. 8. LDD with radiculopathy  9. TARIK on CPAP  10. H/o breast cancer   11. Polymyalgia rheumatica    PLAN:  1. Admit monitor bed. 2. Consult cardiology. 3. See orders. 4. Home when stable.     Juarez Herzog  7:18 AM  3/20/2019

## 2019-03-21 NOTE — PROGRESS NOTES
CARDIOLOGY  INPATIENT PROGRESS NOTE       Date:  3/21/2019    Patient: Yoni Baez, 80 y. o., 12/28/1927  Admission:  3/19/2019 11:06 AM,  LOS: 2 days   Admit DX: CHF (congestive heart failure), NYHA class I, acute on chronic, combined (HCC) [I50.43]  CHF (congestive heart failure), NYHA class I, acute on chronic, combined (HCC) [I50.43]  CHF (congestive heart failure), NYHA class I, acute on chronic, combined ([address] [I50.43]      Chief Complaint:  Patient is being seen in follow up for:  1. Acute on chronic diastolic heart failure  2. PAF: currently in SR on Amiodarone  3. Chronic anticoagulation with Eliquis  4. Coronary artery disease     SUBJECTIVE:    Yoni Baez was seen and examined. Notes and labs reviewed. No CP notes SOB walking from bathroom today however. There were not complications over night. ROS:   Cardiac: As per HPI   General: No fever, chills   Pulmonary: As per HPI   HEENT: No diplopia, dysphagia, epistaxis   GI: No nausea, vomiting, abdominal pain, hematochezia or melena  : No dysuria or hematuria   Endocrine: no polydipsia, polyuria  Musculoskeletal: VILLEGAS x 4, no arthritis   Skin: no rash or pruritis  Neuro/Psych: No headache or focal motor/sensory deficits    OBJECTIVE:        Intake/Output Summary (Last 24 hours) at 3/21/2019 0855  Last data filed at 3/21/2019 0836  Gross per 24 hour   Intake 430 ml   Output 1800 ml   Net -1370 ml       PHYSICAL EXAM: /70   Pulse 66   Temp 97.6 °F (36.4 °C) (Oral)   Resp 18   Ht 5' 2\" (1.575 m)   Wt 189 lb 4.8 oz (85.9 kg)   SpO2 99%   BMI 34.62 kg/m²       HEAD & FACE: Normocephalic. Symmetric. EYES: No xanthelasma.  Conjunctivae not injected. EARS, NOSE, MOUTH & THROAT: Good dentition.  No oral pallor or cyanosis.    NECK: No JVD at 30 degrees.  No thyromegaly. RESPIRATORY: Few crackles in bases bilaterally. Anais Mattes use of accessory muscle or intercostal retractions.    CARDIOVASCULAR: Regular rate and rhythm.  No lifts or PROTIME, INR in the last 72 hours. Mag: No results for input(s): MG in the last 72 hours. Phos: No results for input(s): PHOS in the last 72 hours. TSH: No results for input(s): TSH in the last 72 hours. HgA1c: No results found for: LABA1C  No results found for: EAG  Cardiac Enzymes:   Recent Labs     03/19/19  1126   TROPONINI <0.01     Fasting Lipid Panel:   Lab Results   Component Value Date    HDL 37.0 10/25/2013    LDLCALC 77 10/25/2013    TRIG 108 10/25/2013     Liver Profile:   Recent Labs     03/20/19  0445 03/21/19  0450   AST 18 15   ALT 17 14   LABALBU 3.6 3.6       Diagnostics:      ECHO: EF 55%      Telemetry: Sinus    ASSESSMENT:   1. Acute on chronic diastolic heart failure  2. PAF: currently in SR on Amiodarone  3. Chronic anticoagulation with Eliquis  4. Coronary artery disease     PLAN:  1. IV diuretic therapy  2.  Continue present other cardiac meds      Electronically signed by Ann Reid MD on 3/21/2019 at 8:55 AM

## 2019-03-21 NOTE — PROGRESS NOTES
[facility] Quality Flow/Interdisciplinary Rounds Progress Note        Quality Flow Rounds held on March 21, 2019    Disciplines Attending:  Bedside Nurse, ,  and Nursing Unit Leadership    Darrian Anderson was admitted on 3/19/2019 11:06 AM    Anticipated Discharge Date:  Expected Discharge Date: 03/22/19    Disposition:    David Score:  David Scale Score: 20    Readmission Risk              Risk of Unplanned Readmission:        19           Discussed patient goal for the day, patient clinical progression, and barriers to discharge. The following Goal(s) of the Day/Commitment(s) have been identified:  Wean IV Lasix to PO, check Cardiology plan.       Itzel Bray  March 21, 2019

## 2019-03-21 NOTE — PROGRESS NOTES
Family Medicine    Subjective: The patient is feeling OK this AM. Had dyspnea when she walked to the bathroom earlier. No chest pain. Dyspnea resolved with rest.    Objective:  BP (!) 140/69   Pulse 68   Temp 97.8 °F (36.6 °C) (Oral)   Resp 18   Ht 5' 2\" (1.575 m)   Wt 189 lb 4.8 oz (85.9 kg)   SpO2 96%   BMI 34.62 kg/m²     HEENT: MMM. Heart:  RRR. Lungs:  CTA bilaterally. Abd: bowel sounds present, nontender, nondistended, no masses. Extrem:  No clubbing or cyanosis. Trace  edema. Neuro: Intact without deficits. CBC with Differential:    Lab Results   Component Value Date    WBC 9.1 03/21/2019    RBC 3.78 03/21/2019    HGB 12.5 03/21/2019    HCT 38.2 03/21/2019     03/21/2019    .1 03/21/2019    MCH 33.1 03/21/2019    MCHC 32.7 03/21/2019    RDW 13.1 03/21/2019    SEGSPCT 54 11/21/2013    LYMPHOPCT 16.1 03/19/2019    MONOPCT 5.4 03/19/2019    BASOPCT 0.4 03/19/2019    MONOSABS 0.39 03/19/2019    LYMPHSABS 1.17 03/19/2019    EOSABS 0.01 03/19/2019    BASOSABS 0.03 03/19/2019     CMP:    Lab Results   Component Value Date     03/21/2019    K 3.9 03/21/2019     03/21/2019    CO2 31 03/21/2019    BUN 24 03/21/2019    CREATININE 1.1 03/21/2019    GFRAA 56 03/21/2019    LABGLOM 47 03/21/2019    GLUCOSE 99 03/21/2019    GLUCOSE 106 10/04/2011    PROT 5.9 03/21/2019    LABALBU 3.6 03/21/2019    LABALBU 3.5 07/07/2011    CALCIUM 8.7 03/21/2019    BILITOT 0.8 03/21/2019    ALKPHOS 53 03/21/2019    AST 15 03/21/2019    ALT 14 03/21/2019     Assessment/Plan:  1. Acute on chronic diastolic CHF - On IV Lasix. Cardiology on case. 2D Ech unchanged. - 2.2L. Had dyspnea this AM but overall appears to be improved. 2. Atrial fibrillation - currently NSR. Anticoagulated on Eliquis. 3. CAD - stable. 4. HTN - stable. 5. Hypothyroidism - Synthroid. 6. Hyperlipidemia - statin. 7. Lung nodule - follows with pulmonary as outpatient. Stable. 8. LDD with radiculopathy - stable.   9. TARIK on CPAP - stable. 10. H/o breast cancer - stable. 11. Disposition - As above. Continue same.     Carolina Koroma  8:03 AM  3/21/2019

## 2019-03-21 NOTE — PLAN OF CARE
Problem: Falls - Risk of:  Goal: Will remain free from falls  Description  Will remain free from falls  3/21/2019 0938 by Rajinder Powell RN  Outcome: Ongoing  3/21/2019 0100 by Mukesh Vivas RN  Outcome: Met This Shift  Goal: Absence of physical injury  Description  Absence of physical injury  3/21/2019 0938 by Rajinder Powell RN  Outcome: Ongoing  3/21/2019 0100 by Mukesh Vivas RN  Outcome: Met This Shift

## 2019-03-21 NOTE — CARE COORDINATION
Met w/ patient. Explained role of . Lives alone in apartment- 7 steps to entrance. Has [employer], cane. Uses O2 2l NC at HS thru [facility]- currently using O2 2lNC continuously. Hx [facility]. No [facility]. PCP is Dr. Huang Frost and pharmacy is [facility]. Currently on iv diuretic, oral steroid. Patient states discharge plan will be to return home.   Will continue to follow for discharge needs Lenin Goddard

## 2019-03-22 NOTE — PROGRESS NOTES
CARDIOLOGY  INPATIENT PROGRESS NOTE       Date:  3/22/2019    Patient: Nai Casillas, 80 y. o., 12/28/1927  Admission:  3/19/2019 11:06 AM,  LOS: 3 days   Admit DX: CHF (congestive heart failure), NYHA class I, acute on chronic, combined (HCC) [I50.43]  CHF (congestive heart failure), NYHA class I, acute on chronic, combined (HCC) [I50.43]  CHF (congestive heart failure), NYHA class I, acute on chronic, combined ([address] [I50.43]      Chief Complaint:  Patient is being seen in follow up for:  1. Acute on chronic diastolic heart failure  2. PAF: currently in SR on Amiodarone  3. Chronic anticoagulation with Eliquis  4. Coronary artery disease     SUBJECTIVE:    Nai Casillas was seen and examined. Notes and labs reviewed. No CP No SOB    There were not complications over night. ROS:   Cardiac: As per HPI   General: No fever, chills   Pulmonary: As per HPI   HEENT: No diplopia, dysphagia, epistaxis   GI: No nausea, vomiting, abdominal pain, hematochezia or melena  : No dysuria or hematuria   Endocrine: no polydipsia, polyuria  Musculoskeletal: VILLEGAS x 4, no arthritis   Skin: no rash or pruritis  Neuro/Psych: No headache or focal motor/sensory deficits    OBJECTIVE:        Intake/Output Summary (Last 24 hours) at 3/22/2019 0906  Last data filed at 3/21/2019 2030  Gross per 24 hour   Intake 480 ml   Output 550 ml   Net -70 ml       PHYSICAL EXAM: /60   Pulse 60   Temp 97.6 °F (36.4 °C) (Oral)   Resp 18   Ht 5' 2\" (1.575 m)   Wt 182 lb 4.8 oz (82.7 kg)   SpO2 95%   BMI 33.34 kg/m²       HEAD & FACE: Normocephalic. Symmetric. EYES: No xanthelasma.  Conjunctivae not injected. EARS, NOSE, MOUTH & THROAT: Good dentition.  No oral pallor or cyanosis.    NECK: No JVD at 30 degrees.  No thyromegaly. RESPIRATORY: Clear.  No use of accessory muscle or intercostal retractions.    CARDIOVASCULAR: Regular rate and rhythm.  No lifts or thrills on palpitation.  Auscultation with normal S1-S2 in intensity and splitting. Grade 1-3/2 early diastolic murmur noted best at the right upper sternal border as well as a grade 2/6 holosystolic murmur noted best at the apex. No carotid bruits.  Abdominal aorta not enlarged.  Femoral arteries without bruits.  Pedal pulses 1+. 1+ pretibial bilateral edema.    ABDOMEN: Soft without hepatic or splenic enlargement.  No tenderness.    MUSCULOSKELETAL: No kyphosis or scoliosis of the back.  Good muscle strength and tone.  No muscle atrophy.    EXTREMITIES: No clubbing or cyanosis.    SKIN: No Xanthomas or ulcerations. NEUROLOGIC: Oriented to time, place and person.  Normal mood and affect.    LYMPHATIC:  No palpable neck or supraclavicular nodes.  No spleno              Current Inpatient Medications:   amiodarone  200 mg Oral QAM    apixaban  5 mg Oral BID    vitamin D  2,000 Units Oral Lunch    ferrous sulfate  325 mg Oral Lunch    levothyroxine  137 mcg Oral QAM AC    lisinopril  40 mg Oral Nightly    simvastatin  20 mg Oral Nightly    magnesium oxide  400 mg Oral Daily    metoprolol succinate  25 mg Oral BID    potassium chloride  20 mEq Oral BID WC    predniSONE  10 mg Oral QAM    raloxifene  60 mg Oral QAM    famotidine  40 mg Oral QPM    senna  1 tablet Oral QAM    calcium-vitamin D  1 tablet Oral Lunch    lactobacillus  1 capsule Oral Daily    simethicone  160 mg Oral Lunch    rOPINIRole  0.5 mg Oral Daily    rOPINIRole  0.25 mg Oral Nightly    furosemide  40 mg Intravenous Daily       IV Infusions (if any):      LABS:    CBC:   Recent Labs     03/21/19  0450 03/22/19  0314   WBC 9.1 7.8   HGB 12.5 13.0   HCT 38.2 42.1    163     BMP:   Recent Labs     03/21/19  0450 03/22/19  0314    140   K 3.9 3.9   CO2 31* 28   BUN 24* 19   CREATININE 1.1* 1.0   LABGLOM 47 52   GLUCOSE 99 83     BNP: No results for input(s): BNP in the last 72 hours. PT/INR: No results for input(s): PROTIME, INR in the last 72 hours.   Mag: No results for input(s): MG in the

## 2019-03-22 NOTE — PROGRESS NOTES
Family Medicine    Subjective: The patient is feeling well this AM. Dyspnea has resolved. No complaints. Objective:  /62   Pulse 64   Temp 98.3 °F (36.8 °C) (Oral)   Resp 18   Ht 5' 2\" (1.575 m)   Wt 182 lb 4.8 oz (82.7 kg)   SpO2 95%   BMI 33.34 kg/m²     HEENT: MMM. Heart:  RRR. Lungs:  CTA bilaterally. Abd: bowel sounds present, nontender, nondistended, no masses. Extrem:  No clubbing or cyanosis. Trace  edema. Neuro: Intact without deficits. CBC with Differential:    Lab Results   Component Value Date    WBC 7.8 03/22/2019    RBC 4.04 03/22/2019    HGB 13.0 03/22/2019    HCT 42.1 03/22/2019     03/22/2019    .2 03/22/2019    MCH 32.2 03/22/2019    MCHC 30.9 03/22/2019    RDW 12.8 03/22/2019    SEGSPCT 54 11/21/2013    LYMPHOPCT 16.1 03/19/2019    MONOPCT 5.4 03/19/2019    BASOPCT 0.4 03/19/2019    MONOSABS 0.39 03/19/2019    LYMPHSABS 1.17 03/19/2019    EOSABS 0.01 03/19/2019    BASOSABS 0.03 03/19/2019     CMP:    Lab Results   Component Value Date     03/22/2019    K 3.9 03/22/2019     03/22/2019    CO2 28 03/22/2019    BUN 19 03/22/2019    CREATININE 1.0 03/22/2019    GFRAA >60 03/22/2019    LABGLOM 52 03/22/2019    GLUCOSE 83 03/22/2019    GLUCOSE 106 10/04/2011    PROT 6.0 03/22/2019    LABALBU 3.6 03/22/2019    LABALBU 3.5 07/07/2011    CALCIUM 8.9 03/22/2019    BILITOT 0.9 03/22/2019    ALKPHOS 52 03/22/2019    AST 15 03/22/2019    ALT 15 03/22/2019     Assessment/Plan:  1. Acute on chronic diastolic CHF - On IV Lasix. Cardiology on case. 2D Echo unchanged. - 4L. No further dyspnea. 2. Atrial fibrillation - currently NSR. Anticoagulated on Eliquis. 3. CAD - stable. 4. HTN - stable. 5. Hypothyroidism - Synthroid. 6. Hyperlipidemia - statin. 7. Lung nodule - follows with pulmonary as outpatient. Stable. 8. LDD with radiculopathy - stable. 9. TARIK on CPAP - stable. 10. H/o breast cancer - stable. 11. Disposition - As above.  Switch to PO Lasix and discharge to home.     Tracey Dickson  8:22 AM  3/22/2019

## 2019-03-28 NOTE — CARE COORDINATION
3/28/2019 Introduced myself to patient and family and discussed admission with daughter who is a practicing NP. The patient was recently discharged for observation for CHF on 3/22/2019. She apparently had a fall at home on this visit. She continues to drive to her appointments. She lives alone in an apartment with frequent family support. She sees Dr Bubba Franco as her PCP.  (PS)

## 2019-03-28 NOTE — ED NOTES
Presents to the ED with family for evaluation of left lower arm and right knee pain after a mechanical fall this morning. States she tripped and fell forward, but her left arm broke her fall. Pain is mostly in lower left forearm just before her wrist but her wrist is hurting as well. Obvious edema to left forearm area where her pain is  and ecchymosis noted to left hand from previous blood draws. No obvious deformity, edema, or ecchymosis noted to right knee. Does relay she had issues with this knee prior to falling. Denies hitting her head or LOC. Is on a blood thinner. Is A&O x4. Family is at the bedside and relays she has had cold symptoms since being discharged form the hospital and has been feeling weaker since going home. Was placed on doxycycline for pharyngitis/ URI symptoms but has not had any significant in improvement of feeling weak. Is in no obvious distress. Family updated on plan of care. Side rails x 2 and call light within reach. Will monitor.       Mara Madrid RN  03/28/19 [id number]

## 2019-03-28 NOTE — ED PROVIDER NOTES
Department of Emergency Medicine   ED  Provider Note  Admit Date/RoomTime: 3/28/2019 10:20 AM  ED Room: 23/23          History of Present Illness:  3/28/19, Time: 10:29 AM         Mone Hansen is a 80 y.o. female presenting to the ED for a fall, beginning earlier same day. The complaint has been intermittent, moderate in severity, and worsened by nothing. Pt says that she was getting out of bed and tripped on her foot causing her to fall. No LOC. She did not hit her head. Pt only c/o R knee pain and L forearm pain. Pt denies GI symptoms, abdominal pain, headache, dizziness, changes in vision, SOB, chest pain, cold symptoms, back pain, neck pain, extremity pain, hip pain, weakness, numbness, tingling or any other symptoms at this time. Review of Systems:   Pertinent positives and negatives are stated within HPI, all other systems reviewed and are negative.      --------------------------------------------- PAST HISTORY ---------------------------------------------  Past Medical History:  has a past medical history of A-fib ([address] AC (acromioclavicular) joint bone spurs, Acid reflux, Ascending aortic aneurysm ([address] Atrial fibrillation ([address] CAD (coronary artery disease), Cancer ([address] CHF (congestive heart failure) ([address] Chronic back pain, DJD (degenerative joint disease), Hyperlipidemia, Hypertension, Hypothyroidism, Iron deficiency anemia, Lung nodule, MVP (mitral valve prolapse), Osteoarthritis, Osteoporosis, Partial bowel obstruction ([address] Pneumonia, Polymyalgia rheumatica ([address] Scoliosis, Sinus arrhythmia, Sleep apnea, Spinal stenosis, Thoracic ascending aortic aneurysm ([address] and Thyroid disease. Past Surgical History:  has a past surgical history that includes Artery Biopsy (2008); cervical fusion (1994); Toe Surgery; Wrist surgery (Right, YRS AGO); Foot surgery (2010,2011); Hysterectomy (1960's); Colon surgery (1980's); Cardiac catheterization (2010);  Coronary angioplasty with stent (06/2011); eye surgery (Bilateral, 2004); hernia repair (1996, 1998); Cholecystectomy (1980's); back surgery (1996); Dilatation, esophagus; transesophageal echocardiogram (02/13/2018); Cardioversion (02/13/2018); and Breast surgery (Right, 1993). Social History:  reports that she has never smoked. She has never used smokeless tobacco. She reports that she does not drink alcohol or use drugs. Family History: family history includes Brain Cancer in her mother; Heart Attack in her father; Heart Surgery in her maternal aunt; Lung Cancer in her brother; Stroke in her maternal aunt. The patients home medications have been reviewed. Allergies: Judit-d [diphenhydramine hcl]; Benadryl [diphenhydramine]; Phenergan [promethazine hcl]; and Ibuprofen      ---------------------------------------------------PHYSICAL EXAM--------------------------------------  Vital signs noted, please see nurses notes. General: Well-developed, well-nourished patient lying in bed who appears non-toxic. Head: Atraumatic, normocephalic. Eyes: Sclera anicteric. ENT: Mucous membranes moist.  Heart: Regular rate and rhythm. Lungs: Clear to auscultation bilaterally. Abdomen: Soft, non-tender, non-distended, no guarding or peritoneal signs. Neurologic: Awake and alert, normal speech and mental status. Moves all extremities equally well. No focal deficits or lateralizing signs. Psychiatric: Mood and affect appropriate. Skin: Warm and dry, no appreciable rash. Musculoskeletal: No peripheral edema. No signs of DVT. Minor swelling to L forearm. No hip tenderness. No appreciable deformities or bruising to R knee. No bruising to back. -------------------------------------------------- RESULTS -------------------------------------------------  I have personally reviewed all laboratory and imaging results for this patient. Results are listed below. LABS:  No results found for this visit on 03/28/19.     RADIOLOGY:  Interpreted by Radiologist.  XR RADIUS ULNA LEFT (2 VIEWS)   Final Result   No acute osseous findings. Moderate osteoarthritis of the wrist and first carpometacarpal joint. XR WRIST LEFT (MIN 3 VIEWS)   Final Result   No acute osseous findings. Moderate osteoarthritis of the wrist and first carpometacarpal joint. XR FEMUR RIGHT (MIN 2 VIEWS)   Final Result   No acute osseous findings. Severe osteoarthritis of the right knee. Moderate osteoarthritis right hip. XR KNEE RIGHT (MIN 4 VIEWS)   Final Result   No acute osseous findings. Severe osteoarthritis of the right knee. Moderate osteoarthritis right hip.                ------------------------- NURSING NOTES AND VITALS REVIEWED ---------------------------   The nursing notes within the ED encounter and vital signs as below have been reviewed by myself. BP (!) 140/61   Pulse 61   Temp 98.1 °F (36.7 °C) (Oral)   Resp 16   SpO2 96%   Oxygen Saturation Interpretation: Normal    The patients available past medical records and past encounters were reviewed. ------------------------------ ED COURSE/MEDICAL DECISION MAKING----------------------  Medications   acetaminophen (TYLENOL) tablet 1,000 mg (1,000 mg Oral Given 3/28/19 1110)       Medical Decision Making:    History unremarkable patient be discharged follow with primary care physician. Patient given warning signs for when to return. Patient has a walker and a cane at home. Patient does not want any pain medicine for home. This patient's ED course included: a personal history and physicial examination and re-evaluation prior to disposition    This patient has remained hemodynamically stable during their ED course. Re-Evaluations:           Re-evaluation. Patients symptoms are improving      Counseling:    The emergency provider has spoken with the patient and discussed todays results, in addition to providing specific details for the plan of care and counseling regarding the diagnosis and prognosis. Questions are answered at this time and they are agreeable with the plan.       --------------------------------- IMPRESSION AND DISPOSITION ---------------------------------    IMPRESSION  1. Contusion of right knee, initial encounter    2. Left arm pain    3. Osteoarthritis of multiple joints, unspecified osteoarthritis type        DISPOSITION  Disposition: Discharge to home  Patient condition is good    3/28/19, 10:29 AM.    This note is prepared by Loc Avila, acting as Scribe for Brittaney Gannon DO.    Brittaney Gannon DO:  The scribe's documentation has been prepared under my direction and personally reviewed by me in its entirety. I confirm that the note above accurately reflects all work, treatment, procedures, and medical decision making performed by me.           Brittaney Gannon DO  03/28/19 [id number]

## 2019-04-08 NOTE — PATIENT INSTRUCTIONS
Stop Lasix. Start Bumex 2 mg po daily. Try to maintain low salt diet. We are referring you to CHF clinic. Patient Education        Limiting Sodium and Fluids With Heart Failure: Care Instructions  Your Care Instructions    Sodium causes your body to hold on to extra water. This may cause your heart failure symptoms to get worse. Limiting sodium may help you feel better and lower your risk of having to go to the hospital.  People get most of their sodium from processed foods. Fast food and restaurant meals also tend to be very high in sodium. Your doctor may suggest that you limit sodium to 2,000 milligrams (mg) a day or less. That is less than 1 teaspoon of salt a day, including all the salt you eat in cooked or packaged foods. Usually, you have to limit the amount of liquids you drink only if your heart failure is severe. Limiting sodium alone often is enough to help your body get rid of extra fluids. However, your doctor may tell you to limit your fluid intake to a set amount each day. Follow-up care is a key part of your treatment and safety. Be sure to make and go to all appointments, and call your doctor if you are having problems. It's also a good idea to know your test results and keep a list of the medicines you take. How can you care for yourself at home? Read food labels  · Read food labels on cans and food packages. The labels tell you how much sodium is in each serving. Make sure that you look at the serving size. If you eat more than the serving size, you have eaten more sodium than is listed for one serving. · Food labels also tell you the Percent Daily Value. If the Percent Daily Value says 50%, it means that you will get at least 50% of all the sodium you need for the entire day in one serving. Choose products with low Percent Daily Values for sodium.   · Be aware that sodium can come in forms other than salt, including monosodium glutamate (MSG), sodium citrate, and sodium bicarbonate (baking soda). MSG is often added to Asian food. You can sometimes ask for food without MSG or salt. Buy low-sodium foods  · Buy foods that are labeled \"unsalted\" (no salt added), \"sodium-free\" (less than 5 mg of sodium per serving), or \"low-sodium\" (less than 140 mg of sodium per serving). A food labeled \"light sodium\" has less than half of the full-sodium version of that food. Foods labeled \"reduced-sodium\" may still have too much sodium. · Buy fresh vegetables or plain, frozen vegetables. Buy low-sodium versions of canned vegetables, soups, and other canned goods. Prepare low-sodium meals  · Use less salt each day when cooking. Reducing salt in this way will help you adjust to the taste. Do not add salt after cooking. Take the salt shaker off the table. · Flavor your food with garlic, lemon juice, onion, vinegar, herbs, and spices instead of salt. Do not use soy sauce, steak sauce, onion salt, garlic salt, mustard, or ketchup on your food. · Make your own salad dressings, sauces, and ketchup without adding salt. · Use less salt (or none) when recipes call for it. You can often use half the salt a recipe calls for without losing flavor. Other dishes like rice, pasta, and grains do not need added salt. · Rinse canned vegetables. This removes some--but not all--of the salt. · Avoid water that has a naturally high sodium content or that has been treated with water softeners, which add sodium. Call your local water company to find out the sodium content of your water supply. If you buy bottled water, read the label and choose a sodium-free brand. Avoid high-sodium foods, such as:  · Smoked, cured, salted, and canned meat, fish, and poultry. · Ham, mckee, hot dogs, and luncheon meats. · Regular, hard, and processed cheese and regular peanut butter. · Crackers with salted tops. · Frozen prepared meals.   · Canned and dried soups, broths, and bouillon, unless labeled sodium-free or low-sodium. · Canned vegetables, unless labeled sodium-free or low-sodium. · Salted snack foods such as chips and pretzels. · [employer] fries, pizza, tacos, and other fast foods. · Pickles, olives, ketchup, and other condiments, especially soy sauce, unless labeled sodium-free or low-sodium. If you cannot cook for yourself  · Have family members or friends help you, or have someone cook low-sodium meals. · Check with your local senior nutrition program to find out where meals are served and whether they offer a low-sodium option. You can often find these programs through your local health department or hospital.  · Have meals delivered to your home. Most [facility] have a Meals on [employer]. These programs provide one hot meal a day for older adults, delivered to their homes. Ask whether these meals are low-sodium. Let them know that you are on a low-sodium diet. Limiting fluid intake  · Find a method that works for you. You might simply write down how much you drink every time you do. Some people keep a container filled with the amount of fluid allowed for that day. If they drink from a source other than the container, then they pour out that amount. · Measure your regular drinking glasses to find out how much fluid each one holds. Once you know this, you will not have to measure every time. · Besides water, milk, juices, and other drinks, some foods have a lot of fluid. Count any foods that will melt (such as ice cream or gelatin dessert) or liquid foods (such as soup) as part of your fluid intake for the day. Where can you learn more? Go to https://[employer]. org and sign in to your [email or web address] account. Enter A166 in the [employer] box to learn more about \"Limiting Sodium and Fluids With Heart Failure: Care Instructions. \"     If you do not have an account, please click on the \"Sign Up Now\" link.   Current as of: July 22, 2018  Content Version: 11.9  © 1438-3081 [employer]. Care instructions adapted under license by [facility] ([facility]). If you have questions about a medical condition or this instruction, always ask your healthcare professional. [address] any warranty or liability for your use of this information.

## 2019-04-08 NOTE — PROGRESS NOTES
failure) ([address]     Nonrheumatic aortic valve insufficiency     Non-rheumatic mitral regurgitation     Paroxysmal atrial fibrillation (HCC)     Lung nodule     Atrial fibrillation ([address] 02/15/2018    Essential hypertension     Acute on chronic diastolic congestive heart failure ([address] 07/13/2017     Overview Note:     Updating Deprecated Diagnoses      Mixed hyperlipidemia 12/28/2014    CAD in native artery 12/28/2014    TARIK (obstructive sleep apnea) 08/08/2013    Hypothyroidism 12/23/2010       PAST MEDICAL HISTORY:       Diagnosis Date    A-fib ([address]     AC (acromioclavicular) joint bone spurs     in feet    Acid reflux     Ascending aortic aneurysm (HCC)     4.0 cm,(SEE LETTERS DR. PENALOZA) IS BEING MONITORED    Atrial fibrillation ([address]     CAD (coronary artery disease)     Cancer ([address] 16 YRS AGO    breast RIGHT    CHF (congestive heart failure) (HCC)     Chronic back pain     stenosis, receives epidurals bi-monthly    DJD (degenerative joint disease)     Hyperlipidemia     Hypertension     Hypothyroidism     Iron deficiency anemia     Lung nodule     RIGHT LOWER LOBE, BEING MONITORED BY DR. Jaqui Meracdo, LAST CT EPIC 3/2014    MVP (mitral valve prolapse)     Osteoarthritis     Osteoporosis     Partial bowel obstruction ([address]     Pneumonia 2008    Polymyalgia rheumatica ([address]     Scoliosis     Sinus arrhythmia     1/2014 last ekg, nsr with pacs, epic    Sleep apnea     uses cpap    Spinal stenosis     Thoracic ascending aortic aneurysm ([address]     Thyroid disease     hypothyroidism        SURGICAL HISTORY:   Past Surgical History:   Procedure Laterality Date    ARTERY BIOPSY  2008    temporal   [facility],  BONE SPURS    BREAST SURGERY Right 1993    CANCER    CARDIAC CATHETERIZATION  2010    CARDIOVERSION  02/13/2018    CERVICAL FUSION  1994    c3-4    CHOLECYSTECTOMY  1980's    OPEN    COLON SURGERY  1980's    repair of partial bowel obstruction    CORONARY ANGIOPLASTY WITH STENT PLACEMENT  06/2011    Dr. Jin Marie, ESOPHAGUS      EYE SURGERY Bilateral 2004    CATARACTS WITH LENS IMPLANTS    FOOT SURGERY  7700,4432    ct guided injections of feet, toenail removal    HERNIA REPAIR  7235, 5929    UMBILICAL, RIH    HYSTERECTOMY  1960's    TOE SURGERY      Removal of bony outgrowth, right great toe    TRANSESOPHAGEAL ECHOCARDIOGRAM  02/13/2018    WRIST SURGERY Right YRS AGO        SOCIAL AND OCCUPATIONAL HEALTH HISTORY:    Social History     Socioeconomic History    Marital status:       Spouse name: Not on file    Number of children: Not on file    Years of education: Not on file    Highest education level: Not on file   Occupational History    Occupation: retired-   Social Needs    Financial resource strain: Not on file    Food insecurity:     Worry: Not on file     Inability: Not on file    Transportation needs:     Medical: Not on file     Non-medical: Not on file   Tobacco Use    Smoking status: Never Smoker    Smokeless tobacco: Never Used   Substance and Sexual Activity    Alcohol use: No     Alcohol/week: 0.0 oz    Drug use: No    Sexual activity: Never   Lifestyle    Physical activity:     Days per week: Not on file     Minutes per session: Not on file    Stress: Not on file   Relationships    Social connections:     Talks on phone: Not on file     Gets together: Not on file     Attends [employer] service: Not on file     Active member of club or organization: Not on file     Attends meetings of clubs or organizations: Not on file     Relationship status: Not on file    Intimate partner violence:     Fear of current or ex partner: Not on file     Emotionally abused: Not on file     Physically abused: Not on file     Forced sexual activity: Not on file   Other Topics Concern    Not on file   Social History Narrative    Not on file       MEDICATIONS:   Current Outpatient Medications   Medication Sig Dispense Refill    bumetanide (BUMEX) 2 MG tablet Take 1 tablet by mouth daily 30 tablet 3    rOPINIRole (REQUIP) 0.5 MG tablet Take 0.75 mg by mouth every evening       senna (SENOKOT) 8.6 MG tablet Take 1 tablet by mouth every morning      predniSONE (DELTASONE) 5 MG tablet Take 10 mg by mouth every morning       amiodarone (CORDARONE) 200 MG tablet Take 1 tablet by mouth daily (Patient taking differently: Take 200 mg by mouth every morning ) 30 tablet 11    metoprolol succinate (TOPROL XL) 25 MG extended release tablet Take 1 tablet by mouth 2 times daily (Patient taking differently: Take 50 mg by mouth 2 times daily ) 60 tablet 11    levothyroxine (SYNTHROID) 125 MCG tablet Take 1 tablet by mouth every morning (before breakfast) (Patient taking differently: Take 137 mcg by mouth every morning (before breakfast) ) 30 tablet 3    lactulose (CHRONULAC) 10 GM/15ML solution Take 20 g by mouth nightly as needed      Lactobacillus (PROBIOTIC ACIDOPHILUS) CAPS Take 1 capsule by mouth nightly       Simethicone (GAS-X ULTRA STRENGTH) 180 MG CAPS Take 1 capsule by mouth Daily with lunch       apixaban (ELIQUIS) 5 MG TABS tablet Take 1 tablet by mouth 2 times daily 60 tablet 0    Calcium Carb-Cholecalciferol (CALCIUM-VITAMIN D) 600-400 MG-UNIT TABS Take 1 tablet by mouth Daily with lunch       ranitidine (ZANTAC) 300 MG tablet TAKE ONE TABLET BY MOUTH EVERY MORNING  1    HYDROcodone-acetaminophen (NORCO) 7.5-325 MG per tablet Take 1 tablet by mouth every 6 hours as needed       raloxifene (EVISTA) 60 MG tablet Take 60 mg by mouth every morning       OXYGEN Inhale 2 L into the lungs nightly Indications: BMS      magnesium oxide (MAG-OX) 400 (240 MG) MG tablet Take 1 tablet by mouth daily.  (Patient taking differently: Take 240 mg by mouth Daily with lunch ) 30 tablet 2    ferrous sulfate 325 (65 FE) MG tablet Take 325 mg by mouth Daily with lunch       lisinopril (PRINIVIL;ZESTRIL) 40 MG tablet Take 40 mg by mouth nightly       rOPINIRole (REQUIP) 0.25 MG tablet Take 0.25 mg by mouth nightly       Coenzyme Q10 (COQ-10) 200 MG CAPS Take 1 capsule by mouth Daily with lunch       cyanocobalamin 1000 MCG/ML injection Inject 1,000 mcg into the muscle every 30 days       lovastatin (MEVACOR) 20 MG tablet Take 40 mg by mouth nightly       Cholecalciferol (VITAMIN D) 2000 UNITS TABS Take 1 tablet by mouth Daily with lunch       potassium chloride (KLOR-CON) 10 MEQ CR tablet Take 20 mEq by mouth 2 times daily        No current facility-administered medications for this visit. FAMILY HISTORY: A review of medical events in the patient's family , including disease which may be hereditary or place the patient at risk were reviewed. Family History   Problem Relation Age of Onset    Brain Cancer Mother     Lung Cancer Brother     Heart Attack Father     Heart Surgery Maternal Aunt     Stroke Maternal Aunt           REVIEW OF SYSTEMS:  Cardiac: As per HPI  General: No fever, chills + fatigue  Pulmonary: As per HPI  HEENT: No visual disturbances, difficult swallowing  GI: No nausea, vomiting, abdominal pain  : No dysuria or hematuria  Endocrine: +  thyroid disease denies diabetes  Musculoskeletal: VILLEGAS x 4, no focal motor deficits + leg edema  Skin: Intact, no rashes  Neuro/Psych: No headache or seizures    PHYSICAL EXAMINATION:   Vitals:    04/08/19 1158   BP: 108/72   Pulse: 63   Resp: 12   Weight: 201 lb (91.2 kg)   Height: 5' 2\" (1.575 m)       Constitutional: A well developed, well nourished 80 y.o. female who is alert, oriented, cooperative and in no apparent distress. Skin: Intact, no rash  Head: Normocephalic, atraumatic  Eyes: EOMI, no conjunctival erythema  ENT: No pharyngeal erythema, MMM, no rhinorrhea  Neck: Supple, trace JVP, no carotid bruits  Lungs: diminished BS noted bilaterally. No wheezes, rales, or rhonchi.   Cardiac: Regular rate and rhythm, +S1S2, 1/6 INNA noted  Abdomen: Soft, nontender, +bowel sounds  Extremities: Moves all extremities x 4, L wrist wrapped; R knee brace noted. +1 bilateral lower extremity edema  Neurologic: No focal motor deficits apparent, normal mood and affect, alert and oriented x 3  Peripheral Pulses: Intact posterior tibial pulses bilaterally    DATA:   The data collected below information that was obtained, reviewed, analyzed and interpreted today. Echocardiogram: 8/15/13  Technically difficult study with suboptimal echo windows. Definity contrast was used to improve endomyocardial border definition. Ejection fraction is visually estimated at 55-60%. There is doppler evidence of stage II diastolic dysfunction. Mild left ventricular concentric hypertrophy noted. The left atrium is moderately dilated. The right ventricle is not completely visualized but appears at least mildly dilated. The right ventricle appears mildly hypertrophied. Mild mitral regurgitation is present. There is mild to moderate aortic regurgitation. Mild to moderate tricuspid regurgitation. Estimated right ventricular systolic pressure 30 mmHg assuming a right atrial pressure of 3 mmHg.      Exercise Nuclear Stress Test: 8/15/13  No evidence of stress induced ischemia, EF 83%     Cardiac Catheterization: 10/24/13  a. Status post remote stent of LAD with demonstration of moderate in-stent restenosis today. b. 50% ostial 2nd diagonal branch stenosis. 2. Normal left ventricular systolic function. 3. Aortic root dilatation.      Echocardiogram: 12/31/14 [facility])  Ejection fraction is visually estimated at 60%. No regional wall motion abnormalities seen. Moderate aortic regurgitation is noted. Moderate mitral regurgitation is present.     Echocardiogram: 7/13/17 ([place])   Normal left ventricular systolic function.   Ejection fraction is visually estimated at 60-65%.    Mild concentric left ventricular hypertrophy with proximal septal thickening.   Normal right ventricular size and function (TAPSE 1.8 cm).   There is doppler evidence of stage II diastolic dysfunction.   Severely dilated left atrium by volume index.   Mild mitral regurgitation.   Mild-moderate aortic regurgitation.   Mild tricuspid regurgitation.   PASP is estimated at 38 mmHg.     PEACE: 2/13/18 (Kb Laundry)   Ejection fraction is visually estimated at 55%.   No regional wall motion abnormalities seen.    Right ventricular segmental wall motion is normal.  Mild to moderate mitral regurgitation is present.   Mild to moderate tricuspid regurgitation.   Aortic sclerosis is noted.   Mild to moderate aortic regurgitation is noted. No evidence of thrombus within left atrium. Echocardiogram: 3/19/19  Summary   Left ventricular size is grossly normal.   Mild left ventricular concentric hypertrophy noted.   No regional wall motion abnormalities seen.   There is doppler evidence of stage II diastolic dysfunction.   Moderate mitral regurgitation is present.   Mild-to-moderate aortic regurgitation is noted.       EKG: NSR rate 63, QTc 473, nonspecific st/t wave changes (no acute change from EKG 3/19/19)      ASSESSMENT/PLAN:     1. Persistent atrial fibrillation (diagnosed 12/2014) -- recurrent atrial fibrillation s/p PEACE/CVN on 2/13/18, switched Toprol XL to BID dosing on 2/12/18 (continue), decision made by patient/family in the past to discontinue anticoagulation (she is now agreeable), eliquis 5 mg BID added 2/12/18 prior to [address] (continue) --> maintaining SR, amiodarone started in 5/2017. Maintaining NSR presently  2. Chronic diastolic CHF (hospitalized in 7/2017 and 3/19/19 for acute on chronic CHF exacerbation) - admits to recent diminished UO despite increasing Lasix dosing. 3. Valvular heart disease - moderate AI and MR on 12/31/14 echocardiogram --> results of repeat echocardiogram (7/13/17, 3/19/19) outlined above (stable)  4. CAD s/p MC to mid LAD in 2011 --> no new flow limiting disease on 10/24/13 cardiac catheterization. Continue current medications. Denies any anginal equivalent complaints  5. HTN - controlled presently  6. HLD - on statin  7. Reported ascending aortic aneurysm -- 4 cm (2/20/18 study)  8. Hypothyroidism - on synthroid, normal TSH in 7/2017, TSH 0.216 in 2/2018  9. TARIK - AHI 5 on 12/2014 study (no treatment at this time)  10. Chronic LAFB  11. Recent mechanical fall (3/28/19) resulting in R knee contusion and L wrist fracture    - stop Lasix. Transition to Bumex 2 mg po daily dosing  - referral to CHF clinic placed  - encouraged low sodium diet, monitoring of daily weights, monitoring fluid intake  - monitor lytes, creatinine  - if persistent edema noted, can trial prn Zaroxolyn (hold off for now)  - continue strict compliance with Eliquis 5 mg po BID dosing  - continue Amiodarone  - continue Toprol XL, Lisinopril  - continue statin; low fat/heart healthy diet  - she will return to see Dr. Jeff Nieto in 2-3 weeks or sooner if any issues arise               Thank you for entrusting us to participate in [address] care. If you have any questions, please don't hesitate to call us at [facility].     Sincerely,    [facility]

## 2019-04-09 PROBLEM — I50.30 DIASTOLIC CHF (HCC): Status: ACTIVE | Noted: 2019-01-01

## 2019-04-10 NOTE — ED PROVIDER NOTES
HPI:  4/9/19, Time: 9:25 PM        Cuate Ram is a 80 y.o. female presenting to the ED for dyspnea on exertion, weight gain, hypoxia, beginning 5 days ago. The complaint has been persistent, moderate in severity, and worsened by light exertion. Patient's daughter offers most of the history states that the patient has known history of diastolic CHF but his had increasing weight gain over the past 5-10 days of approximately 14 pounds. Patient also has some increasing leg swelling and edema bilaterally. She is already on anticoagulation with Eliquis. Patient has not had any fever/chills, she's had decreased intake with intermittent nausea but not had any vomiting, no diarrhea, no change in bowel or bladder habit patterns. No coughing nor any colored sputum production nor any hemoptysis. Patient denies any other specific complaints. Symptoms aggravated again by exertion. No relieving factors reported. Review of Systems:   Pertinent positives and negatives are stated within HPI, all other systems reviewed and are negative.    --------------------------------------------- PAST HISTORY ---------------------------------------------  Past Medical History:  has a past medical history of A-fib ([address] AC (acromioclavicular) joint bone spurs, Acid reflux, Ascending aortic aneurysm ([address] Atrial fibrillation ([address] CAD (coronary artery disease), Cancer ([address] CHF (congestive heart failure) ([address] Chronic back pain, DJD (degenerative joint disease), Hyperlipidemia, Hypertension, Hypothyroidism, Iron deficiency anemia, Lung nodule, MVP (mitral valve prolapse), Osteoarthritis, Osteoporosis, Partial bowel obstruction ([address] Pneumonia, Polymyalgia rheumatica ([address] Scoliosis, Sinus arrhythmia, Sleep apnea, Spinal stenosis, Thoracic ascending aortic aneurysm ([address] and Thyroid disease. Past Surgical History:  has a past surgical history that includes Artery Biopsy (2008); cervical fusion (1994); Toe Surgery;  Wrist surgery (Right, YRS AGO); Foot surgery (2010,2011); Hysterectomy (1960's); Colon surgery (1980's); Cardiac catheterization (2010); Coronary angioplasty with stent (06/2011); eye surgery (Bilateral, 2004); hernia repair (1996, 1998); Cholecystectomy (1980's); back surgery (1996); Dilatation, esophagus; transesophageal echocardiogram (02/13/2018); Cardioversion (02/13/2018); and Breast surgery (Right, 1993). Social History:  reports that she has never smoked. She has never used smokeless tobacco. She reports that she does not drink alcohol or use drugs. Family History: family history includes Brain Cancer in her mother; Heart Attack in her father; Heart Surgery in her maternal aunt; Lung Cancer in her brother; Stroke in her maternal aunt. The patients home medications have been reviewed. Allergies: [place]-d [diphenhydramine hcl]; Benadryl [diphenhydramine];  Phenergan [promethazine hcl]; and Ibuprofen    -------------------------------------------------- RESULTS -------------------------------------------------  All laboratory and radiology results have been personally reviewed by myself   LABS:  Results for orders placed or performed during the hospital encounter of 04/09/19   Troponin   Result Value Ref Range    Troponin <0.01 0.00 - 0.03 ng/mL   Brain Natriuretic Peptide   Result Value Ref Range    Pro- (H) 0 - 450 pg/mL   CBC Auto Differential   Result Value Ref Range    WBC 5.3 4.5 - 11.5 E9/L    RBC 3.76 3.50 - 5.50 E12/L    Hemoglobin 12.1 11.5 - 15.5 g/dL    Hematocrit 37.7 34.0 - 48.0 %    .3 (H) 80.0 - 99.9 fL    MCH 32.2 26.0 - 35.0 pg    MCHC 32.1 32.0 - 34.5 %    RDW 13.2 11.5 - 15.0 fL    Platelets 986 525 - 084 E9/L    MPV 10.7 7.0 - 12.0 fL    Neutrophils % 59.1 43.0 - 80.0 %    Immature Granulocytes % 1.1 0.0 - 5.0 %    Lymphocytes % 27.2 20.0 - 42.0 %    Monocytes % 10.3 2.0 - 12.0 %    Eosinophils % 1.3 0.0 - 6.0 %    Basophils % 1.0 0.0 - 2.0 %    Neutrophils # 3.11 1.80 - Affect      ------------------------------ ED COURSE/MEDICAL DECISION MAKING----------------------  Medications   bumetanide (BUMEX) injection 1 mg (1 mg Intravenous Given 4/9/19 0332)         ED COURSE:       Medical Decision Making:   Differential Diagnoses:  CHF Exacerbation, COPD, Pneumonia, Asthma, Acute on Chronic Kidney Injury, Metabolic/Electrolyte Disorder, to name a few. EKG #1:  Interpreted by emergency department physician unless otherwise noted. Time:  20:29    Rate: 60  Rhythm: Sinus. Interpretation: nonspecific ST and T wave findings. + LAFB. Poor RWP. Counseling: The emergency provider has spoken with the patient and discussed todays results, in addition to providing specific details for the plan of care and counseling regarding the diagnosis and prognosis. Questions are answered at this time and they are agreeable with the plan. Consults:  Spoke w/ Dr. Jamila Boykin @ 23:07 and he agrees w/ the plan for admission to Telemetry.    --------------------------------- IMPRESSION AND DISPOSITION ---------------------------------    IMPRESSION  1. Acute on chronic diastolic congestive heart failure ([facility]        DISPOSITION  Disposition: Admit to telemetry  Patient condition is stable      NOTE: This report was transcribed using voice recognition software.  Every effort was made to ensure accuracy; however, inadvertent computerized transcription errors may be present       Brayan Reich MD  04/09/19 3369

## 2019-04-10 NOTE — ED NOTES
OSITOAR faxed to floor spoke with Anastacia Rubio, copy of fax verificarion received and in chart.      Luzmaria Ramos RN  04/09/19 9301

## 2019-04-10 NOTE — PLAN OF CARE
Problem: Falls - Risk of:  Goal: Will remain free from falls  Description  Will remain free from falls  4/10/2019 1645 by Lj Rivers RN  Outcome: Met This Shift  4/10/2019 1645 by Lj Rivers RN  Outcome: Met This Shift  Goal: Absence of physical injury  Description  Absence of physical injury  4/10/2019 1645 by Lj Rivers RN  Outcome: Met This Shift  4/10/2019 1645 by Lj Rivers RN  Outcome: Met This Shift     Problem: Cardiac:  Goal: Hemodynamic stability will improve  Description  Hemodynamic stability will improve  4/10/2019 1645 by Lj Rivers RN  Outcome: Met This Shift  4/10/2019 1645 by Lj Rivers RN  Outcome: Met This Shift     Problem: Fluid Volume:  Goal: Maintenance of adequate hydration will improve  Description  Maintenance of adequate hydration will improve  4/10/2019 1645 by Lj Rivers RN  Outcome: Met This Shift  4/10/2019 1645 by Lj Rivers RN  Outcome: Met This Shift  Goal: Will show no signs and symptoms of electrolyte imbalance  Description  Will show no signs and symptoms of electrolyte imbalance  4/10/2019 1645 by Lj Rivers RN  Outcome: Met This Shift  4/10/2019 1645 by Lj Rivers RN  Outcome: Met This Shift     Problem: Physical Regulation:  Goal: Complications related to the disease process, condition or treatment will be avoided or minimized  Description  Complications related to the disease process, condition or treatment will be avoided or minimized  Outcome: Met This Shift     Problem: Cardiovascular  Goal: No DVT, peripheral vascular complications  Outcome: Met This Shift  Goal: Hemodynamic stability  Outcome: Met This Shift  Goal: Anticoagulate/Hct stable  Outcome: Met This Shift  Goal: Understanding of dietary restrictions  Outcome: Met This Shift     Problem:   Goal: No urinary complication  Outcome: Met This Shift     Problem: Pain:  Goal: Pain level will decrease  Description  Pain level will decrease  Outcome: Met This Shift  Goal: Control of acute pain  Description  Control of acute pain  Outcome: Met This Shift  Goal: Control of chronic pain  Description  Control of chronic pain  Outcome: Met This Shift

## 2019-04-10 NOTE — CARE COORDINATION
Social Work / Discharge Planning : SW met with patient daughter who is CNP. Patient resides at home alone. Patient is active with [facility]. She uses an assistive device. PCP is DR Tereso Dan. Patient and family plan is home with [facility]. WiIl need BETTY at discharge. SW to follow.  Electronically signed by BRIJESH Luna on 4/10/19 at 1:52 PM

## 2019-04-10 NOTE — PROGRESS NOTES
Pharmacy Note    Jeramy Diehl was ordered CoQ10. Per the [address], this medication is non-formulary and not stocked by pharmacy for the reason indicated below. The medication can be reordered at discharge. Thank Erika Lin Pharm. D 4/10/2019 12:13 AM

## 2019-04-10 NOTE — CONSULTS
[facility] Cardiology Consultation    Juana Patel is a 80 y.o. female patient of Dr Pratima Hendricks  with a history of atrial fibrillation and ischemic heart disease who was admitted 04/09/19 with a chief complaint of progressive dyspnea and weight gain over ~~~5 days. He has had inceasing leg edema   dyspnea with bathing and increase abdominal girth     She had hospitalization 3/19/19 for  HFpEF (pBNP 1294). She maintained NSR during admission and improved with IV diuresis. Echo 3/19/19 noted EF normal, no WMA, stage 2 DD, LA mod dilated, mod MR, mild TR, mild-mod AI. Upon discrge 3/22/19, weight was noted 182#, creatinine 1.0, pBNP 823. hospital consultation on 2/11/2018 for Afib with RVR and underwent a successful DCCV to NSR on 2/13/2018                 Past Medical History:    1. Persistent atrial fibrillation (diagnosed 12/2014) - recurrent atrial fibrillation s/p PEACE/CVN on 2/13/18, switched Toprol XL to BID dosing on 2/12/18 (continue), decision made by patient/family in the past to discontinue anticoagulation (she is now agreeable), eliquis 5 mg BID added 2/12/18 prior to [address] (continue) --> maintaining SR, amiodarone started in 5/2017. 2. Chronic diastolic heart CHF (hospitalized in 7/2017 for acute on chronic CHF exacerbation). 3. CAD s/p MC to mid LAD in 2011 -- no new flow limiting disease on 1024/2013 cardiac catheteriztaion   4. Hypertension  5. Hyperlipidemia  6. Reported ascending aortic aneurysm -- no aneurysm noted on 7/12/17 CTA chest  7. Hypothyroidism: on replacement therapy  8. TARIK - AHI 5 on 12/2014 study (no treatment at this time)  9. Chronic left anterior fascicular block   10. Lumbar stenosis s/p prior epidural injection s                  Past Medical History:   Diagnosis Date    A-fib (HCC)     AC (acromioclavicular) joint bone spurs     in feet    Acid reflux     Ascending aortic aneurysm (HCC)     4.0 cm,(SEE LETTERS DR. PENALOZA) IS BEING MONITORED    Atrial fibrillation ([address]     CAD (coronary artery disease)     Cancer ([address] 16 YRS AGO    breast RIGHT    CHF (congestive heart failure) (HCC)     Chronic back pain     stenosis, receives epidurals bi-monthly    DJD (degenerative joint disease)     Hyperlipidemia     Hypertension     Hypothyroidism     Iron deficiency anemia     Lung nodule     RIGHT LOWER LOBE, BEING MONITORED BY DR. Monica Raygoza, LAST CT EPIC 3/2014    MVP (mitral valve prolapse)     Osteoarthritis     Osteoporosis     Partial bowel obstruction ([address]     Pneumonia 2008    Polymyalgia rheumatica ([address]     Scoliosis     Sinus arrhythmia     1/2014 last ekg, nsr with pacs, epic    Sleep apnea     uses cpap    Spinal stenosis     Thoracic ascending aortic aneurysm ([address]     Thyroid disease     hypothyroidism       Past Surgical History:   Procedure Laterality Date    ARTERY BIOPSY  2008    temporal   [facility],  BONE SPURS    BREAST SURGERY Right 1993    CANCER    CARDIAC CATHETERIZATION  2010    CARDIOVERSION  02/13/2018    CERVICAL FUSION  1994    c3-4    CHOLECYSTECTOMY  1980's    OPEN    COLON SURGERY  1980's    repair of partial bowel obstruction    CORONARY ANGIOPLASTY WITH STENT PLACEMENT  06/2011    Dr. Jyotsna Klein, ESOPHAGUS      EYE SURGERY Bilateral 2004    CATARACTS WITH LENS IMPLANTS    FOOT SURGERY  2010,2011    ct guided injections of feet, toenail removal   [address] [place] [address]    HYSTERECTOMY  1960's    TOE SURGERY      Removal of bony outgrowth, right great toe    TRANSESOPHAGEAL ECHOCARDIOGRAM  02/13/2018    WRIST SURGERY Right YRS AGO       Family History   Problem Relation Age of Onset    Brain Cancer Mother     Lung Cancer Brother     Heart Attack Father     Heart Surgery Maternal Aunt     Stroke Maternal Aunt        Prior to Admission medications    Medication Sig Start Date End Date Taking?  Authorizing Provider   bumetanide (BUMEX) 2 MG tablet Take 1 tablet by mouth daily 4/8/19   RONAN Sierra   rOPINIRole (REQUIP) 0.5 MG tablet Take 0.75 mg by mouth every evening     Historical Provider, MD   senna (SENOKOT) 8.6 MG tablet Take 1 tablet by mouth every morning    Historical Provider, MD   predniSONE (DELTASONE) 5 MG tablet Take 10 mg by mouth every morning     Historical Provider, MD   amiodarone (CORDARONE) 200 MG tablet Take 1 tablet by mouth daily  Patient taking differently: Take 200 mg by mouth every morning  6/8/18   Fidencio Amaro MD   metoprolol succinate (TOPROL XL) 25 MG extended release tablet Take 1 tablet by mouth 2 times daily  Patient taking differently: Take 50 mg by mouth 2 times daily  6/8/18   Fidencio Amaro MD   levothyroxine (SYNTHROID) 125 MCG tablet Take 1 tablet by mouth every morning (before breakfast)  Patient taking differently: Take 137 mcg by mouth every morning (before breakfast)  2/22/18   Juarez Herzog MD   lactulose (CHRONULAC) 10 GM/15ML solution Take 20 g by mouth nightly as needed    Historical Provider, MD   Lactobacillus (PROBIOTIC ACIDOPHILUS) CAPS Take 1 capsule by mouth nightly     Historical Provider, MD   Simethicone (GAS-X ULTRA STRENGTH) 180 MG CAPS Take 1 capsule by mouth Daily with lunch     Historical Provider, MD   apixaban (ELIQUIS) 5 MG TABS tablet Take 1 tablet by mouth 2 times daily 2/14/18   Juarez Herzog MD   Calcium Carb-Cholecalciferol (CALCIUM-VITAMIN D) 600-400 MG-UNIT TABS Take 1 tablet by mouth Daily with lunch     Historical Provider, MD   ranitidine (ZANTAC) 300 MG tablet TAKE ONE TABLET BY MOUTH EVERY MORNING 3/21/17   Historical Provider, MD   HYDROcodone-acetaminophen ([facility]) 7.5-325 MG per tablet Take 1 tablet by mouth every 6 hours as needed  10/21/16   Historical Provider, MD   raloxifene (EVISTA) 60 MG tablet Take 60 mg by mouth every morning  9/29/16   Historical Provider, MD   OXYGEN Inhale 2 L into the lungs nightly Indications: BMS    Historical Provider, MD   magnesium oxide (MAG-OX) 400 (240 MG) MG tablet Take 1 tablet by mouth daily. Patient taking differently: Take 240 mg by mouth Daily with lunch  1/6/15   Diane Pérez MD   ferrous sulfate 325 (65 FE) MG tablet Take 325 mg by mouth Daily with lunch     Historical Provider, MD   lisinopril (PRINIVIL;ZESTRIL) 40 MG tablet Take 40 mg by mouth nightly  11/17/14   Historical Provider, MD   rOPINIRole (REQUIP) 0.25 MG tablet Take 0.25 mg by mouth nightly     Historical Provider, MD   Coenzyme Q10 (COQ-10) 200 MG CAPS Take 1 capsule by mouth Daily with lunch     Historical Provider, MD   cyanocobalamin 1000 MCG/ML injection Inject 1,000 mcg into the muscle every 30 days     Historical Provider, MD   lovastatin (MEVACOR) 20 MG tablet Take 40 mg by mouth nightly     Historical Provider, MD   Cholecalciferol (VITAMIN D) 2000 UNITS TABS Take 1 tablet by mouth Daily with lunch     Historical Provider, MD   potassium chloride (KLOR-CON) 10 MEQ CR tablet Take 20 mEq by mouth 2 times daily     Historical Provider, MD        Allergies: Judit-d [diphenhydramine hcl]; Benadryl [diphenhydramine];  Phenergan [promethazine hcl]; and Ibuprofen    Social History     Tobacco Use    Smoking status: Never Smoker    Smokeless tobacco: Never Used   Substance Use Topics    Alcohol use: No     Alcohol/week: 0.0 oz          Review of Systems:  HEENT: negative for acute visual and auditory problems  Constitutional: negative for fever and chills  Respiratory: negative for cough and hemoptysis  Cardiovascular:   Gastrointestinal: negative for abdominal pain, diarrhea, melena, hematemesis, nausea and vomiting  Genitourinary: negative for dysuria and hematuria  Derm: negative for rash and skin lesion(s)  Neurological: negative for seizures and tremors  Endocrine: negative for diabetic symptoms including polydipsia and polyuria  Musculoskeletal: negative for CTD  Psychiatric: negative for anxiety and depression      Inpatient Meds:    sodium chloride flush  10 mL Intravenous 2 times per day    amiodarone  200 mg Oral QAM    apixaban  5 mg Oral BID    vitamin D  2,000 Units Oral Lunch    cyanocobalamin  1,000 mcg Intramuscular Q30 Days    ferrous sulfate  325 mg Oral Lunch    levothyroxine  137 mcg Oral QAM AC    lisinopril  40 mg Oral Nightly    atorvastatin  20 mg Oral Nightly    metoprolol succinate  50 mg Oral BID    potassium chloride  20 mEq Oral BID WC    predniSONE  10 mg Oral QAM    raloxifene  60 mg Oral QAM    rOPINIRole  0.25 mg Oral Nightly    senna  1 tablet Oral QAM    aspirin  81 mg Oral Daily    lactobacillus  1 capsule Oral Nightly    calcium-vitamin D  1 tablet Oral Lunch    magnesium oxide  400 mg Oral Daily    simethicone  160 mg Oral Lunch    famotidine  40 mg Oral Daily    rOPINIRole  0.75 mg Oral Daily         Physical Exam:  Vitals:    04/10/19 0000   BP: 138/67   Pulse: 67   Resp: 16   Temp: 98.1 °F (36.7 °C)   SpO2: 95%        Skin: Warm and dry. No rash or bruises  HEENT: Normocephalic, atraumatic. No scleral icterus. EOMI. No abnormalities of mouth or nose identified    Neck: No JVD, carotid upstrokes normal, no carotid bruit  Chest: Normal expansion  Back: Normal  Cardiac: RRR, S4  Lungs: medium bibasilar rales  Abdomen: Normal bowel sounds, no HSM, non-tender, no masses  Extremities: No clubbing, edema or cyanosis  Neurological: Moves all extremities. Alert and oriented to person, place, and time.       Labs:    CBC:   Lab Results   Component Value Date    WBC 5.3 04/09/2019    RBC 3.76 04/09/2019    HGB 12.1 04/09/2019    HCT 37.7 04/09/2019    .3 04/09/2019    MCH 32.2 04/09/2019    MCHC 32.1 04/09/2019    RDW 13.2 04/09/2019     04/09/2019    MPV 10.7 04/09/2019     CMP:    Lab Results   Component Value Date     04/09/2019    K 3.8 04/09/2019    CL 99 04/09/2019    CO2 32 04/09/2019    BUN 19 04/09/2019    CREATININE 1.3 04/09/2019    GFRAA 46 04/09/2019    LABGLOM 38 04/09/2019    GLUCOSE 131 04/09/2019    GLUCOSE 106 10/04/2011    PROT 6.0 03/22/2019    LABALBU 3.6 03/22/2019    LABALBU 3.5 07/07/2011    CALCIUM 8.7 04/09/2019    BILITOT 0.9 03/22/2019    ALKPHOS 52 03/22/2019    AST 15 03/22/2019    ALT 15 03/22/2019     BMP:    Lab Results   Component Value Date     04/09/2019    K 3.8 04/09/2019    CL 99 04/09/2019    CO2 32 04/09/2019    BUN 19 04/09/2019    LABALBU 3.6 03/22/2019    LABALBU 3.5 07/07/2011    CREATININE 1.3 04/09/2019    CALCIUM 8.7 04/09/2019    GFRAA 46 04/09/2019    LABGLOM 38 04/09/2019    GLUCOSE 131 04/09/2019    GLUCOSE 106 10/04/2011     Troponin:    Lab Results   Component Value Date    TROPONINI <0.01 04/10/2019      Lab Results   Component Value Date    PROBNP 468 (H) 04/09/2019    PROBNP 823 (H) 03/21/2019    PROBNP 1,294 (H) 03/19/2019     No results for input(s): CKTOTAL, CKMB in the last 72 hours. Imaging:   XR CHEST PORTABLE   Final Result   Chronic findings in the base. Mild cardiomegaly. No acute   pulmonary process. Cardiac Tests:  Echocardiogram: 8/15/13  Technically difficult study with suboptimal echo windows. Definity contrast was used to improve endomyocardial border definition. Ejection fraction is visually estimated at 55-60%. There is doppler evidence of stage II diastolic dysfunction. Mild left ventricular concentric hypertrophy noted. The left atrium is moderately dilated. The right ventricle is not completely visualized but appears at least mildly dilated. The right ventricle appears mildly hypertrophied. Mild mitral regurgitation is present. There is mild to moderate aortic regurgitation. Mild to moderate tricuspid regurgitation. Estimated right ventricular systolic pressure 30 mmHg assuming a right atrial pressure of 3 mmHg.      Exercise Nuclear Stress Test: 8/15/13  No evidence of stress induced ischemia, EF 83%     Cardiac Catheterization: 10/24/13  a.  Status post remote stent of LAD with demonstration of moderate in-stent restenosis today. b. 50% ostial 2nd diagonal branch stenosis. 2. Normal left ventricular systolic function. 3. Aortic root dilatation.      Echocardiogram: 12/31/14 Dorthula Carlos)  Ejection fraction is visually estimated at 60%. No regional wall motion abnormalities seen. Moderate aortic regurgitation is noted. Moderate mitral regurgitation is present.     Echocardiogram: 7/13/17 ([place])   Normal left ventricular systolic function.   Ejection fraction is visually estimated at 60-65%.  Mild concentric left ventricular hypertrophy with proximal septal thickening.   Normal right ventricular size and function (TAPSE 1.8 cm).   There is doppler evidence of stage II diastolic dysfunction.   Severely dilated left atrium by volume index.   Mild mitral regurgitation.   Mild-moderate aortic regurgitation.   Mild tricuspid regurgitation.   PASP is estimated at 38 mmHg.     PEACE: 2/13/18 Kunal Browne)   Ejection fraction is visually estimated at 55%.   No regional wall motion abnormalities seen.    Right ventricular segmental wall motion is normal.  Mild to moderate mitral regurgitation is present.   Mild to moderate tricuspid regurgitation.   Aortic sclerosis is noted.   Mild to moderate aortic regurgitation is noted. No evidence of thrombus within left atrium.        TTE 03/19/19     Summary   Left ventricular size is grossly normal.   Mild left ventricular concentric hypertrophy noted.   No regional wall motion abnormalities seen.   There is doppler evidence of stage II diastolic dysfunction.   Moderate mitral regurgitation is present.   Mild-to-moderate aortic regurgitation is noted. EKG: Reviewed by Dr Earnest Dawson  Normal sinus  L and posterior axis    Chest CT  02/2018     1. Mild bronchiectasis in the right lower lobe is likely   postinfectious. 2. Pleural parenchymal banding in the lung bases. 3. Unchanged indeterminate nodule right middle lobe. 4. Cardiomegaly. Coronary artery disease. Atherosclerosis. Unchanged   ectasia ascending thoracic aorta (4 cm). 5. Cholecystectomy. Summary of pertinent history:  1 Admitted 04/09/19 with dyspnea pBNP= 468 bun=19 creatinine=1.3 CXR >> no CHF/acute process  2 s/p MC to mid LAD in 2011 -- no new flow limiting disease on 1024/2013 cardiac catheteriztaion   3  Hypertension  4. Hyperlipidemia  5. Reported ascending aortic aneurysm -- no aneurysm noted on 6/12/17 CTA chest  7. Hypothyroidism: on replacement therapy  8.  TARIK - AHI 5 on 12/2014 study (no treatment at this time)  9 Hx lung nodule        Assessment:   1 Dyspnea multifactorial > currently>not in acute decompensated left heart failuure(no JVD and low pBNP) but rales and leg edema) may have right heart failure She has pulmonary hypertension with RVSP greater than usually seen with pure diastolic dysfunction  2 TARIK  3 Chronic ischemic heart disease  4 History falling  5 PAF on amiodarone and apixaban    Plan:  1 Continue current medications from a cardiac standpoint  2 Suggest Pulmonary Consult   3 Small dose IV marietta Kwok MD

## 2019-04-10 NOTE — H&P
History and Physical    CHIEF COMPLAINT: Dyspnea    HISTORY OF PRESENT ILLNESS:    The patient is a 80 y.o. female who presented to the ER for worsening dyspnea. Patient with a history of diastolic CHF with recent admission for exacerbation. Over the past couple weeks she has had increasing LE edema and dyspnea. This acutely worsened on the day of admission prompting ER visit. In the ER, patient found to be in CHF and now admitted for further evaluation and treatment. Patient denies headache, change of vision, chest pain, palpitations, syncope, and/or any neurological signs or symptoms. Past Medical History:    Past Medical History:   Diagnosis Date    A-fib ([address]     AC (acromioclavicular) joint bone spurs     in feet    Acid reflux     Ascending aortic aneurysm (HCC)     4.0 cm,(SEE LETTERS DR. PENALOZA) IS BEING MONITORED    Atrial fibrillation ([address]     CAD (coronary artery disease)     Cancer ([address] 16 YRS AGO    breast RIGHT    CHF (congestive heart failure) (HCC)     Chronic back pain     stenosis, receives epidurals bi-monthly    DJD (degenerative joint disease)     Hyperlipidemia     Hypertension     Hypothyroidism     Iron deficiency anemia     Lung nodule     RIGHT LOWER LOBE, BEING MONITORED BY DR. Ric Tran, LAST CT EPIC 3/2014    MVP (mitral valve prolapse)     Osteoarthritis     Osteoporosis     Partial bowel obstruction ([address]     Pneumonia 2008    Polymyalgia rheumatica ([address]     Scoliosis     Sinus arrhythmia     1/2014 last ekg, nsr with pacs, epic    Sleep apnea     uses cpap    Spinal stenosis     Thoracic ascending aortic aneurysm ([address]     Thyroid disease     hypothyroidism     Past Surgical History:    Past Surgical History:   Procedure Laterality Date    ARTERY BIOPSY  2008    temporal    [address],  BONE [address] [facility]  2010    CARDIOVERSION  02/13/2018   [address] [place] [address] c3-4    CHOLECYSTECTOMY  1980's    OPEN    COLON SURGERY  1980's    repair of partial bowel obstruction    CORONARY ANGIOPLASTY WITH STENT PLACEMENT  06/2011    Dr. Dipesh Diane, ESOPHAGUS      EYE SURGERY Bilateral 2004    CATARACTS WITH LENS IMPLANTS    FOOT SURGERY  2010,2011    ct guided injections of feet, toenail removal    HERNIA REPAIR  2065, 8448    UMBILICAL, RIH    HYSTERECTOMY  1960's    TOE SURGERY      Removal of bony outgrowth, right great toe    TRANSESOPHAGEAL ECHOCARDIOGRAM  02/13/2018    WRIST SURGERY Right YRS AGO     Medications Prior to Admission:    Medications Prior to Admission: bumetanide (BUMEX) 2 MG tablet, Take 1 tablet by mouth daily  rOPINIRole (REQUIP) 0.5 MG tablet, Take 0.75 mg by mouth every evening   senna (SENOKOT) 8.6 MG tablet, Take 1 tablet by mouth every morning  predniSONE (DELTASONE) 5 MG tablet, Take 10 mg by mouth every morning   amiodarone (CORDARONE) 200 MG tablet, Take 1 tablet by mouth daily (Patient taking differently: Take 200 mg by mouth every morning )  metoprolol succinate (TOPROL XL) 25 MG extended release tablet, Take 1 tablet by mouth 2 times daily (Patient taking differently: Take 50 mg by mouth 2 times daily )  levothyroxine (SYNTHROID) 125 MCG tablet, Take 1 tablet by mouth every morning (before breakfast) (Patient taking differently: Take 137 mcg by mouth every morning (before breakfast) )  lactulose (CHRONULAC) 10 GM/15ML solution, Take 20 g by mouth nightly as needed  Lactobacillus (PROBIOTIC ACIDOPHILUS) CAPS, Take 1 capsule by mouth nightly   Simethicone (GAS-X ULTRA STRENGTH) 180 MG CAPS, Take 1 capsule by mouth Daily with lunch   apixaban (ELIQUIS) 5 MG TABS tablet, Take 1 tablet by mouth 2 times daily  Calcium Carb-Cholecalciferol (CALCIUM-VITAMIN D) 600-400 MG-UNIT TABS, Take 1 tablet by mouth Daily with lunch   ranitidine (ZANTAC) 300 MG tablet, TAKE ONE TABLET BY MOUTH EVERY MORNING  HYDROcodone-acetaminophen (NORCO) 7.5-325 MG per tablet, Take 1 tablet by mouth every 6 hours as needed   raloxifene (EVISTA) 60 MG tablet, Take 60 mg by mouth every morning   OXYGEN, Inhale 2 L into the lungs nightly Indications: BMS  magnesium oxide (MAG-OX) 400 (240 MG) MG tablet, Take 1 tablet by mouth daily. (Patient taking differently: Take 240 mg by mouth Daily with lunch )  ferrous sulfate 325 (65 FE) MG tablet, Take 325 mg by mouth Daily with lunch   lisinopril (PRINIVIL;ZESTRIL) 40 MG tablet, Take 40 mg by mouth nightly   rOPINIRole (REQUIP) 0.25 MG tablet, Take 0.25 mg by mouth nightly   Coenzyme Q10 (COQ-10) 200 MG CAPS, Take 1 capsule by mouth Daily with lunch   cyanocobalamin 1000 MCG/ML injection, Inject 1,000 mcg into the muscle every 30 days   lovastatin (MEVACOR) 20 MG tablet, Take 40 mg by mouth nightly   Cholecalciferol (VITAMIN D) 2000 UNITS TABS, Take 1 tablet by mouth Daily with lunch   potassium chloride (KLOR-CON) 10 MEQ CR tablet, Take 20 mEq by mouth 2 times daily     Allergies:    [place]-d [diphenhydramine hcl]; Benadryl [diphenhydramine]; Phenergan [promethazine hcl]; and Ibuprofen    Social History:    reports that she has never smoked. She has never used smokeless tobacco. She reports that she does not drink alcohol or use drugs. Family History:   family history includes Brain Cancer in her mother; Heart Attack in her father; Heart Surgery in her maternal aunt; Lung Cancer in her brother; Stroke in her maternal aunt. REVIEW OF SYSTEMS:  As above in the HPI, otherwise negative    PHYSICAL EXAM:    Vitals:  /67   Pulse 67   Temp 98.1 °F (36.7 °C) (Oral)   Resp 16   Ht 4' 11\" (1.499 m)   Wt 196 lb 11.2 oz (89.2 kg)   SpO2 95%   BMI 39.73 kg/m²     General:  Awake, alert, oriented X 3. Well developed, well nourished. No apparent distress. HEENT:  Normocephalic, atraumatic. Pupils equal, round, reactive. No scleral icterus. No conjunctival injection. Oral and nasal mucosa normal in appearance.  Oropharynx is

## 2019-04-10 NOTE — CONSULTS
Pulmonary Consultation    Admit Date: 4/9/2019  Requesting Physician: Shanna Garcia MD    CC:  · Respiratory Failure  HPI:  · This is a 80years old female with history of Congestive heart failure, atrial fibrillation and Sleep apnea, comes in to the hospital due to worsening shortness of breath and ankle swelling. The patient was recently hospitalized on 3/19 due to exacerbation of CHF. The patient states she has been taking her medications. She denies any cough and denies any chest pain. She denies any cough and complains of shortness of breath. She states she takes 2L oxygen at home only at night time via nasal cannula. She also takes oxygen during day time as needed with exertion. Her admission CXR did not show any acute process. Diuresis was started and pulmonology has been consulted for further recommendations. PMH:    Past Medical History:   Diagnosis Date    A-fib ([address]     AC (acromioclavicular) joint bone spurs     in feet    Acid reflux     Ascending aortic aneurysm (HCC)     4.0 cm,(SEE LETTERS DR. PENALOZA) IS BEING MONITORED    Atrial fibrillation (HCC)     CAD (coronary artery disease)     Cancer (HCC) 16 YRS AGO    breast RIGHT    CHF (congestive heart failure) (HCC)     Chronic back pain     stenosis, receives epidurals bi-monthly    DJD (degenerative joint disease)     Hyperlipidemia     Hypertension     Hypothyroidism     Iron deficiency anemia     Lung nodule     RIGHT LOWER LOBE, BEING MONITORED BY DR. Dawna Hinton, LAST CT EPIC 3/2014    MVP (mitral valve prolapse)     Osteoarthritis     Osteoporosis     Partial bowel obstruction ([address]     Pneumonia 2008    Polymyalgia rheumatica (HCC)     Scoliosis     Sinus arrhythmia     1/2014 last ekg, nsr with pacs, epic    Sleep apnea     uses cpap    Spinal stenosis     Thoracic ascending aortic aneurysm ([address]     Thyroid disease     hypothyroidism     PSH:     Past Surgical History:   Procedure Laterality Date appetite, or recent weight change. · Hematologic/Lymphatic: No abnormal bruising or bleeding, blood clots or swollen lymph nodes. No anemia, fever, chills, night sweats, or swollen glands. · Allergic/Immunologic: No seasonal or perenial allergies. No history of hives or atopic dermatitis. Social History:  · Alcohol:  Denies Alcohol use  · Tobacco:   No Tobacco use  · Employment:  no silica or asbestos exposure  · Family:  No family history of lung disease    Medications:     sodium chloride flush  10 mL Intravenous 2 times per day    amiodarone  200 mg Oral QAM    apixaban  5 mg Oral BID    vitamin D  2,000 Units Oral Lunch    [START ON 2019] cyanocobalamin  1,000 mcg Intramuscular Q30 Days    ferrous sulfate  325 mg Oral Lunch    levothyroxine  137 mcg Oral QAM AC    lisinopril  40 mg Oral Nightly    atorvastatin  20 mg Oral Nightly    metoprolol succinate  50 mg Oral BID    potassium chloride  20 mEq Oral BID WC    predniSONE  10 mg Oral QAM    raloxifene  60 mg Oral QAM    rOPINIRole  0.25 mg Oral Nightly    senna  1 tablet Oral QAM    aspirin  81 mg Oral Daily    lactobacillus  1 capsule Oral Nightly    calcium-vitamin D  1 tablet Oral Lunch    magnesium oxide  400 mg Oral Daily    simethicone  160 mg Oral Lunch    famotidine  40 mg Oral Daily    rOPINIRole  0.75 mg Oral Daily       Vitals:  Tmax:  VITALS:  BP (!) 125/58   Pulse 64   Temp 98.7 °F (37.1 °C) (Oral)   Resp 16   Ht 4' 11\" (1.499 m)   Wt 196 lb 11.2 oz (89.2 kg)   SpO2 93%   BMI 39.73 kg/m²   24HR INTAKE/OUTPUT:      Intake/Output Summary (Last 24 hours) at 4/10/2019 1556  Last data filed at 4/10/2019 1545  Gross per 24 hour   Intake 360 ml   Output 1050 ml   Net -690 ml     CURRENT PULSE OXIMETRY:  SpO2: 93 %  24HR PULSE OXIMETRY RANGE:  SpO2  Av.3 %  Min: 92 %  Max: 96 %    EXAM:  General: No distress. Alert. Eyes: PERRL. No sclera icterus. No conjunctival injection. ENT: No discharge.  Pharynx [phone] or after hours through [employer], x [phone]. Please note that voice recognition technology was used in the preparation of this note and make therefore it may contain inadvertent transcription errors    Julianne Kan M.D., F.C.C.P.     [facility], [address] [place] [address], [address] [place] [address]

## 2019-04-11 NOTE — PLAN OF CARE
Problem: Falls - Risk of:  Goal: Will remain free from falls  Description  Will remain free from falls  4/11/2019 0249 by Reid Eastman RN  Outcome: Met This Shift  4/10/2019 1645 by Alex Gipson RN  Outcome: Met This Shift  4/10/2019 1645 by Alex Gipson RN  Outcome: Met This Shift  Goal: Absence of physical injury  Description  Absence of physical injury  4/11/2019 0249 by Reid Eastman RN  Outcome: Met This Shift  4/10/2019 1645 by Alex Gipson RN  Outcome: Met This Shift  4/10/2019 1645 by Alex Gipson RN  Outcome: Met This Shift     Problem:  Activity:  Goal: Capacity to carry out activities will improve  Description  Capacity to carry out activities will improve  4/11/2019 0249 by Reid Eastman RN  Outcome: Met This Shift  4/10/2019 1645 by Alex Gipson RN  Outcome: Ongoing  4/10/2019 1645 by Alex Gipson RN  Outcome: Ongoing  Goal: Will verbalize the importance of balancing activity with adequate rest periods  Description  Will verbalize the importance of balancing activity with adequate rest periods  4/11/2019 0249 by Reid Eastman RN  Outcome: Met This Shift  4/10/2019 1645 by Alex Gipson RN  Outcome: Ongoing  4/10/2019 1645 by Alex Gipson RN  Outcome: Met This Shift     Problem: Cardiac:  Goal: Hemodynamic stability will improve  Description  Hemodynamic stability will improve  4/11/2019 0249 by Reid Eastman RN  Outcome: Met This Shift  4/10/2019 1645 by Alex Gipson RN  Outcome: Met This Shift  4/10/2019 1645 by Alex Gipson RN  Outcome: Met This Shift  Goal: Ability to maintain an adequate cardiac output will improve  Description  Ability to maintain an adequate cardiac output will improve  4/11/2019 0249 by Reid Eastman RN  Outcome: Met This Shift  4/10/2019 1645 by Alex Gipsno RN  Outcome: Ongoing  4/10/2019 1645 by Alex Gipson RN  Outcome: Ongoing     Problem: Fluid Volume:  Goal: Risk for excess fluid volume will decrease  Description  Risk for excess fluid volume will decrease  4/11/2019 0249 by Claudell Meissner, RN  Outcome: Met This Shift  4/10/2019 1645 by Lety Peterson RN  Outcome: Ongoing  4/10/2019 1645 by Lety Peterson RN  Outcome: Ongoing  Goal: Maintenance of adequate hydration will improve  Description  Maintenance of adequate hydration will improve  4/11/2019 0249 by Claudell Meissner, RN  Outcome: Met This Shift  4/10/2019 1645 by Lety Peterson RN  Outcome: Met This Shift  4/10/2019 1645 by Lety Peterson RN  Outcome: Met This Shift  Goal: Will show no signs and symptoms of electrolyte imbalance  Description  Will show no signs and symptoms of electrolyte imbalance  4/10/2019 1645 by Lety Peterson RN  Outcome: Met This Shift  4/10/2019 1645 by Lety Peterson RN  Outcome: Met This Shift     Problem: Physical Regulation:  Goal: Complications related to the disease process, condition or treatment will be avoided or minimized  Description  Complications related to the disease process, condition or treatment will be avoided or minimized  4/11/2019 0249 by Claudell Meissner, RN  Outcome: Met This Shift  4/10/2019 1645 by Lety Peterson RN  Outcome: Met This Shift     Problem: Cardiovascular  Goal: No DVT, peripheral vascular complications  4/06/6518 1645 by Lety Peterson RN  Outcome: Met This Shift  Goal: Hemodynamic stability  4/10/2019 1645 by Lety Peterson RN  Outcome: Met This Shift  Goal: Anticoagulate/Hct stable  4/10/2019 1645 by Lety Peterson RN  Outcome: Met This Shift  Goal: Understanding of dietary restrictions  4/10/2019 1645 by Lety Peterson RN  Outcome: Met This Shift     Problem:   Goal: No urinary complication  1/72/1764 1764 by Lety Peterson RN  Outcome: Met This Shift     Problem: Pain:  Goal: Pain level will decrease  Description  Pain level will decrease  4/10/2019 1645 by Lety Peterson RN  Outcome: Met This Shift  Goal: Control of acute

## 2019-04-11 NOTE — PROGRESS NOTES
Huang Frost MD   5 mg at 04/11/19 6851    vitamin D tablet 2,000 Units  2,000 Units Oral Lunch Mariangel Amador MD   2,000 Units at 04/10/19 1246    cyanocobalamin injection 1,000 mcg  1,000 mcg Intramuscular Q30 Days Mariangel Amador MD        ferrous sulfate tablet 325 mg  325 mg Oral Lunch Mariangel Amador MD   325 mg at 04/10/19 1247    HYDROcodone-acetaminophen ([facility]) 7.5-325 MG per tablet 1 tablet  1 tablet Oral Q6H PRN Mariangel Amador MD   1 tablet at 04/11/19 0829    lactulose (CHRONULAC) 10 GM/15ML solution 20 g  20 g Oral Nightly PRN Mariangel Amador MD   20 g at 04/11/19 5210    levothyroxine (SYNTHROID) tablet 137 mcg  137 mcg Oral QAM AC Mariangel Amador MD   137 mcg at 04/11/19 0555    lisinopril (PRINIVIL;ZESTRIL) tablet 40 mg  40 mg Oral Nightly Mariangel Amador MD   40 mg at 04/10/19 2020    atorvastatin (LIPITOR) tablet 20 mg  20 mg Oral Nightly Mariangel Amador MD   20 mg at 04/10/19 2020    metoprolol succinate (TOPROL XL) extended release tablet 50 mg  50 mg Oral BID Mariangel Amador MD   50 mg at 04/11/19 7302    potassium chloride (KLOR-CON M) extended release tablet 20 mEq  20 mEq Oral BID WC Mariangel Amador MD   20 mEq at 04/11/19 0829    predniSONE (DELTASONE) tablet 10 mg  10 mg Oral QAM Mariangel Amador MD   10 mg at 04/11/19 0829    raloxifene (EVISTA) tablet 60 mg  60 mg Oral QAM Mariangel Amador MD   60 mg at 04/10/19 0941    rOPINIRole (REQUIP) tablet 0.25 mg  0.25 mg Oral Nightly Mariangel Amador MD   0.25 mg at 04/10/19 2020    senna (SENOKOT) tablet 8.6 mg  1 tablet Oral QAM Mariangel Amador MD   8.6 mg at 04/10/19 0941    magnesium hydroxide (MILK OF MAGNESIA) 400 MG/5ML suspension 30 mL  30 mL Oral Daily PRN Mariangel Amador MD        ondansetron [facility]) injection 4 mg  4 mg Intravenous Q6H PRN Mariangel Amador MD        aspirin chewable tablet 81 mg  81 mg Oral Daily Mariangel Amador MD   81 mg at 04/11/19 4391    lactobacillus (CULTURELLE) capsule 1 capsule  1 capsule Oral Nightly Marques CALIXTO Mariam Jason MD   1 capsule at 04/10/19 2020    calcium-vitamin D (OSCAL-500) 500-200 MG-UNIT per tablet 1 tablet  1 tablet Oral Lunch Tisha Son MD   1 tablet at 04/10/19 1247    magnesium oxide (MAG-OX) tablet 400 mg  400 mg Oral Daily Tisha Son MD   400 mg at 04/11/19 0341    simethicone (MYLICON) chewable tablet 160 mg  160 mg Oral Lunch Tisha Son MD   160 mg at 04/10/19 1247    famotidine (PEPCID) tablet 40 mg  40 mg Oral Daily Tisha Son MD   40 mg at 04/11/19 3224    rOPINIRole (REQUIP) tablet 0.75 mg  0.75 mg Oral Daily Tisha Son MD   0.75 mg at 04/10/19 1540    nystatin-triamcinolone (MYCOLOG II) cream   Topical BID PRN Tisha Son MD        albuterol (PROVENTIL) nebulizer solution 2.5 mg  2.5 mg Nebulization Q6H PRN ZHANNA Garcia             Physical Exam:  /60   Pulse 64   Temp 97.8 °F (36.6 °C) (Oral)   Resp 18   Ht 4' 11\" (1.499 m)   Wt 196 lb 11.2 oz (89.2 kg)   SpO2 94%   BMI 39.73 kg/m²   Wt Readings from Last 3 Encounters:   04/10/19 196 lb 11.2 oz (89.2 kg)   04/08/19 201 lb (91.2 kg)   03/22/19 182 lb 4.8 oz (82.7 kg)     Appearance: Awake, alert, no acute respiratory distress  Skin: Intact, no rash  Head: Normocephalic, atraumatic  Eyes: EOMI, no conjunctival erythema  ENMT: No pharyngeal erythema, MMM, no rhinorrhea  Neck: Supple, no elevated JVP, no carotid bruits  Lungs: Clear to auscultation bilaterally. No wheezes, rales, or rhonchi.   Cardiac: Regular rate and rhythm, +S1S2, no murmurs apparent  Abdomen: Soft, nontender, +bowel sounds  Extremities: Moves all extremities x 4, no lower extremity edema  Neurologic: No focal motor deficits apparent, normal mood and affect  Peripheral Pulses: Intact posterior tibial pulses bilaterally    Intake/Output:    Intake/Output Summary (Last 24 hours) at 4/11/2019 1055  Last data filed at 4/11/2019 0604  Gross per 24 hour   Intake 240 ml   Output 1325 ml   Net -1085 ml     No intake/output data recorded. Laboratory Tests:  Recent Labs     04/09/19  2156 04/10/19  1325 04/11/19  0410    138 139   K 3.8 4.8 4.0   CL 99 100 101   CO2 32* 29 31*   BUN 19 18 17   CREATININE 1.3* 1.1* 1.0   GLUCOSE 131* 172* 105*   CALCIUM 8.7 8.8 8.8     Lab Results   Component Value Date    MG 2.3 02/18/2018     Recent Labs     04/10/19  1325 04/11/19  0410   ALKPHOS 68 57   ALT 11 9   AST 19 15   PROT 6.3* 5.7*   BILITOT 1.2 1.1   LABALBU 3.6 3.3*     Recent Labs     04/09/19 2156 04/11/19  0410   WBC 5.3 6.0   RBC 3.76 3.46*   HGB 12.1 11.3*   HCT 37.7 34.5   .3* 99.7   MCH 32.2 32.7   MCHC 32.1 32.8   RDW 13.2 12.9    150   MPV 10.7 11.0     Lab Results   Component Value Date    CKTOTAL 71 10/24/2013    CKMB 0.8 10/24/2013    TROPONINI <0.01 04/10/2019    TROPONINI <0.01 04/09/2019    TROPONINI <0.01 03/19/2019     Lab Results   Component Value Date    INR 1.6 02/15/2018    INR 1.1 07/12/2017    INR 1.2 04/20/2015    PROTIME 18.0 (H) 02/15/2018    PROTIME 12.1 07/12/2017    PROTIME 13.0 (H) 04/20/2015     Lab Results   Component Value Date    TSH 0.216 (L) 02/15/2018     No results found for: LABA1C  No results found for: EAG  Lab Results   Component Value Date    CHOL 136 10/25/2013    CHOL 176 10/05/2011    CHOL 146 07/07/2011     Lab Results   Component Value Date    TRIG 108 10/25/2013    TRIG 169 (H) 10/05/2011    TRIG 108 07/07/2011     Lab Results   Component Value Date    HDL 37.0 (A) 10/25/2013    HDL 36.0 (A) 10/05/2011    HDL 34.0 (A) 07/07/2011     Lab Results   Component Value Date    LDLCALC 77 10/25/2013    LDLCALC 106 (H) 10/05/2011    [address] 90 07/07/2011     No results found for: LABVLDL, VLDL  No results found for: [facility]    Cardiac Tests:  ECG:             Cardiac Tests:  Echocardiogram: 8/15/13  Technically difficult study with suboptimal echo windows. Definity contrast was used to improve endomyocardial border definition.   Ejection fraction is visually estimated at 55-60%. There is doppler evidence of stage II diastolic dysfunction. Mild left ventricular concentric hypertrophy noted. The left atrium is moderately dilated. The right ventricle is not completely visualized but appears at least mildly dilated. The right ventricle appears mildly hypertrophied. Mild mitral regurgitation is present. There is mild to moderate aortic regurgitation. Mild to moderate tricuspid regurgitation. Estimated right ventricular systolic pressure 30 mmHg assuming a right atrial pressure of 3 mmHg.      Exercise Nuclear Stress Test: 8/15/13  No evidence of stress induced ischemia, EF 83%     Cardiac Catheterization: 10/24/13  a. Status post remote stent of LAD with demonstration of moderate in-stent restenosis today. b. 50% ostial 2nd diagonal branch stenosis. 2. Normal left ventricular systolic function. 3. Aortic root dilatation.      Echocardiogram: 12/31/14 Jaziel Matta)  Ejection fraction is visually estimated at 60%. No regional wall motion abnormalities seen. Moderate aortic regurgitation is noted. Moderate mitral regurgitation is present.     Echocardiogram: 7/13/17 ([place])   Normal left ventricular systolic function.   Ejection fraction is visually estimated at 60-65%.    Mild concentric left ventricular hypertrophy with proximal septal thickening.   Normal right ventricular size and function (TAPSE 1.8 cm).   There is doppler evidence of stage II diastolic dysfunction.   Severely dilated left atrium by volume index.   Mild mitral regurgitation.   Mild-moderate aortic regurgitation.   Mild tricuspid regurgitation.   PASP is estimated at 38 mmHg.     PEACE: 2/13/18 Tung Rodriguez)   Ejection fraction is visually estimated at 55%.   No regional wall motion abnormalities seen.    Right ventricular segmental wall motion is normal.  Mild to moderate mitral regurgitation is present.   Mild to moderate tricuspid regurgitation.   Aortic sclerosis is noted.   Mild to moderate aortic regurgitation is noted. No evidence of thrombus within left atrium.        TTE 03/19/19     Summary   Left ventricular size is grossly normal.   Mild left ventricular concentric hypertrophy noted.   No regional wall motion abnormalities seen.   There is doppler evidence of stage II diastolic dysfunction.   Moderate mitral regurgitation is present.   Mild-to-moderate aortic regurgitation is noted.     EKG: Reviewed by Dr David Luong  Normal sinus  L and posterior axis     Chest CT  02/2018      1. Mild bronchiectasis in the right lower lobe is likely   postinfectious. 2. Pleural parenchymal banding in the lung bases. 3. Unchanged indeterminate nodule right middle lobe. 4. Cardiomegaly. Coronary artery disease. Atherosclerosis. Unchanged   ectasia ascending thoracic aorta (4 cm). 5. Cholecystectomy.               Summary of pertinent history:  1 Admitted 04/09/19 with dyspnea pBNP= 468 bun=19 creatinine=1.3 CXR >> no CHF/acute process  2 s/p MC to mid LAD in 2011 -- no new flow limiting disease on 1024/2013 cardiac catheteriztaion   3  Hypertension  4. Hyperlipidemia  5. Reported ascending aortic aneurysm -- no aneurysm noted on 6/12/17 CTA chest  7. Hypothyroidism: on replacement therapy  8.  TARIK - AHI 5 on 12/2014 study (no treatment at this time)  9 Hx lung nodule           Assessment:   1 Dyspnea multifactorial > currently>not in acute decompensated left heart failuure(no JVD and low pBNP) but rales and leg edema) may have right heart failure She has pulmonary hypertension with RVSP greater than usually seen with pure diastolic dysfunction  2 TARIK  3 Chronic ischemic heart disease  4 History falling  5 PAF on amiodarone and apixaban  6 Pulmonary consult note allreciated     Plan:  1 Continue current medications from a cardiac standpoint except decrease amiodarone  2 Thyroid check  3 Small dose IV lasix again today           R [address] [place] [address] Dr Cosme, [address] [facility]        Lab Results   Component Value Date     04/11/2019     04/10/2019     04/09/2019    K 4.0 04/11/2019    K 4.8 04/10/2019    K 3.8 04/09/2019     04/11/2019     04/10/2019    CL 99 04/09/2019    CO2 31 (H) 04/11/2019    CO2 29 04/10/2019    CO2 32 (H) 04/09/2019    BUN 17 04/11/2019    BUN 18 04/10/2019    BUN 19 04/09/2019    CREATININE 1.0 04/11/2019    CREATININE 1.1 (H) 04/10/2019    CREATININE 1.3 (H) 04/09/2019    GLUCOSE 105 (H) 04/11/2019    GLUCOSE 172 (H) 04/10/2019    GLUCOSE 131 (H) 04/09/2019    CALCIUM 8.8 04/11/2019    CALCIUM 8.8 04/10/2019    CALCIUM 8.7 04/09/2019

## 2019-04-11 NOTE — PROGRESS NOTES
Pulmonary Progress Note    Admit Date: 2019  Hospital day                               PCP: Lael Leventhal, MD    Chief Complaint (s):  Patient Active Problem List   Diagnosis    Hypothyroidism    TARIK (obstructive sleep apnea)    Mixed hyperlipidemia    CAD in native artery    Acute on chronic diastolic congestive heart failure (HCC)    Essential hypertension    Atrial fibrillation (HCC)    Lung nodule    Paroxysmal atrial fibrillation (HCC)    CHF (congestive heart failure), NYHA class I, acute on chronic, combined ([facility]    Acute on chronic diastolic CHF (congestive heart failure) [facility]    Nonrheumatic aortic valve insufficiency    Non-rheumatic mitral regurgitation    Diastolic CHF ([facility]       Subjective:  · Sitting up in a chair, doing a little bit better.       Vitals:  VITALS:  /60   Pulse 64   Temp 97.8 °F (36.6 °C) (Oral)   Resp 18   Ht 4' 11\" (1.499 m)   Wt 196 lb 11.2 oz (89.2 kg)   SpO2 94%   BMI 39.73 kg/m²     24HR INTAKE/OUTPUT:      Intake/Output Summary (Last 24 hours) at 2019 1027  Last data filed at 2019 0604  Gross per 24 hour   Intake 240 ml   Output 1325 ml   Net -1085 ml       24HR PULSE OXIMETRY RANGE:    SpO2  Av %  Min: 92 %  Max: 94 %    Medications:  IV:      Scheduled Meds:   sodium chloride flush  10 mL Intravenous 2 times per day    amiodarone  200 mg Oral QAM    apixaban  5 mg Oral BID    vitamin D  2,000 Units Oral Lunch    cyanocobalamin  1,000 mcg Intramuscular Q30 Days    ferrous sulfate  325 mg Oral Lunch    levothyroxine  137 mcg Oral QAM AC    lisinopril  40 mg Oral Nightly    atorvastatin  20 mg Oral Nightly    metoprolol succinate  50 mg Oral BID    potassium chloride  20 mEq Oral BID WC    predniSONE  10 mg Oral QAM    raloxifene  60 mg Oral QAM    rOPINIRole  0.25 mg Oral Nightly    senna  1 tablet Oral QAM    aspirin  81 mg Oral Daily    lactobacillus  1 capsule Oral Nightly    calcium-vitamin D of weight and age, per [address], the patient's baseline PCO2 is roughly 53.   4. Stable pulmonary nodule  5. Doubt substantial pulmonary parenchymal disease, the patient is a lifelong nonsmoker. High-resolution CT scan of the chest on a 2018 evidence is only right basilar bronchiectasis without overt parenchymal changes.     Plan:  1. Continue nocturnal oxygen  2. Nocturnal oximetry: It didn't get done yesterday because sleep lab could not accommodate her request.  3. Agree with diuresis  4. KEN hose       Care reviewed with the staff and the patient's family as available. Please note that voice recognition technology was used in the preparation of this note and make therefore it may contain inadvertent transcription errors. Byron Rivers M.D., F.C.C.P.     [facility], [address] [place] [address], [address] [place] [address], [facility]

## 2019-04-11 NOTE — PROGRESS NOTES
P Quality Flow/Interdisciplinary Rounds Progress Note        Quality Flow Rounds held on April 11, 2019    Disciplines Attending:  Bedside Nurse, ,  and Nursing Unit Leadership    Kaleigh Stewart was admitted on 4/9/2019  9:01 PM    Anticipated Discharge Date:  Expected Discharge Date: 04/22/19    Disposition:    David Score:  David Scale Score: 21    Readmission Risk              Risk of Unplanned Readmission:        23           Discussed patient goal for the day, patient clinical progression, and barriers to discharge.   The following Goal(s) of the Day/Commitment(s) have been identified:  Check cardio plan, monitor I&O for nocturnal bedside pulse ox dick Koenig  April 11, 2019

## 2019-04-11 NOTE — PROGRESS NOTES
Family Medicine    Subjective: The patient is doing well this AM. Breathing is improved. Still with LE edema. -1.5L. Objective:  /72   Pulse 67   Temp 98.1 °F (36.7 °C) (Oral)   Resp 18   Ht 4' 11\" (1.499 m)   Wt 196 lb 11.2 oz (89.2 kg)   SpO2 93%   BMI 39.73 kg/m²     HEENT: MMM  Heart:  RRR. Lungs:  CTA bilaterally except for bibasilar rales. Abd: bowel sounds present, nontender, nondistended, no masses. Extrem:  No clubbing or cyanosis. 1-2+ edema. Neuro: Intact without deficits. CBC with Differential:    Lab Results   Component Value Date    WBC 6.0 04/11/2019    RBC 3.46 04/11/2019    HGB 11.3 04/11/2019    HCT 34.5 04/11/2019     04/11/2019    MCV 99.7 04/11/2019    MCH 32.7 04/11/2019    MCHC 32.8 04/11/2019    RDW 12.9 04/11/2019    SEGSPCT 54 11/21/2013    LYMPHOPCT 27.2 04/09/2019    MONOPCT 10.3 04/09/2019    BASOPCT 1.0 04/09/2019    MONOSABS 0.54 04/09/2019    LYMPHSABS 1.43 04/09/2019    EOSABS 0.07 04/09/2019    BASOSABS 0.05 04/09/2019     CMP:    Lab Results   Component Value Date     04/11/2019    K 4.0 04/11/2019     04/11/2019    CO2 31 04/11/2019    BUN 17 04/11/2019    CREATININE 1.0 04/11/2019    GFRAA >60 04/11/2019    LABGLOM 52 04/11/2019    GLUCOSE 105 04/11/2019    GLUCOSE 106 10/04/2011    PROT 5.7 04/11/2019    LABALBU 3.3 04/11/2019    LABALBU 3.5 07/07/2011    CALCIUM 8.8 04/11/2019    BILITOT 1.1 04/11/2019    ALKPHOS 57 04/11/2019    AST 15 04/11/2019    ALT 9 04/11/2019     Assessment/Plan:  1. Acute on chronic diastolic CHF - cardiology on case. Diuresing. -1.5L. Improving. 2. Acute hypoxic respiratory failure - secondary to #1. Pulmonary on case as well. No apparent pulmonary etiology. 3. Atrial fibrillation - NSR this AM. On Amiodarone and Eliquis. 4. CAD - stable. 5. HTN - stable. 6. Hyperlipidemia - statin. 7. Hypothyroidism - Synthroid. 8. LDD with radiculopathy - stable. 9. TARIK - nocturnal O2.  10. Disposition - as above. Continue same.     Hesham Broderick  7:50 AM  4/11/2019

## 2019-04-12 NOTE — PROGRESS NOTES
Family Medicine    Subjective: The patient is doing well this AM. Having some difficulty breathing this AM. Still with LE edema. Objective:  BP (!) 124/54   Pulse 57   Temp 98.3 °F (36.8 °C) (Oral)   Resp 16   Ht 4' 11\" (1.499 m)   Wt 196 lb 11.2 oz (89.2 kg)   SpO2 91%   BMI 39.73 kg/m²     HEENT: MMM  Heart:  RRR. Lungs:  CTA bilaterally except for bibasilar rales. Abd: bowel sounds present, nontender, nondistended, no masses. Extrem:  No clubbing or cyanosis. 1-2+ edema. Neuro: Intact without deficits. CBC with Differential:    Lab Results   Component Value Date    WBC 6.0 04/11/2019    RBC 3.46 04/11/2019    HGB 11.3 04/11/2019    HCT 34.5 04/11/2019     04/11/2019    MCV 99.7 04/11/2019    MCH 32.7 04/11/2019    MCHC 32.8 04/11/2019    RDW 12.9 04/11/2019    SEGSPCT 54 11/21/2013    LYMPHOPCT 27.2 04/09/2019    MONOPCT 10.3 04/09/2019    BASOPCT 1.0 04/09/2019    MONOSABS 0.54 04/09/2019    LYMPHSABS 1.43 04/09/2019    EOSABS 0.07 04/09/2019    BASOSABS 0.05 04/09/2019     CMP:    Lab Results   Component Value Date     04/11/2019    K 4.0 04/11/2019     04/11/2019    CO2 31 04/11/2019    BUN 17 04/11/2019    CREATININE 1.0 04/11/2019    GFRAA >60 04/11/2019    LABGLOM 52 04/11/2019    GLUCOSE 105 04/11/2019    GLUCOSE 106 10/04/2011    PROT 5.7 04/11/2019    LABALBU 3.3 04/11/2019    LABALBU 3.5 07/07/2011    CALCIUM 8.8 04/11/2019    BILITOT 1.1 04/11/2019    ALKPHOS 57 04/11/2019    AST 15 04/11/2019    ALT 9 04/11/2019     Assessment/Plan:  1. Acute on chronic diastolic CHF - cardiology on case. Diuresing. Increased symptoms this AM. Lasix 40mg IV x 1.   2. Acute hypoxic respiratory failure - secondary to #1. Pulmonary on case as well. No apparent pulmonary etiology. 3. Atrial fibrillation - NSR this AM. On Amiodarone and Eliquis. 4. CAD - stable. 5. HTN - stable. 6. Hyperlipidemia - statin. 7. Hypothyroidism - Synthroid. 8. LDD with radiculopathy - stable.   9. TARIK

## 2019-04-12 NOTE — CARE COORDINATION
Social work / Discharge Planning:       Patient's daughter Darrel Rios updated social work that family wants [facility] and prefers [facility]. Social work left a message for liaison. Awaiting return call to make referral.  Electronically signed by BRIJESH Weinberg on 4/12/2019 at 12:28 PM          Referral made to [address] [place] [address]. Awaiting response.   Electronically signed by BRIJESH Weinberg on 4/12/2019 at 12:56 PM

## 2019-04-12 NOTE — PROGRESS NOTES
Occupational Therapy  OCCUPATIONAL THERAPY INITIAL EVALUATION      Date:2019  Patient Name: Yoni Baez  MRN: [id number]  : 1927  Room: [address] [place] [address]    Evaluating OT: Isac Matson - [id number]    AM-PAC Daily Activity Raw Score:       Recommended Adaptive Equipment: Extended Tub Bench    Diagnosis: Acute on chronic diastolic CHF (congestive heart failure) ([facility] [Z67.50], Diastolic CHF ([facility] [H80.24]    Patient's daughter (who is a NP) reported that patient injured her R knee and L wrist due to a fall in late March; patient and patient's daughter indicate that patient was not given restrictions for L UE by physician. Pertinent Medical History: a-fib, CHF, chronic back pain, CAD, cancer, HTN, DJD, polymyalgia rheumatica, scoliosis, osteoporosis      Precautions: falls; O2 via nasal cannula; R knee brace; L wrist splint; chair/bed alarms    Home Living: Patient lives alone in a one-floor apartment (six steps with handrail). Bathroom Setup: tub shower (tub seat, grab bars, and handheld shower head available)  Equipment Owned: tub seat; rollator; quad cane; elevated toilet seat (with grab bars); home O2 (worn at night)    Prior Level of Function (PLOF): Per family member, patient was independent with toileting, grooming, and self-feeding, but needed assistance with dressing and bathing. Family members provide consistent assistance with most IADLs; patient had been able to perform light meal prep tasks independently prior. Patient was independent with functional mobility (with quad cane or rollator) prior to this hospitalization. Patient's daughter noted that patient has consistent assistance from family members several times daily. Driving: Yes, though patient has not driven in about a month    Pain Level: Patient reported experiencing pain in her R wrist, but did not rate/describe her pain. Cognition: Patient alert and oriented x3. Good command follow demonstrated.  Patient pleasant and cooperative. Memory: WFL   Sequencing: WFL   Problem Solving: WFL   Judgement/Safety: WFL grossly    Functional Assessment:   Initial Eval Status  Date: 4/12/2019 Treatment Status  Date:  Short Term Goals  Treatment Frequency: PRN   Feeding Setup  Patient reported experiencing difficulty opening/manipulating packages/containers due to pain in L hand/wrist.  Independent   Grooming CGA   (standing at sink) for completion of hand washing. Cues given to maximize safety with use of walker within bathroom. Mod I  (standing at sink)   UB Dressing Min A  SBA   LB Dressing Max A - to don slippers  Max A needed to don/doff R knee brace. Family members assist with LB dressing tasks at home. Mod A - with use of AE, as needed/appropriate   Bathing Mod A  Patient's daughter(s) assist with showering at home. Min A - with use of AE/DME, as needed/appropriate   Toileting Min A - cues given to maximize safety with toilet transfer  Mod I   Bed Mobility  Not assessed. Functional Transfers Sit-to-Stand: CGA   from bedside chair  Independent   Functional Mobility CGA   (with walker) within patient's room and bathroom. Cues given occasionally to maximize safety with management of walker. Mod I - with use of device, as needed/appropriate   Balance Sitting: Good  Standing: Fair  (with walker)  Fair+ dynamic standing balance during completion of ADLs and other functional tasks. Activity Tolerance Fair-  Patient's O2 saturation was 95% following functional mobility and ADLs (on 2L of O2 via nasal cannula). Patient will demonstrate Good understanding and consistent implementation of energy conservation techniques and work simplification techniques into ADL/IADL routines. Visual/  Perceptual WFL  Patient wears glasses for reading. N/A        Strength: ROM: Additional Information:    R UE  4-/5  WFL grossly Patient is R-hand dominant.    L UE 4-/5, excluding L hand/wrist (3/5 to 3+/5 per observation) Proximally WFL; AROM in L ?  Environmental Modifications ? Cognitive Re-Training ? Compensatory Techniques for ADLs ? Splinting Needs ? Positioning to Improve Overall Function ? Therapeutic Activity ? Therapeutic Exercise  ? Visual/Perceptual: ?    Delirium Prevention/Treatment  ? Other:  ?    Rehab Potential: Good for established goals. Patient / Family Goal: Patient and patient's daughter noted that patient wants to return home upon discharge. Patient's daughter noted that patient is on the wait list for an Independent Living condo at [address] [facility]. Patient and/or family were instructed on functional diagnosis, prognosis/goals, and OT plan of care. Patient and patient's daughter demonstrated Good understanding. Low complexity evaluation + 23 timed treatment minutes  Treatment Time In: 4869  Treatment Time Out: 1655    Evaluation time includes thorough review of current medical information, gathering information on past medical history/social history and prior level of function, completion of standardized testing/informal observation of tasks, assessment of data, and development of POC/Goals. Lainey Rebollar, OTR/L  License Number: [id number]

## 2019-04-12 NOTE — PLAN OF CARE
Problem: Falls - Risk of:  Goal: Will remain free from falls  Description  Will remain free from falls  4/12/2019 0416 by Gabe Hughes RN  Outcome: Met This Shift  4/11/2019 1813 by Sarah Parada RN  Outcome: Met This Shift  Goal: Absence of physical injury  Description  Absence of physical injury  4/12/2019 0416 by Gabe Hughes RN  Outcome: Met This Shift  4/11/2019 1813 by Sarah Parada RN  Outcome: Met This Shift     Problem:  Activity:  Goal: Capacity to carry out activities will improve  Description  Capacity to carry out activities will improve  4/12/2019 0416 by Gabe Hughes RN  Outcome: Met This Shift  4/11/2019 1813 by Sarah Parada RN  Outcome: Met This Shift  Goal: Will verbalize the importance of balancing activity with adequate rest periods  Description  Will verbalize the importance of balancing activity with adequate rest periods  4/12/2019 0416 by Gabe Hughes RN  Outcome: Met This Shift  4/11/2019 1813 by Sarah Parada RN  Outcome: Met This Shift     Problem: Cardiac:  Goal: Hemodynamic stability will improve  Description  Hemodynamic stability will improve  4/12/2019 0416 by Gabe Hughes RN  Outcome: Met This Shift  4/11/2019 1813 by Sarah Parada RN  Outcome: Met This Shift  Goal: Ability to maintain an adequate cardiac output will improve  Description  Ability to maintain an adequate cardiac output will improve  4/12/2019 0416 by Gabe Hughes RN  Outcome: Met This Shift  4/11/2019 1813 by Sarah Parada RN  Outcome: Met This Shift     Problem: Cardiovascular  Goal: No DVT, peripheral vascular complications  7/96/6981 1813 by Sarah Parada RN  Outcome: Met This Shift  Goal: Understanding of dietary restrictions  4/11/2019 1813 by Sarah Parada RN  Outcome: Met This Shift     Problem:   Goal: Adequate urinary output  4/12/2019 0416 by Gabe Hughes RN  Outcome: Met This Shift  4/11/2019 1813 by Sarah Parada RN  Outcome: Not Met This Shift  Goal: No urinary complication  6/17/0724 6823 by Saira York RN  Outcome: Met This Shift  4/11/2019 1813 by Destiny Dotson RN  Outcome: Not Met This Shift     Problem: Pain:  Goal: Pain level will decrease  Description  Pain level will decrease  4/12/2019 0416 by Saira York RN  Outcome: Met This Shift  4/11/2019 1813 by Destiny Dotson RN  Outcome: Met This Shift  Goal: Control of acute pain  Description  Control of acute pain  4/12/2019 0416 by Saira York RN  Outcome: Met This Shift  4/11/2019 1813 by Destiny Dotson RN  Outcome: Met This Shift  Goal: Control of chronic pain  Description  Control of chronic pain  4/12/2019 0416 by Saira York RN  Outcome: Met This Shift  4/11/2019 1813 by Destiny Dotson RN  Outcome: Met This Shift

## 2019-04-12 NOTE — PROGRESS NOTES
Patient hooked up to sleep study, instructed by sleep staff to remain supine or comfortable in bed. Will hold q2H turns overnight and allow patient to move freely in bed.

## 2019-04-12 NOTE — PROGRESS NOTES
Pulmonary Progress Note    Admit Date: 2019  Hospital day                               PCP: Diane Pérez MD    Chief Complaint (s):  Patient Active Problem List   Diagnosis    Hypothyroidism    TARIK (obstructive sleep apnea)    Mixed hyperlipidemia    CAD in native artery    Acute on chronic diastolic congestive heart failure (HCC)    Essential hypertension    Atrial fibrillation (HCC)    Lung nodule    Paroxysmal atrial fibrillation (HCC)    CHF (congestive heart failure), NYHA class I, acute on chronic, combined ([address]    Acute on chronic diastolic CHF (congestive heart failure) (HCC)    Nonrheumatic aortic valve insufficiency    Non-rheumatic mitral regurgitation    Diastolic CHF ([address]       Subjective:  · Significant shortness of breath this a.m. which is now resolved. The etiology is unclear. His most likely cardiac asthma with bronchospasm as any lung disease causing bronchospasm is inflammatory in nature and would never have resolved this quickly.       Vitals:  VITALS:  BP (!) 102/56   Pulse 61   Temp 98.1 °F (36.7 °C) (Oral)   Resp 18   Ht 4' 11\" (1.499 m)   Wt 196 lb 11.2 oz (89.2 kg)   SpO2 96%   BMI 39.73 kg/m²     24HR INTAKE/OUTPUT:      Intake/Output Summary (Last 24 hours) at 2019 1340  Last data filed at 2019 1304  Gross per 24 hour   Intake 940 ml   Output 1250 ml   Net -310 ml       24HR PULSE OXIMETRY RANGE:    SpO2  Av.3 %  Min: 91 %  Max: 96 %    Medications:  IV:      Scheduled Meds:   nitroGLYCERIN        isosorbide mononitrate  30 mg Oral Daily    amiodarone  100 mg Oral QAM    sodium chloride flush  10 mL Intravenous 2 times per day    apixaban  5 mg Oral BID    vitamin D  2,000 Units Oral Lunch    cyanocobalamin  1,000 mcg Intramuscular Q30 Days    ferrous sulfate  325 mg Oral Lunch    levothyroxine  137 mcg Oral QAM AC    lisinopril  40 mg Oral Nightly    atorvastatin  20 mg Oral Nightly    metoprolol succinate  50 mg Oral BID    potassium chloride  20 mEq Oral BID WC    predniSONE  10 mg Oral QAM    raloxifene  60 mg Oral QAM    rOPINIRole  0.25 mg Oral Nightly    senna  1 tablet Oral QAM    aspirin  81 mg Oral Daily    lactobacillus  1 capsule Oral Nightly    calcium-vitamin D  1 tablet Oral Lunch    magnesium oxide  400 mg Oral Daily    simethicone  160 mg Oral Lunch    famotidine  40 mg Oral Daily    rOPINIRole  0.75 mg Oral Daily       Diet:   DIET CARDIAC; No Caffeine     EXAM:  General: No distress. Alert. Eyes: PERRL. No sclera icterus. No conjunctival injection. ENT: No discharge. Pharynx clear. Neck: Trachea midline. Normal thyroid. Resp: No accessory muscle use. No rales. No wheezing. No rhonchi. CV: Regular rate. Regular rhythm. No murmur or rub. Abd: Non-tender. Non-distended. No masses. No organomegaly. Normal bowel sounds. Skin: Warm and dry. No nodule on exposed extremities. No rash on exposed extremities. Ext: No cyanosis, clubbing, 4 +edema  Lymph: No cervical LAD. No supraclavicular LAD. M/S: No cyanosis. No joint deformity. No clubbing. Neuro: Awake. Follows commands. Positive pupils/gag/corneals. Normal pain response. Results:  CBC:   Recent Labs     04/09/19  2156 04/11/19  0410 04/12/19  1110   WBC 5.3 6.0 9.2   HGB 12.1 11.3* 12.2   HCT 37.7 34.5 37.7   .3* 99.7 99.7    150 195     BMP:   Recent Labs     04/10/19  1325 04/11/19  0410 04/12/19  1110    139 138   K 4.8 4.0 4.3    101 101   CO2 29 31* 29   BUN 18 17 16   CREATININE 1.1* 1.0 1.0     LIVER PROFILE:   Recent Labs     04/10/19  1325 04/11/19  0410   AST 19 15   ALT 11 9   BILITOT 1.2 1.1   ALKPHOS 68 57     PT/INR: No results for input(s): PROTIME, INR in the last 72 hours. APTT: No results for input(s): APTT in the last 72 hours. Pathology:  1. N/A      Microbiology:  1.  None    Recent ABG:   No results for input(s): PH, PO2, PCO2, HCO3, BE, O2SAT, METHB, O2HB, COHB, O2CON, HHB,

## 2019-04-12 NOTE — PROGRESS NOTES
[facility] Quality Flow/Interdisciplinary Rounds Progress Note        Quality Flow Rounds held on April 12, 2019    Disciplines Attending:  Bedside Nurse, ,  and Nursing Unit Leadership    Vani Garcia was admitted on 4/9/2019  9:01 PM    Anticipated Discharge Date:  Expected Discharge Date: 04/22/19    Disposition:    David Score:  David Scale Score: 21    Readmission Risk              Risk of Unplanned Readmission:        24           Discussed patient goal for the day, patient clinical progression, and barriers to discharge. The following Goal(s) of the Day/Commitment(s) have been identified:  Diuresis as tolerated, check Cardiology plan.       Karen Garcia  April 12, 2019

## 2019-04-12 NOTE — PROGRESS NOTES
INPATIENT CARDIOLOGY FOLLOW-UP    Name: Kaleigh Stewart    Age: 80 y.o. Date of Admission: 4/9/2019  9:01 PM    Date of Service: 4/12/2019    Chief Complaint: Follow-up for dyspnea/CHF    Interim History:  No new overnight cardiac complaints. Currently with no complaints of CP, SOB, palpitations, dizziness, or lightheadedness. SR on telemetry. Review of Systems:   Cardiac: As per HPI  General: No fever, chills  Pulmonary: As per HPI  HEENT: No visual disturbances, difficult swallowing  GI: No nausea, vomiting  Endocrine: No thyroid disease or DM  Musculoskeletal: VILLEGAS x 4, no focal motor deficits  Skin: Intact, no rashes  Neuro/Psych: No headache or seizures    Problem List:  Active Problems:    Acute on chronic diastolic CHF (congestive heart failure) ([facility])    Diastolic CHF ([facility]  Resolved Problems:    * No resolved hospital problems. *      Allergies: Allergies   Allergen Reactions    Judit-D [Diphenhydramine Hcl] Other (See Comments)     Becomes very hyper, \"can't stay still\" dystonia    Benadryl [Diphenhydramine]     Phenergan [Promethazine Hcl]      Dystonia      Ibuprofen Nausea And Vomiting     Patient states she takes \"Advil all the time\" and is not allergic to Ibuprofen.        Current Medications:  Current Facility-Administered Medications   Medication Dose Route Frequency Provider Last Rate Last Dose    nitroGLYCERIN (NITROSTAT) 0.4 MG SL tablet             amiodarone (CORDARONE) tablet 100 mg  100 mg Oral QALISA Hebert MD   100 mg at 04/12/19 0535    sodium chloride flush 0.9 % injection 10 mL  10 mL Intravenous 2 times per day Shanna Garcia MD   10 mL at 04/12/19 0834    sodium chloride flush 0.9 % injection 10 mL  10 mL Intravenous PRN Shanna Garcia MD   10 mL at 04/12/19 1109    acetaminophen (TYLENOL) tablet 650 mg  650 mg Oral Q4H PRN Shanna Garcia MD   650 mg at 04/12/19 0563    apixaban (ELIQUIS) tablet 5 mg  5 mg Oral BID Shanna Garcia MD   5 mg at 04/12/19 0830    vitamin D tablet 2,000 Units  2,000 Units Oral Lunch Jamee Roe MD   2,000 Units at 04/11/19 1149    cyanocobalamin injection 1,000 mcg  1,000 mcg Intramuscular Q30 Days Jamee Roe MD   1,000 mcg at 04/11/19 1127    ferrous sulfate tablet 325 mg  325 mg Oral Lunch Jamee Roe MD   325 mg at 04/11/19 1149    HYDROcodone-acetaminophen (Arlester Cindy) 7.5-325 MG per tablet 1 tablet  1 tablet Oral Q6H PRN Jamee Roe MD   1 tablet at 04/12/19 1012    lactulose (CHRONULAC) 10 GM/15ML solution 20 g  20 g Oral Nightly PRN Jamee Roe MD   20 g at 04/11/19 4669    levothyroxine (SYNTHROID) tablet 137 mcg  137 mcg Oral QAM AC Jamee Roe MD   137 mcg at 04/12/19 0657    lisinopril (PRINIVIL;ZESTRIL) tablet 40 mg  40 mg Oral Nightly Jamee Roe MD   40 mg at 04/11/19 2223    atorvastatin (LIPITOR) tablet 20 mg  20 mg Oral Nightly Jamee Roe MD   20 mg at 04/11/19 2215    metoprolol succinate (TOPROL XL) extended release tablet 50 mg  50 mg Oral BID Jamee Roe MD   50 mg at 04/12/19 4920    potassium chloride (KLOR-CON M) extended release tablet 20 mEq  20 mEq Oral BID WC Jamee Roe MD   20 mEq at 04/12/19 9689    predniSONE (DELTASONE) tablet 10 mg  10 mg Oral QAM Jamee Roe MD   10 mg at 04/12/19 5198    raloxifene (EVISTA) tablet 60 mg  60 mg Oral QAM Jamee Roe MD   60 mg at 04/12/19 8253    rOPINIRole (REQUIP) tablet 0.25 mg  0.25 mg Oral Nightly Jamee Roe MD   0.25 mg at 04/11/19 2216    senna (SENOKOT) tablet 8.6 mg  1 tablet Oral QAM Jamee Roe MD   8.6 mg at 04/12/19 1606    magnesium hydroxide (MILK OF MAGNESIA) 400 MG/5ML suspension 30 mL  30 mL Oral Daily PRN Jamee Roe MD        ondansetron [facility] injection 4 mg  4 mg Intravenous Q6H PRN Jamee Roe MD        aspirin chewable tablet 81 mg  81 mg Oral Daily Jamee Roe MD   81 mg at 04/12/19 0794    lactobacillus (CULTURELLE) capsule 1 capsule  1 capsule Oral Nightly Jamee Roe MD   1 capsule at 04/11/19 2216    calcium-vitamin D (OSCAL-500) 500-200 MG-UNIT per tablet 1 tablet  1 tablet Oral Lunch Jalen Neal MD   1 tablet at 04/11/19 1149    magnesium oxide (MAG-OX) tablet 400 mg  400 mg Oral Daily Jalen Neal MD   400 mg at 04/12/19 7356    simethicone (MYLICON) chewable tablet 160 mg  160 mg Oral Lunch Jalen Neal MD   160 mg at 04/11/19 1149    famotidine (PEPCID) tablet 40 mg  40 mg Oral Daily Jalen Neal MD   40 mg at 04/12/19 6926    rOPINIRole (REQUIP) tablet 0.75 mg  0.75 mg Oral Daily Jalen Neal MD   0.75 mg at 04/11/19 1521    nystatin-triamcinolone (MYCOLOG II) cream   Topical BID PRN Jalen Neal MD        albuterol (PROVENTIL) nebulizer solution 2.5 mg  2.5 mg Nebulization Q6H PRN ZHANNA Garber             Physical Exam:  /67   Pulse 65   Temp 97.6 °F (36.4 °C) (Oral)   Resp 18   Ht 4' 11\" (1.499 m)   Wt 196 lb 11.2 oz (89.2 kg)   SpO2 96%   BMI 39.73 kg/m²   Wt Readings from Last 3 Encounters:   04/10/19 196 lb 11.2 oz (89.2 kg)   04/08/19 201 lb (91.2 kg)   03/22/19 182 lb 4.8 oz (82.7 kg)     Appearance: Awake, alert, no acute respiratory distress  Skin: Intact, no rash  Head: Normocephalic, atraumatic  Eyes: EOMI, no conjunctival erythema  ENMT: No pharyngeal erythema, MMM, no rhinorrhea  Neck: Supple, no elevated JVP, no carotid bruits  Lungs: Clear to auscultation bilaterally. No wheezes, rales, or rhonchi.   Cardiac: Regular rate and rhythm, +S1S2, no murmurs apparent  Abdomen: Soft, nontender, +bowel sounds  Extremities: Moves all extremities x 4, no lower extremity edema  Neurologic: No focal motor deficits apparent, normal mood and affect  Peripheral Pulses: Intact posterior tibial pulses bilaterally    Intake/Output:    Intake/Output Summary (Last 24 hours) at 4/12/2019 1112  Last data filed at 4/12/2019 0908  Gross per 24 hour   Intake 520 ml   Output 950 ml   Net -430 ml     I/O this shift:  In: -   Out: 450 mononitrate today  2 Thyroid check  3 Will continue IV diuresis                [facility], [address] [facility]        Lab Results   Component Value Date     04/11/2019     04/10/2019     04/09/2019    K 4.0 04/11/2019    K 4.8 04/10/2019    K 3.8 04/09/2019     04/11/2019     04/10/2019    CL 99 04/09/2019    CO2 31 (H) 04/11/2019    CO2 29 04/10/2019    CO2 32 (H) 04/09/2019    BUN 17 04/11/2019    BUN 18 04/10/2019    BUN 19 04/09/2019    CREATININE 1.0 04/11/2019    CREATININE 1.1 (H) 04/10/2019    CREATININE 1.3 (H) 04/09/2019    GLUCOSE 105 (H) 04/11/2019    GLUCOSE 172 (H) 04/10/2019    GLUCOSE 131 (H) 04/09/2019    CALCIUM 8.8 04/11/2019    CALCIUM 8.8 04/10/2019    CALCIUM 8.7 04/09/2019

## 2019-04-12 NOTE — CARE COORDINATION
Social work / Discharge Planning:       Patient is active with [facility] and will need orders to resume HC prior to discharge.   Per the patient's daughter, she has home 02 at night only (2 liters) from [facility].   Electronically signed by BRIJESH Fields on 4/12/2019 at 10:22 AM

## 2019-04-13 NOTE — PLAN OF CARE
Problem: Falls - Risk of:  Goal: Will remain free from falls  Description  Will remain free from falls  Outcome: Met This Shift  Goal: Absence of physical injury  Description  Absence of physical injury  Outcome: Met This Shift     Problem: Physical Regulation:  Goal: Complications related to the disease process, condition or treatment will be avoided or minimized  Description  Complications related to the disease process, condition or treatment will be avoided or minimized  Outcome: Met This Shift     Problem: Cardiovascular  Goal: No DVT, peripheral vascular complications  Outcome: Met This Shift     Problem:   Goal: Adequate urinary output  Outcome: Met This Shift  Goal: No urinary complication  Outcome: Met This Shift     Problem: Pain:  Goal: Control of acute pain  Description  Control of acute pain  Outcome: Met This Shift

## 2019-04-13 NOTE — PROGRESS NOTES
Disposition - PT/OT  - home w [facility] vs SNF de[ending on how patient does w therapy      Electronically signed by Ole Dennis MD on 4/13/2019 at 8:32 AM

## 2019-04-13 NOTE — PROGRESS NOTES
INPATIENT CARDIOLOGY FOLLOW-UP    Name: Tomasz Mcnulty    Age: 80 y.o. Date of Service: 4/13/2019    Chief Complaint: Follow-up for acute on chronic diastolic CHF, atrial fibrillation    Interim History:  Clinically improved over the past 24 hours with ongoing diuresis. No chest pain, palpitations, or respiratory distress at rest. LE edema improved. SR on telemetry. Review of Systems:   Cardiac: As per HPI  General: No fever, chills  Pulmonary: As per HPI  HEENT: No visual disturbances, difficult swallowing  GI: No nausea, vomiting  Endocrine: +hypothyroidism, no diabetes  Skin: Intact, no rashes  Neuro/Psych: No headache, focal motor deficits    Problem List:  Patient Active Problem List   Diagnosis    Hypothyroidism    TARIK (obstructive sleep apnea)    Mixed hyperlipidemia    CAD in native artery    Acute on chronic diastolic congestive heart failure (HCC)    Essential hypertension    Atrial fibrillation ([facility])    Lung nodule    Paroxysmal atrial fibrillation ([address]    CHF (congestive heart failure), NYHA class I, acute on chronic, combined ([address]    Acute on chronic diastolic CHF (congestive heart failure) ([facility])    Nonrheumatic aortic valve insufficiency    Non-rheumatic mitral regurgitation    Diastolic CHF ([address]       Allergies: Allergies   Allergen Reactions    Judit-D [Diphenhydramine Hcl] Other (See Comments)     Becomes very hyper, \"can't stay still\" dystonia    Benadryl [Diphenhydramine]     Phenergan [Promethazine Hcl]      Dystonia      Ibuprofen Nausea And Vomiting     Patient states she takes \"Advil all the time\" and is not allergic to Ibuprofen.        Current Medications:  Current Facility-Administered Medications   Medication Dose Route Frequency Provider Last Rate Last Dose    [START ON 4/14/2019] furosemide (LASIX) injection 20 mg  20 mg Intravenous BID ZHANNA Carrion CNP        isosorbide mononitrate (IMDUR) extended release tablet 30 mg  30 mg Oral Daily Geovanna Low Jann Gutierres MD   30 mg at 04/13/19 6689    metolazone (ZAROXOLYN) tablet 5 mg  5 mg Oral Daily Raissa Gill MD   5 mg at 04/13/19 2383    amiodarone (CORDARONE) tablet 100 mg  100 mg Oral QAM Raissa Gill MD   100 mg at 04/13/19 1462    sodium chloride flush 0.9 % injection 10 mL  10 mL Intravenous 2 times per day Natasha Gómez MD   10 mL at 04/13/19 0826    sodium chloride flush 0.9 % injection 10 mL  10 mL Intravenous PRN Natasha Gómez MD   10 mL at 04/12/19 1109    acetaminophen (TYLENOL) tablet 650 mg  650 mg Oral Q4H PRN Natasha Gómez MD   650 mg at 04/12/19 0405    apixaban (ELIQUIS) tablet 5 mg  5 mg Oral BID Natasha Gómez MD   5 mg at 04/13/19 7965    vitamin D tablet 2,000 Units  2,000 Units Oral Lunch Natasha Gómez MD   2,000 Units at 04/13/19 1139    cyanocobalamin injection 1,000 mcg  1,000 mcg Intramuscular Q30 Days Natasha Gómez MD   1,000 mcg at 04/11/19 1127    ferrous sulfate tablet 325 mg  325 mg Oral Lunch Natasha Gómez MD   325 mg at 04/13/19 1139    HYDROcodone-acetaminophen ([facility]) 7.5-325 MG per tablet 1 tablet  1 tablet Oral Q6H PRN Natasha Gómez MD   1 tablet at 04/13/19 0824    lactulose (CHRONULAC) 10 GM/15ML solution 20 g  20 g Oral Nightly PRN Natasha Gómez MD   20 g at 04/11/19 4954    levothyroxine (SYNTHROID) tablet 137 mcg  137 mcg Oral QAM AC Natasha Gómez MD   137 mcg at 04/13/19 3842    lisinopril (PRINIVIL;ZESTRIL) tablet 40 mg  40 mg Oral Nightly Natasha Gómez MD   40 mg at 04/12/19 2155    atorvastatin (LIPITOR) tablet 20 mg  20 mg Oral Nightly Natasha Gómez MD   20 mg at 04/12/19 2154    metoprolol succinate (TOPROL XL) extended release tablet 50 mg  50 mg Oral BID Natasha Gómez MD   50 mg at 04/13/19 3470    potassium chloride (KLOR-CON M) extended release tablet 20 mEq  20 mEq Oral BID  Natasha Gómez MD   20 mEq at 04/13/19 0842    predniSONE (DELTASONE) tablet 10 mg  10 mg Oral QAM Natasha Gómez MD   10 mg at 04/13/19 0114    raloxifene (EVISTA) tablet 60 mg  60 mg Oral QAM Kevin Gutierrez MD   60 mg at 04/13/19 7320    rOPINIRole (REQUIP) tablet 0.25 mg  0.25 mg Oral Nightly Kevin Gutierrez MD   0.25 mg at 04/12/19 2154    senna (SENOKOT) tablet 8.6 mg  1 tablet Oral QAM Kevin Gutierrez MD   8.6 mg at 04/13/19 8485    magnesium hydroxide (MILK OF MAGNESIA) 400 MG/5ML suspension 30 mL  30 mL Oral Daily PRN Kevin Gutierrez MD        ondansetron [facility]) injection 4 mg  4 mg Intravenous Q6H PRN Kevin Gutierrez MD        aspirin chewable tablet 81 mg  81 mg Oral Daily Kevin Gutierrez MD   81 mg at 04/13/19 3189    lactobacillus (CULTURELLE) capsule 1 capsule  1 capsule Oral Nightly Kevin Gutierrez MD   1 capsule at 04/12/19 2154    calcium-vitamin D (OSCAL-500) 500-200 MG-UNIT per tablet 1 tablet  1 tablet Oral Lunch Kevin Gutierrez MD   1 tablet at 04/13/19 1139    magnesium oxide (MAG-OX) tablet 400 mg  400 mg Oral Daily Kevin Gutierrez MD   400 mg at 04/13/19 1123    simethicone (MYLICON) chewable tablet 160 mg  160 mg Oral Lunch Kevin Gutierrez MD   160 mg at 04/13/19 1139    famotidine (PEPCID) tablet 40 mg  40 mg Oral Daily Kevin Gutierrez MD   40 mg at 04/13/19 7050    rOPINIRole (REQUIP) tablet 0.75 mg  0.75 mg Oral Daily Kevin Gutierrez MD   0.75 mg at 04/13/19 1526    nystatin-triamcinolone (MYCOLOG II) cream   Topical BID PRN Kevin Gutierrez MD        albuterol (PROVENTIL) nebulizer solution 2.5 mg  2.5 mg Nebulization Q6H PRN ZHANNA Ayers         Physical Exam:  /60   Pulse 60   Temp 99.8 °F (37.7 °C) (Oral)   Resp 18   Ht 4' 11\" (1.499 m)   Wt 197 lb (89.4 kg)   SpO2 96%   BMI 39.79 kg/m²   Wt Readings from Last 3 Encounters:   04/13/19 197 lb (89.4 kg)   04/08/19 201 lb (91.2 kg)   03/22/19 182 lb 4.8 oz (82.7 kg)     Appearance: Awake, alert, no acute respiratory distress  Skin: Intact, no rash  Head: Normocephalic, atraumatic  Eyes: EOMI, no conjunctival erythema  ENMT: MMM, no rhinorrhea  Neck: Supple, no carotid bruits  Lungs: Decreased BS B/L, no wheezing  Cardiac: RRR, 2/6 systolic murmur > RUSB  Abdomen: Soft, +bowel sounds  Extremities: Moves all extremities x 4, +LE edema (improved)  Neurologic: No focal motor deficits apparent, normal mood and affect    Laboratory Tests:  Lab Results   Component Value Date    CREATININE 1.0 04/12/2019    BUN 16 04/12/2019     04/12/2019    K 4.3 04/12/2019     04/12/2019    CO2 29 04/12/2019     Lab Results   Component Value Date    MG 2.3 02/18/2018     Lab Results   Component Value Date    ALT 9 04/11/2019    AST 15 04/11/2019    ALKPHOS 57 04/11/2019    BILITOT 1.1 04/11/2019     Lab Results   Component Value Date    WBC 9.2 04/12/2019    HGB 12.2 04/12/2019    HCT 37.7 04/12/2019    MCV 99.7 04/12/2019     04/12/2019     Lab Results   Component Value Date    CKTOTAL 71 10/24/2013    CKMB 0.8 10/24/2013    TROPONINI <0.01 04/10/2019    TROPONINI <0.01 04/09/2019    TROPONINI <0.01 03/19/2019     Lab Results   Component Value Date    INR 1.6 02/15/2018    INR 1.1 07/12/2017    INR 1.2 04/20/2015    PROTIME 18.0 (H) 02/15/2018    PROTIME 12.1 07/12/2017    PROTIME 13.0 (H) 04/20/2015     Lab Results   Component Value Date    TSH 3.250 04/11/2019     Lab Results   Component Value Date    CHOL 136 10/25/2013    CHOL 176 10/05/2011    CHOL 146 07/07/2011     Lab Results   Component Value Date    TRIG 108 10/25/2013    TRIG 169 (H) 10/05/2011    TRIG 108 07/07/2011     Lab Results   Component Value Date    HDL 37.0 (A) 10/25/2013    HDL 36.0 (A) 10/05/2011    HDL 34.0 (A) 07/07/2011     Lab Results   Component Value Date    LDLCALC 77 10/25/2013    LDLCALC 106 (H) 10/05/2011    [address] 90 07/07/2011     Cardiac Tests:  Telemetry: SR, rate 60's    Echocardiogram: 8/15/13  Technically difficult study with suboptimal echo windows. Definity contrast was used to improve endomyocardial border definition.   Ejection fraction is visually estimated at 55-60%. There is doppler evidence of stage II diastolic dysfunction. Mild left ventricular concentric hypertrophy noted. The left atrium is moderately dilated. The right ventricle is not completely visualized but appears at least mildly dilated. The right ventricle appears mildly hypertrophied. Mild mitral regurgitation is present. There is mild to moderate aortic regurgitation. Mild to moderate tricuspid regurgitation. Estimated right ventricular systolic pressure 30 mmHg assuming a right atrial pressure of 3 mmHg.      Exercise Nuclear Stress Test: 8/15/13  No evidence of stress induced ischemia, EF 83%     Cardiac Catheterization: 10/24/13  a. Status post remote stent of LAD with demonstration of moderate in-stent restenosis today. b. 50% ostial 2nd diagonal branch stenosis. 2. Normal left ventricular systolic function. 3. Aortic root dilatation.      Echocardiogram: 12/31/14 Kayleigh Zarate)  Ejection fraction is visually estimated at 60%. No regional wall motion abnormalities seen. Moderate aortic regurgitation is noted. Moderate mitral regurgitation is present.     Echocardiogram: 7/13/17 ([place])   Normal left ventricular systolic function.   Ejection fraction is visually estimated at 60-65%.  Mild concentric left ventricular hypertrophy with proximal septal thickening.   Normal right ventricular size and function (TAPSE 1.8 cm).   There is doppler evidence of stage II diastolic dysfunction.   Severely dilated left atrium by volume index.   Mild mitral regurgitation.   Mild-moderate aortic regurgitation.   Mild tricuspid regurgitation.   PASP is estimated at 38 mmHg.     PEACE: 2/13/18 Haydee Roth)   Ejection fraction is visually estimated at 55%.   No regional wall motion abnormalities seen.    Right ventricular segmental wall motion is normal.  Mild to moderate mitral regurgitation is present.   Mild to moderate tricuspid regurgitation.   Aortic sclerosis is noted.   Mild to moderate aortic regurgitation is noted. No evidence of thrombus within left atrium. PEACE: 3/19/19 (Dalbert Perfect)   Left ventricular size is grossly normal.   Mild left ventricular concentric hypertrophy noted.   No regional wall motion abnormalities seen.   There is doppler evidence of stage II diastolic dysfunction.   Moderate mitral regurgitation is present.   Mild-to-moderate aortic regurgitation is noted. Mild tricuspid regurgitation. RVSP is 54 mmHg. ASSESSMENT / PLAN:  1. Persistent atrial fibrillation (diagnosed 12/2014) -- recurrent atrial fibrillation s/p PEACE/CVN on 2/13/18, switched Toprol XL to BID dosing on 2/12/18 (continue), decision made by patient/family in the past to discontinue anticoagulation (she is now agreeable), eliquis 5 mg BID added 2/12/18 prior to [address] (continue) --> maintaining SR, amiodarone started in 5/2017. Continue Toprol XL, eliquis, and amiodarone as ordered. 2. Acute on chronic diastolic CHF -- will continue IV lasix for today, continue zaroxolyn, monitor I/O's/renal function/electrolytes (I/O's net negative 3.4 L)  3. Valvular heart disease - moderate AI and MR on 12/31/14 echocardiogram --> results of repeat echocardiograms outlined above  4. CAD s/p MC to mid LAD in 2011 --> no new flow limiting disease on 10/24/13 cardiac catheterization. Continue current medications. 5. HTN - controlled, continue BB and ACE-I, controlled  6. HLD - on statin  7. Reported ascending aortic aneurysm -- no aneurysm noted on 7/12/17 CTA chest  8. Hypothyroidism - on synthroid, normal TSH this admission  9. TARIK - AHI 5 on 12/2014 study / significant O2 desaturation at nighttime -- CPAP started this admission  10. Chronic LAFB  11. Hospitalized in 4/2015 for treatment of lumbar stenosis --> s/p prior epidural injections  12. Hypoxia -- CTA chest with no PE, +\"density in the lung fields compatible with atelectasis and/or possibly pneumonia. There is associated chronic pleural thickening seen.  Stable pulmonary

## 2019-04-13 NOTE — PLAN OF CARE
Problem: Falls - Risk of:  Goal: Will remain free from falls  Description  Will remain free from falls  4/13/2019 0809 by Rebeka Brandt RN  Outcome: Ongoing  4/13/2019 0323 by Slick Doyle RN  Outcome: Met This Shift  Goal: Absence of physical injury  Description  Absence of physical injury  4/13/2019 0809 by Rebeka Brandt RN  Outcome: Ongoing  4/13/2019 0323 by Slick Doyle RN  Outcome: Met This Shift

## 2019-04-13 NOTE — PLAN OF CARE
Problem: Falls - Risk of:  Goal: Will remain free from falls  Description  Will remain free from falls  4/13/2019 1054 by Flynn Boykin RN  Outcome: Ongoing  4/13/2019 0809 by Flynn Boykin RN  Outcome: Ongoing  4/13/2019 0323 by Rashaun Rosas RN  Outcome: Met This Shift  Goal: Absence of physical injury  Description  Absence of physical injury  4/13/2019 1054 by Flynn Boykin RN  Outcome: Ongoing  4/13/2019 0809 by Flynn Boykin RN  Outcome: Ongoing  4/13/2019 0323 by Rashaun Rosas RN  Outcome: Met This Shift

## 2019-04-13 NOTE — PROGRESS NOTES
P Quality Flow/Interdisciplinary Rounds Progress Note        Quality Flow Rounds held on April 13, 2019    Disciplines Attending:  Bedside Nurse and Nursing Unit Leadership    Sheehan Carlos was admitted on 4/9/2019  9:01 PM    Anticipated Discharge Date:  Expected Discharge Date: 04/22/19    Disposition:    David Score:  David Scale Score: 20    Readmission Risk              Risk of Unplanned Readmission:        22           Discussed patient goal for the day, patient clinical progression, and barriers to discharge. The following Goal(s) of the Day/Commitment(s) have been identified:  Await SNF placement.       Nadine Gonzalez  April 13, 2019

## 2019-04-14 NOTE — PROGRESS NOTES
INPATIENT CARDIOLOGY FOLLOW-UP    Name: Yoni Baez    Age: 80 y.o. Date of Service: 4/14/2019    Chief Complaint: Follow-up for acute on chronic diastolic CHF, atrial fibrillation    Interim History:  Clinically improved over the past 48 hours with ongoing diuresis. No chest pain, palpitations, or respiratory distress at rest. LE edema improved. SR on telemetry. Review of Systems:   Cardiac: As per HPI  General: No fever, chills  Pulmonary: As per HPI  HEENT: No visual disturbances, difficult swallowing  GI: No nausea, vomiting  Endocrine: +hypothyroidism, no diabetes  Skin: Intact, no rashes  Neuro/Psych: No headache, focal motor deficits    Problem List:  Patient Active Problem List   Diagnosis    Hypothyroidism    TARIK (obstructive sleep apnea)    Mixed hyperlipidemia    CAD in native artery    Acute on chronic diastolic congestive heart failure (HCC)    Essential hypertension    [facility] fibrillation ([facility])    Lung nodule    Paroxysmal atrial fibrillation ([address]    CHF (congestive heart failure), NYHA class I, acute on chronic, combined ([address]    Acute on chronic diastolic CHF (congestive heart failure) ([facility])    Nonrheumatic aortic valve insufficiency    Non-rheumatic mitral regurgitation    Diastolic CHF ([address]       Allergies: Allergies   Allergen Reactions    Judit-D [Diphenhydramine Hcl] Other (See Comments)     Becomes very hyper, \"can't stay still\" dystonia    Benadryl [Diphenhydramine]     Phenergan [Promethazine Hcl]      Dystonia      Ibuprofen Nausea And Vomiting     Patient states she takes \"Advil all the time\" and is not allergic to Ibuprofen.        Current Medications:  Current Facility-Administered Medications   Medication Dose Route Frequency Provider Last Rate Last Dose    furosemide (LASIX) injection 20 mg  20 mg Intravenous BID ZHANNA Hunt CNP   20 mg at 04/14/19 6476    isosorbide mononitrate (IMDUR) extended release tablet 30 mg  30 mg Oral Daily Angie Mayer raloxifene (EVISTA) tablet 60 mg  60 mg Oral QAM Mariangel Amador MD   60 mg at 04/14/19 0818    rOPINIRole (REQUIP) tablet 0.25 mg  0.25 mg Oral Nightly Mariangel Amador MD   0.25 mg at 04/13/19 2009    senna (SENOKOT) tablet 8.6 mg  1 tablet Oral QAM Mariangel Amador MD   8.6 mg at 04/14/19 0818    magnesium hydroxide (MILK OF MAGNESIA) 400 MG/5ML suspension 30 mL  30 mL Oral Daily PRN Mariangel Amador MD        ondansetron [facility]) injection 4 mg  4 mg Intravenous Q6H PRN Mariangel Amador MD        aspirin chewable tablet 81 mg  81 mg Oral Daily Mariangel Amador MD   81 mg at 04/14/19 0818    lactobacillus (CULTURELLE) capsule 1 capsule  1 capsule Oral Nightly Mariangel Amador MD   1 capsule at 04/13/19 2009    calcium-vitamin D (OSCAL-500) 500-200 MG-UNIT per tablet 1 tablet  1 tablet Oral Lunch Mariangel Amador MD   1 tablet at 04/13/19 1139    magnesium oxide (MAG-OX) tablet 400 mg  400 mg Oral Daily Mariangel Amador MD   400 mg at 04/14/19 0818    simethicone (MYLICON) chewable tablet 160 mg  160 mg Oral Lunch Mariangel Amador MD   160 mg at 04/14/19 1110    famotidine (PEPCID) tablet 40 mg  40 mg Oral Daily Mariangel Amador MD   40 mg at 04/14/19 0819    rOPINIRole (REQUIP) tablet 0.75 mg  0.75 mg Oral Daily Mariangel Amador MD   0.75 mg at 04/13/19 1526    nystatin-triamcinolone (MYCOLOG II) cream   Topical BID PRN Mariangel Amador MD         Physical Exam:  /63   Pulse 56   Temp 97.7 °F (36.5 °C) (Oral)   Resp 18   Ht 4' 11\" (1.499 m)   Wt 193 lb 12.8 oz (87.9 kg)   SpO2 95%   BMI 39.14 kg/m²   Wt Readings from Last 3 Encounters:   04/14/19 193 lb 12.8 oz (87.9 kg)   04/08/19 201 lb (91.2 kg)   03/22/19 182 lb 4.8 oz (82.7 kg)     Appearance: Awake, alert, no acute respiratory distress  Skin: Intact, no rash  Head: Normocephalic, atraumatic  Eyes: EOMI, no conjunctival erythema  ENMT: MMM, no rhinorrhea  Neck: Supple, no carotid bruits  Lungs: Decreased BS B/L, no wheezing  Cardiac: RRR, 2/6 systolic murmur > RUSB  Abdomen: Soft, +bowel sounds  Extremities: Moves all extremities x 4, +LE edema (improved)  Neurologic: No focal motor deficits apparent, normal mood and affect    Laboratory Tests:  Lab Results   Component Value Date    CREATININE 1.2 (H) 04/14/2019    BUN 21 04/14/2019     04/14/2019    K 3.5 04/14/2019    CL 99 04/14/2019    CO2 32 (H) 04/14/2019     Lab Results   Component Value Date    MG 2.3 04/14/2019     Lab Results   Component Value Date    ALT 9 04/11/2019    AST 15 04/11/2019    ALKPHOS 57 04/11/2019    BILITOT 1.1 04/11/2019     Lab Results   Component Value Date    WBC 9.2 04/12/2019    HGB 12.2 04/12/2019    HCT 37.7 04/12/2019    MCV 99.7 04/12/2019     04/12/2019     Lab Results   Component Value Date    CKTOTAL 71 10/24/2013    CKMB 0.8 10/24/2013    TROPONINI <0.01 04/10/2019    TROPONINI <0.01 04/09/2019    TROPONINI <0.01 03/19/2019     Lab Results   Component Value Date    INR 1.6 02/15/2018    INR 1.1 07/12/2017    INR 1.2 04/20/2015    PROTIME 18.0 (H) 02/15/2018    PROTIME 12.1 07/12/2017    PROTIME 13.0 (H) 04/20/2015     Lab Results   Component Value Date    TSH 3.250 04/11/2019     Lab Results   Component Value Date    CHOL 136 10/25/2013    CHOL 176 10/05/2011    CHOL 146 07/07/2011     Lab Results   Component Value Date    TRIG 108 10/25/2013    TRIG 169 (H) 10/05/2011    TRIG 108 07/07/2011     Lab Results   Component Value Date    HDL 37.0 (A) 10/25/2013    HDL 36.0 (A) 10/05/2011    HDL 34.0 (A) 07/07/2011     Lab Results   Component Value Date    LDLCALC 77 10/25/2013    LDLCALC 106 (H) 10/05/2011    [facility] 90 07/07/2011     Cardiac Tests:  Telemetry: SR, rate 60's    Echocardiogram: 8/15/13  Technically difficult study with suboptimal echo windows. Definity contrast was used to improve endomyocardial border definition. Ejection fraction is visually estimated at 55-60%. There is doppler evidence of stage II diastolic dysfunction.   Mild left ventricular

## 2019-04-14 NOTE — PROGRESS NOTES
[facility] [address] [facility]  [address] [facility], [address], [address] [facility]      Pulmonary Progress Note      SUBJECTIVE:  Claims similar with breathing, sitting up in chair.  Not much cough/congestion, on NC, didn't tolerate NIPPV last night despite change in mask, claims pressure too much for her    OBJECTIVE    Medications    Continuous Infusions:    Scheduled Meds:   furosemide  20 mg Intravenous BID    isosorbide mononitrate  30 mg Oral Daily    metolazone  5 mg Oral Daily    amiodarone  100 mg Oral QAM    sodium chloride flush  10 mL Intravenous 2 times per day    apixaban  5 mg Oral BID    vitamin D  2,000 Units Oral Lunch    cyanocobalamin  1,000 mcg Intramuscular Q30 Days    ferrous sulfate  325 mg Oral Lunch    levothyroxine  137 mcg Oral QAM AC    lisinopril  40 mg Oral Nightly    atorvastatin  20 mg Oral Nightly    metoprolol succinate  50 mg Oral BID    potassium chloride  20 mEq Oral BID WC    predniSONE  10 mg Oral QAM    raloxifene  60 mg Oral QAM    rOPINIRole  0.25 mg Oral Nightly    senna  1 tablet Oral QAM    aspirin  81 mg Oral Daily    lactobacillus  1 capsule Oral Nightly    calcium-vitamin D  1 tablet Oral Lunch    magnesium oxide  400 mg Oral Daily    simethicone  160 mg Oral Lunch    famotidine  40 mg Oral Daily    rOPINIRole  0.75 mg Oral Daily       PRN Meds:sodium chloride flush, acetaminophen, HYDROcodone-acetaminophen, lactulose, magnesium hydroxide, ondansetron, nystatin-triamcinolone    Physical    VITALS:  /63   Pulse 56   Temp 97.7 °F (36.5 °C) (Oral)   Resp 18   Ht 4' 11\" (1.499 m)   Wt 193 lb 12.8 oz (87.9 kg)   SpO2 95%   BMI 39.14 kg/m²     24HR INTAKE/OUTPUT:      Intake/Output Summary (Last 24 hours) at 2019 1057  Last data filed at 2019 0817  Gross per 24 hour   Intake 370 ml   Output 2700 ml   Net -2330 ml       24HR PULSE OXIMETRY RANGE:    SpO2  Av.5 %  Min: 92 %  Max: 96 %    General appearance: alert, appears stated age and cooperative  Lungs: rhonchi bibasilar  Heart: regular rate and rhythm, S1, S2 normal, no murmur, click, rub or gallop  Abdomen: soft, non-tender; bowel sounds normal; no masses,  no organomegaly  Extremities: edema bipedal  Neurologic: Mental status: Alert, oriented, thought content appropriate    Data    CBC:   Recent Labs     04/12/19  1110   WBC 9.2   HGB 12.2   HCT 37.7   MCV 99.7          BMP:  Recent Labs     04/12/19  1110 04/14/19  0357    140   K 4.3 3.5    99   CO2 29 32*   BUN 16 21   CREATININE 1.0 1.2*    ALB:3,BILIDIR:3,BILITOT:3,ALKPHOS:3)@    PT/INR: No results for input(s): PROTIME, INR in the last 72 hours. ABG:   No results for input(s): PH, PO2, PCO2, HCO3, BE, O2SAT, METHB, O2HB, COHB, O2CON, HHB, THB in the last 72 hours. Radiology/Other tests reviewed: none    Assessment:     Active Problems:    Acute on chronic diastolic CHF (congestive heart failure) ([facility])    Diastolic [facility] ([facility]  Resolved Problems:    * No resolved hospital problems. *      Plan:       1. Cont with diuresis, watch fluid balance  2. Cont with oxygen, CPAP at night, not sure how low pressures were adjusted to before she refused to wear further, will need sleep study as out-pt  3. OOB to chair, ambulate as tolerated        Thanks for letting us see this patient in consultation. Please contact us with any questions. Office [phone] or after hours through [employer], x [phone].

## 2019-04-14 NOTE — PROGRESS NOTES
Disposition - PT/OT  - home w [facility] vs SNF de[ending on how patient does w therapy      Electronically signed by Alexis Pineda MD on 4/14/2019 at 8:02 AM

## 2019-04-15 NOTE — CARE COORDINATION
Social work / Discharge Planning:       Social work spoke to the patient's daughter Rosalva Landa. Both the patient and her daughter plan for patient to return home with [facility]. Patient will need orders to resume HC. Social work spoke to [facility] and was informed that they provide home 02 concentrator but not a bipap at the patient's residence.   Electronically signed by BRIJESH Jarrett on 4/15/2019 at 11:22 AM

## 2019-04-15 NOTE — PROGRESS NOTES
Physical Therapy    Facility/Department: [facility] MED SURG  Initial Assessment    NAME: Nai Casillas  : 1927  MRN: [id number]    Date of Service: 4/15/2019       REQUIRES PT FOLLOW UP: Yes       Patient Diagnosis(es): The encounter diagnosis was Acute on chronic diastolic congestive heart failure ([address] has a past medical history of A-fib ([address] AC (acromioclavicular) joint bone spurs, Acid reflux, Ascending aortic aneurysm (HCC), Atrial fibrillation ([address] CAD (coronary artery disease), Cancer ([address] CHF (congestive heart failure) ([address] Chronic back pain, DJD (degenerative joint disease), Hyperlipidemia, Hypertension, Hypothyroidism, Iron deficiency anemia, Lung nodule, MVP (mitral valve prolapse), Osteoarthritis, Osteoporosis, Partial bowel obstruction ([address] Pneumonia, Polymyalgia rheumatica (HCC), Scoliosis, Sinus arrhythmia, Sleep apnea, Spinal stenosis, Thoracic ascending aortic aneurysm ([address] and Thyroid disease. has a past surgical history that includes Artery Biopsy (); cervical fusion (); Toe Surgery; Wrist surgery (Right, YRS AGO); Foot surgery (,); Hysterectomy (s); Colon surgery (s); Cardiac catheterization (); Coronary angioplasty with stent (2011); eye surgery (Bilateral, ); hernia repair (, ); Cholecystectomy (); back surgery (); Dilatation, esophagus; transesophageal echocardiogram (2018); Cardioversion (2018); and Breast surgery (Right, ). Evaluating Therapist: Renato Morin PT        Room #:  140   DIAGNOSIS:  CHF   Additional Pertinent History: recent fall, sustained L wrist injury and R knee injury   PRECAUTIONS:  Falls, L wrist brace, R knee brace     Social:  Pt lives alone  in a  1  floor apartment, 6  steps and  1  rails to enter. Prior to admission pt walked with  QC . Maryam gautam. Initial Evaluation  Date:  4/15/19 Treatment      Short Term/ Long Term   Goals   Was pt agreeable to Eval/treatment? yes      Does pt have pain? L wrist, R knee      Bed Mobility  Rolling:  NT   Supine to sit:  S/ I   Sit to supine:  NT   Scooting:  SBA in sit    independent    Transfers Sit to stand:  SBA   Stand to sit:  SBA   Stand pivot:  SBA    independent    Ambulation     15 feet x 1 and 100 feet x 1  with  QC  with  SBA   150  feet with  QC or AAD  with  Independent        Stair negotiation: ascended and descended NT    6  steps with 1  rail with  SBA    LE ROM  WFL      LE strength  4-/ 5      AM- PAC RAW score   18/ 24            Pt is alert and Oriented x  3      Balance:  SBA , no LOB with gait   Endurance:  Decreased   Chair alarm: Yes      ASSESSMENT  Pt displays functional ability as noted in the objective portion of this evaluation. Comments/Treatment:   R knee brace donned prior to gait. Pt able to perform hygiene in bathroom  S/I. Pulse ox after gait on RA 93%   Pt left up in chair with call light in reach        Examination of body systems Decreased   Functional mobility x   ROM    Strength x   Safety Awareness    Cognition    Endurance x   Sensation    Balance x   Vision/Visual Deficits    Coordination        Patient education  Pt educated on  Fall risk     Patient response to education:   Pt verbalized understanding Pt demonstrated skill Pt requires further education in this area   x  x     Rehab potential is Good for reaching above PT goals. Pts/ family goals   1. Home     Patient and or family understand(s) diagnosis, prognosis, and plan of care. -  Yes     PLAN  PT care will be provided in accordance with the objectives noted above. Whenever appropriate, clear delegation orders will be provided for nursing staff. Exercises and functional mobility practice will be used as well as appropriate assistive devices or modalities to obtain goals. Patient and family education will also be administered as needed. Frequency of treatments will be 2-3 x/week x  5 days.     Time in:  0820   Time out: [address] [facility] [address] number:  PT 2356

## 2019-04-15 NOTE — PROGRESS NOTES
Pulmonary Progress Note    Admit Date: 2019  Hospital day                               PCP: Anne Amin MD    Chief Complaint (s):  Patient Active Problem List   Diagnosis    Hypothyroidism    TARIK (obstructive sleep apnea)    Mixed hyperlipidemia    CAD in native artery    Acute on chronic diastolic congestive heart failure (HCC)    Essential hypertension    Atrial fibrillation (HCC)    Lung nodule    Paroxysmal atrial fibrillation (HCC)    CHF (congestive heart failure), NYHA class I, acute on chronic, combined ([facility]    Acute on chronic diastolic [facility]congestive heart failure) ([facility])    Nonrheumatic aortic valve insufficiency    Non-rheumatic mitral regurgitation    Diastolic CHF ([facility]       Subjective:  · Sleeping soundly this a.m. Home BiPAP is in place. Events of the weekend are noted. There's been a very good response to diuretics.       Vitals:  VITALS:  /70   Pulse 56   Temp 97.4 °F (36.3 °C) (Oral)   Resp 16   Ht 4' 11\" (1.499 m)   Wt 195 lb 3.2 oz (88.5 kg)   SpO2 97%   BMI 39.43 kg/m²     24HR INTAKE/OUTPUT:      Intake/Output Summary (Last 24 hours) at 4/15/2019 0630  Last data filed at 2019 2115  Gross per 24 hour   Intake 670 ml   Output 1950 ml   Net -1280 ml       24HR PULSE OXIMETRY RANGE:    SpO2  Av %  Min: 95 %  Max: 97 %    Medications:  IV:      Scheduled Meds:   furosemide  20 mg Intravenous BID    isosorbide mononitrate  30 mg Oral Daily    metolazone  5 mg Oral Daily    amiodarone  100 mg Oral QAM    sodium chloride flush  10 mL Intravenous 2 times per day    apixaban  5 mg Oral BID    vitamin D  2,000 Units Oral Lunch    cyanocobalamin  1,000 mcg Intramuscular Q30 Days    ferrous sulfate  325 mg Oral Lunch    levothyroxine  137 mcg Oral QAM AC    lisinopril  40 mg Oral Nightly    atorvastatin  20 mg Oral Nightly    metoprolol succinate  50 mg Oral BID    potassium chloride  20 mEq Oral BID WC    predniSONE  10 mg Oral QAM    raloxifene  60 mg Oral QAM    rOPINIRole  0.25 mg Oral Nightly    senna  1 tablet Oral QAM    aspirin  81 mg Oral Daily    lactobacillus  1 capsule Oral Nightly    calcium-vitamin D  1 tablet Oral Lunch    magnesium oxide  400 mg Oral Daily    simethicone  160 mg Oral Lunch    famotidine  40 mg Oral Daily    rOPINIRole  0.75 mg Oral Daily       Diet:   DIET CARDIAC; No Caffeine     EXAM:  General: No distress. Sleeping soundly and comfortably  Eyes: PERRL. No sclera icterus. No conjunctival injection. ENT: No discharge. Pharynx clear. Neck: Trachea midline. Normal thyroid. Resp: No accessory muscle use. No rales. No wheezing. No rhonchi. CV: Regular rate. Regular rhythm. No murmur or rub. Abd: Non-tender. Non-distended. No masses. No organomegaly. Normal bowel sounds. Skin: Warm and dry. No nodule on exposed extremities. No rash on exposed extremities. Ext: No cyanosis, clubbing, 3 +edema  Lymph: No cervical LAD. No supraclavicular LAD. M/S: No cyanosis. No joint deformity. No clubbing. Neuro: Positive pupils/gag/corneals. Normal pain response. Results:  CBC:   Recent Labs     04/12/19  1110   WBC 9.2   HGB 12.2   HCT 37.7   MCV 99.7        BMP:   Recent Labs     04/12/19  1110 04/14/19  0357 04/15/19  0231    140 142   K 4.3 3.5 3.7    99 98   CO2 29 32* 35*   BUN 16 21 23   CREATININE 1.0 1.2* 1.4*     LIVER PROFILE:   No results for input(s): AST, ALT, LIPASE, BILIDIR, BILITOT, ALKPHOS in the last 72 hours. Invalid input(s): AMYLASE,  ALB  PT/INR: No results for input(s): PROTIME, INR in the last 72 hours. APTT: No results for input(s): APTT in the last 72 hours. Pathology:  1. N/A      Microbiology:  1. None    Recent ABG:   No results for input(s): PH, PO2, PCO2, HCO3, BE, O2SAT, METHB, O2HB, COHB, O2CON, HHB, THB in the last 72 hours. Recent Films:  XR CHEST PORTABLE   Final Result   Chronic findings in the base. Mild cardiomegaly.  No acute   pulmonary process. Assessment:  1. CHF Exacerbation HFPEF  2. TARIK:  treated with oxygen in the home. Pulse oximetry evidences profound desaturation at night. Seems to be tolerating home BiPAP quite well. 3. Morbid obesity: Suspect mild chronic hypercapnia on the basis of weight and age, per [place] formula, the patient's baseline PCO2 is roughly 53.   4. Stable pulmonary nodule  5. Doubt substantial pulmonary parenchymal disease, the patient is a lifelong nonsmoker. High-resolution CT scan of the chest on a 2018 evidence is only right basilar bronchiectasis without overt parenchymal changes.     Plan:  1. Continue home BiPAP  2. Agree with diuresis         Care reviewed with the staff and the patient's family as available. Please note that voice recognition technology was used in the preparation of this note and make therefore it may contain inadvertent transcription errors. Shirlene Rodgers M.D., F.C.C.P.     [facility] [address] [facility], [address] [place] [address], [address] [place] [address]

## 2019-04-15 NOTE — PROGRESS NOTES
Message sent to Emely Brown regarding pt BP@ 90/50 and whether or not to administer pm lasix dose. Awaiting return call prior to administering. Will continue to monitor. Received phone call from [facility] to give lasix as long as pt is asymptomatic.  Will cont to monitor

## 2019-04-15 NOTE — PROGRESS NOTES
Subjective:  Feeling better No CP, SOB, F, V, D, P     Objective:    /70   Pulse 56   Temp 97.4 °F (36.3 °C) (Oral)   Resp 16   Ht 4' 11\" (1.499 m)   Wt 195 lb 3.2 oz (88.5 kg)   SpO2 97%   BMI 39.43 kg/m²     24HR INTAKE/OUTPUT:      Intake/Output Summary (Last 24 hours) at 4/15/2019 0727  Last data filed at 4/15/2019 0641  Gross per 24 hour   Intake 670 ml   Output 2750 ml   Net -2080 ml     nad   Heart:  RRR, no murmurs, gallops, or rubs. Lungs:  CTA bilaterally, no wheeze, rales or rhonchi  Abd: bowel sounds present, nontender, nondistended, no masses  Extrem:  No clubbing, cyanosis, or edema    Most Recent Labs  Lab Results   Component Value Date    WBC 9.2 04/12/2019    HGB 12.2 04/12/2019    HCT 37.7 04/12/2019     04/12/2019     04/15/2019    K 3.7 04/15/2019    CL 98 04/15/2019    CREATININE 1.4 (H) 04/15/2019    BUN 23 04/15/2019    CO2 35 (H) 04/15/2019    GLUCOSE 107 (H) 04/15/2019    ALT 9 04/11/2019    AST 15 04/11/2019    INR 1.6 02/15/2018    TSH 3.250 04/11/2019     Recent Labs     04/14/19  0357   MG 2.3     Lab Results   Component Value Date    CALCIUM 9.4 04/15/2019    PHOS 2.9 02/15/2018        XR CHEST PORTABLE   Final Result   Chronic findings in the base. Mild cardiomegaly. No acute   pulmonary process. Assessment    Active Problems:    Acute on chronic diastolic CHF (congestive heart failure) ([facility])    Diastolic CHF ([facility]  Resolved Problems:    * No resolved hospital problems. *      Plan:  1. Acute on chronic diastolic CHF - cardiology on case. Diuresing. Increased symptoms this AM. Cont diuresis per cardiology  2. Acute hypoxic respiratory failure - secondary to #1. Pulmonary on case as well. No apparent pulmonary etiology. 3. Atrial fibrillation - NSR this AM. On Amiodarone and Eliquis. 4. CAD - stable. 5. HTN - stable. 6. Hyperlipidemia - statin. 7. Hypothyroidism - Synthroid. 8. LDD with radiculopathy - stable.   9. TARIK - nocturnal O2.  10.

## 2019-04-15 NOTE — PROGRESS NOTES
Date: 4/15/2019    Time: 12:13 AM    Patient Placed On BIPAP/CPAP/ Non-Invasive Ventilation? No    If no must comment. Facial area red/color change? No           If YES are Blister/Lesion present? No   If yes must notify nursing staff  BIPAP/CPAP skin barrier? No    Skin barrier type:na       Comments: Pt unable to tolerate hospital BiPAP so family brought home unit in. Pt placed on home unit with 2L bleed in. Pt updated that home unit is missing it's humidifier, RT was able to discontinue and use home unit with out that part.           Morey Riedel

## 2019-04-15 NOTE — PROGRESS NOTES
INPATIENT CARDIOLOGY FOLLOW-UP    Name: Chayo Valle    Age: 80 y.o. Date of Service: 4/15/2019    Chief Complaint: Follow-up for acute on chronic diastolic CHF, atrial fibrillation    Interim History:  Clinically improved over the past 72 hours with ongoing diuresis. No chest pain, palpitations, or respiratory distress at rest. LE edema improved. SR on telemetry. Review of Systems:   Cardiac: As per HPI  General: No fever, chills  Pulmonary: As per HPI  HEENT: No visual disturbances, difficult swallowing  GI: No nausea, vomiting  Endocrine: +hypothyroidism, no diabetes  Skin: Intact, no rashes  Neuro/Psych: No headache, focal motor deficits    Problem List:  Patient Active Problem List   Diagnosis    Hypothyroidism    TARIK (obstructive sleep apnea)    Mixed hyperlipidemia    CAD in native artery    Acute on chronic diastolic congestive heart failure (HCC)    Essential hypertension    Atrial fibrillation ([facility])    Lung nodule    Paroxysmal atrial fibrillation ([address]    CHF (congestive heart failure), NYHA class I, acute on chronic, combined ([address]    Acute on chronic diastolic CHF (congestive heart failure) ([facility])    Nonrheumatic aortic valve insufficiency    Non-rheumatic mitral regurgitation    Diastolic CHF ([address]       Allergies: Allergies   Allergen Reactions    Judit-D [Diphenhydramine Hcl] Other (See Comments)     Becomes very hyper, \"can't stay still\" dystonia    Benadryl [Diphenhydramine]     Phenergan [Promethazine Hcl]      Dystonia      Ibuprofen Nausea And Vomiting     Patient states she takes \"Advil all the time\" and is not allergic to Ibuprofen.        Current Medications:  Current Facility-Administered Medications   Medication Dose Route Frequency Provider Last Rate Last Dose    metolazone (ZAROXOLYN) tablet 2.5 mg  2.5 mg Oral Daily Kwame Pacheoc MD        calcium carbonate (TUMS) chewable tablet 1,000 mg  1,000 mg Oral Q4H PRN Zac Morgan MD   1,000 mg at 04/14/19 1700    isosorbide mononitrate (IMDUR) extended release tablet 30 mg  30 mg Oral Daily Antonia Valencia MD   30 mg at 04/14/19 6936    amiodarone (CORDARONE) tablet 100 mg  100 mg Oral QAM Antonia Valencia MD   100 mg at 04/14/19 0818    sodium chloride flush 0.9 % injection 10 mL  10 mL Intravenous 2 times per day Jamee Roe MD   10 mL at 04/14/19 2013    sodium chloride flush 0.9 % injection 10 mL  10 mL Intravenous PRN Jamee Roe MD   10 mL at 04/12/19 1109    acetaminophen (TYLENOL) tablet 650 mg  650 mg Oral Q4H PRN Jamee Roe MD   650 mg at 04/13/19 2257    apixaban (ELIQUIS) tablet 5 mg  5 mg Oral BID Jamee Roe MD   5 mg at 04/14/19 2013    vitamin D tablet 2,000 Units  2,000 Units Oral Lunch Jamee Roe MD   2,000 Units at 04/14/19 1110    cyanocobalamin injection 1,000 mcg  1,000 mcg Intramuscular Q30 Days Jamee Roe MD   1,000 mcg at 04/11/19 1127    ferrous sulfate tablet 325 mg  325 mg Oral Lunch Jamee Roe MD   325 mg at 04/14/19 1110    HYDROcodone-acetaminophen (NORCO) 7.5-325 MG per tablet 1 tablet  1 tablet Oral Q6H PRN Jamee Roe MD   1 tablet at 04/14/19 2013    lactulose (CHRONULAC) 10 GM/15ML solution 20 g  20 g Oral Nightly PRN Jamee Roe MD   20 g at 04/14/19 0830    levothyroxine (SYNTHROID) tablet 137 mcg  137 mcg Oral QAM AC Jamee Roe MD   137 mcg at 04/15/19 0631    lisinopril (PRINIVIL;ZESTRIL) tablet 40 mg  40 mg Oral Nightly Jamee Roe MD   40 mg at 04/14/19 2013    atorvastatin (LIPITOR) tablet 20 mg  20 mg Oral Nightly Jamee Roe MD   20 mg at 04/14/19 2013    metoprolol succinate (TOPROL XL) extended release tablet 50 mg  50 mg Oral BID Jamee Roe MD   50 mg at 04/14/19 2013    potassium chloride (KLOR-CON M) extended release tablet 20 mEq  20 mEq Oral BID  Jamee Roe MD   20 mEq at 04/14/19 1701    predniSONE (DELTASONE) tablet 10 mg  10 mg Oral QAM Jamee Roe MD   10 mg at 04/14/19 0818    raloxifene (EVISTA) tablet 60 mg  60 mg Oral QAM Tisha Son MD   60 mg at 04/14/19 0818    rOPINIRole (REQUIP) tablet 0.25 mg  0.25 mg Oral Nightly Tisha Son MD   0.25 mg at 04/14/19 2013    senna (SENOKOT) tablet 8.6 mg  1 tablet Oral SHANIKA Son MD   8.6 mg at 04/14/19 0818    magnesium hydroxide (MILK OF MAGNESIA) 400 MG/5ML suspension 30 mL  30 mL Oral Daily PRN Tisha Son MD        ondansetron [facility]) injection 4 mg  4 mg Intravenous Q6H PRN Tisha Son MD        aspirin chewable tablet 81 mg  81 mg Oral Daily Tisha Son MD   81 mg at 04/14/19 0818    lactobacillus (CULTURELLE) capsule 1 capsule  1 capsule Oral Nightly Tisha Son MD   1 capsule at 04/14/19 2013    calcium-vitamin D (OSCAL-500) 500-200 MG-UNIT per tablet 1 tablet  1 tablet Oral Lunch Tisha Son MD   1 tablet at 04/14/19 1416    magnesium oxide (MAG-OX) tablet 400 mg  400 mg Oral Daily Tisha Son MD   400 mg at 04/14/19 0818    simethicone (MYLICON) chewable tablet 160 mg  160 mg Oral Lunch Tisha Son MD   160 mg at 04/14/19 1110    famotidine (PEPCID) tablet 40 mg  40 mg Oral Daily Tisha Son MD   40 mg at 04/14/19 0819    rOPINIRole (REQUIP) tablet 0.75 mg  0.75 mg Oral Daily Tisha Son MD   0.75 mg at 04/14/19 1414    nystatin-triamcinolone (MYCOLOG II) cream   Topical BID PRN Tisha Son MD         Physical Exam:  /60   Pulse 57   Temp 98.2 °F (36.8 °C) (Oral)   Resp 18   Ht 4' 11\" (1.499 m)   Wt 195 lb 3.2 oz (88.5 kg)   SpO2 96%   BMI 39.43 kg/m²   Wt Readings from Last 3 Encounters:   04/15/19 195 lb 3.2 oz (88.5 kg)   04/08/19 201 lb (91.2 kg)   03/22/19 182 lb 4.8 oz (82.7 kg)     Appearance: Awake, alert, no acute respiratory distress  Skin: Intact, no rash  Head: Normocephalic, atraumatic  Eyes: EOMI, no conjunctival erythema  ENMT: MMM, no rhinorrhea  Neck: Supple, no carotid bruits  Lungs: Decreased BS B/L, no wheezing  Cardiac: RRR, 2/6 systolic murmur > RUSB  Abdomen: Soft, +bowel sounds  Extremities: Moves all extremities x 4, +LE edema (improved)  Neurologic: No focal motor deficits apparent, normal mood and affect    Laboratory Tests:  Lab Results   Component Value Date    CREATININE 1.4 (H) 04/15/2019    BUN 23 04/15/2019     04/15/2019    K 3.7 04/15/2019    CL 98 04/15/2019    CO2 35 (H) 04/15/2019     Lab Results   Component Value Date    MG 2.3 04/14/2019     Lab Results   Component Value Date    ALT 9 04/11/2019    AST 15 04/11/2019    ALKPHOS 57 04/11/2019    BILITOT 1.1 04/11/2019     Lab Results   Component Value Date    WBC 9.2 04/12/2019    HGB 12.2 04/12/2019    HCT 37.7 04/12/2019    MCV 99.7 04/12/2019     04/12/2019     Lab Results   Component Value Date    CKTOTAL 71 10/24/2013    CKMB 0.8 10/24/2013    TROPONINI <0.01 04/10/2019    TROPONINI <0.01 04/09/2019    TROPONINI <0.01 03/19/2019     Lab Results   Component Value Date    INR 1.6 02/15/2018    INR 1.1 07/12/2017    INR 1.2 04/20/2015    PROTIME 18.0 (H) 02/15/2018    PROTIME 12.1 07/12/2017    PROTIME 13.0 (H) 04/20/2015     Lab Results   Component Value Date    TSH 3.250 04/11/2019     Lab Results   Component Value Date    CHOL 136 10/25/2013    CHOL 176 10/05/2011    CHOL 146 07/07/2011     Lab Results   Component Value Date    TRIG 108 10/25/2013    TRIG 169 (H) 10/05/2011    TRIG 108 07/07/2011     Lab Results   Component Value Date    HDL 37.0 (A) 10/25/2013    HDL 36.0 (A) 10/05/2011    HDL 34.0 (A) 07/07/2011     Lab Results   Component Value Date    LDLCALC 77 10/25/2013    LDLCALC 106 (H) 10/05/2011    [address] 90 07/07/2011     Cardiac Tests:  Telemetry: SR, rate 60's    Echocardiogram: 8/15/13  Technically difficult study with suboptimal echo windows. Definity contrast was used to improve endomyocardial border definition. Ejection fraction is visually estimated at 55-60%. There is doppler evidence of stage II diastolic dysfunction.   Mild left ventricular concentric hypertrophy noted. The left atrium is moderately dilated. The right ventricle is not completely visualized but appears at least mildly dilated. The right ventricle appears mildly hypertrophied. Mild mitral regurgitation is present. There is mild to moderate aortic regurgitation. Mild to moderate tricuspid regurgitation. Estimated right ventricular systolic pressure 30 mmHg assuming a right atrial pressure of 3 mmHg.      Exercise Nuclear Stress Test: 8/15/13  No evidence of stress induced ischemia, EF 83%     Cardiac Catheterization: 10/24/13  a. Status post remote stent of LAD with demonstration of moderate in-stent restenosis today. b. 50% ostial 2nd diagonal branch stenosis. 2. Normal left ventricular systolic function. 3. Aortic root dilatation.      Echocardiogram: 12/31/14 Dorthula Carlos)  Ejection fraction is visually estimated at 60%. No regional wall motion abnormalities seen. Moderate aortic regurgitation is noted. Moderate mitral regurgitation is present.     Echocardiogram: 7/13/17 ([place])   Normal left ventricular systolic function.   Ejection fraction is visually estimated at 60-65%.  Mild concentric left ventricular hypertrophy with proximal septal thickening.   Normal right ventricular size and function (TAPSE 1.8 cm).   There is doppler evidence of stage II diastolic dysfunction.   Severely dilated left atrium by volume index.   Mild mitral regurgitation.   Mild-moderate aortic regurgitation.   Mild tricuspid regurgitation.   PASP is estimated at 38 mmHg. PEACE: 2/13/18 Kunal Browne)   Ejection fraction is visually estimated at 55%.   No regional wall motion abnormalities seen.    Right ventricular segmental wall motion is normal.  Mild to moderate mitral regurgitation is present.   Mild to moderate tricuspid regurgitation.   Aortic sclerosis is noted.   Mild to moderate aortic regurgitation is noted. No evidence of thrombus within left atrium.     PEACE: 3/19/19 Kunal Browne)   Left ventricular size is grossly normal.   Mild left ventricular concentric hypertrophy noted.   No regional wall motion abnormalities seen.   There is doppler evidence of stage II diastolic dysfunction.   Moderate mitral regurgitation is present.   Mild-to-moderate aortic regurgitation is noted. Mild tricuspid regurgitation. RVSP is 54 mmHg. ASSESSMENT / PLAN:  1. Persistent atrial fibrillation (diagnosed 12/2014) -- recurrent atrial fibrillation s/p PEACE/CVN on 2/13/18, switched Toprol XL to BID dosing on 2/12/18 (continue), decision made by patient/family in the past to discontinue anticoagulation (she is now agreeable), eliquis 5 mg BID added 2/12/18 prior to [address] (continue) --> maintaining SR, amiodarone started in 5/2017. Continue Toprol XL, eliquis, and amiodarone as ordered. 2. Acute on chronic diastolic CHF -- will switch to po lasix, continue zaroxolyn (will decrease dose to 2.5 mg daily), monitor I/O's/renal function/electrolytes (I/O's net negative 7.2 L, Cr 1.4 today). Supplement potassium. 3. Valvular heart disease - moderate AI and MR on 12/31/14 echocardiogram --> results of repeat echocardiograms outlined above  4. CAD s/p MC to mid LAD in 2011 --> no new flow limiting disease on 10/24/13 cardiac catheterization. Continue current medications. 5. HTN - controlled, continue BB and ACE-I, controlled  6. HLD - on statin  7. Reported ascending aortic aneurysm -- no aneurysm noted on 7/12/17 CTA chest  8. Hypothyroidism - on synthroid, normal TSH this admission  9. TARIK - AHI 5 on 12/2014 study / significant O2 desaturation at nighttime -- CPAP started this admission (pulmonary note reviewed)  10. Chronic LAFB  11. Hospitalized in 4/2015 for treatment of lumbar stenosis --> s/p prior epidural injections  12. Hypoxia -- CTA chest with no PE, +\"density in the lung fields compatible with atelectasis and/or possibly pneumonia. There is associated chronic pleural thickening seen. Stable pulmonary nodule. \" -- follows with pulmonary     - Case discussed with the patient's daughter today Jaswinder Hopper)    Geovani Hwang MD  [facility] ([facility]) Cardiology

## 2019-04-15 NOTE — PROGRESS NOTES
Occupational Therapy  OT BEDSIDE TREATMENT NOTE      Date:4/15/2019  Patient Name: Nai Casillas  MRN: [id number]  : 1927  Room: [address] [place] [address]     Evaluating OT: Lashonda Gaspar, BENTONR/L - [id number]     AM-PAC Daily Activity Raw Score: 17      Recommended Adaptive Equipment: Extended Tub Bench     Diagnosis: Acute on chronic diastolic CHF (congestive heart failure) ([facility]) [G94.03], Diastolic CHF ([facility]) [Y13.55]               Patient's daughter (who is a NP) reported that patient injured her R knee and L wrist due to a fall in late March; patient and patient's daughter indicate that patient was not given restrictions for L UE by physician. Pertinent Medical History: a-fib, CHF, chronic back pain, CAD, cancer, HTN, DJD, polymyalgia rheumatica, scoliosis, osteoporosis      Precautions: falls;  R knee brace; L wrist splint     Home Living: Patient lives alone in a one-floor apartment (six steps with handrail). Bathroom Setup: tub shower (tub seat, grab bars, and handheld shower head available)  Equipment Owned: tub seat; rollator; quad cane; elevated toilet seat (with grab bars); home O2 (worn at night)     Prior Level of Function (PLOF): Per family member, patient was independent with toileting, grooming, and self-feeding, but needed assistance with dressing and bathing. Family members provide consistent assistance with most IADLs; patient had been able to perform light meal prep tasks independently prior. Patient was independent with functional mobility (with quad cane or rollator) prior to this hospitalization. Patient's daughter noted that patient has consistent assistance from family members several times daily. Driving: Yes, though patient has not driven in about a month     Pain Level: Patient reported experiencing pain in her R wrist, but did not rate/describe her pain. Cognition: Pt pleasant and cooperative.    Functional Assessment:    Initial Eval Status  Date: 2019 Treatment Status  Short Term Goals  Treatment Frequency: PRN   Feeding Setup  Patient reported experiencing difficulty opening/manipulating packages/containers due to pain in L hand/wrist.   Independent   Grooming CGA   (standing at sink) for completion of hand washing. Cues given to maximize safety with use of walker within bathroom. supervision  Mod I  (standing at sink)   UB Dressing Min A   SBA   LB Dressing Max A - to don slippers  Max A needed to don/doff R knee brace. Family members assist with LB dressing tasks at home.  pt has tubigrip stocking for LE's  Sock aide instructed and used to don stockings with SBA  Mod A - with use of AE, as needed/appropriate   Bathing Mod A  Patient's daughter(s) assist with showering at home. Pt provided with information regarding ext tub bench for tub transfers  Min A - with use of AE/DME, as needed/appropriate   Toileting Min A - cues given to maximize safety with toilet transfer   Mod I   Bed Mobility  Not assessed.       Functional Transfers Sit-to-Stand: CGA   from bedside chair  Supervision Independent   Functional Mobility CGA   (with walker) within patient's room and bathroom. Cues given occasionally to maximize safety with management of walker.  SBA using w/w  Mod I - with use of device, as needed/appropriate   Balance Sitting: Good  Standing: Fair  (with walker)   Fair+ dynamic standing balance during completion of ADLs and other functional tasks. Activity Tolerance Fair-  Patient's O2 saturation was 95% following functional mobility and ADLs (on 2L of O2 via nasal cannula). No SOB during activity.    Patient will demonstrate Good understanding and consistent implementation of energy conservation techniques and work simplification techniques into ADL/IADL routines         Comments:  Adaptive equipment instruction completed with good carry over of use during ADL. Sock aide issued to pt this session. Recommended use of ext tub bench for tub transfers.      Exercise: good use of UE's during activity     Education: home safety, transfer safety    Other: overall fair tolerance for activity. · Pt has made  progress towards set goals.    · Continue with current plan of care      Total Tx Time: [address] MARY/L [address]

## 2019-04-16 NOTE — PROGRESS NOTES
[facility] Quality Flow/Interdisciplinary Rounds Progress Note        Quality Flow Rounds held on April 16, 2019    Disciplines Attending:  Bedside Nurse, ,  and Nursing Unit Leadership    Cuate Ram was admitted on 4/9/2019  9:01 PM    Anticipated Discharge Date:  Expected Discharge Date: 04/22/19    Disposition:    David Score:  David Scale Score: 21    Readmission Risk              Risk of Unplanned Readmission:        28           Discussed patient goal for the day, patient clinical progression, and barriers to discharge. The following Goal(s) of the Day/Commitment(s) have been identified:  Discharge.       Donnie Galarza  April 16, 2019

## 2019-04-16 NOTE — PROGRESS NOTES
Type and Reason for Visit: Initial, RD Nutrition Re-Screen(LOS Assessment, RD Re-Screen Negative)    Nutrition Screen:   · Have you recently lost weight without trying? - 0 to 1 pound (0 points)   · Have you been eating poorly because of a decreased appetite? - No (0 points)   · Malnutrition Screening Tool Score - 0    Dietitian Assessment of Nutrition Re-Screen: Pt assessed per LOS protocol. Pt currently eating ~% of most meals and w/ no other nutritional issues noted at this time. Will follow per policy.           Electronically signed by Cheryl Diego RD, LD on 4/16/19 at 3:05 PM    Contact Number: [phone]

## 2019-04-16 NOTE — PLAN OF CARE
Problem: Falls - Risk of:  Goal: Will remain free from falls  Description  Will remain free from falls  Outcome: Met This Shift  Goal: Absence of physical injury  Description  Absence of physical injury  Outcome: Met This Shift     Problem: Cardiac:  Goal: Hemodynamic stability will improve  Description  Hemodynamic stability will improve  Outcome: Met This Shift     Problem: Pain:  Goal: Control of acute pain  Description  Control of acute pain  Outcome: Met This Shift  Goal: Control of chronic pain  Description  Control of chronic pain  Outcome: Met This Shift

## 2019-04-16 NOTE — PROGRESS NOTES
Sent message to Dr. Brisa Jones, for BP 91/54 and whether to hold either the lisinopril or Toprol ordered for tonight. Patient asymptomatic at this time. Dr. Brisa Jones sent return message to add parameters to hold Toprol for systolic less than 90 and to hold tonight's lisinopril.

## 2019-04-16 NOTE — PROGRESS NOTES
INPATIENT CARDIOLOGY FOLLOW-UP    Name: Nai Casillas    Age: 80 y.o. Date of Service: 4/16/2019    Chief Complaint: Follow-up for acute on chronic diastolic CHF, atrial fibrillation    Interim History:  Clinically improved over the past 3-4 days with ongoing diuresis. No chest pain, palpitations, or respiratory distress at rest. LE edema much improved. SR on telemetry. On po diuretic since 4/15/19. Review of Systems:   Cardiac: As per HPI  General: No fever, chills  Pulmonary: As per HPI  HEENT: No visual disturbances, difficult swallowing  GI: No nausea, vomiting  Endocrine: +hypothyroidism, no diabetes  Skin: Intact, no rashes  Neuro/Psych: No headache, focal motor deficits    Problem List:  Patient Active Problem List   Diagnosis    Hypothyroidism    TARIK (obstructive sleep apnea)    Mixed hyperlipidemia    CAD in native artery    Acute on chronic diastolic congestive heart failure (HCC)    Essential hypertension    [facility] ([facility])    Lung nodule    Paroxysmal atrial fibrillation ([address]    CHF (congestive heart failure), NYHA class I, acute on chronic, combined ([address]    Acute on chronic diastolic CHF (congestive heart failure) ([facility])    Nonrheumatic aortic valve insufficiency    Non-rheumatic mitral regurgitation    Diastolic CHF ([address]       Allergies: Allergies   Allergen Reactions    [place]-D [Diphenhydramine Hcl] Other (See Comments)     Becomes very hyper, \"can't stay still\" dystonia    Benadryl [Diphenhydramine]     Phenergan [Promethazine Hcl]      Dystonia      Ibuprofen Nausea And Vomiting     Patient states she takes \"Advil all the time\" and is not allergic to Ibuprofen.        Current Medications:  Current Facility-Administered Medications   Medication Dose Route Frequency Provider Last Rate Last Dose    metolazone (ZAROXOLYN) tablet 2.5 mg  2.5 mg Oral Daily Summer Guerrero MD   2.5 mg at 04/15/19 0800    furosemide (LASIX) tablet 40 mg  40 mg Oral BID Roldan Pichardo no conjunctival erythema  ENMT: MMM, no rhinorrhea  Neck: Supple, no carotid bruits  Lungs: Decreased BS B/L, no wheezing  Cardiac: RRR, 2/6 systolic murmur > RUSB  Abdomen: Soft, +bowel sounds  Extremities: Moves all extremities x 4, +LE edema (improved)  Neurologic: No focal motor deficits apparent, normal mood and affect    Laboratory Tests:  Lab Results   Component Value Date    CREATININE 1.4 (H) 04/16/2019    BUN 29 (H) 04/16/2019     04/16/2019    K 3.3 (L) 04/16/2019    CL 92 (L) 04/16/2019    CO2 34 (H) 04/16/2019     Lab Results   Component Value Date    MG 1.9 04/16/2019     Lab Results   Component Value Date    ALT 9 04/11/2019    AST 15 04/11/2019    ALKPHOS 57 04/11/2019    BILITOT 1.1 04/11/2019     Lab Results   Component Value Date    WBC 9.2 04/12/2019    HGB 12.2 04/12/2019    HCT 37.7 04/12/2019    MCV 99.7 04/12/2019     04/12/2019     Lab Results   Component Value Date    CKTOTAL 71 10/24/2013    CKMB 0.8 10/24/2013    TROPONINI <0.01 04/10/2019    TROPONINI <0.01 04/09/2019    TROPONINI <0.01 03/19/2019     Lab Results   Component Value Date    INR 1.6 02/15/2018    INR 1.1 07/12/2017    INR 1.2 04/20/2015    PROTIME 18.0 (H) 02/15/2018    PROTIME 12.1 07/12/2017    PROTIME 13.0 (H) 04/20/2015     Lab Results   Component Value Date    TSH 3.250 04/11/2019     Lab Results   Component Value Date    CHOL 136 10/25/2013    CHOL 176 10/05/2011    CHOL 146 07/07/2011     Lab Results   Component Value Date    TRIG 108 10/25/2013    TRIG 169 (H) 10/05/2011    TRIG 108 07/07/2011     Lab Results   Component Value Date    HDL 37.0 (A) 10/25/2013    HDL 36.0 (A) 10/05/2011    HDL 34.0 (A) 07/07/2011     Lab Results   Component Value Date    LDLCALC 77 10/25/2013    LDLCALC 106 (H) 10/05/2011    LDLCALC 90 07/07/2011     Cardiac Tests:  Telemetry: SR, rate 60's    Echocardiogram: 8/15/13  Technically difficult study with suboptimal echo windows.   Definity contrast was used to improve endomyocardial border definition. Ejection fraction is visually estimated at 55-60%. There is doppler evidence of stage II diastolic dysfunction. Mild left ventricular concentric hypertrophy noted. The left atrium is moderately dilated. The right ventricle is not completely visualized but appears at least mildly dilated. The right ventricle appears mildly hypertrophied. Mild mitral regurgitation is present. There is mild to moderate aortic regurgitation. Mild to moderate tricuspid regurgitation. Estimated right ventricular systolic pressure 30 mmHg assuming a right atrial pressure of 3 mmHg.      Exercise Nuclear Stress Test: 8/15/13  No evidence of stress induced ischemia, EF 83%     Cardiac Catheterization: 10/24/13  a. Status post remote stent of LAD with demonstration of moderate in-stent restenosis today. b. 50% ostial 2nd diagonal branch stenosis. 2. Normal left ventricular systolic function. 3. Aortic root dilatation.      Echocardiogram: 12/31/14 Kranthi Valdovinos)  Ejection fraction is visually estimated at 60%. No regional wall motion abnormalities seen. Moderate aortic regurgitation is noted. Moderate mitral regurgitation is present.     Echocardiogram: 7/13/17 ([place])   Normal left ventricular systolic function.   Ejection fraction is visually estimated at 60-65%.  Mild concentric left ventricular hypertrophy with proximal septal thickening.   Normal right ventricular size and function (TAPSE 1.8 cm).   There is doppler evidence of stage II diastolic dysfunction.   Severely dilated left atrium by volume index.   Mild mitral regurgitation.   Mild-moderate aortic regurgitation.   Mild tricuspid regurgitation.   PASP is estimated at 38 mmHg.     PEACE: 2/13/18 [facility])   Ejection fraction is visually estimated at 55%.   No regional wall motion abnormalities seen.    Right ventricular segmental wall motion is normal.  Mild to moderate mitral regurgitation is present.   Mild to moderate tricuspid regurgitation.   Aortic sclerosis is noted.   Mild to moderate aortic regurgitation is noted. No evidence of thrombus within left atrium. PEACE: 3/19/19 ([place])   Left ventricular size is grossly normal.   Mild left ventricular concentric hypertrophy noted.   No regional wall motion abnormalities seen.   There is doppler evidence of stage II diastolic dysfunction.   Moderate mitral regurgitation is present.   Mild-to-moderate aortic regurgitation is noted. Mild tricuspid regurgitation. RVSP is 54 mmHg. ASSESSMENT / PLAN:  1. Persistent atrial fibrillation (diagnosed 12/2014) -- recurrent atrial fibrillation s/p PEACE/CVN on 2/13/18, switched Toprol XL to BID dosing on 2/12/18 (continue), decision made by patient/family in the past to discontinue anticoagulation (she is now agreeable), eliquis 5 mg BID added 2/12/18 prior to [address] (continue) --> maintaining SR, amiodarone started in 5/2017. Continue Toprol XL, eliquis, and amiodarone as ordered. 2. Acute on chronic diastolic CHF -- continue po lasix, continue zaroxolyn (decreased dose to 2.5 mg daily), monitor renal function/electrolytes (I/O's net negative 8.3 L, Cr 1.4 today). Will supplement potassium in addition to scheduled dose. 3. Valvular heart disease - moderate AI and MR on 12/31/14 echocardiogram --> results of repeat echocardiograms outlined above  4. CAD s/p MC to mid LAD in 2011 --> no new flow limiting disease on 10/24/13 cardiac catheterization. Continue current medications. 5. HTN - controlled, continue BB and ACE-I, controlled  6. HLD - on statin  7. Reported ascending aortic aneurysm -- no aneurysm noted on 7/12/17 CTA chest  8. Hypothyroidism - on synthroid, normal TSH this admission  9. TARIK - AHI 5 on 12/2014 study / significant O2 desaturation at nighttime -- CPAP started this admission (pulmonary note reviewed)  10. Chronic LAFB  11. Hospitalized in 4/2015 for treatment of lumbar stenosis --> s/p prior epidural injections  12.  Hypoxia -- CTA chest with no PE, +\"density in the lung fields compatible with atelectasis and/or possibly pneumonia. There is associated chronic pleural thickening seen. Stable pulmonary nodule. \" -- follows with pulmonary    - Increase activity  - Discharge planning    Pastora Herndon MD  [address]  Cardiology

## 2019-04-16 NOTE — PROGRESS NOTES
Pulmonary Progress Note    Admit Date: 2019  Hospital day                               PCP: Jalen Neal MD    Chief Complaint (s):  Patient Active Problem List   Diagnosis    Hypothyroidism    TARIK (obstructive sleep apnea)    Mixed hyperlipidemia    CAD in native artery    Acute on chronic diastolic congestive heart failure (HCC)    Essential hypertension    Atrial fibrillation (HCC)    Lung nodule    Paroxysmal atrial fibrillation (HCC)    CHF (congestive heart failure), NYHA class I, acute on chronic, combined [facility]    Acute on chronic diastolic [facility] (congestive heart failure) ([facility])    Nonrheumatic aortic valve insufficiency    Non-rheumatic mitral regurgitation    Diastolic CHF ([facility]       Subjective:  · Sitting up in a chair, awake and alert this a.m. No shortness of breath. Overall, seems to be doing better. She has been reluctantly compliant with BiPAP.       Vitals:  VITALS:  BP (!) 97/52   Pulse 68   Temp 97.7 °F (36.5 °C) (Oral)   Resp 18   Ht 4' 11\" (1.499 m)   Wt 195 lb 4.8 oz (88.6 kg)   SpO2 95%   BMI 39.45 kg/m²     24HR INTAKE/OUTPUT:      Intake/Output Summary (Last 24 hours) at 2019 0949  Last data filed at 2019 0427  Gross per 24 hour   Intake 520 ml   Output 1750 ml   Net -1230 ml       24HR PULSE OXIMETRY RANGE:    SpO2  Av.2 %  Min: 93 %  Max: 98 %    Medications:  IV:      Scheduled Meds:   metolazone  2.5 mg Oral Daily    furosemide  40 mg Oral BID    isosorbide mononitrate  30 mg Oral Daily    amiodarone  100 mg Oral QAM    sodium chloride flush  10 mL Intravenous 2 times per day    apixaban  5 mg Oral BID    vitamin D  2,000 Units Oral Lunch    cyanocobalamin  1,000 mcg Intramuscular Q30 Days    ferrous sulfate  325 mg Oral Lunch    levothyroxine  137 mcg Oral QAM AC    lisinopril  40 mg Oral Nightly    atorvastatin  20 mg Oral Nightly    metoprolol succinate  50 mg Oral BID    potassium chloride  20 mEq Oral BID WC   

## 2019-04-16 NOTE — CONSULTS
Awaiting call back from [facility] with Cardiology for F/U with office and CHF clinic

## 2019-04-16 NOTE — PROGRESS NOTES
Subjective:  Feeling better No CP, SOB, F, V, D, P     Objective:    BP (!) 93/55   Pulse 62   Temp 98.7 °F (37.1 °C) (Oral)   Resp 18   Ht 4' 11\" (1.499 m)   Wt 195 lb 4.8 oz (88.6 kg)   SpO2 98%   BMI 39.45 kg/m²     24HR INTAKE/OUTPUT:      Intake/Output Summary (Last 24 hours) at 4/16/2019 0815  Last data filed at 4/16/2019 0427  Gross per 24 hour   Intake 700 ml   Output 1750 ml   Net -1050 ml     nad   Heart:  RRR, no murmurs, gallops, or rubs. Lungs:  CTA bilaterally, no wheeze, rales or rhonchi  Abd: bowel sounds present, nontender, nondistended, no masses  Extrem:  No clubbing, cyanosis, or edema    Most Recent Labs  Lab Results   Component Value Date    WBC 9.2 04/12/2019    HGB 12.2 04/12/2019    HCT 37.7 04/12/2019     04/12/2019     04/16/2019    K 3.3 (L) 04/16/2019    CL 92 (L) 04/16/2019    CREATININE 1.4 (H) 04/16/2019    BUN 29 (H) 04/16/2019    CO2 34 (H) 04/16/2019    GLUCOSE 102 (H) 04/16/2019    ALT 9 04/11/2019    AST 15 04/11/2019    INR 1.6 02/15/2018    TSH 3.250 04/11/2019     Recent Labs     04/16/19  0425   MG 1.9     Lab Results   Component Value Date    CALCIUM 9.0 04/16/2019    PHOS 2.9 02/15/2018        XR CHEST PORTABLE   Final Result   Chronic findings in the base. Mild cardiomegaly. No acute   pulmonary process. Assessment    Active Problems:    Acute on chronic diastolic CHF (congestive heart failure) ([facility])    Diastolic CHF ([address]  Resolved Problems:    * No resolved hospital problems. *      Plan:  1. Acute on chronic diastolic CHF - cardiology on case. Diuresing. Increased symptoms this AM. Cont diuresis per cardiology  2. Acute hypoxic respiratory failure - secondary to #1. Pulmonary on case as well. No apparent pulmonary etiology. 3. Atrial fibrillation - NSR this AM. On Amiodarone and Eliquis. 4. CAD - stable. 5. HTN - stable. 6. Hyperlipidemia - statin. 7. Hypothyroidism - Synthroid. 8. LDD with radiculopathy - stable.   9. TARIK - nocturnal O2.  10. Disposition - PT/OT  - home w [address] likely tomorrow      Electronically signed by Mikhail Kang MD on 4/16/2019 at 8:15 AM

## 2019-04-16 NOTE — PROGRESS NOTES
Call placed to PT regarding family request for PT to work with patient ambulating steps, Theresa Durán will see shortly, he was already aware.   Electronically signed by Karen Garcia RN on 4/16/2019 at 10:08 AM

## 2019-04-16 NOTE — PROGRESS NOTES
have pain? L wrist, R knee  L wrist pain    Bed Mobility  Rolling:  NT   Supine to sit:  S/ I   Sit to supine:  NT   Scooting:  SBA in sit  NT  independent    Transfers Sit to stand:  SBA   Stand to sit:  SBA   Stand pivot:  SBA  Sit to stand: SBA  Stand to sit: SBA  Stand Pivot: NT  independent    Ambulation     15 feet x 1 and 100 feet x 1  with  QC  with  SBA  140 feet x 2 and 40 feet x 1 using [employer] per Pt preference   feet with  QC or AAD  with  Independent        Stair negotiation: ascended and descended NT  7 stairs with rail and SBQC for support SBA, step to pattern used  6  steps with 1  rail with  SBA    LE ROM  WFL      LE strength  4-/ 5      AM- PAC RAW score   18/ 24 18/24        Balance: fair dynamic using WW for support    Patient education  Pt was educated on [employer] usage for support backing to a chair prior to sitting, upright posture and staying within [employer] base of support during gait    Patient response to education:   Pt verbalized understanding Pt demonstrated skill Pt requires further education in this area   yes yes yes     Additional Comments: Katherine slow and consistent, no loss of balance or shortness of breath noted. Pt fatigued after activity. Pt on room air all activity, 02 saturation 94% after activity. Pt was left in a bedside chair with call light in reach. Chair/bed alarm: chair alarm active    Treatment time: 20 minutes  Time out: 1038    Pt is making good progress toward established Physical Therapy goals as per increased functional mobility performed. Continue with physical therapy current plan of care.     Bryce Barrett PTA   License Number: [id number]

## 2019-04-16 NOTE — PROGRESS NOTES
Met with patient in her room, I met with her last admission and we covered the CHF zones, daily weights, low salt diet, medication education. She has no questions at this time but does have an interest in going to the CHF clinic one she is discharged.  Perfect serve sent to Melanie Torres for a referral/order, patient is known to Dr. Reena Castillo.  Electronically signed by Leigha Umaña RN on 4/16/2019 at 9:11 AM

## 2019-04-16 NOTE — CARE COORDINATION
Social work / Discharge planning:       Social work spoke to Zenia Salcedo, [facility], [facility] and [facility].   None of these companies provide cpap supplies for the patient. The original unit came from [facility], but they are not a Medicare provider. Whenever the patient changed insurance to Maggie Meraz, [address] stopped providing supplies. The patient will need a new sleep study in order to get cpap supplies and a new unit for home use. RN updated.   Electronically signed by BRIJESH Joy on 4/16/2019 at 11:10 AM

## 2019-04-16 NOTE — PROGRESS NOTES
Family ready for discharge, call placed to Dr. Michael Jennings for discharge.   Electronically signed by Elise Winn RN on 4/16/2019 at 1:04 PM

## 2019-04-17 NOTE — TELEPHONE ENCOUNTER
Patient's daughter Michael Garland) notified of Dr. Cedric Arguelles recommendation. Chart amended to reflect D/C of [facility].

## 2019-04-17 NOTE — CARE COORDINATION
[facility] Transitions Initial Follow Up Call    Call within 2 business days of discharge: Yes    Patient: Nai Casillas Patient : [id number]   MRN: [id number]  Reason for Admission: CHF, Afib   Discharge Date: 19 RARS: Readmission Risk Score: 29      Last Discharge [facility]       Complaint Diagnosis Description Type Department Provider    19 Shortness of Breath; Leg Swelling Acute on chronic diastolic congestive heart failure [facility]) ED to Hosp-Admission (Discharged) (ADMITTED) CLARKE Loving MD; Marcello Redd MD         Spoke with: [facility]: SEB     Non-face-to-face services provided:  Obtained and reviewed discharge summary and/or continuity of care documents    Care Transitions 24 Hour Call    Do you have any ongoing symptoms?:  Yes  Patient-reported symptoms:  Fatigue  Do you have a copy of your discharge instructions?:  Yes  Do you have all of your prescriptions and are they filled?:  Yes  Have you been contacted by a [address]?:  No  Have you scheduled your follow up appointment?:  Yes  How are you going to get to your appointment?:  Car - family or friend to transport  Were you discharged with any Home Care or Post Acute Services:  Yes  Post Acute Services:  Home Health (Comment: [facility] )  Do you feel like you have everything you need to keep you well at home?:  Yes  Care Transitions Interventions  No Identified Needs       Spoke with [facility] for initial BPCI care transition call post hospital discharge. Explained the role of Care Transition Coordinator and the BPCI-A program. CMS BPCI-A letter reviewed and will be mailed to the patient, patient is agreeable to follow up post discharge from the hospital.     [facility] reports that she is feeling \"better than before\" but continues to feel weak. She is getting around her home with her walker as she was prior to admission.  She reports that she wore her CPAP last night and she did notice a difference in the way she felt this morning compared to before when she was just wearing O2. She denies any current SOB, edema, or chest tightness. She reports that her weight this morning was 181lbs. She understands the importance of daily weights. Med review completed. She reports that she did hear from [facility] today. CTC explained that a member of the Care Transition Central Team will be contacting her for further follow up calls, she is in agreement and denies any other needs or concerns at this time.      Follow Up  Future Appointments   Date Time Provider Drake Peters   4/25/2019  2:00 PM ZHANNA Woody - CNP [place] [facility]   5/13/2019 10:00 AM MD Peewee Ang Card [facility]   7/12/2019  9:45 AM DO Allen Ulloa ENT [facility]       Johnnie Rao RN

## 2019-04-17 NOTE — LETTER
Beneficiary Notification Letter     This Carlos Sesay Provider is Participating in an Innovative Payment and [address] [place] from Medicare     Greetings:   [address] is participating in a Medicare initiative called the [place] for [facility]. You are receiving this letter because your health care provider has identified you as a patient who is receiving care through this initiative. Health care providers participating in the [facility] for [facility], including [address], will work with Medicare to improve care for patients. Your Medicare rights have not been changed. You still have all the same Medicare rights and protections, including the right to choose which hospital, doctor, or other health care provider you see. However, because Ladonna Yusuf chose to participate in the [address], all Medicare beneficiaries who meet the eligibility criteria of this initiative will receive care under the initiative. If you do not wish to receive care under the Bundled Payments for [facility], you must choose a health care provider that does not participate in this initiative for your care. Regardless of which health care provider you see, Medicare will continue to cover all of your medically necessary services. Bundled Payments for Care Improvement Advanced aims to help improve your care     The Bundled Payments for [facility] is an innovative Medicare initiative that encourages your doctors, hospitals, and other health care providers to work more closely together so you get better care during and following certain hospital stays.  In this initiative, doctors and hospitals may work closely with certain health care providers and suppliers that help patients recover after discharge from the hospital, including skilled nursing facilities, home health agencies, inpatient rehabilitation facilities, and long term care hospitals. [address] [place] [address] is working closely with the doctors and other health care providers that care for you during and following your hospital stay and for a period of time after you leave the hospital. By working together, the health care providers are trying to more efficiently provide well-managed, high quality, patient-centered care as you undergo treatment. Hospitals, doctors, and other health care providers that care for you following a hospital stay may receive an additional payment for providing better, more coordinated health care. Medicare will monitor your care to make sure you and others get high quality care. Your feedback is important     Medicare may also ask you to answer a survey about the services and care you received from [address] will be mailed to you. Your feedback will improve care for all people with Medicare who receive care from [address] [place] [address]. Completion of this survey is optional.     Get more information     For more information about the Bundled Payments for [address] [place] [address], you can:    · Visit the CMS BPCI Advanced Website at http://sanchez-sosa.net/ initiatives/bpci-advanced   · Call the [facility] BPCI-A team at (219) 454-5284. · Call 1-800-MEDICARE (6-275.858.9431). TTY users can call 1-173.943.8446     If you have concerns or complaints about your care, talk to your health care provider, or contact your Beneficiary and Family Centered Quality Improvement Organization [facility]). To get your CC-QIO's phone number, visit Medicare.gov/contacts or call 1-800-MEDICARE. · To find a different hospital, visit www. hospitalcompare.[facility].gov or call 1-800- MEDICARE (0-277.246.3291). TTY users should call 0-668.872.4370. · To find a different doctor, visit Medicare's Physician Compare website, [employer].co.nz, or call 1-800-MEDICARE (737 5841). TTY users should call 9-727.177.8659. · To find a different skilled nursing facility, visit [place] website, https://www.[employer]/, or call 1-800-MEDICARE (1- 839.286.6664). TTY users should call 2-976.999.8152. · To find a different long term care hospital, visit [facility] [address] Compare website, [employer], or call 1-800- MEDICARE (739 9725). TTY users should call 2-999.857.4267. · To find a different inpatient rehabilitation facility, visit [facility] [address] Compare website, www.medicare.gov/ inpatientrehabilitation facilitycompare, or call 1-800-MEDICARE (7-275.997.8080). TTY users should call 4- 448.460.4773. · To find a different home health agency, visit [address] website, www.medicare.gov/homehealthcompare, or call 1-800-MEDICARE (7-443- 639-5645). TTY users should call 0-758.722.5015.

## 2019-04-17 NOTE — TELEPHONE ENCOUNTER
Patient's daughter called stating patient was just started on Imdur 30 mg daily. About an hour and half to two hours after taking it today, her BP dropped to 80/50 (HR 64) and she is feeling lightheaded. Please advise.

## 2019-04-25 NOTE — PROGRESS NOTES
Mandeep Reyes NP notified of potassium results of 2.8. Mala Alfaro NP seeing pt. Will give pt instructions.

## 2019-04-25 NOTE — PATIENT INSTRUCTIONS
1. Decrease metolazone to 2.5 mg every other day - however if you gain weight, have increased shortness of breath, swelling or abdominal bloating then you can take the metolazone on the off day. 2. When you get home take 20 meq potassium immediately     3. Increase potassium to 40 meq twice daily    4. Repeat labs on Monday (4/29/2019)    5. Diet should sodium restricted to 2 grams    -Stay hydrated     -Again watch your daily weight trends and if you gain water weight please follow above instructions.    -If you gain 3-5 pounds in 2-3 days OR notice that you are retaining fluid in anyway just like you did before then take an extra dose of your water pill (furosemide/Lasix ) every day until you lose the weight or feel better.   -If you notice that you have taken more than 3 extra doses in 1 week then please call and let us know. -IF you take an extra dose of lasix - then you need to take an extra potassium pill (20 meq). -If at any time you feel that you are retaining fluid, your medications are not working, or you feel ill in anyway, then please call us for either same day appointment or the next day, and for instructions. Our goal is to keep you out of the emergency room and the hospital and we have ways to do it. You just need to call us in a timely manner.     -If you become sick for other reasons, and notice that you are not urinating as much, the urine is very dark, you have significant diarrhea or vomiting, then please DO NOT take your water pill and CALL US immediately. Continue to weigh yourself on a regular basis. 6. Return as scheduled with Dr. Muniz Call - however if you have a change in symptoms and need a sooner evaluation please call the office.

## 2019-04-25 NOTE — PROGRESS NOTES
First visit today, [facility], Zone pamphlet, and sodium content paper all reviewed with pt. And daughter both verbalized understanding. Breath sounds clear, 1-2+ pitting edema noted BLE. Labs drawn and follow up appt. Made.

## 2019-04-25 NOTE — PROGRESS NOTES
[facility]  Office Visit         Reason for Visit: Heart failure    Primary Cardiologist:  Dr. Lisette Schuster        History of Present Illness:     Ms. Adriana Cevallos is a 80-year-old obese  female with a PMHx HFpEF, CAD, atrial fibrillation, hypertension, CKD, hyperlipidemia. She presents today for post acute heart failure hospitalization, she presented to the hospital with complaints of increased shortness of breath, lower extremity edema and abdominal bloating. She was decongested with IV diuretic and reported subjective improvement. Since discharge she had some hypotension at home, family is in the medical field and check her blood pressure regularly it does not sound like the patient was symptomatic. Her lisinopril was held while in the hospital and shortly after discharge her Imdur was held as well. Since that time her blood pressure has improved and she currently denies any dizziness, lightheadedness or recent falls. She reports good response to oral diuretic with clear yellow urine production and stable weights. She is monitoring her diet for sodium content, takes her medications as prescribed and is weighing herself daily. Her only complaint today is some continued weakness/fatigue since discharge that is only slowly improving. She does have bilateral lower extremity dependent edema with compression stockings in place. She denies dyspnea with exertion, shortness of breath, or decline in overall functional capacity. She denies orthopnea, PND, nocturnal cough or hemoptysis. She denies abdominal distention or bloating, early satiety, anorexia/change in appetite, unintentional weight loss. She does lower extremity edema. She denies exertional lightheadedness. She denies palpitations, syncope or near syncope. Review of systems is negative for chest pain, pressure, discomfort. When ambulating on an incline, he/she reports/denies leg claudication.  History is negative for neurological symptoms including transient loss of vision, asymmetric weakness, aphasia, dysphasia, numbness, tingling. Patient Active Problem List    Diagnosis Date Noted    Diastolic CHF ([facility] 31/61/7012    CHF (congestive heart failure), NYHA class I, acute on chronic, combined ([facility] 03/19/2019    Acute on chronic diastolic CHF (congestive heart failure) ([facility]     Nonrheumatic aortic valve insufficiency     Non-rheumatic mitral regurgitation     Paroxysmal atrial fibrillation (HCC)     Lung nodule     Atrial fibrillation ([address] 02/15/2018    Essential hypertension     Acute on chronic diastolic congestive heart failure ([facility] 07/13/2017     Updating Deprecated Diagnoses      Mixed hyperlipidemia 12/28/2014    CAD in native artery 12/28/2014    TARIK (obstructive sleep apnea) 08/08/2013    Hypothyroidism 12/23/2010     Past Medical History:    1. Persistent atrial fibrillation (diagnosed 12/2014) - recurrent atrial fibrillation s/p PEACE/CVN on 2/13/18, switched Toprol XL to BID dosing on 2/12/18 (continue), decision made by patient/family in the past to discontinue anticoagulation (she is now agreeable), eliquis 5 mg BID added 2/12/18 prior to [address] (continue) --> maintaining SR, amiodarone started in 5/2017. 2. Chronic diastolic heart CHF (hospitalized in 7/2017 for acute on chronic CHF exacerbation). 3. CAD s/p MC to mid LAD in 2011 -- no new flow limiting disease on 1024/2013 cardiac catheteriztaion   4. Hypertension  5. Hyperlipidemia  6. Reported ascending aortic aneurysm -- no aneurysm noted on 7/12/17 CTA chest  7. Hypothyroidism: on replacement therapy  8. TARIK - AHI 5 on 12/2014 study (no treatment at this time)  9. Chronic left anterior fascicular block   10.  Lumbar stenosis s/p prior epidural injection s     Past Medical History:   Diagnosis Date    A-fib ([facility]     AC (acromioclavicular) joint bone spurs     in feet    Acid reflux     Ascending aortic aneurysm (HCC) 4.0 cm,(SEE LETTERS DR. PENALOZA) IS BEING MONITORED    Atrial fibrillation ([address]     CAD (coronary artery disease)     Cancer ([address] 16 YRS AGO    breast RIGHT    CHF (congestive heart failure) (HCC)     Chronic back pain     stenosis, receives epidurals bi-monthly    DJD (degenerative joint disease)     Hyperlipidemia     Hypertension     Hypothyroidism     Iron deficiency anemia     Lung nodule     RIGHT LOWER LOBE, BEING MONITORED BY DR. Wells Hand, LAST CT EPIC 3/2014    MVP (mitral valve prolapse)     Osteoarthritis     Osteoporosis     Partial bowel obstruction ([address]     Pneumonia 2008    Polymyalgia rheumatica ([address]     Scoliosis     Sinus arrhythmia     1/2014 last ekg, nsr with pacs, epic    Sleep apnea     uses cpap    Spinal stenosis     Thoracic ascending aortic aneurysm ([address]     Thyroid disease     hypothyroidism           Past Surgical History:   Procedure Laterality Date    ARTERY BIOPSY  2008    temporal    BACK SURGERY  1996    LUMBAR [address],  BONE SPURS    BREAST SURGERY Right 1993    CANCER    CARDIAC CATHETERIZATION  2010    CARDIOVERSION  02/13/2018    CERVICAL FUSION  1994    c3-4    CHOLECYSTECTOMY  1980's    OPEN    COLON SURGERY  1980's    repair of partial bowel obstruction    CORONARY ANGIOPLASTY WITH STENT PLACEMENT  06/2011    Dr. Dipesh Diane, ESOPHAGUS      EYE SURGERY Bilateral 2004    CATARACTS WITH LENS IMPLANTS    FOOT SURGERY  2010,2011    ct guided injections of feet, toenail removal   [address] [place] [address]    HYSTERECTOMY  1960's    TOE SURGERY      Removal of bony outgrowth, right great toe    TRANSESOPHAGEAL ECHOCARDIOGRAM  02/13/2018    WRIST SURGERY Right YRS AGO       Allergies   Allergen Reactions    Judit-D [Diphenhydramine Hcl] Other (See Comments)     Becomes very hyper, \"can't stay still\" dystonia    Benadryl [Diphenhydramine]     Phenergan [Promethazine Hcl]      Dystonia      Ibuprofen Nausea And Vomiting     Patient states she takes \"Advil all the time\" and is not allergic to Ibuprofen. Outpatient Medications Marked as Taking for the 4/25/19 encounter (Office Visit) with ZHANNA Hogan - CNP   Medication Sig Dispense Refill    fexofenadine (ALLEGRA) 180 MG tablet TAKE ONE TABLET BY MOUTH ONCE DAILY AS NEEDED  5    metolazone (ZAROXOLYN) 2.5 MG tablet Take 1 tablet by mouth every other day 30 tablet 3    potassium chloride (KLOR-CON M) 20 MEQ extended release tablet Take 2 tablets by mouth 2 times daily 60 tablet 0    CPAP Machine MISC by Does not apply route nightly      furosemide (LASIX) 40 MG tablet Take 1 tablet by mouth 2 times daily 60 tablet 3    metoprolol succinate (TOPROL XL) 50 MG extended release tablet Take 1 tablet by mouth 2 times daily 30 tablet 3    nystatin-triamcinolone (MYCOLOG II) 953100-6.1 UNIT/GM-% cream Apply topically 4 times daily.  1 Tube 0    rOPINIRole (REQUIP) 0.5 MG tablet Take 0.5 mg by mouth daily At 3pm      senna (SENOKOT) 8.6 MG tablet Take 1 tablet by mouth every morning      predniSONE (DELTASONE) 5 MG tablet Take 10 mg by mouth every morning       amiodarone (CORDARONE) 200 MG tablet Take 1 tablet by mouth daily 30 tablet 11    levothyroxine (SYNTHROID) 125 MCG tablet Take 1 tablet by mouth every morning (before breakfast) (Patient taking differently: Take 137 mcg by mouth every morning (before breakfast) ) 30 tablet 3    lactulose (CHRONULAC) 10 GM/15ML solution Take 20 g by mouth nightly as needed      Lactobacillus (PROBIOTIC ACIDOPHILUS) CAPS Take 1 capsule by mouth nightly       Simethicone (GAS-X ULTRA STRENGTH) 180 MG CAPS Take 1 capsule by mouth Daily with lunch       apixaban (ELIQUIS) 5 MG TABS tablet Take 1 tablet by mouth 2 times daily 60 tablet 0    Calcium Carb-Cholecalciferol (CALCIUM-VITAMIN D) 600-400 MG-UNIT TABS Take 1 tablet by mouth 2 times daily       ranitidine (ZANTAC) 300 MG tablet TAKE ONE TABLET BY MOUTH EVERY MORNING  1    HYDROcodone-acetaminophen (NORCO) 7.5-325 MG per tablet Take 1 tablet by mouth every 6 hours as needed       raloxifene (EVISTA) 60 MG tablet Take 60 mg by mouth every morning       OXYGEN Inhale 2 L into the lungs nightly Indications: BMS      magnesium oxide (MAG-OX) 400 (240 MG) MG tablet Take 1 tablet by mouth daily. (Patient taking differently: Take 240 mg by mouth Daily with lunch ) 30 tablet 2    ferrous sulfate 325 (65 FE) MG tablet Take 325 mg by mouth Daily with lunch       rOPINIRole (REQUIP) 0.25 MG tablet Take 0.25 mg by mouth nightly       Coenzyme Q10 (COQ-10) 200 MG CAPS Take 1 capsule by mouth Daily with lunch       cyanocobalamin 1000 MCG/ML injection Inject 1,000 mcg into the muscle every 30 days       lovastatin (MEVACOR) 20 MG tablet Take 40 mg by mouth nightly       Cholecalciferol (VITAMIN D) 2000 UNITS TABS Take 1 tablet by mouth Daily with lunch          Review of Systems:   Cardiac: As per HPI  General: No fever, chills, rigors  Pulmonary: As per HPI  HEENT: No visual disturbances, difficult swallowing  GI: No nausea, vomiting, abdominal pain  : No dysuria or hematuria  Endocrine: No thyroid disease or diabetes  Musculoskeletal: VILLEGAS x 4, no focal motor deficits  Skin: Intact, no rashes  Neuro/Psych: No headache or seizures      Weights: Wt Readings from Last 3 Encounters:   04/25/19 189 lb (85.7 kg)   04/16/19 195 lb 4.8 oz (88.6 kg)   04/08/19 201 lb (91.2 kg)             Physical Examination:     /62 (Site: Left Upper Arm, Position: Sitting, Cuff Size: Large Adult)   Pulse 64   Resp 16   Ht 5' 2\" (1.575 m)   Wt 189 lb (85.7 kg)   SpO2 98%   BMI 34.57 kg/m²     CONSTITUTIONAL: Alert and oriented times 3, no acute distress and cooperative to examination with proper mood and affect. SKIN: Skin color, texture, turgor normal. No rashes or lesions. LYMPH: no cervical nodes, no inguinal nodes  HEENT: Head is normocephalic, atraumatic. EOMI, PERRLA.   NECK: Supple, symmetrical, trachea midline, no adenopathy, thyroid symmetric, not enlarged and no tenderness, skin normal. No jvd/hjr. CHEST/LUNGS: chest symmetric with normal A/P diameter, normal respiratory rate and rhythm, lungs clear to auscultation without wheezes, rales or rhonchi. No accessory muscle use. Scars None   CARDIOVASCULAR: Heart sounds are normal.  Regular rate and rhythm without murmur, gallop or rub. Normal S1 and S2. . Carotid and femoral pulses 2+/4 and equal bilaterally. ABDOMEN: Normal shape. No and Laparoscopic scar(s) present. Normal bowel sounds. No bruits. soft, nondistended, no masses or organomegaly. no evidence of hernia. Percussion: Normal without hepatosplenomegally. Tenderness: absent. RECTAL: deferred, not clinically indicated  NEUROLOGIC: There are no focalizing motor or sensory deficits. CN II-XII are grossly intact. Youlanda Saris EXTREMITIES: no cyanosis, no clubbing. 1-2+ bilateral lower extremity edema. Compression stockings in place. Warm and well perfused. All the following diagnostics were personally reviewed and interpreted by me. LAB DATA:     4/12/2019 11:10 4/14/2019 03:57 4/15/2019 02:31 4/16/2019 04:25 4/25/2019 13:45   Sodium 138 140 142 136 137   Potassium 4.3 3.5 3.7 3.3 (L) 2.8 (L)   Chloride 101 99 98 92 (L) 89 (L)   CO2 29 32 (H) 35 (H) 34 (H) 36 (H)   BUN 16 21 23 29 (H) 33 (H)   Creatinine 1.0 1.2 (H) 1.4 (H) 1.4 (H) 1.4 (H)   Anion Gap 8 9 9 10 12   GFR Non- 52 42 35 35 35   GFR  >60 51 43 43 43   Magnesium  2.3  1.9    Glucose 143 (H) 103 (H) 107 (H) 102 (H) 161 (H)   Calcium 8.9 8.6 9.4 9.0 9.4   Pro-BNP     752 (H)   WBC 9.2       RBC 3.78       Hemoglobin Quant 12.2       Hematocrit 37.7       MCV 99.7       MCH 32.3       MCHC 32.4       MPV 11.2       RDW 13.1       Platelet Count 800           DIAGNOSTICS:    CXR (4/9/2019)  FINDINGS:   Cardiac is enlarged. There is moderate tortuosity for the  thoracic aorta.  Areas fibrillation with dccv (2/2018) - maintaining SR on BB and amiodarone  6. Anticoagulated with Eliquis  7. VHD - moderate MR, AI and mild TR  8. Hypertension with recent hypotension resulting in the discontinuation of nitrates and ace. 9. CKD - rise in co2 from 29 >> 36, bun 33/scr 1.4  10. Hyperlipidemia-on statin therapy  11. Hypothyroidism-on replacement therapy  12. TARIK  13. Hypokalemia        PLAN:  1. Decrease metolazone to 2.5 mg every other day - however if you gain weight, have increased shortness of breath, swelling or abdominal bloating then you can take the metolazone on the off day. 2. When you get home take 20 meq potassium immediately     3. Increase potassium to 40 meq twice daily    4. Repeat labs on Monday (4/29/2019)    5. Diet should sodium restricted to 2 grams    -Stay hydrated     -Again watch your daily weight trends and if you gain water weight please follow below instructions.    -If you gain 3-5 pounds in 2-3 days OR notice that you are retaining fluid in anyway just like you did before then take an extra dose of your water pill (furosemide/Lasix ) every day until you lose the weight or feel better.   -If you notice that you have taken more than 3 extra doses in 1 week then please call and let us know. -IF you take an extra dose of lasix - then you need to take an extra potassium pill (20 meq). -If at any time you feel that you are retaining fluid, your medications are not working, or you feel ill in anyway, then please call us for either same day appointment or the next day, and for instructions. Our goal is to keep you out of the emergency room and the hospital and we have ways to do it. You just need to call us in a timely manner.     -If you become sick for other reasons, and notice that you are not urinating as much, the urine is very dark, you have significant diarrhea or vomiting, then please DO NOT take your water pill and CALL US immediately.  Continue to weigh yourself

## 2019-05-01 NOTE — DISCHARGE SUMMARY
neededHistorical Med      Lactobacillus (PROBIOTIC ACIDOPHILUS) CAPS Take 1 capsule by mouth nightly Historical Med      Simethicone (GAS-X ULTRA STRENGTH) 180 MG CAPS Take 1 capsule by mouth Daily with lunch Historical Med      apixaban (ELIQUIS) 5 MG TABS tablet Take 1 tablet by mouth 2 times daily, Disp-60 tablet, R-0Normal      furosemide (LASIX) 40 MG tablet Take 1 tablet by mouth 2 times daily, Disp-60 tablet, R-3Normal      Calcium Carb-Cholecalciferol (CALCIUM-VITAMIN D) 600-400 MG-UNIT TABS Take 1 tablet by mouth Daily with lunch Historical Med      ranitidine (ZANTAC) 300 MG tablet TAKE ONE TABLET BY MOUTH EVERY MORNING, R-1Historical Med      HYDROcodone-acetaminophen (NORCO) 7.5-325 MG per tablet Take 1 tablet by mouth every 6 hours as needed Historical Med      raloxifene (EVISTA) 60 MG tablet Take 60 mg by mouth every morning Historical Med      OXYGEN Inhale 2 L into the lungs nightly Indications: BMSHistorical Med      magnesium oxide (MAG-OX) 400 (240 MG) MG tablet Take 1 tablet by mouth daily. , Disp-30 tablet, R-2Normal      ferrous sulfate 325 (65 FE) MG tablet Take 325 mg by mouth Daily with lunch Historical Med      lisinopril (PRINIVIL;ZESTRIL) 40 MG tablet Take 40 mg by mouth nightly Historical Med      !! rOPINIRole (REQUIP) 0.25 MG tablet Take 0.25 mg by mouth nightly Historical Med      Coenzyme Q10 (COQ-10) 200 MG CAPS Take 1 capsule by mouth Daily with lunch Historical Med      cyanocobalamin 1000 MCG/ML injection Inject 1,000 mcg into the muscle every 30 days Historical Med      lovastatin (MEVACOR) 20 MG tablet Take 40 mg by mouth nightly Historical Med      Cholecalciferol (VITAMIN D) 2000 UNITS TABS Take 1 tablet by mouth Daily with lunch Historical Med      potassium chloride (KLOR-CON) 10 MEQ CR tablet Take 20 mEq by mouth 2 times daily Historical Med       !! - Potential duplicate medications found. Please discuss with provider.         Activity: activity as tolerated  Diet: cardiac

## 2019-05-03 NOTE — TELEPHONE ENCOUNTER
5/3/2019 5:59 [phone] (home)  Juan Pires   Left voice message as per Danae Ochoa CNP request. Instructions as below. Today only take 40meq TID potassium then resume 40meq BID. Instructions to go to any [facility], script in computer next week for repeat labs .     ----- Message from ZHANNA Proctor CNP sent at 5/2/2019  8:54 AM EDT -----  How is she feeling? Staying hydrated? Weights? Needed to use any extra doses of diuretics? Take an extra dose of potassium today. Repeat labs in one week.      Thank you.     ----- Message -----  From: Kamari Michaels Incoming Results From [employer]  Sent: 4/29/2019   1:00 PM  To: ZHANNA Proctor CNP

## 2019-05-05 NOTE — ED PROVIDER NOTES
HPI:  5/5/19,   Time: 12:01 PM         Tomasz Mcnulty is a 80 y.o. female presenting to the ED for right eye redness, beginning when coming out of [employer]. The complaint has been persistent, mild in severity, and worsened by nothing. Patient states she feels fine however when she came out of [employer] her daughter noticed her eye was red. She denies blurred vision, headache, eyepain, nausea, vomiting. She complains of no symptoms other than eye being red. She went to urgent care who diagnosed her with a subconjunctival hemorrhage and sent her here in case an eye doctor was on call. ROS:   Pertinent positives and negatives are stated within HPI, all other systems reviewed and are negative.  --------------------------------------------- PAST HISTORY ---------------------------------------------  Past Medical History:  has a past medical history of A-fib ([address] AC (acromioclavicular) joint bone spurs, Acid reflux, Ascending aortic aneurysm ([address] Atrial fibrillation ([address] CAD (coronary artery disease), Cancer ([address] CHF (congestive heart failure) ([address] Chronic back pain, DJD (degenerative joint disease), Hyperlipidemia, Hypertension, Hypothyroidism, Iron deficiency anemia, Lung nodule, MVP (mitral valve prolapse), Osteoarthritis, Osteoporosis, Partial bowel obstruction ([address] Pneumonia, Polymyalgia rheumatica ([address] Scoliosis, Sinus arrhythmia, Sleep apnea, Spinal stenosis, Thoracic ascending aortic aneurysm ([address] and Thyroid disease. Past Surgical History:  has a past surgical history that includes Artery Biopsy (2008); cervical fusion (1994); Toe Surgery; Wrist surgery (Right, YRS AGO); Foot surgery (2010,2011); Hysterectomy (1960's); Colon surgery (1980's); Cardiac catheterization (2010); Coronary angioplasty with stent (06/2011); eye surgery (Bilateral, 2004); hernia repair (1996, 1998); Cholecystectomy (1980's); back surgery (1996);  Dilatation, esophagus; transesophageal echocardiogram (02/13/2018); Cardioversion (02/13/2018); and Breast surgery (, 1993). Social History:  reports that she has never smoked. She has never used smokeless tobacco. She reports that she does not drink alcohol or use drugs. Family History: family history includes Brain Cancer in her mother; Heart Attack in her father; Heart Surgery in her maternal aunt; Lung Cancer in her brother; Stroke in her maternal aunt. The patients home medications have been reviewed. Allergies: Judit-d [diphenhydramine hcl]; Benadryl [diphenhydramine]; Phenergan [promethazine hcl]; and Ibuprofen    -------------------------------------------------- RESULTS -------------------------------------------------  All laboratory and radiology results have been personally reviewed by myself   LABS:  No results found for this visit on 05/05/19. RADIOLOGY:  Interpreted by Radiologist.  No orders to display       ------------------------- NURSING NOTES AND VITALS REVIEWED ---------------------------   The nursing notes within the ED encounter and vital signs as below have been reviewed. /62   Pulse 56   Temp 98.1 °F (36.7 °C) (Oral)   Resp 18   Ht 5' 2\" (1.575 m)   Wt 180 lb (81.6 kg)   SpO2 100%   BMI 32.92 kg/m²   Oxygen Saturation Interpretation: Normal      ---------------------------------------------------PHYSICAL EXAM--------------------------------------      Constitutional/General: Alert and oriented x3, well appearing, non toxic in NAD  Head: NC/AT  Eyes: PERRL, EOMI. Right sclera  with subconjunctival hemorrhage covering most of the sclera except for a small portion in the right upper corner of the eye. IT william the pupil. No hyphema. Mouth: Oropharynx clear, handling secretions, no trismus  Neck: Supple, full ROM, no meningeal signs  Pulmonary: Lungs clear to auscultation bilaterally, no wheezes, rales, or rhonchi.  Not in respiratory distress  Cardiovascular:  Regular rate and rhythm, no murmurs, gallops, or rubs. 2+ distal pulses  Abdomen: Soft, non tender, non distended,   Extremities: Moves all extremities x 4. Warm and well perfused  Skin: warm and dry without rash  Neurologic: GCS 15,  Psych: Normal Affect      ------------------------------ ED COURSE/MEDICAL DECISION MAKING----------------------  Medications - No data to display      Medical Decision Making:    Patient was evaluated by Dr. Ramírez Brooks. We will discharge patient home. She will [facility] Wendy [facility]. Counseling: The emergency provider has spoken with the patient and discussed todays results, in addition to providing specific details for the plan of care and counseling regarding the diagnosis and prognosis. Questions are answered at this time and they are agreeable with the plan.      --------------------------------- IMPRESSION AND DISPOSITION ---------------------------------    IMPRESSION  1.  Subconjunctival bleed, right        DISPOSITION  Disposition: Discharge to home  Patient condition is stable                 Omayra Ye  05/05/19 1215

## 2019-05-06 NOTE — TELEPHONE ENCOUNTER
Writer contacted [id number] to schedule ED f/u appt. Appt scheduled for Tuesday 5-7 @1:45pm with Dr. Pastor Camargo.

## 2019-05-08 PROBLEM — N17.9 AKI (ACUTE KIDNEY INJURY) (HCC): Status: ACTIVE | Noted: 2019-01-01

## 2019-05-08 PROBLEM — N28.9 ACUTE RENAL INSUFFICIENCY: Status: ACTIVE | Noted: 2019-01-01

## 2019-05-08 NOTE — DISCHARGE SUMMARY
Physician Discharge Summary     Patient ID:  Melanie Parikh  [id number]  39 y.o.  12/28/1927    Admit date: 4/9/2019    Discharge date and time:  4/16/2019    Admission Diagnoses:   Patient Active Problem List   Diagnosis    Hypothyroidism    TARIK (obstructive sleep apnea)    Mixed hyperlipidemia    CAD in native artery    Acute on chronic diastolic congestive heart failure (HCC)    Essential hypertension    Atrial fibrillation (HCC)    Lung nodule    Paroxysmal atrial fibrillation (HCC)    CHF (congestive heart failure), NYHA class I, acute on chronic, combined ([facility]    Acute on chronic diastolic CHF (congestive heart failure) [facility]    Nonrheumatic aortic valve insufficiency    Non-rheumatic mitral regurgitation    Diastolic CHF ([facility]       Discharge Diagnoses: Active Problems:    Acute on chronic diastolic CHF (congestive heart failure) ([facility])    Diastolic CHF ([facility]  Resolved Problems:    * No resolved hospital problems. *      Consults: IP CONSULT TO PRIMARY CARE PROVIDER  IP CONSULT TO CARDIOLOGY  IP CONSULT TO PULMONOLOGY  IP CONSULT TO IV TEAM  IP CONSULT TO PULMONOLOGY  IP CONSULT TO IV TEAM  IP CONSULT TO HEART FAILURE NURSE/COORDINATOR    Procedures: N/A    Hospital Course: Admitted with CHF exacerbation. Diuresis. Improved. Discharge Exam:  See progress note from today    Condition:  Stable    Disposition: home    Patient Instructions:   Discharge Medication List as of 4/16/2019  3:03 PM      START taking these medications    Details   furosemide (LASIX) 40 MG tablet Take 1 tablet by mouth 2 times daily, Disp-60 tablet, R-3Normal      nystatin-triamcinolone (MYCOLOG II) 312440-2.1 UNIT/GM-% cream Apply topically 4 times daily. , Disp-1 Tube, R-0, Normal      aspirin 81 MG chewable tablet Take 1 tablet by mouth daily, Disp-30 tablet, R-3Normal      metolazone (ZAROXOLYN) 2.5 MG tablet Take 1 tablet by mouth daily, Disp-30 tablet, R-3Normal      isosorbide mononitrate (IMDUR) 30 MG extended Med      lisinopril (PRINIVIL;ZESTRIL) 40 MG tablet Take 40 mg by mouth nightly Historical Med      !! rOPINIRole (REQUIP) 0.25 MG tablet Take 0.25 mg by mouth nightly Historical Med      Coenzyme Q10 (COQ-10) 200 MG CAPS Take 1 capsule by mouth Daily with lunch Historical Med      cyanocobalamin 1000 MCG/ML injection Inject 1,000 mcg into the muscle every 30 days Historical Med      lovastatin (MEVACOR) 20 MG tablet Take 40 mg by mouth nightly Historical Med      Cholecalciferol (VITAMIN D) 2000 UNITS TABS Take 1 tablet by mouth Daily with lunch Historical Med      potassium chloride (KLOR-CON) 10 MEQ CR tablet Take 20 mEq by mouth 2 times daily Historical Med       !! - Potential duplicate medications found. Please discuss with provider.       STOP taking these medications       bumetanide (BUMEX) 2 MG tablet Comments:   Reason for Stopping:             Activity: activity as tolerated  Diet: cardiac diet    Follow-up with PCP, cardiology, pulmonology    Note that over 30 minutes was spent in preparing discharge papers, discussing discharge with patient, staff, consultants, medication review, arranging follow up, etc.    Signed:  Jammie Vasquez MD  5/8/2019  2:57 PM

## 2019-05-08 NOTE — PROGRESS NOTES
Weight today 189.4lb, pt denies any discomfort breath sounds clear denies any discomfort. Labs drawn and follow up appt. Made.

## 2019-05-08 NOTE — ED PROVIDER NOTES
70-year-old female presents to the emergency department with concerns for acute renal failure, hyperkalemia and dehydration. The patient has a history of congestive heart failure was been admitted multiple times for this. She states really feeling mild symptoms though she did state some mild chest pain this morning. Labs are checked by her cardiologist today and found the creatinine of 2.7 which is no elevation for baseline between 1.3 and 1.7. She was found to have a potassium of 3.1. Patient states she's been under strict diet has decreased liquid intake. She does admit the hot dog yesterday. She states she had mild chest pain earlier today but this was improved with Pepto-Bismol. She states no other complaints this time. The history is provided by the patient. Chest Pain   Pain location:  Substernal area  Pain quality: aching    Pain radiates to:  Does not radiate  Pain severity:  Mild  Onset quality:  Gradual  Duration:  3 hours  Timing:  Intermittent  Progression:  Resolved  Chronicity:  New  Relieved by:  Nothing  Worsened by:  Nothing  Ineffective treatments:  None tried  Associated symptoms: no abdominal pain, no anorexia, no anxiety, no back pain, no cough, no dizziness, no fatigue, no fever, no headache, no heartburn, no nausea, no near-syncope, no numbness, no orthopnea, no PND, no shortness of breath, no syncope, no vomiting and no weakness        Review of Systems   Constitutional: Negative for chills, fatigue and fever. Respiratory: Negative for cough and shortness of breath. Cardiovascular: Positive for chest pain. Negative for orthopnea, syncope, PND and near-syncope. Gastrointestinal: Negative for abdominal pain, anorexia, blood in stool, heartburn, nausea and vomiting. Genitourinary: Negative for dysuria and frequency. Musculoskeletal: Negative for back pain. Skin: Negative for rash. Neurological: Negative for dizziness, weakness, numbness and headaches.    All other systems reviewed and are negative. Physical Exam   Constitutional: She appears well-developed and well-nourished. No distress. HENT:   Head: Normocephalic and atraumatic. Eyes: Pupils are equal, round, and reactive to light. EOM are normal.   Neck: Normal range of motion. Neck supple. Cardiovascular: Normal rate and regular rhythm. Pulmonary/Chest: Effort normal and breath sounds normal.   Abdominal: Soft. Bowel sounds are normal.   Musculoskeletal: She exhibits edema. Legs:      Procedures    MDM    ED Course as of May 08 1604   Wed May 08, 2019   1516 Patient was seen and examined. Labs reviewed done earlier today that showed a creatinine of 2.7 which is increased from her baseline of 1.3-1.7. Patient was also hypokalemic at 3.1 this is been replaced. Patient given small fluid bolus and then placed on an infusion. CBC and a troponin was added on EKG does show possible signs of new flipped T waves in V4 through V6 but no signs of ST elevation. Patient is without chest pain at this time. [CF]   1601 I spoke with Dr. Huang Frost will admit the patient. Patient given aspirin for chest pain that she had earlier. Troponin is found to be negative. A consultation was placed with cardiology for further evaluation.     [CF]      ED Course User Index  [CF] Tasneem Eli, DO     --------------------------------------------- PAST HISTORY ---------------------------------------------  Past Medical History:  has a past medical history of A-fib ([address] AC (acromioclavicular) joint bone spurs, Acid reflux, Ascending aortic aneurysm ([address] Atrial fibrillation ([address] CAD (coronary artery disease), Cancer ([address] CHF (congestive heart failure) ([address] Chronic back pain, DJD (degenerative joint disease), Hyperlipidemia, Hypertension, Hypothyroidism, Iron deficiency anemia, Lung nodule, MVP (mitral valve prolapse), Osteoarthritis, Osteoporosis, Partial bowel obstruction ([address] Pneumonia, Polymyalgia rheumatica ([address] Scoliosis, signs as below have been reviewed. Patient Vitals for the past 24 hrs:   BP Temp Temp src Pulse Resp SpO2 Height Weight   05/08/19 1420 (!) 98/54 98 °F (36.7 °C) Oral 64 16 96 % 5' 2\" (1.575 m) 185 lb (83.9 kg)       Oxygen Saturation Interpretation: Normal    ------------------------------------------ PROGRESS NOTES ------------------------------------------  Counseling:  I have spoken with the patient and discussed todays results, in addition to providing specific details for the plan of care and counseling regarding the diagnosis and prognosis. Their questions are answered at this time and they are agreeable with the plan of admission.    --------------------------------- ADDITIONAL PROVIDER NOTES ---------------------------------  This patient's ED course included: a personal history and physicial examination, re-evaluation prior to disposition, multiple bedside re-evaluations, IV medications, cardiac monitoring and continuous pulse oximetry    This patient has remained hemodynamically stable during their ED course. Diagnosis:  1. Acute renal failure superimposed on chronic kidney disease, unspecified CKD stage, unspecified acute renal failure type ([address]    2. Dehydration    3. Hypokalemia    4. Acute electrocardiogram changes    5. Chest pain, unspecified type Improving       Disposition:  Patient's disposition: Admit to telemetry  Patient's condition is stable.              Consuelo Wood DO  Resident  05/08/19 1327 Detail Level: Detailed Consent: The patient's consent was obtained including but not limited to risks of crusting, scabbing, blistering, scarring, darker or lighter pigmentary change, recurrence, incomplete removal and infection. Render Post-Care Instructions In Note?: no Duration Of Freeze Thaw-Cycle (Seconds): 0 Post-Care Instructions: I reviewed with the patient in detail post-care instructions. Patient is to wear sunprotection, and avoid picking at any of the treated lesions. Pt may apply Vaseline to crusted or scabbing areas.

## 2019-05-09 NOTE — H&P
History and Physical    CHIEF COMPLAINT:  Acute kidney failure    HISTORY OF PRESENT ILLNESS:    The patient is a 80 y.o. female patient with history of acute on chronic diastolic CHF who was at the CHF clinic and had lab work which showed acute renal insufficiency with creatinine of 2.5 and hypokalemia. Patient's baseline creatinine is approximately 1.2. Patient therefore brought to the emergency room. She was found to be dehydrated. Now admitted for further evaluation and treatment. Patient denies headache, change in vision, chest pain, shortness of breath, palpitations, syncope, lower extremity edema, and or any neurological signs or symptoms. Past Medical History:    Past Medical History:   Diagnosis Date    A-fib ([address]     AC (acromioclavicular) joint bone spurs     in feet    Acid reflux     Ascending aortic aneurysm (HCC)     4.0 cm,(SEE LETTERS DR. PENALOZA) IS BEING MONITORED    Atrial fibrillation ([address]     CAD (coronary artery disease)     Cancer ([address] 16 YRS AGO    breast RIGHT    CHF (congestive heart failure) (HCC)     Chronic back pain     stenosis, receives epidurals bi-monthly    DJD (degenerative joint disease)     Hyperlipidemia     Hypertension     Hypothyroidism     Iron deficiency anemia     Lung nodule     RIGHT LOWER LOBE, BEING MONITORED BY DR. Melissa Bean, LAST CT EPIC 3/2014    MVP (mitral valve prolapse)     Osteoarthritis     Osteoporosis     Partial bowel obstruction ([address]     Pneumonia 2008    Polymyalgia rheumatica ([address]     Scoliosis     Sinus arrhythmia     1/2014 last ekg, nsr with pacs, epic    Sleep apnea     uses cpap    Spinal stenosis     Thoracic ascending aortic aneurysm ([address]     Thyroid disease     hypothyroidism     Past Surgical History:    Past Surgical History:   Procedure Laterality Date    ARTERY BIOPSY  2008    temporal    [address]  2010  CARDIOVERSION  02/13/2018    CERVICAL FUSION  1994    c3-4    CHOLECYSTECTOMY  1980's    OPEN    COLON SURGERY  1980's    repair of partial bowel obstruction    CORONARY ANGIOPLASTY WITH STENT PLACEMENT  06/2011    Dr. Federica Shah, ESOPHAGUS      EYE SURGERY Bilateral 2004    CATARACTS WITH LENS IMPLANTS    FOOT SURGERY  2010,2011    ct guided injections of feet, toenail removal    HERNIA REPAIR  5731, 2510    UMBILICAL, RIH    HYSTERECTOMY  1960's    TOE SURGERY      Removal of bony outgrowth, right great toe    TRANSESOPHAGEAL ECHOCARDIOGRAM  02/13/2018    WRIST SURGERY Right YRS AGO     Medications Prior to Admission:    Medications Prior to Admission: Simethicone 80 MG TABS, Take 160 mg by mouth Daily with lunch  calcium carbonate 600 MG TABS tablet, Take 1 tablet by mouth 2 times daily  levothyroxine (SYNTHROID) 137 MCG tablet, Take 137 mcg by mouth every morning  HYDROcodone-acetaminophen (NORCO) 5-325 MG per tablet, Take 1 tablet by mouth every 8 hours.   clotrimazole (LOTRIMIN) 1 % cream, APPLY TOPICALLY 2 TIMES DAILY AS NEEDED  fexofenadine (ALLEGRA) 180 MG tablet, TAKE ONE TABLET BY MOUTH EVERY MORNING  potassium chloride (KLOR-CON M) 20 MEQ extended release tablet, Take 2 tablets by mouth 2 times daily  CPAP Machine MISC, by Does not apply route nightly  furosemide (LASIX) 40 MG tablet, Take 1 tablet by mouth 2 times daily  metoprolol succinate (TOPROL XL) 50 MG extended release tablet, Take 1 tablet by mouth 2 times daily  rOPINIRole (REQUIP) 0.5 MG tablet, Take 0.5 mg by mouth daily At 3pm  senna (SENOKOT) 8.6 MG tablet, Take 1 tablet by mouth every morning  predniSONE (DELTASONE) 5 MG tablet, Take 10 mg by mouth every morning   amiodarone (CORDARONE) 200 MG tablet, Take 1 tablet by mouth daily (Patient taking differently: Take 200 mg by mouth every morning )  lactulose (CHRONULAC) 10 GM/15ML solution, Take 20 g by mouth nightly as needed  Lactobacillus (PROBIOTIC ACIDOPHILUS) CAPS, Take 1 capsule by mouth Daily with lunch   apixaban (ELIQUIS) 5 MG TABS tablet, Take 1 tablet by mouth 2 times daily  ranitidine (ZANTAC) 300 MG tablet, TAKE ONE TABLET BY MOUTH EVERY MORNING  raloxifene (EVISTA) 60 MG tablet, Take 60 mg by mouth every morning   OXYGEN, Inhale 2 L into the lungs nightly Indications: BMS  magnesium oxide (MAG-OX) 400 (240 MG) MG tablet, Take 1 tablet by mouth daily. (Patient taking differently: Take 240 mg by mouth Daily with lunch )  ferrous sulfate 325 (65 FE) MG tablet, Take 325 mg by mouth Daily with lunch   rOPINIRole (REQUIP) 0.25 MG tablet, Take 0.25 mg by mouth nightly   Coenzyme Q10 (COQ-10) 200 MG CAPS, Take 1 capsule by mouth Daily with lunch   cyanocobalamin 1000 MCG/ML injection, Inject 1,000 mcg into the muscle every 30 days   lovastatin (MEVACOR) 20 MG tablet, Take 40 mg by mouth nightly   Cholecalciferol (VITAMIN D) 2000 UNITS TABS, Take 1 tablet by mouth Daily with lunch     Allergies:    Judit-d [diphenhydramine hcl]; Benadryl [diphenhydramine]; Phenergan [promethazine hcl]; and Ibuprofen    Social History:    reports that she has never smoked. She has never used smokeless tobacco. She reports that she does not drink alcohol or use drugs. Family History:   family history includes Brain Cancer in her mother; Heart Attack in her father; Heart Surgery in her maternal aunt; Lung Cancer in her brother; Stroke in her maternal aunt. REVIEW OF SYSTEMS:  As above in the HPI, otherwise negative    PHYSICAL EXAM:    Vitals:  BP (!) 108/51   Pulse 63   Temp 97.9 °F (36.6 °C) (Oral)   Resp 20   Ht 5' 2\" (1.575 m)   Wt 188 lb 9.6 oz (85.5 kg)   SpO2 96%   BMI 34.50 kg/m²     General:  Awake, alert, oriented X 3. Well developed, well nourished. No apparent distress. HEENT:  Normocephalic, atraumatic. Pupils equal, round, reactive. No scleral icterus. No conjunctival injection. Oral and nasal mucosa normal in appearance. Oropharynx is clear. MMM.   Neck: Supple without LAD, thyromegaly, or bruits. Heart:  RRR without murmurs, gallops, rubs  Lungs:  CTA bilaterally without wheezes, rales, or rhonchi. Symmetrical expansion. Abdomen: Soft, NT/ND, no masses, or organomegaly. Bowel sounds present and normoactive. Extremities:  No clubbing, cyanosis, or edema. No tenderness to palpation. Skin:  Warm and dry, no open lesions or rash  Neuro:  Cranial nerves 2-12 intact, no focal deficits  Breast: deferred  Rectal: deferred  Genitalia:  deferred    LABS:  As per chart and reviewed. ASSESSMENT:    1. Acute renal insufficiency  2. Hypokalemia  3. Chronic diastolic CHF  4. Atrial fibrillation  5. Coronary artery disease  6. Hyperlipidemia  7. Hypothyroidism  8. History of breast cancer  9. LDD  10. Osteoporosis  11. Lung nodule  12. Thoracic aortic aneurysm    PLAN:  1. Admit to monitored floor. 2. Consult cardiology. 3. See orders. 4. Discharge once stable.     Diane Pérez  7:41 AM  5/9/2019

## 2019-05-09 NOTE — PROGRESS NOTES
Pharmacy Note    Chayo Valle was ordered raloxifene (EVISTA). Per the [address], this medication is non-formulary and not stocked by pharmacy for the reason indicated below. The medication can be reordered at discharge.      Medications in which risks outweigh benefits during hospitalization:         -  oral bisphosphonates         -  raloxifene (Evista)    Medications that lack necessity during an acute hospital stay:      -  ezetimibe (Zetia)         -  nasal antihistamines        -  Nasal ipratropium 0.03% and 0.06%        -  nasal miacalcin        -  acyclovir topical cream/ointment orders for herpes labialis (cold sores)    Kevin Avila, [facility]  5/9/2019  1:09 AM

## 2019-05-09 NOTE — CONSULTS
[facility] Cardiology Consultation    Melanie Parikh is a 80 y.o. female patient of  with a history of CHF who was admitted 05/09/19 with a Abnormal lab consistent with excess intravascular volume depletion. She has been followed as an outpatient at the CHF clinic. She is been on diuretics to control symptoms. Apparently recent medication changes were made although it's uncertain talking to the patient regarding the exact instructions regarding metolazone. She denies chest pain orthopnea or PND. She has a history of ankle edema none currently. In April she was instructed to stop Imdur (her BP dropped to 80/50 (HR 64) and she is feeling lightheaded.  )    She had been admitted 04/09/19 with weight gain and significant leg edema. She was also hospitalized 03/19/19 for weight gain and edema. Echo 3/19/19 noted EF normal, no WMA, stage 2 DD, LA mod dilated, mod MR, mild TR, mild-mod AI. Upon discrge 3/22/19, weight was noted 182#, creatinine 1.0, pBNP 823. Past Medical History:    1. Persistent atrial fibrillation (diagnosed 12/2014) - recurrent atrial fibrillation s/p PEACE/CVN on 2/13/18, switched Toprol XL to BID dosing on 2/12/18 (continue), decision made by patient/family in the past to discontinue anticoagulation (she is now agreeable), eliquis 5 mg BID added 2/12/18 prior to [address] (continue) --> maintaining SR, amiodarone started in 5/2017. 2. Chronic diastolic heart CHF (hospitalized in 7/2017 for acute on chronic CHF exacerbation). 3. CAD s/p MC to mid LAD in 2011 -- no new flow limiting disease on 1024/2013 cardiac catheteriztaion   4. Hypertension  5. Hyperlipidemia  6. Reported ascending aortic aneurysm -- no aneurysm noted on 7/12/17 CTA chest  7. Hypothyroidism: on replacement therapy  8. TARIK - AHI 5 on 12/2014 study (no treatment at this time)  9. Chronic left anterior fascicular block   10.  Lumbar stenosis s/p prior epidural injection s                                    Past Surgical mcg into the muscle every 30 days    Yes Historical Provider, MD   lovastatin (MEVACOR) 20 MG tablet Take 40 mg by mouth nightly    Yes Historical Provider, MD   Cholecalciferol (VITAMIN D) 2000 UNITS TABS Take 1 tablet by mouth Daily with lunch    Yes Historical Provider, MD        Allergies: [place]-d [diphenhydramine hcl]; Benadryl [diphenhydramine];  Phenergan [promethazine hcl]; and Ibuprofen    Social History     Tobacco Use    Smoking status: Never Smoker    Smokeless tobacco: Never Used   Substance Use Topics    Alcohol use: Never     Alcohol/week: 0.0 oz     Frequency: Never          Review of Systems:  HEENT: negative for acute visual and auditory problems  Constitutional: negative for fever and chills  Respiratory: negative for cough and hemoptysis  Cardiovascular: negative for chest pain and dyspnea  Gastrointestinal: negative for abdominal pain, diarrhea, melena, hematemesis, nausea and vomiting  Genitourinary: negative for dysuria and hematuria  Derm: negative for rash and skin lesion(s)  Neurological: negative for seizures and tremors  Endocrine: negative for diabetic symptoms including polydipsia and polyuria  Musculoskeletal: negative for CTD  Psychiatric: negative for anxiety and depression      Inpatient Meds:    sodium chloride flush  10 mL Intravenous 2 times per day    amiodarone  200 mg Oral Daily    apixaban  5 mg Oral BID    calcium elemental  500 mg Oral BID    vitamin D  2,000 Units Oral Lunch    clotrimazole   Topical BID    [START ON 5/11/2019] cyanocobalamin  1,000 mcg Intramuscular Q30 Days    ferrous sulfate  325 mg Oral Lunch    cetirizine  5 mg Oral Daily    furosemide  40 mg Oral BID    HYDROcodone-acetaminophen  1 tablet Oral Q8H    lactobacillus  1 capsule Oral Lunch    levothyroxine  137 mcg Oral QAM    simvastatin  20 mg Oral Nightly    magnesium oxide  400 mg Oral Daily    metoprolol succinate  50 mg Oral BID    potassium chloride  40 mEq Oral BID    10/04/2011     Troponin:    Lab Results   Component Value Date    TROPONINI <0.01 05/08/2019      Lab Results   Component Value Date    PROBNP 969 (H) 05/08/2019    PROBNP 866 (H) 05/06/2019    PROBNP 920 (H) 04/29/2019     No results for input(s): CKTOTAL, CKMB in the last 72 hours. Imaging:   XR CHEST PORTABLE   Final Result   Cardiomegaly. EKG:          Cardiac Tests:  Echocardiogram: 8/15/13  Technically difficult study with suboptimal echo windows. Definity contrast was used to improve endomyocardial border definition. Ejection fraction is visually estimated at 55-60%. There is doppler evidence of stage II diastolic dysfunction. Mild left ventricular concentric hypertrophy noted. The left atrium is moderately dilated. The right ventricle is not completely visualized but appears at least mildly dilated. The right ventricle appears mildly hypertrophied. Mild mitral regurgitation is present. There is mild to moderate aortic regurgitation. Mild to moderate tricuspid regurgitation. Estimated right ventricular systolic pressure 30 mmHg assuming a right atrial pressure of 3 mmHg.      Exercise Nuclear Stress Test: 8/15/13  No evidence of stress induced ischemia, EF 83%     Cardiac Catheterization: 10/24/13  a. Status post remote stent of LAD with demonstration of moderate in-stent restenosis today. b. 50% ostial 2nd diagonal branch stenosis. 2. Normal left ventricular systolic function. 3. Aortic root dilatation.      Echocardiogram: 12/31/14 Ronald Severance)  Ejection fraction is visually estimated at 60%. No regional wall motion abnormalities seen. Moderate aortic regurgitation is noted. Moderate mitral regurgitation is present.     Echocardiogram: 7/13/17 ([place])   Normal left ventricular systolic function.   Ejection fraction is visually estimated at 60-65%.    Mild concentric left ventricular hypertrophy with proximal septal thickening.   Normal right ventricular size and function (TAPSE 1.8 cm).   There is doppler evidence of stage II diastolic dysfunction.   Severely dilated left atrium by volume index.   Mild mitral regurgitation.   Mild-moderate aortic regurgitation.   Mild tricuspid regurgitation.   PASP is estimated at 38 mmHg.     PEACE: 2/13/18 (Orientaltamika Smith)   Ejection fraction is visually estimated at 55%.   No regional wall motion abnormalities seen.    Right ventricular segmental wall motion is normal.  Mild to moderate mitral regurgitation is present.   Mild to moderate tricuspid regurgitation.   Aortic sclerosis is noted.   Mild to moderate aortic regurgitation is noted. No evidence of thrombus within left atrium.        TTE 03/19/19     Summary   Left ventricular size is grossly normal.   Mild left ventricular concentric hypertrophy noted.   No regional wall motion abnormalities seen.   There is doppler evidence of stage II diastolic dysfunction.   Moderate mitral regurgitation is present.   Mild-to-moderate aortic regurgitation is noted.     EKG: Reviewed by Dr Cassi Murillo sinus  L and posterior axis     Chest CT  02/2018      1. Mild bronchiectasis in the right lower lobe is likely   postinfectious. 2. Pleural parenchymal banding in the lung bases. 3. Unchanged indeterminate nodule right middle lobe. 4. Cardiomegaly. Coronary artery disease. Atherosclerosis. Unchanged   ectasia ascending thoracic aorta (4 cm). 5. Cholecystectomy.               Summary of pertinent history:  1 admitted 05/08/19 because of abnormal labs. BUN 50. Creatinine 2.7. K3.1. ProBNP 969.  2Admitted 04/09/19 with dyspnea pBNP= 468 bun=19 creatinine=1.3 CXR >> no CHF/acute process  3 s/p MC to mid LAD in 2011 -- no new flow limiting disease on 1024/2013 cardiac catheteriztaion   4  Hypertension  5. Hyperlipidemia  6. Reported ascending aortic aneurysm -- no aneurysm noted on 6/12/17 CTA chest  7. Hypothyroidism: on replacement therapy  8.  TARIK - AHI 5 on 12/2014 study (no treatment at this time)  9 Hx lung nodule           Assessment:   1 intravascular volume depletion secondary to diuretic use.  2 TARIK  3 Chronic ischemic heart disease  4 History falling  5 PAF on amiodarone and apixaban       Plan:  1 okay for discharge today from cardiology standpoint. Continue current medications    2 use metolazone on alternate day basis  3 for now will be closely followed by [address] [place] [address] rather than CHF clinic. She will be seen next week.  by Diego Krueger MD

## 2019-05-09 NOTE — CARE COORDINATION
Social work / Discharge Planning:        Patient is a readmit. Discharge order noted. Social work spoke to the patient's daughter and confirmed that she is active with [facility]. RN updated that patient needs the order to resume HC.   Electronically signed by BRIJESH Castillo on 5/9/2019 at 11:15 AM

## 2019-05-09 NOTE — PLAN OF CARE
Problem: Falls - Risk of:  Goal: Will remain free from falls  Outcome: Completed  Goal: Absence of physical injury  Outcome: Completed     Problem: Tissue Perfusion - Renal, Altered:  Goal: Electrolytes within specified parameters  Outcome: Completed  Goal: Urine creatinine clearance will be within specified parameters  Outcome: Completed  Goal: Serum creatinine will be within specified parameters  Outcome: Completed  Goal: Ability to achieve a balanced intake and output will improve  Outcome: Completed     Problem: HEMODYNAMIC STATUS  Goal: Patient has stable vital signs and fluid balance  Outcome: Completed     Problem: FLUID AND ELECTROLYTE IMBALANCE  Goal: Fluid and electrolyte balance are achieved/maintained  Outcome: Completed     Problem: ACTIVITY INTOLERANCE/IMPAIRED MOBILITY  Goal: Mobility/activity is maintained at optimum level for patient  Outcome: Completed     Problem:  Activity:  Goal: Ability to tolerate increased activity will improve  Outcome: Completed     Problem: Physical Regulation:  Goal: Complications related to the disease process, condition or treatment will be avoided or minimized  Outcome: Completed     Problem: Cardiovascular  Goal: Hemodynamic stability  Outcome: Completed  Goal: Weight maintained or lost  Outcome: Completed     Problem:   Goal: Adequate urinary output  Outcome: Completed  Goal: No urinary complication  Outcome: Completed

## 2019-05-10 NOTE — CARE COORDINATION
BPCI-A PROGRAM READMIT [address] Transitions Initial Follow Up Call    Call within 2 business days of discharge: Yes    Patient: Olive Ji Patient : [id number]   MRN: [id number]  Reason for Admission: dehydration, hx CHF   Discharge Date: 19 RARS: Readmission Risk Score: 37      Last Discharge [facility]       Complaint Diagnosis Description Type Department Provider    19 Abnormal Lab Acute renal failure superimposed on chronic kidney disease, unspecified CKD stage, unspecified acute renal failure type ([facility] [address] . .. ED to Hosp-Admission (Discharged) (ADMITTED) Dima Farrell MD; Mac Reveles .. Spoke with: [address]: SEB   Non-face-to-face services provided:  Obtained and reviewed discharge summary and/or continuity of care documents    Care Transitions 24 Hour Call    Do you have any ongoing symptoms?:  No  Do you have a copy of your discharge instructions?:  Yes  Do you have all of your prescriptions and are they filled?:  Yes  Have you been contacted by a [address]?:  No  Have you scheduled your follow up appointment?:  No  Were you discharged with any Home Care or Post Acute Services:  Yes  Post Acute Services:  Home Health (Comment: [facility] )  Do you feel like you have everything you need to keep you well at home?:  Yes  Care Transitions Interventions  No Identified Needs       Spoke with Virginie Sykes for initial BPCI care transition call post hospital discharge, patient is agreeable to follow up post discharge from the hospital. She reports that she is feeling \"pretty good\" today. She denies current shortness of breath or edema at this time. She is ambulating with her walker. She reports that Rand Copeland from [facility] is currently at her home. She did not weigh herself today and stated the Rand Copeland plans to do so before he leaves. Med review completed. She denies any issues with her medications.  [facility] explained that a member of the Care Transition Central Team will be contacting her for further follow up calls, she is in agreement and denies any other needs or concerns at this time.        Follow Up  Future Appointments   Date Time Provider Drake Peters   5/13/2019 10:00 AM Summer Guerrero MD [id number] [facility]   6/12/2019 10:15 AM [facility] CHF ROOM 2 SEYZ CHF None   7/12/2019  9:45 AM DO Pura Dee [facility]   7/30/2019  1:00 PM ZHANNA Hernandez - CNP [facility]       Al Arreola RN

## 2019-05-10 NOTE — PROGRESS NOTES
[id number] [address]                               SLEEP STUDY REPORT    PATIENT NAME: Sebastien Godoy                    :        1927  MED REC NO:   [id number]                            ROOM:  ACCOUNT NO:   [de-identified]                           ADMIT DATE: 2019  PROVIDER:     Brigitte Tuttle MD    DATE OF STUDY:  2019    INDICATION FOR POLYSOMNOGRAPHY:  Known sleep apnea, re-titration. CURRENT MEDICATIONS:  Multiple, these are reviewed in the record. ESS is 13. Neck circumference 15 and BMI is 33. A split night study was conducted. SLEEP ARCHITECTURE:  The patient spent a total of 419 minutes bed and  slept 366 minutes, and overall sleep efficiency of 84%. Sleep latency  was 16 minutes. SLEEP STAGES:  The patient was in N1 sleep for 11%, N2 for 75%, no N3  sleep, and was in REM sleep for 13% of study. VENTILATION SUMMARY:  Total of 63 obstructive events recorded for an  overall index of 10. Longest duration of the event was 97 seconds. REM  index is 36. By the midpoint of the study, the RDI did not exceed 40, titration of  nasal CPAP was not undertaken. OXYGENATION:  The patient spent a vast majority of her sleep time with  an SpO2 less than 90%. Examination of the histogram shows reasonable consolidation of sleep  stages with significant oxyhemoglobin desaturation in REM sleep with  ongoing oxyhemoglobin desaturation throughout the entire study. IMPRESSION:  Severe obstructive sleep apnea with significant hypoxia  which is exacerbated by REM sleep. The patient has known right heart  failure and had been diagnosed in the past with sleep apnea but it was  discontinued due to noncompliance. PLAN:  Titration study given the severity of the oxyhemoglobin  desaturation with REM sleep, the frequency of events, and the underlying  hypoxia.         Amish Corbett, MD        D: 05/09/2019 17:04:18       T: 05/09/2019 17:12:18     [id number]  Job#: [id number]     Doc#: [id number]    CC:

## 2019-05-13 NOTE — PROGRESS NOTES
OUTPATIENT CARDIOLOGY FOLLOW-UP    Name: Bee Aquino    Age: 80 y.o. Date of Service: 5/13/2019    Chief Complaint: Follow-up for chronic diastolic CHF, atrial fibrillation    Interim History:  She was hospitalized last week for evaluation of MIKE and hypokalemia --> s/p IV fluids --> lasix resumed with zaroxolyn every other day (has been taking zaroxolyn daily). Clinically improved over the past few days. No chest pain, palpitations, or respiratory distress at rest. Mild LE edema. SR on EKG. Review of Systems:   Cardiac: As per HPI  General: No fever, chills  Pulmonary: As per HPI  HEENT: No visual disturbances, difficult swallowing  GI: No nausea, vomiting  Endocrine: +hypothyroidism, no diabetes  Skin: Intact, no rashes  Neuro/Psych: No headache, focal motor deficits    Problem List:  Patient Active Problem List   Diagnosis    Hypothyroidism    TARIK (obstructive sleep apnea)    Mixed hyperlipidemia    CAD in native artery    Acute on chronic diastolic congestive heart failure (HCC)    Essential hypertension    Atrial fibrillation (HCC)    Lung nodule    Paroxysmal atrial fibrillation ([address]    CHF (congestive heart failure), NYHA class I, acute on chronic, combined ([address]    Acute on chronic diastolic CHF (congestive heart failure) (HCC)    Nonrheumatic aortic valve insufficiency    Non-rheumatic mitral regurgitation    Diastolic CHF ([address]    MIKE (acute kidney injury) ([address]    Acute renal insufficiency    Acute renal failure superimposed on chronic kidney disease ([address]       Allergies: Allergies   Allergen Reactions    [place]-D [Diphenhydramine Hcl] Other (See Comments)     Becomes very hyper, \"can't stay still\" dystonia    Benadryl [Diphenhydramine]     Phenergan [Promethazine Hcl]      Dystonia      Ibuprofen Nausea And Vomiting     Patient states she takes \"Advil all the time\" and is not allergic to Ibuprofen.        Current Medications:  Current Outpatient Medications   Medication Sig Dispense Refill    amiodarone (CORDARONE) 200 MG tablet TAKE ONE TABLET BY MOUTH EVERY DAY 30 tablet 10    metolazone (ZAROXOLYN) 2.5 MG tablet Take 1 tablet by mouth See Admin Instructions Take every other day 30 tablet 3    Simethicone 80 MG TABS Take 160 mg by mouth Daily with lunch      calcium carbonate 600 MG TABS tablet Take 1 tablet by mouth 2 times daily      levothyroxine (SYNTHROID) 137 MCG tablet Take 137 mcg by mouth every morning      HYDROcodone-acetaminophen (NORCO) 5-325 MG per tablet Take 1 tablet by mouth every 8 hours. 0    clotrimazole (LOTRIMIN) 1 % cream APPLY TOPICALLY 2 TIMES DAILY AS NEEDED  2    fexofenadine (ALLEGRA) 180 MG tablet TAKE ONE TABLET BY MOUTH EVERY MORNING  5    potassium chloride (KLOR-CON M) 20 MEQ extended release tablet Take 2 tablets by mouth 2 times daily 60 tablet 0    CPAP Machine MISC by Does not apply route nightly      furosemide (LASIX) 40 MG tablet Take 1 tablet by mouth 2 times daily 60 tablet 3    metoprolol succinate (TOPROL XL) 50 MG extended release tablet Take 1 tablet by mouth 2 times daily 30 tablet 3    rOPINIRole (REQUIP) 0.5 MG tablet Take 0.5 mg by mouth daily At 3pm      senna (SENOKOT) 8.6 MG tablet Take 1 tablet by mouth every morning      predniSONE (DELTASONE) 5 MG tablet Take 10 mg by mouth every morning       lactulose (CHRONULAC) 10 GM/15ML solution Take 20 g by mouth nightly as needed      Lactobacillus (PROBIOTIC ACIDOPHILUS) CAPS Take 1 capsule by mouth Daily with lunch       apixaban (ELIQUIS) 5 MG TABS tablet Take 1 tablet by mouth 2 times daily 60 tablet 0    ranitidine (ZANTAC) 300 MG tablet TAKE ONE TABLET BY MOUTH EVERY MORNING  1    raloxifene (EVISTA) 60 MG tablet Take 60 mg by mouth every morning       OXYGEN Inhale 2 L into the lungs nightly Indications: BMS      magnesium oxide (MAG-OX) 400 (240 MG) MG tablet Take 1 tablet by mouth daily.  (Patient taking differently: Take 240 mg by mouth Daily with

## 2019-05-16 NOTE — TELEPHONE ENCOUNTER
Patient scheduled for epidural injections with Dr. Brandon Nieto at [email or web address]. Recommended to hold Eliquis (3) days prior to each procedure about every (10) weeks for a series of (3) procedures. Please advise.

## 2019-06-07 NOTE — PROGRESS NOTES
OUTPATIENT CARDIOLOGY FOLLOW-UP    Name: Juan Pires    Age: 80 y.o. Date of Service: 6/7/2019    Chief Complaint: Follow-up for chronic diastolic CHF, atrial fibrillation    Interim History:  She was hospitalized in 5/2019 for evaluation of MIKE and hypokalemia --> s/p IV fluids --> lasix resumed with zaroxolyn every other day. Clinically improved since hospital discharge. No chest pain, palpitations, or respiratory distress at rest. Mild LE edema. Still with episodes of MARSH. SR on EKG. Review of Systems:   Cardiac: As per HPI  General: No fever, chills  Pulmonary: As per HPI  HEENT: No visual disturbances, difficult swallowing  GI: No nausea, vomiting  Endocrine: +hypothyroidism, no diabetes  Skin: Intact, no rashes  Neuro/Psych: No headache, focal motor deficits    Problem List:  Patient Active Problem List   Diagnosis    Hypothyroidism    TARIK (obstructive sleep apnea)    Mixed hyperlipidemia    CAD in native artery    Acute on chronic diastolic congestive heart failure (HCC)    Essential hypertension    Atrial fibrillation ([facility])    Lung nodule    Paroxysmal atrial fibrillation ([address]    CHF (congestive heart failure), NYHA class I, acute on chronic, combined ([address]    Acute on chronic diastolic CHF (congestive heart failure) ([facility])    Nonrheumatic aortic valve insufficiency    Non-rheumatic mitral regurgitation    Diastolic CHF ([address]    MIKE (acute kidney injury) ([address]    Acute renal insufficiency    Acute renal failure superimposed on chronic kidney disease ([address]       Allergies: Allergies   Allergen Reactions    [place]-D [Diphenhydramine Hcl] Other (See Comments)     Becomes very hyper, \"can't stay still\" dystonia    Benadryl [Diphenhydramine]     Phenergan [Promethazine Hcl]      Dystonia      Ibuprofen Nausea And Vomiting     Patient states she takes \"Advil all the time\" and is not allergic to Ibuprofen.        Current Medications:  Current Outpatient Medications   Medication Sig Dispense Refill    Probiotic Product (PROBIOTIC ADVANCED PO) Take by mouth      amiodarone (CORDARONE) 200 MG tablet TAKE ONE TABLET BY MOUTH EVERY DAY 30 tablet 10    metolazone (ZAROXOLYN) 2.5 MG tablet Take 1 tablet by mouth See Admin Instructions Take every other day 30 tablet 3    Simethicone 80 MG TABS Take 160 mg by mouth Daily with lunch      calcium carbonate 600 MG TABS tablet Take 1 tablet by mouth 2 times daily      levothyroxine (SYNTHROID) 137 MCG tablet Take 137 mcg by mouth every morning      HYDROcodone-acetaminophen (NORCO) 5-325 MG per tablet Take 1 tablet by mouth every 8 hours.   0    clotrimazole (LOTRIMIN) 1 % cream APPLY TOPICALLY 2 TIMES DAILY AS NEEDED  2    fexofenadine (ALLEGRA) 180 MG tablet TAKE ONE TABLET BY MOUTH EVERY MORNING  5    potassium chloride (KLOR-CON M) 20 MEQ extended release tablet Take 2 tablets by mouth 2 times daily (Patient taking differently: Take 10 mEq by mouth 2 times daily ) 60 tablet 0    CPAP Machine MISC by Does not apply route nightly      furosemide (LASIX) 40 MG tablet Take 1 tablet by mouth 2 times daily 60 tablet 3    metoprolol succinate (TOPROL XL) 50 MG extended release tablet Take 1 tablet by mouth 2 times daily (Patient taking differently: Take 25 mg by mouth 2 times daily ) 30 tablet 3    rOPINIRole (REQUIP) 0.5 MG tablet Take 0.5 mg by mouth daily At 3pm      senna (SENOKOT) 8.6 MG tablet Take 1 tablet by mouth every morning      predniSONE (DELTASONE) 5 MG tablet Take 10 mg by mouth every morning       lactulose (CHRONULAC) 10 GM/15ML solution Take 20 g by mouth nightly as needed      Lactobacillus (PROBIOTIC ACIDOPHILUS) CAPS Take 1 capsule by mouth Daily with lunch       apixaban (ELIQUIS) 5 MG TABS tablet Take 1 tablet by mouth 2 times daily 60 tablet 0    ranitidine (ZANTAC) 300 MG tablet TAKE ONE TABLET BY MOUTH EVERY MORNING  1    raloxifene (EVISTA) 60 MG tablet Take 60 mg by mouth every morning       OXYGEN Inhale 2 L into the lungs nightly Indications: BMS      magnesium oxide (MAG-OX) 400 (240 MG) MG tablet Take 1 tablet by mouth daily. (Patient taking differently: Take 240 mg by mouth Daily with lunch ) 30 tablet 2    ferrous sulfate 325 (65 FE) MG tablet Take 325 mg by mouth Daily with lunch       rOPINIRole (REQUIP) 0.25 MG tablet Take 0.25 mg by mouth nightly       Coenzyme Q10 (COQ-10) 200 MG CAPS Take 1 capsule by mouth Daily with lunch       cyanocobalamin 1000 MCG/ML injection Inject 1,000 mcg into the muscle every 30 days       lovastatin (MEVACOR) 20 MG tablet Take 40 mg by mouth nightly       Cholecalciferol (VITAMIN D) 2000 UNITS TABS Take 1 tablet by mouth Daily with lunch        No current facility-administered medications for this visit.       Physical Exam:  /70   Pulse 59   Resp 16   Ht 5' 2\" (1.575 m)   Wt 196 lb 12.8 oz (89.3 kg)   BMI 36.00 kg/m²   Wt Readings from Last 3 Encounters:   06/05/19 196 lb 12.8 oz (89.3 kg)   05/13/19 196 lb 12.8 oz (89.3 kg)   05/09/19 188 lb 9.6 oz (85.5 kg)     Appearance: Awake, alert, no acute respiratory distress  Skin: Intact, no rash  Head: Normocephalic, atraumatic  Eyes: EOMI, no conjunctival erythema  ENMT: MMM, no rhinorrhea  Neck: Supple, no carotid bruits  Lungs: Decreased BS B/L, no wheezing  Cardiac: RRR, 2/6 systolic murmur > RUSB  Abdomen: Soft, +bowel sounds  Extremities: Moves all extremities x 4, +LE edema (improved)  Neurologic: No focal motor deficits apparent, normal mood and affect    Laboratory Tests:  Lab Results   Component Value Date    CREATININE 1.6 (H) 05/13/2019    BUN 34 (H) 05/13/2019     05/13/2019    K 4.3 05/13/2019    CL 97 (L) 05/13/2019    CO2 31 (H) 05/13/2019     Lab Results   Component Value Date    MG 1.9 04/16/2019     Lab Results   Component Value Date    ALT 14 05/09/2019    AST 13 05/09/2019    ALKPHOS 56 05/09/2019    BILITOT 1.2 05/09/2019     Lab Results   Component Value Date    WBC 10.1 05/08/2019    HGB 12.1 05/08/2019    HCT 38.6 05/08/2019    .4 (H) 05/08/2019     05/08/2019     Lab Results   Component Value Date    CKTOTAL 71 10/24/2013    CKMB 0.8 10/24/2013    TROPONINI <0.01 05/08/2019    TROPONINI <0.01 04/10/2019    TROPONINI <0.01 04/09/2019     Lab Results   Component Value Date    INR 1.6 02/15/2018    INR 1.1 07/12/2017    INR 1.2 04/20/2015    PROTIME 18.0 (H) 02/15/2018    PROTIME 12.1 07/12/2017    PROTIME 13.0 (H) 04/20/2015     Lab Results   Component Value Date    TSH 3.250 04/11/2019     Lab Results   Component Value Date    CHOL 136 10/25/2013    CHOL 176 10/05/2011    CHOL 146 07/07/2011     Lab Results   Component Value Date    TRIG 108 10/25/2013    TRIG 169 (H) 10/05/2011    TRIG 108 07/07/2011     Lab Results   Component Value Date    HDL 37.0 (A) 10/25/2013    HDL 36.0 (A) 10/05/2011    HDL 34.0 (A) 07/07/2011     Lab Results   Component Value Date    LDLCALC 77 10/25/2013    LDLCALC 106 (H) 10/05/2011    [address] 07/07/2011     Cardiac Tests:  EKG: SB, rate 59, LAFB    Echocardiogram: 8/15/13  Technically difficult study with suboptimal echo windows. Definity contrast was used to improve endomyocardial border definition. Ejection fraction is visually estimated at 55-60%. There is doppler evidence of stage II diastolic dysfunction. Mild left ventricular concentric hypertrophy noted. The left atrium is moderately dilated. The right ventricle is not completely visualized but appears at least mildly dilated. The right ventricle appears mildly hypertrophied. Mild mitral regurgitation is present. There is mild to moderate aortic regurgitation. Mild to moderate tricuspid regurgitation. Estimated right ventricular systolic pressure 30 mmHg assuming a right atrial pressure of 3 mmHg.      Exercise Nuclear Stress Test: 8/15/13  No evidence of stress induced ischemia, EF 83%     Cardiac Catheterization: 10/24/13  a.  Status post remote stent of LAD with demonstration of moderate in-stent restenosis today. b. 50% ostial 2nd diagonal branch stenosis. 2. Normal left ventricular systolic function. 3. Aortic root dilatation.      Echocardiogram: 12/31/14 [place])  Ejection fraction is visually estimated at 60%. No regional wall motion abnormalities seen. Moderate aortic regurgitation is noted. Moderate mitral regurgitation is present.     Echocardiogram: 7/13/17 ([place])   Normal left ventricular systolic function.   Ejection fraction is visually estimated at 60-65%.  Mild concentric left ventricular hypertrophy with proximal septal thickening.   Normal right ventricular size and function (TAPSE 1.8 cm).   There is doppler evidence of stage II diastolic dysfunction.   Severely dilated left atrium by volume index.   Mild mitral regurgitation.   Mild-moderate aortic regurgitation.   Mild tricuspid regurgitation.   PASP is estimated at 38 mmHg. PEACE: 2/13/18 Lorraine Stubbs)   Ejection fraction is visually estimated at 55%.   No regional wall motion abnormalities seen.    Right ventricular segmental wall motion is normal.  Mild to moderate mitral regurgitation is present.   Mild to moderate tricuspid regurgitation.   Aortic sclerosis is noted.   Mild to moderate aortic regurgitation is noted. No evidence of thrombus within left atrium. PEACE: 3/19/19 (Bette Paredes)   Left ventricular size is grossly normal.   Mild left ventricular concentric hypertrophy noted.   No regional wall motion abnormalities seen.   There is doppler evidence of stage II diastolic dysfunction.   Moderate mitral regurgitation is present.   Mild-to-moderate aortic regurgitation is noted. Mild tricuspid regurgitation. RVSP is 54 mmHg. ASSESSMENT / PLAN:  1.  Persistent atrial fibrillation (diagnosed 12/2014) -- recurrent atrial fibrillation s/p PEACE/CVN on 2/13/18, switched Toprol XL to BID dosing on 2/12/18 (continue), decision made by patient/family in the past to discontinue anticoagulation (she is now agreeable),

## 2019-06-21 NOTE — PROGRESS NOTES
[address] [address]                               SLEEP STUDY REPORT    PATIENT NAME: Christopher Caceres                    :        1927  MED REC NO:   [id number]                            ROOM:  ACCOUNT NO:   [de-identified]                           ADMIT DATE: 2019  PROVIDER:     Fermin Cunha MD    DATE OF STUDY:  2019    INDICATION FOR POLYSOMNOGRAPHY:  Known sleep apnea with an AHI of 10  with an SpO2 of 74% on previous study. ESS is 13. Neck circumference is 15, BMI is 35. CURRENT MEDICATIONS:  Multiple, they include Zaroxolyn, Lasix,  metoprolol, Requip, Eliquis, amiodarone, levothyroxine, prednisone,  hydrocodone. This study is a titration study. SLEEP ARCHITECTURE:  The patient spent a total of 420 minutes in bed and  slept _____ minutes with an overall sleep efficiency of 44%. Time to  sleep onset was negligible. Three REM periods were recorded with a  latency of 30 minutes. SLEEP STAGES:  The patient was in N1 for 14% of the study, N2 for 75%,  N3 for 0%, and REM sleep for 11% of the study. During the study, 36 obstructive events were recorded for an overall RDI  of 12. Again, significant oxyhemoglobin desaturations recorded during the  study. The patient's minimum SpO2 being 83%. The patient spent 41% of  her time in bed with a normal SpO2. Examination of the histogram shows a long period of wakefulness once  CPAP was titrated with sleep occurring only towards the end of the study  at a higher level of CPAP. Earlier, it appeared to be reasonably well  tolerated. IMPRESSION:  Severe previously documented obstructive sleep apnea. This  appears to be adequately treated with nasal CPAP at 11 cm of water  pressure, however, this also appears to be poorly tolerated by the  patient.     RECOMMENDATIONS:  Despite the above, nasal CPAP is indicated at 11 cm of  water pressure. Followup as an outpatient will be undertaken.         Kathe Wood MD      D: 06/20/2019 17:30:17       T: 06/21/2019 7:10:17     [email or web address]  Job#: [id number]     Doc#: [id number]    CC:

## 2019-07-12 NOTE — PROGRESS NOTES
Subjective:      Patient ID:  Bennett Hsieh is a 80 y.o. female. HPI:  Pt returns for recheck of allergies and tongue lesion   Nasal Steroid: Yes    Name: fluticasone (Flonase)   Still taking: No    reason for stopping: \"made symptoms worse\"    Other therapy: nothing    Pt has not been on therapy since last visit and is doing well. Her tongue lesion hasn't getting any larger since a year ago. Her daughter reports significant hearing loss and the patient does not understand what is being said to her often. The patient reportedly has difficulty hearing female voices and low voices. She has some OTC hearing aids but they don't work well. The patient is complaining of nonproductive cough in the morning that lasts about an hour. The patients worst time of year is summer, fall, winter and spring      Patient's medications, allergies, past medical, surgical, social and family histories were reviewed and updated as appropriate. Review of Systems   Constitutional: Negative. HENT: Positive for hearing loss. Negative for congestion, rhinorrhea, sinus pressure, sinus pain, sneezing, sore throat, trouble swallowing and voice change. Eyes: Negative. Negative for visual disturbance. Respiratory: Positive for cough. Negative for shortness of breath. Cardiovascular: Negative. Negative for chest pain. Gastrointestinal: Negative. Negative for abdominal pain. Genitourinary: Negative. Musculoskeletal: Negative. Skin: Negative. Negative for color change. Neurological: Negative. Psychiatric/Behavioral: Negative. Negative for behavioral problems and hallucinations. All other systems reviewed and are negative. Objective:   Physical Exam   Constitutional: She is oriented to person, place, and time. She appears well-developed and well-nourished. HENT:   Head: Normocephalic and atraumatic.    Right Ear: Hearing, tympanic membrane, external ear and ear canal normal. No

## 2019-08-08 NOTE — ED PROVIDER NOTES
[promethazine hcl]; and Ibuprofen    Physical Exam   Oxygen Saturation Interpretation: Normal.  ED Triage Vitals [08/08/19 1732]   BP Temp Temp Source Pulse Resp SpO2 Height Weight   133/63 98.7 °F (37.1 °C) Oral 80 14 99 % 5' 2\" (1.575 m) 185 lb (83.9 kg)       Physical Exam  · Constitutional:  Alert, development consistent with age. · HEENT:  NC/NT. Airway patent. No raccoon's or soria's sign noted. She has superficial red marks on posterior scalp. · Neck:  No midline or paravertebral tenderness. Normal ROM. Supple. · Chest:  Symmetrical without visible rash or tenderness. · Respiratory:  Lungs Clear to auscultation and breath sounds equal.  · CV:  Regular rate and rhythm, normal heart sounds, without pathological murmurs, ectopy, gallops, or rubs. · GI:  Abdomen Soft, nontender, good bowel sounds. No firm or pulsatile mass. · Pelvis:  Stable, nontender to palpation. · Back:  No midline or paravertebral tenderness. No costovertebral tenderness. Area of redness to the right para spinous muscles with minimal tenderness. · Extremities: No tenderness or edema noted. · Integument:  Normal turgor. Warm, dry, without visible rash, unless noted elsewhere. · Lymphatic: no lymphadenopathy noted  · Neurological:  Oriented x3, GCS 15. Motor functions intact. Lab / Imaging Results   (All laboratory and radiology results have been personally reviewed by myself)  Labs:  No results found for this visit on 08/08/19. Imaging: All Radiology results interpreted by Radiologist unless otherwise noted. CT Head WO Contrast   Final Result      NO ACUTE INTRACRANIAL PROCESS. The study is unchanged from earlier CT   from      Small left occipital scalp hematoma which is unchanged. CT Head WO Contrast   Final Result      No acute intracranial abnormality. Specifically, no acute intracranial   hemorrhage. Stable appearance of the brain compared to 3/11/2019.       Small left occipital scalp contusion without sclerotic degenerative change and   hyperostosis in the posterior elements from L3 through S1. Collapse of   the upper articular endplate of L1 prominent dorsal hyperostosis   narrowing the anterior central canal is unchanged, and disc space   narrowing with vacuum phenomenon and grade 1 anterolisthesis of L4 on   L5 is again documented, and appears stable, with anterolisthesis   related to deformity of the posterior facets and degenerative change,   rather than fracture. Coronal imaging shows sclerotic aortoiliac calcification, mildly   atretic appearing kidneys, and the spine shows a dextroscoliotic   curvature through the lumbar region, with severe deformity and   sclerosis of the posterior articulating facets from L2 through L5   posterior elements. Sacroiliac joints are uncomplicated. There is no   paraspinous acute soft tissue pathology. Axial images show:   Patency of the central canal is unchanged, and the retropulsed and   hyperostotic change at the dorsal upper L1 articular margin still only   minimally encroaches on the thecal sac to the left of midline. At the L2-3 level, hypertrophy of the ligamentum flavum narrows the   posterior central canal resulting in mild central stenosis, which is   not new. Moderately severe central stenosis is noted at the L3-4 level due to   posterior hypertrophic changes and hypertrophy ligamentum flavum with   short pedicles may have progressed slightly, but is not an acute   traumatic finding. At L4-5, central stenosis is mild to moderate and unchanged. There is   pronounced hypertrophic ligamentum flavum change posteriorly   At L5-S1: There is no encroachment or interval change. Arbutus Ring       IMPRESSION:  Deformity of the lumbar spine with evidence of prior   compression of the upper L1 endplate and grade 1 anterolisthesis of L4   on L5 as well as dextroscoliotic curvature are little changed, with   stable appearance of the central spinal canal since March 11,

## 2019-08-22 PROBLEM — N30.90 CYSTITIS: Status: ACTIVE | Noted: 2019-01-01

## 2019-08-22 PROBLEM — M54.9 INTRACTABLE BACK PAIN: Status: ACTIVE | Noted: 2019-01-01

## 2019-08-22 NOTE — ED PROVIDER NOTES
Result Value Ref Range    aPTT 28.9 24.5 - 35.1 sec   Protime-INR   Result Value Ref Range    Protime 21.3 (H) 9.3 - 12.4 sec    INR 1.9    Microscopic Urinalysis   Result Value Ref Range    Casts RARE /LPF    WBC, UA 10-20 (A) 0 - 5 /HPF    RBC, UA 0-1 0 - 2 /HPF    Bacteria, UA MANY (A) /HPF   EKG 12 Lead   Result Value Ref Range    Ventricular Rate 49 BPM    Atrial Rate 49 BPM    P-R Interval 170 ms    QRS Duration 108 ms    Q-T Interval 514 ms    QTc Calculation (Bazett) 464 ms    P Axis 74 degrees    R Axis -49 degrees    T Axis 14 degrees   TYPE AND SCREEN   Result Value Ref Range    ABO/Rh O POS     Antibody Screen NEG        RADIOLOGY:  CT ABDOMEN PELVIS WO CONTRAST   Final Result   1. Presence of a soft tissue density mass in the presacral space of   the lower cervical spine with some changes of adjacent fat, as above   described. 2. Abnormal findings are also present in the sacral spine as seen in   the MRI study, but not identified on the CT scan examination except   for the presence of osteopenia. Consider further evaluation with MRI   of the sacral spine without and IV contrast.      3. After the MRI study of the differential diagnosis for the described   presacral mass would be better defined. 4. It is unusual the presence of presacral mass in the patient to   group. Metastasis is unlikely in presence of unremarkable appearance   of the rectum and bladder and after hysterectomy/oophorectomy, and in   the absence of pelvic sidewall adenopathy. Complex cyst versus   malignancy will be part of the differential diagnosis. 5. Further evaluation with a CT scan of the chest recommended to the   presence of a 7 mm nodule in the right middle lobe. For evaluation of   the line is recommended. MRI LUMBAR SPINE W WO CONTRAST   Final Result   1. Abnormal findings in the mid lower sacral spine.  Requires a   specific evaluation with MRI and/or CT scan without and IV contrast of   the

## 2019-08-23 NOTE — CONSULTS
LAST CT EPIC 3/2014    MVP (mitral valve prolapse)     Osteoarthritis     Osteoporosis     Partial bowel obstruction ([address]     Pneumonia 2008    Polymyalgia rheumatica ([address]     Scoliosis     Sinus arrhythmia     1/2014 last ekg, nsr with pacs, epic    Sleep apnea     uses cpap    Spinal stenosis     Thoracic ascending aortic aneurysm ([address]     Thyroid disease     hypothyroidism         Past Surgical History:   Procedure Laterality Date    ARTERY BIOPSY  2008    temporal   [facility],  BONE SPURS    BREAST SURGERY Right 1993    CANCER    CARDIAC CATHETERIZATION  2010    CARDIOVERSION  02/13/2018    CERVICAL FUSION  1994    c3-4    CHOLECYSTECTOMY  1980's    OPEN    COLON SURGERY  1980's    repair of partial bowel obstruction    CORONARY ANGIOPLASTY WITH STENT PLACEMENT  06/2011    Dr. Charlie Novoa, ESOPHAGUS      EYE SURGERY Bilateral 2004    CATARACTS WITH LENS IMPLANTS    FOOT SURGERY  2010,2011    ct guided injections of feet, toenail removal   [address] [place] [address]    HYSTERECTOMY  1960's    TOE SURGERY      Removal of bony outgrowth, right great toe    TRANSESOPHAGEAL ECHOCARDIOGRAM  02/13/2018    WRIST SURGERY Right YRS AGO         Social History     Socioeconomic History    Marital status:       Spouse name: Not on file    Number of children: Not on file    Years of education: Not on file    Highest education level: Not on file   Occupational History    Occupation: retired-   Social Needs    Financial resource strain: Not on file    Food insecurity:     Worry: Not on file     Inability: Not on file    Transportation needs:     Medical: Not on file     Non-medical: Not on file   Tobacco Use    Smoking status: Never Smoker    Smokeless tobacco: Never Used   Substance and Sexual Activity    Alcohol use: Never     Alcohol/week: 0.0 standard drinks     Frequency: Never    Drug use: Never    Sexual activity: Never     Partners: Male   Lifestyle    Physical activity:     Days per week: Not on file     Minutes per session: Not on file    Stress: Not on file   Relationships    Social connections:     Talks on phone: Not on file     Gets together: Not on file     Attends Advent service: Not on file     Active member of club or organization: Not on file     Attends meetings of clubs or organizations: Not on file     Relationship status: Not on file    Intimate partner violence:     Fear of current or ex partner: Not on file     Emotionally abused: Not on file     Physically abused: Not on file     Forced sexual activity: Not on file   Other Topics Concern    Not on file   Social History Narrative    Drinks 3-4 cups of coffee daily.         Family History  Family History   Problem Relation Age of Onset    Brain Cancer Mother     Lung Cancer Brother     Heart Attack Father     Heart Surgery Maternal Aunt     Stroke Maternal Aunt        Scheduled Meds:  Maame Hillman ON 8/26/2019] metOLazone  2.5 mg Oral Once per day on Mon Fri    sodium chloride flush  10 mL Intravenous 2 times per day    HYDROcodone-acetaminophen  1 tablet Oral Once    amiodarone  200 mg Oral Daily    calcium elemental  500 mg Oral Lunch    vitamin D  2,000 Units Oral Lunch    ferrous sulfate  325 mg Oral Lunch    cetirizine  10 mg Oral Daily    furosemide  40 mg Oral BID    lactobacillus  1 capsule Oral Lunch    levothyroxine  125 mcg Oral Daily    lisinopril  40 mg Oral Dinner    simvastatin  10 mg Oral Nightly    magnesium oxide  200 mg Oral Lunch    metoprolol succinate  25 mg Oral BID    miconazole   Topical BID    potassium chloride  40 mEq Oral BID    raloxifene  60 mg Oral QAM    famotidine  40 mg Oral Daily    rOPINIRole  0.25 mg Oral Nightly    rOPINIRole  0.5 mg Oral Daily    senna  1 tablet Oral QAM    simethicone  160 mg Oral Lunch    predniSONE  40 mg Oral Daily    Followed by   Maame Hillman ON 8/25/2019] wheezes, no rhonchi. Equal chest excursion with respirations. Heart RRR. pmi not laterally displaced. No S3 or S4, no rub  Abdomen:  Soft, ND, NT. Bowel sounds present. No HSM. No epigastric bruit, no increased Ao pulsation,  Extremities: warm to touch. some LE edema or cyanosis. No fem bruit. 2+ upstrokes and equal bilaterally. PT/Pedal 2+ equal bilat  Neuro: Cr N 2-12 grossly intact. No focal motor neuro deficit. No alteration in recent remote memory.     Assessment/Plan                                   MIKE with Metabolic alkalosis - most likely diuretic induced , azotemia part                                     due to high dose steroids on board , she remains non oliguric , will check                                  Renal sonogram                                    Baseline cr at 1.0 , on high dose diuretics for underlying diastolic                                                  dysfunction and significant  Volume overload , volume status better                                  On current diuretics with acceptable cr at 1.6 on current diuretics                                    H/O Recurrent cystitis , possible UTI , urine c/s pending results                                    Recent h/o fall backwards , injuring sacral area and cervical area                                  Recent epidural shot / sacral hematoma                                    A fib controlled on eliquis - currently on hold                                     MRI/CT suggestive of severe spinal canal stenosis / presacral hematoma     Will await urine c/s , renal sono , reduce the dose of lisinopril ( BP on lower side ) continue    present diuretics for now       Thank you for allowing us to participate in the care of Mere Maravilla, we will follow her       [address]  8/23/2019  10:48 AM

## 2019-08-23 NOTE — CONSULTS
Physical activity:     Days per week: None     Minutes per session: None    Stress: None   Relationships    Social connections:     Talks on phone: None     Gets together: None     Attends Nondenominational service: None     Active member of club or organization: None     Attends meetings of clubs or organizations: None     Relationship status: None    Intimate partner violence:     Fear of current or ex partner: None     Emotionally abused: None     Physically abused: None     Forced sexual activity: None   Other Topics Concern    None   Social History Narrative    Drinks 3-4 cups of coffee daily. Pets: None  Travel: No    Family History:       Problem Relation Age of Onset    Brain Cancer Mother     Lung Cancer Brother     Heart Attack Father     Heart Surgery Maternal Aunt     Stroke Maternal Aunt    . Otherwise non-pertinent to the chief complaint. REVIEW OF SYSTEMS:    Constitutional: Complaining of being cold but no quantified fevers. Neurologic: Negative   Psychiatric: Negative  Rheumatologic: Negative   Endocrine: Negative  Hematologic: Negative  Immunologic: Negative  ENT: Negative  Respiratory: Negative   Cardiovascular: Negative  GI: Negative  : As in the HPI  Musculoskeletal: As in the HPI  Skin: No rashes. PHYSICAL EXAM:    Vitals:   /61   Pulse (!) 47   Temp 98.1 °F (36.7 °C) (Oral)   Resp 16   Ht 4' 11\" (1.499 m)   Wt 200 lb 11.2 oz (91 kg)   SpO2 98%   BMI 40.54 kg/m²   Constitutional: The patient is awake, alert, and oriented. Slightly hard of hearing. 2 daughters present. Skin: Warm and dry. No rashes were noted. HEENT: Eyes show round, and reactive pupils. No jaundice. Moist mucous membranes, no ulcerations, no thrush. Neck: Supple to movements. No lymphadenopathy. Chest: No use of accessory muscles to breathe. Symmetrical expansion. Auscultation reveals no wheezing, crackles, or rhonchi. Cardiovascular: S1 and S2 are rhythmic and regular.  No murmurs

## 2019-08-23 NOTE — CARE COORDINATION
Social work / Discharge Planning:       Social work and [employer] met with patient for initial assessment. She lives alone but has supportive daughters. She uses a rollator and has a shower chair. Patient has home 02 from [facility]. She has used [facility] in the past.   Patient is currently OBS status. If patient is eligible for DOMINIK during stay, family prefers [facility]. If not, the discharge plan will be home with [facility]. Social work and  will follow. Electronically signed by BRIJESH Bean on 8/23/2019 at 1:22 PM          Social work made a tentative referral to [facility].   Electronically signed by BRIJESH Bean on 8/23/2019 at 1:24 PM

## 2019-08-24 PROBLEM — I48.91 ATRIAL FIBRILLATION (HCC): Status: RESOLVED | Noted: 2018-02-15 | Resolved: 2019-01-01

## 2019-08-24 PROBLEM — N18.4 CKD (CHRONIC KIDNEY DISEASE) STAGE 4, GFR 15-29 ML/MIN (HCC): Status: ACTIVE | Noted: 2019-01-01

## 2019-08-24 NOTE — PROGRESS NOTES
PROGRESS  NOTE --                                                          INTERNAL  MEDICINE                                                                              I  PERSONALLY SAW , EXAMINED, AND CARED [id number] [address], 8/24/2019     LABS, XRAY ,CHART, AND MEDICATIONS  REVIEWED BY ME .      PATIENT PROBLEM LIST:  Principal Problem:    Intractable back pain  Active Problems:    Paroxysmal atrial fibrillation (HCC)    Diastolic CHF (HCC)    MIKE (acute kidney injury) ([facility]    Cystitis    Thoracic ascending aortic aneurysm (HCC)    Polymyalgia rheumatica (HCC)    CKD (chronic kidney disease) stage 4, GFR 15-29 ml/min ([facility])  Resolved Problems:    Atrial fibrillation ([facility]         8/24/2019-sUBJECTIVE: Adriane Fu is alert awake and cooperative; oriented ×3. Denies any chest pain dyspnea nausea emesis. Tolerating diet. No abdominal pain. Sitting in bedside chair; hoping to be discharged today. Family present; discussed all problems with them as well as patient. Family would like physical therapy etc. for home. Temperature 98.0 respirations 18 pulse 63 blood pressure 106/64. Sodium 140 potassium 3.8 chloride 101 CO2 25 BUN 48 creatinine 1.5 lactic acid 3.6 glucose 244 liver functions normal.  Hemoglobin 11.9 WBC 11.4 platelets 689,817. Patient has been seen by infectious disease as well as nephrology, those consults appreciated; results discussed with patient and family. ROS:  Negative to a 10 system review except that mentioned in the HPI. Objective:     PHYSICAL EXAM:    VS: /64   Pulse 63   Temp 98 °F (36.7 °C) (Oral)   Resp 18   Ht 4' 11\" (1.499 m)   Wt 197 lb 6.4 oz (89.5 kg)   SpO2 96%   BMI 39.87 kg/m²   General appearance: Alert, Awake, Oriented times 3, no distress  Skin: Warm and dry ; no rashes  Head: Normocephalic.  No masses, lesions or tenderness noted  Eyes: Conjunctivae 08/24/2019    GLUCOSE 244 08/24/2019    GLUCOSE 106 10/04/2011     Hepatic Function Panel:    Lab Results   Component Value Date    ALKPHOS 86 08/24/2019    ALT 17 08/24/2019    AST 17 08/24/2019    PROT 6.5 08/24/2019    BILITOT 0.8 08/24/2019    BILIDIR 0.2 07/12/2017    IBILI 0.8 07/12/2017    LABALBU 3.7 08/24/2019    LABALBU 3.5 07/07/2011     Magnesium:    Lab Results   Component Value Date    MG 1.9 04/16/2019     Phosphorus:    Lab Results   Component Value Date    PHOS 2.9 02/15/2018     Uric Acid:  No results found for: LABURIC, URICACID  PT/INR:    Lab Results   Component Value Date    PROTIME 21.3 08/22/2019    PROTIME 12.2 10/04/2011    INR 1.9 08/22/2019     Warfarin PT/INR:  No components found for: PTPATWAR, PTINRWAR  PTT:    Lab Results   Component Value Date    APTT 28.9 08/22/2019   [APTT}  Troponin:    Lab Results   Component Value Date    TROPONINI <0.01 05/08/2019     Last 3 Troponin:    Lab Results   Component Value Date    TROPONINI <0.01 05/08/2019    TROPONINI <0.01 04/10/2019    TROPONINI <0.01 04/09/2019     U/A:    Lab Results   Component Value Date    COLORU Yellow 08/22/2019    PROTEINU Negative 08/22/2019    PHUR 5.5 08/22/2019    LABCAST RARE 08/22/2019    WBCUA 10-20 08/22/2019    RBCUA 0-1 08/22/2019    RBCUA 0-1 10/24/2013    BACTERIA MANY 08/22/2019    CLARITYU Clear 08/22/2019    SPECGRAV 1.010 08/22/2019    LEUKOCYTESUR SMALL 08/22/2019    UROBILINOGEN 0.2 08/22/2019    BILIRUBINUR Negative 08/22/2019    BLOODU TRACE-INTACT 08/22/2019    GLUCOSEU Negative 08/22/2019     HgBA1c:  No results found for: LABA1C  FLP:    Lab Results   Component Value Date    TRIG 108 10/25/2013    HDL 37.0 10/25/2013    LDLCALC 77 10/25/2013     TSH:    Lab Results   Component Value Date    TSH 3.250 04/11/2019     VITAMIN B12: No components found for: B12  FOLATE:  No results found for: FOLATE     CBC:  Recent Labs     08/22/19  1326 08/23/19  1040 08/24/19  0928   WBC 15.2* 12.1* 11.4   RBC 3.72 3. 66 3.68   HGB 12.0 11.9 11.9   HCT 37.7 37.9 37.4    195 200   .3* 103.6* 101.6*   MCH 32.3 32.5 32.3   MCHC 31.8* 31.4* 31.8*   RDW 12.7 12.8 12.7   NRBC 0.0  --   --    METASPCT 0.9  --   --    LYMPHOPCT 1.9* 7.1*  --    MONOPCT 1.8* 9.6  --    BASOPCT 0.0 0.2  --    MONOSABS 0.30 1.16*  --    LYMPHSABS 0.30* 0.86*  --    EOSABS 0.00* 0.01*  --    BASOSABS 0.00 0.02  --           H & H :  Recent Labs     08/22/19  1326 08/23/19  1040 08/24/19  0928   HGB 12.0 11.9 11.9       TSH:No results for input(s): TSH in the last 72 hours. GLUCOSE:No results for input(s): POCGLU in the last 72 hours. CMP:  Recent Labs     08/22/19  1326 08/23/19  1040 08/24/19  0928    142 140   K 4.1 3.8 3.8   CL 97* 105 101   CO2 30* 25 25   BUN 59* 50* 48*   CREATININE 1.9* 1.7* 1.5*   GFRAA 30 34 39   LABGLOM 25 28 33   GLUCOSE 139* 210* 244*   PROT 6.8 6.6 6.5   LABALBU 3.9 3.8 3.7   CALCIUM 9.1 9.1 8.9   BILITOT 1.0 0.8 0.8   ALKPHOS 74 89 86   AST 18 15 17   ALT 15 14 17        BNP:  Lab Results   Component Value Date     (H) 04/18/2011       PROTIME/INR:  Recent Labs     08/22/19  1326   PROTIME 21.3*   INR 1.9       CRP: No results for input(s): CRP in the last 72 hours. ESR:No results for input(s): SEDRATE in the last 72 hours. LIPASE , AMYLASE:  Lab Results   Component Value Date    LIPASE 18 11/28/2014      Lab Results   Component Value Date    AMYLASE 53 11/28/2014       ABGs: No results found for: PH, PO2, PCO2    CARDIAC: No results for input(s): CKTOTAL, CKMB, CKMBINDEX, TROPONINI in the last 72 hours. Lipid Profile:   Lab Results   Component Value Date    TRIG 108 10/25/2013    HDL 37.0 10/25/2013    LDLCALC 77 10/25/2013    CHOL 136 10/25/2013        No results found for: LABA1C         RADIOLOGY: REVIEWED AVAILABLE REPORT  XR SHOULDER RIGHT (MIN 2 VIEWS)   Final Result      No acute fracture      Narrowing of the glenohumeral joint suggesting of osteoarthropathy      Osteopenia. US RETROPERITONEAL COMPLETE   Final Result    Unremarkable ultrasound study of the kidneys. Normal-appearing   urinary bladder. CT ABDOMEN PELVIS WO CONTRAST   Final Result   1. Presence of a soft tissue density mass in the presacral space of   the lower cervical spine with some changes of adjacent fat, as above   described. 2. Abnormal findings are also present in the sacral spine as seen in   the MRI study, but not identified on the CT scan examination except   for the presence of osteopenia. Consider further evaluation with MRI   of the sacral spine without and IV contrast.      3. After the MRI study of the differential diagnosis for the described   presacral mass would be better defined. 4. It is unusual the presence of presacral mass in the patient to   group. Metastasis is unlikely in presence of unremarkable appearance   of the rectum and bladder and after hysterectomy/oophorectomy, and in   the absence of pelvic sidewall adenopathy. Complex cyst versus   malignancy will be part of the differential diagnosis. 5. Further evaluation with a CT scan of the chest recommended to the   presence of a 7 mm nodule in the right middle lobe. For evaluation of   the line is recommended. MRI LUMBAR SPINE W WO CONTRAST   Final Result   Addendum 1 of 1   Addendum: There is some abnormal bone marrow signal in the distal   sacrum and coccyx which is likely to relate to an insufficiency   fracture. There is an ovoid lesion within the soft tissues adjacent to   the coccyx measuring roughly 2.4 cm in long axis and I suspect that   this represents a hematoma. This latter finding may in part or in full   relate to a recent caudal injection which was performed for pain   relief.       Final                Devontetoso Kaila Titus  DO   10:50 AM     8/24/2019      Voice recognition software used for dictation

## 2019-08-24 NOTE — PROGRESS NOTES
Radiology:      Microbiology:   8/22 urine cx E coli  Blood cx sterile    ASSESSMENT:  · Probable asymptomatic bacteriuria. A malodorous urine is not really a symptom of a urinary tract infection, the exception being patients with spinal cord injury  · Status post epidural injection, probably causing a small hematoma with subsequent intractable pain  · Leukocytosis probably secondary to pain and steroids     Plan:    · The patient has received 1 dose of Ceftriaxone. No further atb at this time  · F/u cx  · Monitor labs  · Will follow with you    April [place] Jayla Guajardo  9:11 AM  8/24/2019      Assessment and plan as outlined above and directed by me. Addition and corrections were done as deemed appropriate. Currently no evidence of ongoing infection. Patient can be discharged from ID standpoint.     Cyndi Tono  8/24/2019

## 2019-08-26 PROBLEM — I42.9 CONGESTIVE HEART FAILURE WITH CARDIOMYOPATHY (HCC): Status: ACTIVE | Noted: 2019-01-01

## 2019-08-26 PROBLEM — I50.31 ACUTE DIASTOLIC CONGESTIVE HEART FAILURE (HCC): Status: ACTIVE | Noted: 2019-01-01

## 2019-08-26 PROBLEM — I50.9 CONGESTIVE HEART FAILURE WITH CARDIOMYOPATHY (HCC): Status: ACTIVE | Noted: 2019-01-01

## 2019-08-26 NOTE — ED PROVIDER NOTES
Take 1 tablet by mouth 2 times daily    NYSTATIN (MYCOSTATIN) 844592 UNIT/GM POWDER    Apply topically 4 times daily Under right breast    OXYGEN    Inhale 2 L into the lungs nightly Indications: BMS And PRN during day    POTASSIUM CHLORIDE (KLOR-CON M) 20 MEQ EXTENDED RELEASE TABLET    Take 2 tablets by mouth daily    RALOXIFENE (EVISTA) 60 MG TABLET    Take 60 mg by mouth every morning     RANITIDINE (ZANTAC) 300 MG TABLET    TAKE ONE TABLET BY MOUTH EVERY MORNING    ROPINIROLE (REQUIP) 0.25 MG TABLET    Take 0.25 mg by mouth nightly     ROPINIROLE (REQUIP) 0.5 MG TABLET    Take 0.5 mg by mouth daily At 3pm    SENNA (SENOKOT) 8.6 MG TABLET    Take 1 tablet by mouth every morning    SIMETHICONE 80 MG TABS    Take 160 mg by mouth Daily with lunch       ALLERGIES     [place]-d [diphenhydramine hcl]; Benadryl [diphenhydramine]; and Phenergan [promethazine hcl]    FAMILY HISTORY       Family History   Problem Relation Age of Onset    Brain Cancer Mother     Lung Cancer Brother     Heart Attack Father     Heart Surgery Maternal Aunt     Stroke Maternal Aunt           SOCIAL HISTORY       Social History     Socioeconomic History    Marital status:       Spouse name: Not on file    Number of children: Not on file    Years of education: Not on file    Highest education level: Not on file   Occupational History    Occupation: retired-   Social Needs    Financial resource strain: Not on file    Food insecurity:     Worry: Not on file     Inability: Not on file    Transportation needs:     Medical: Not on file     Non-medical: Not on file   Tobacco Use    Smoking status: Never Smoker    Smokeless tobacco: Never Used   Substance and Sexual Activity    Alcohol use: Never     Alcohol/week: 0.0 standard drinks     Frequency: Never    Drug use: Never    Sexual activity: Never     Partners: Male   Lifestyle    Physical activity:     Days per week: Not on file     Minutes per session: Not on file    repair (1996, 1998); Cholecystectomy (1980's); back surgery (1996); Dilatation, esophagus; transesophageal echocardiogram (02/13/2018); Cardioversion (02/13/2018); and Breast surgery (Right, 1993). Social History:  reports that she has never smoked. She has never used smokeless tobacco. She reports that she does not drink alcohol or use drugs. Family History: family history includes Brain Cancer in her mother; Heart Attack in her father; Heart Surgery in her maternal aunt; Lung Cancer in her brother; Stroke in her maternal aunt. The patients home medications have been reviewed. Allergies: [place]-d [diphenhydramine hcl];  Benadryl [diphenhydramine]; and Phenergan [promethazine hcl]    -------------------------------------------------- RESULTS -------------------------------------------------    LABS:  Results for orders placed or performed during the hospital encounter of 08/26/19   CBC Auto Differential   Result Value Ref Range    WBC 9.5 4.5 - 11.5 E9/L    RBC 3.42 (L) 3.50 - 5.50 E12/L    Hemoglobin 11.1 (L) 11.5 - 15.5 g/dL    Hematocrit 34.7 34.0 - 48.0 %    .5 (H) 80.0 - 99.9 fL    MCH 32.5 26.0 - 35.0 pg    MCHC 32.0 32.0 - 34.5 %    RDW 12.8 11.5 - 15.0 fL    Platelets 224 236 - 665 E9/L    MPV 11.0 7.0 - 12.0 fL    Neutrophils % 72.7 43.0 - 80.0 %    Immature Granulocytes % 2.1 0.0 - 5.0 %    Lymphocytes % 11.6 (L) 20.0 - 42.0 %    Monocytes % 12.9 (H) 2.0 - 12.0 %    Eosinophils % 0.6 0.0 - 6.0 %    Basophils % 0.1 0.0 - 2.0 %    Neutrophils Absolute 6.87 1.80 - 7.30 E9/L    Immature Granulocytes # 0.20 E9/L    Lymphocytes Absolute 1.10 (L) 1.50 - 4.00 E9/L    Monocytes Absolute 1.22 (H) 0.10 - 0.95 E9/L    Eosinophils Absolute 0.06 0.05 - 0.50 E9/L    Basophils Absolute 0.01 0.00 - 0.20 E9/L   Comprehensive Metabolic Panel w/ Reflex to MG   Result Value Ref Range    Sodium 140 132 - 146 mmol/L    Potassium reflex Magnesium 3.5 3.5 - 5.0 mmol/L    Chloride 101 98 - 107 mmol/L    CO2 30 (H)

## 2019-08-27 NOTE — H&P
Pneumonia 2008    [address]ymyalgia rheumatica ([facility] [address]     Scoliosis     Sinus arrhythmia     1/2014 last ekg, nsr with pacs, epic    Sleep apnea     uses cpap    Spinal stenosis     Thoracic ascending aortic aneurysm ([facility] [address]     Thyroid disease     hypothyroidism     Past Surgical History:    Past Surgical History:   Procedure Laterality Date    ARTERY BIOPSY  2008    temporal   [address],  BONE SPURS    BREAST SURGERY Right 1993    CANCER    CARDIAC CATHETERIZATION  2010    CARDIOVERSION  02/13/2018    CERVICAL FUSION  1994    c3-4    CHOLECYSTECTOMY  1980's    OPEN    COLON SURGERY  1980's    repair of partial bowel obstruction    CORONARY ANGIOPLASTY WITH STENT PLACEMENT  06/2011    Dr. Jennifer Romero, ESOPHAGUS      EYE SURGERY Bilateral 2004    CATARACTS WITH LENS IMPLANTS    FOOT SURGERY  2010,2011    ct guided injections of feet, toenail removal   [address] [facility]    HYSTERECTOMY  1960's    TOE SURGERY      Removal of bony outgrowth, right great toe    TRANSESOPHAGEAL ECHOCARDIOGRAM  02/13/2018    WRIST SURGERY Right YRS AGO     Medications Prior to Admission:    Medications Prior to Admission: predniSONE (DELTASONE) 10 MG tablet, Take 10 mg by mouth See Admin Instructions Start date 8/22/19. 40mg for 3 days, 30mg for 3 days, 20mg for 3 days, 10mg daily thereafter.  Tomorrow 8/27/19 is last day of 30mg  lisinopril (PRINIVIL;ZESTRIL) 20 MG tablet, Take 1 tablet by mouth Daily with supper (Patient taking differently: Take 20 mg by mouth nightly )  potassium chloride (KLOR-CON M) 20 MEQ extended release tablet, Take 2 tablets by mouth daily (Patient taking differently: Take 20 mEq by mouth 2 times daily )  levothyroxine (SYNTHROID) 125 MCG tablet, Take 125 mcg by mouth Daily  nystatin (MYCOSTATIN) 966760 UNIT/GM powder, Apply topically 4 times daily Under right breast  amiodarone (CORDARONE) 200 MG tablet, TAKE ONE TABLET BY MOUTH EVERY DAY  metolazone (ZAROXOLYN) 2.5 MG tablet, Take 1 tablet by mouth See Admin Instructions Take every other day (Patient taking differently: Take 2.5 mg by mouth once a week Mondays)  Simethicone 80 MG TABS, Take 160 mg by mouth Daily with lunch  calcium carbonate 600 MG TABS tablet, Take 1 tablet by mouth Daily with lunch   HYDROcodone-acetaminophen (NORCO) 5-325 MG per tablet, Take 1 tablet by mouth every 8 hours. fexofenadine (ALLEGRA) 180 MG tablet, TAKE ONE TABLET BY MOUTH EVERY MORNING  furosemide (LASIX) 40 MG tablet, Take 1 tablet by mouth 2 times daily (Patient taking differently: Take 40 mg by mouth 2 times daily 0800 and 1600)  metoprolol succinate (TOPROL XL) 50 MG extended release tablet, Take 1 tablet by mouth 2 times daily (Patient taking differently: Take 25 mg by mouth 2 times daily )  rOPINIRole (REQUIP) 0.5 MG tablet, Take 0.5 mg by mouth daily At 3pm  senna (SENOKOT) 8.6 MG tablet, Take 1 tablet by mouth every morning  Lactobacillus (PROBIOTIC ACIDOPHILUS) CAPS, Take 1 capsule by mouth Daily with lunch   ranitidine (ZANTAC) 300 MG tablet, TAKE ONE TABLET BY MOUTH EVERY MORNING  raloxifene (EVISTA) 60 MG tablet, Take 60 mg by mouth every morning   magnesium oxide (MAG-OX) 400 (240 MG) MG tablet, Take 1 tablet by mouth daily.  (Patient taking differently: Take 240 mg by mouth Daily with lunch )  ferrous sulfate 325 (65 FE) MG tablet, Take 325 mg by mouth Daily with lunch   rOPINIRole (REQUIP) 0.25 MG tablet, Take 0.25 mg by mouth nightly   Coenzyme Q10 (COQ-10) 200 MG CAPS, Take 1 capsule by mouth Daily with lunch   lovastatin (MEVACOR) 20 MG tablet, Take 40 mg by mouth nightly   Cholecalciferol (VITAMIN D) 2000 UNITS TABS, Take 1 tablet by mouth Daily with lunch   clotrimazole (LOTRIMIN) 1 % cream, APPLY TOPICALLY 2 TIMES DAILY AS NEEDED  lactulose (CHRONULAC) 10 GM/15ML solution, Take 20 g by mouth nightly as needed  OXYGEN, Inhale 2 L into the lungs nightly Indications: BMS And PRN during

## 2019-08-27 NOTE — CONSULTS
Please note: past medical records were reviewed per electronic medical record (EMR) - see detailed reports under Past Medical/ Surgical History. Past Medical and Surgical History:    1. Persistent atrial fibrillation (diagnosed 12/2014) - recurrent atrial fibrillation s/p PEACE/CVN on 2/13/18, switched Toprol XL to BID dosing on 2/12/18 (continue), decision made by patient/family in the past to discontinue anticoagulation (she is now agreeable), eliquis 5 mg BID added 2/12/18 prior to [address] (continue) --> maintaining SR, amiodarone started in 5/2018. Eliquis stopped 08/08/2019 per patient and family request after recurrent fall  2. Chronic diastolic heart CHF (hospitalized in 7/2017 for acute on chronic CHF exacerbation). 3. CAD s/p MC to mid LAD in 2011   4. Cardiac catheterization 10/24/2013: Status post remote stent of LAD with demonstration of moderate in-stent restenosis today. 50% ostial 2nd diagonal branch stenosis. Normal left ventricular systolic function. Aortic root dilatation. 5. PEACE 02/13/2018 ( [facility]): Ejection fraction is visually estimated at 55%. No regional wall motion abnormalities seen. Right ventricular segmental wall motion is normal. Mild to moderate mitral regurgitation is present. Mild to moderate tricuspid regurgitation. Aortic sclerosis is noted.   Mild to moderate aortic regurgitation is noted. No evidence of thrombus within left atrium. 6. Echocardiogram 03/19/2019: EF 44%. Mild concentric LVH. No WMA. Moderate MR. Mild to moderate AR. Stage II Diastolic Dysfunction. 7. Hypertension  8. Hyperlipidemia  9. Obesity  10. Reported ascending aortic aneurysm -- no aneurysm noted on 7/12/17 CTA chest  11. Hypothyroidism: on replacement therapy  12. TARIK - AHI 5 on 12/2014 study; CPAP initiated 04/2019  13. Chronic left anterior fascicular block   14. Lumbar stenosis s/p prior epidural injections  15.  Severe lumbar spinal canal stenosis L4-L5 per MRI of the lumbar spine 194 lb 6.4 oz (88.2 kg)   SpO2 94%   BMI 37.97 kg/m²   CONST:  Well developed, obese, elderly female who appears younger than her stated age. Awake, alert, cooperative, no apparent distress  HEENT:   Head- Normocephalic, atraumatic   Eyes- Conjunctivae pink, anicteric  Throat- Oral mucosa pink and moist  Neck-  No stridor, thick neck, trachea midline, + jugular venous distention. No adenopathy   CHEST: Chest symmetrical and non-tender to palpation. No accessory muscle use or intercostal retractions  RESPIRATORY: Lung sounds - diminished throughout lung fields with fine crackles in bilateral bases   CARDIOVASCULAR:     No carotid bruit  Heart Inspection- shows no noted pulsations  Heart Palpation- no heaves or thrills; PMI is non-displaced   Heart Ausculation- Regular rhythm, bradycardic, systolic murmur. No s3, or rub   PV: 1+ non pitting bilateral lower extremity edema. No varicosities. Pedal pulses palpable, no clubbing or cyanosis   ABDOMEN: Soft, obese, distended non-tender to light palpation. Bowel sounds present. No palpable masses no organomegaly; no abdominal bruit  MS: Good muscle strength and tone. No atrophy or abnormal movements. : Deferred  SKIN: Warm and dry no statis dermatitis or ulcers   NEURO / PSYCH: Oriented to person, place and time. Speech clear and appropriate. Follows all commands.  Pleasant affect     DATA:    ECG: SB   Tele strips: SB  Diagnostic:      Intake/Output Summary (Last 24 hours) at 8/27/2019 0943  Last data filed at 8/27/2019 0831  Gross per 24 hour   Intake 796.67 ml   Output 1100 ml   Net -303.33 ml       Labs:   CBC:   Recent Labs     08/26/19  1721 08/27/19  0816   WBC 9.5 7.8   HGB 11.1* 11.2*   HCT 34.7 34.5    163     BMP:   Recent Labs     08/26/19  1721 08/27/19  0816    143   K 3.5 3.3*   CO2 30* 30*   BUN 50* 45*   CREATININE 1.9* 1.5*   LABGLOM 25 33   CALCIUM 8.8 9.1     Mag:   Recent Labs     08/26/19  1721   MG 2.1   PT/INR:   Recent Labs

## 2019-08-27 NOTE — PLAN OF CARE
Problem: Falls - Risk of:  Goal: Will remain free from falls  Outcome: Met This Shift  Goal: Absence of physical injury  Outcome: Met This Shift     Problem: Risk for Impaired Skin Integrity  Goal: Tissue integrity - skin and mucous membranes  Outcome: Met This Shift     Problem: FLUID AND ELECTROLYTE IMBALANCE  Goal: Fluid and electrolyte balance are achieved/maintained  Outcome: Met This Shift     Problem: Pain:  Goal: Pain level will decrease  Outcome: Met This Shift  Goal: Control of acute pain  Outcome: Met This Shift  Goal: Control of chronic pain  Outcome: Met This Shift     Problem: Discharge Planning:  Goal: Discharged to appropriate level of care  Outcome: Met This Shift

## 2019-08-28 NOTE — CARE COORDINATION
Social Work:    Macarena Crimes at [facility] advised social work that they accept Mrs. Adame and can send an ambulette to transport when discharged.   (lupe, [address] , ambulette)    Electronically signed by BRIJESH Fernandez on 8/28/2019 at 12:40 PM

## 2019-08-28 NOTE — DISCHARGE INSTR - COC
94%   BMI 37.54 kg/m²     Last documented pain score (0-10 scale): Pain Level: 7  Last Weight:   Wt Readings from Last 1 Encounters:   08/28/19 192 lb 3.2 oz (87.2 kg)     Mental Status:  oriented    IV Access:  - None    Nursing Mobility/ADLs:  Walking   Assisted  Transfer  Assisted  Bathing  Assisted  Dressing  Assisted  Toileting  Assisted  Feeding  Independent  Med Admin  Assisted  Med Delivery   whole    Wound Care Documentation and Therapy:        Elimination:  Continence:   · Bowel: Yes  · Bladder: Yes  Urinary Catheter: None   Colostomy/Ileostomy/Ileal Conduit: No       Date of Last BM: 8/29    Intake/Output Summary (Last 24 hours) at 8/28/2019 1245  Last data filed at 8/28/2019 0941  Gross per 24 hour   Intake 720 ml   Output 3800 ml   Net -3080 ml     I/O last 3 completed shifts: In: 5 [P.O.:720]  Out: 4000 [Urine:4000]    Safety Concerns: At Risk for Falls    Impairments/Disabilities:      Hearing    Nutrition Therapy:  Current Nutrition Therapy:   Cardiac, 2gm sodium    Routes of Feeding: Oral  Liquids: No Restrictions  Daily Fluid Restriction: no  Last Modified Barium Swallow with Video (Video Swallowing Test): not done    Treatments at the Time of Hospital Discharge:   Respiratory Treatments: ***  Oxygen Therapy:  is on oxygen at 2 L/min per nasal cannula. HS  Ventilator:    - No ventilator support    Rehab Therapies: Physical Therapy  Weight Bearing Status/Restrictions: No weight bearing restirctions  Other Medical Equipment (for information only, NOT a DME order):  walker  Other Treatments: ***    Patient's personal belongings (please select all that are sent with patient):  Hearing Aides bilateral    RN SIGNATURE:  Electronically signed by Jessica Joshi RN on 8/30/19 at 11:11 AM    CASE MANAGEMENT/SOCIAL WORK SECTION    Inpatient Status Date: ***    Readmission Risk Assessment Score:  Readmission Risk              Risk of Unplanned Readmission:        39           Discharging to Facility/

## 2019-08-29 NOTE — PLAN OF CARE
Problem: Falls - Risk of:  Goal: Will remain free from falls  Description  Will remain free from falls  Outcome: Met This Shift     Problem: Risk for Impaired Skin Integrity  Goal: Tissue integrity - skin and mucous membranes  Description  Structural intactness and normal physiological function of skin and  mucous membranes.   Outcome: Met This Shift     Problem: FLUID AND ELECTROLYTE IMBALANCE  Goal: Fluid and electrolyte balance are achieved/maintained  Outcome: Met This Shift

## 2019-08-30 NOTE — PROGRESS NOTES
Adele Castañeda,    Your patient is on a medication that requires a renal dose adjustment. Renal Function Assessment:    Date Body Weight IBW Adj. Body Weight Scr CrCl Dialysis status   8/27/2019 88.2 kg   1.9 19 ml/min no       Pharmacy has renally dose-adjusted the following medication(s):    Date Medication Original Dosing Regimen New Dosing Regimen   8/27/2019 Pepcid  40 mg daily po 10 mg daily po           These changes were made per protocol according to the [facility] Renal Function-Based Dose Adjustments Policy    *Please note this dose may need readjusted if your patient's renal function significantly improves. Please contact pharmacy with any questions regarding these changes. Thank Francisco Hewitt Pharm. D 8/27/2019 6:51 AM
Advised by Dr. Mini Ivy to give Lasix and BB. Notified him that there are parameters for these medications that nursing staff has been following and that is why pt has not been receiving regular dose.
[facility] Quality Flow/Interdisciplinary Rounds Progress Note        Quality Flow Rounds held on August 27, 2019    Disciplines Attending:  Bedside Nurse, ,  and Nursing Unit Leadership    Anna Peña was admitted on 8/26/2019  4:32 PM    Anticipated Discharge Date:  Expected Discharge Date: 08/29/19    Disposition:    David Score:  David Scale Score: 16    Readmission Risk              Risk of Unplanned Readmission:        45           Discussed patient goal for the day, patient clinical progression, and barriers to discharge.   The following Goal(s) of the Day/Commitment(s) have been identified:  PT/OT to DOMINIK duran  August 27, 2019
INPATIENT CARDIOLOGY FOLLOW-UP    Name: Inocente Landau    Age: 80 y.o. Date of Admission: 8/26/2019  4:32 PM    Date of Service: 8/29/2019    Chief Complaint: Follow-up for CHF    Interim History:  No new overnight cardiac complaints. Currently with no complaints of CP, SOB, palpitations, dizziness, or lightheadedness. SR on telemetry. Review of Systems:   Cardiac: As per HPI  General: No fever, chills  Pulmonary: As per HPI  HEENT: No visual disturbances, difficult swallowing  GI: No nausea, vomiting  Endocrine: No thyroid disease or DM  Musculoskeletal: VILLEGAS x 4, no focal motor deficits  Skin: Intact, no rashes  Neuro/Psych: No headache or seizures    Problem List:  Active Problems:    Congestive heart failure with cardiomyopathy ([address]    Acute diastolic congestive heart failure ([facility] [address]  Resolved Problems:    * No resolved hospital problems. *      Allergies:   Allergies   Allergen Reactions    Judit-D [Diphenhydramine Hcl] Other (See Comments)     Becomes very hyper, \"can't stay still\" dystonia    Benadryl [Diphenhydramine]     Phenergan [Promethazine Hcl]      Dystonia         Current Medications:  Current Facility-Administered Medications   Medication Dose Route Frequency Provider Last Rate Last Dose    lisinopril (PRINIVIL;ZESTRIL) tablet 5 mg  5 mg Oral Nightly Roger Noel MD        [START ON 8/31/2019] amiodarone (CORDARONE) tablet 200 mg  200 mg Oral Every Other Day Malinda Pupa, APRN - CNP        furosemide (LASIX) tablet 40 mg  40 mg Oral BID Malinda Pupa, APRN - CNP   40 mg at 08/29/19 0826    potassium chloride (KLOR-CON M) extended release tablet 40 mEq  40 mEq Oral BID Roger Noel MD   40 mEq at 08/29/19 0919    famotidine (PEPCID) tablet 10 mg  10 mg Oral Daily Roger Noel MD   10 mg at 08/29/19 0919    nortriptyline (PAMELOR) capsule 10 mg  10 mg Oral Nightly Roger Noel MD   10 mg at 08/28/19 2141    enoxaparin (LOVENOX) injection 30 mg  30 mg Subcutaneous Daily Marques CALIXTO
Physical Therapy  Facility/Department: [facility] MED SURG  Daily Treatment Note  NAME: Sammi Alvarado  : 1927  MRN: [id number]    Date of Service: 2019      Patient Diagnosis(es): The primary encounter diagnosis was MIKE (acute kidney injury) ([address] A diagnosis of Low back pain, unspecified back pain laterality, unspecified chronicity, with sciatica presence unspecified was also pertinent to this visit. has a past medical history of A-fib ([address] AC (acromioclavicular) joint bone spurs, Acid reflux, Ascending aortic aneurysm (HCC), Atrial fibrillation ([address] CAD (coronary artery disease), Cancer ([address] CHF (congestive heart failure) ([address] Chronic back pain, CKD (chronic kidney disease) stage 4, GFR 15-29 ml/min ([address] DJD (degenerative joint disease), Hyperlipidemia, Hypertension, Hypothyroidism, Iron deficiency anemia, Lung nodule, MVP (mitral valve prolapse), Osteoarthritis, Osteoporosis, Partial bowel obstruction ([address] Pneumonia, Polymyalgia rheumatica (HCC), Scoliosis, Sinus arrhythmia, Sleep apnea, Spinal stenosis, Thoracic ascending aortic aneurysm ([address] and Thyroid disease. has a past surgical history that includes Artery Biopsy (); cervical fusion (); Toe Surgery; Wrist surgery (Right, YRS AGO); Foot surgery (,); Hysterectomy (s); Colon surgery (); Cardiac catheterization (); Coronary angioplasty with stent (2011); eye surgery (Bilateral, ); hernia repair (, ); Cholecystectomy (); back surgery (); Dilatation, esophagus; transesophageal echocardiogram (2018); Cardioversion (2018); and Breast surgery (Right, ). Evaluating Therapist: Arlene Najjar. Filomena Heir., P.T.        Room #: 2457/2839-Y  DIAGNOSIS: CHF with cardiomyopathy  PMH: severe stenosis  PRECAUTIONS: Falls, bed/chair alarm     Social:  Pt lives alone in a 1 floor apt with 7 steps and 1 rail to enter.   Prior to admission pt walked with ww or quad cane, daughters
Pt family prefers that we do not turn pt every 2 hours especially at night.
HYDROcodone-acetaminophen (NORCO) 5-325 MG per tablet 1 tablet  1 tablet Oral Q6H PRN Yunior Cordova MD   1 tablet at 08/27/19 0219    Or    HYDROcodone-acetaminophen (NORCO)  MG per tablet 1 tablet  1 tablet Oral Q6H PRN Yunior Cordova MD   1 tablet at 08/30/19 1313    [START ON 8/31/2019] predniSONE (DELTASONE) tablet 10 mg  10 mg Oral Daily Yunior Cordova MD             Physical Exam:  BP (!) 107/54   Pulse 62   Temp 98 °F (36.7 °C) (Oral)   Resp 16   Ht 5' (1.524 m)   Wt 191 lb 9.6 oz (86.9 kg)   SpO2 96%   BMI 37.42 kg/m²   Wt Readings from Last 3 Encounters:   08/30/19 191 lb 9.6 oz (86.9 kg)   08/24/19 197 lb 6.4 oz (89.5 kg)   08/08/19 185 lb (83.9 kg)     Appearance: Awake, alert, no acute respiratory distress  Skin: Intact, no rash  Head: Normocephalic, atraumatic  Eyes: EOMI, no conjunctival erythema  ENMT: No pharyngeal erythema, MMM, no rhinorrhea  Neck: Supple, no elevated JVP, no carotid bruits  Lungs: Clear to auscultation bilaterally. No wheezes, rales, or rhonchi. Cardiac: Regular rate and rhythm, +S1S2, no murmurs apparent  Abdomen: Soft, nontender, +bowel sounds  Extremities: Moves all extremities x 4, no lower extremity edema  Neurologic: No focal motor deficits apparent, normal mood and affect  Peripheral Pulses: Intact posterior tibial pulses bilaterally    Intake/Output:    Intake/Output Summary (Last 24 hours) at 8/30/2019 0828  Last data filed at 8/30/2019 0629  Gross per 24 hour   Intake 360 ml   Output 2150 ml   Net -1790 ml     No intake/output data recorded.     Laboratory Tests:  Recent Labs     08/28/19  0310 08/28/19  1930 08/29/19  0535    134 140   K 2.9* 4.1 3.7   CL 93* 96* 99   CO2 30* 27 29   BUN 43* 45* 39*   CREATININE 1.5* 1.7* 1.4*   GLUCOSE 178* 276* 142*   CALCIUM 8.7 8.5* 8.9     Lab Results   Component Value Date    MG 2.0 08/28/2019     Recent Labs     08/28/19  0310 08/29/19  0535   ALKPHOS 100 106*   ALT 14 13   AST 13 15   PROT 5.7* 5.7*

## 2019-08-30 NOTE — PLAN OF CARE
Problem: Falls - Risk of:  Goal: Will remain free from falls  Description  Will remain free from falls  Outcome: Met This Shift  Goal: Absence of physical injury  Description  Absence of physical injury  Outcome: Met This Shift     Problem: Risk for Impaired Skin Integrity  Goal: Tissue integrity - skin and mucous membranes  Description  Structural intactness and normal physiological function of skin and  mucous membranes.   Outcome: Met This Shift     Problem: FLUID AND ELECTROLYTE IMBALANCE  Goal: Fluid and electrolyte balance are achieved/maintained  Outcome: Met This Shift     Problem: HH FLUID RETENTION-CHF  Goal: Absence of fluid overload signs and symptoms  Outcome: Met This Shift     Problem: HEMODYNAMIC STATUS  Goal: Hemoglobin within specified parameters  Outcome: Met This Shift     Problem: Pain:  Goal: Pain level will decrease  Description  Pain level will decrease  Outcome: Met This Shift  Goal: Control of acute pain  Description  Control of acute pain  Outcome: Met This Shift  Goal: Control of chronic pain  Description  Control of chronic pain  Outcome: Met This Shift     Problem: Discharge Planning:  Goal: Discharged to appropriate level of care  Description  Discharged to appropriate level of care  Outcome: Met This Shift

## 2019-09-17 NOTE — PROGRESS NOTES
[Promethazine Hcl]      Dystonia         Current Medications:  Current Outpatient Medications   Medication Sig Dispense Refill    polyethylene glycol (GLYCOLAX) powder Take 17 g by mouth daily      amiodarone (CORDARONE) 200 MG tablet Take 1 tablet by mouth every other day 30 tablet 3    lisinopril (PRINIVIL;ZESTRIL) 5 MG tablet Take 1 tablet by mouth nightly 30 tablet 3    metoprolol succinate (TOPROL XL) 25 MG extended release tablet Take 1 tablet by mouth 2 times daily 30 tablet 3    miconazole (MICOTIN) 2 % powder Apply topically 2 times daily. 45 g 1    metOLazone (ZAROXOLYN) 2.5 MG tablet Take 1 tablet by mouth once a week 30 tablet 3    magnesium oxide (MAG-OX) 400 (241.3 Mg) MG TABS tablet Take 0.5 tablets by mouth Daily with lunch 30 tablet 3    potassium chloride (KLOR-CON M) 20 MEQ extended release tablet Take 2 tablets by mouth 2 times daily 60 tablet 3    HYDROcodone-acetaminophen (NORCO) 5-325 MG per tablet Take 1 tablet by mouth every 6 hours as needed (For Mild Pain (1-3) or Moderate Pain (4-6)) for up to 30 days.  30 tablet 0    levothyroxine (SYNTHROID) 125 MCG tablet Take 125 mcg by mouth Daily      Simethicone 80 MG TABS Take 160 mg by mouth Daily with lunch      calcium carbonate 600 MG TABS tablet Take 1 tablet by mouth Daily with lunch       clotrimazole (LOTRIMIN) 1 % cream APPLY TOPICALLY 2 TIMES DAILY AS NEEDED  2    fexofenadine (ALLEGRA) 180 MG tablet TAKE ONE TABLET BY MOUTH EVERY MORNING  5    furosemide (LASIX) 40 MG tablet Take 1 tablet by mouth 2 times daily (Patient taking differently: Take 40 mg by mouth 2 times daily 0800 and 1600) 60 tablet 3    rOPINIRole (REQUIP) 0.5 MG tablet Take 0.5 mg by mouth daily At 3pm      senna (SENOKOT) 8.6 MG tablet Take 1 tablet by mouth every morning      lactulose (CHRONULAC) 10 GM/15ML solution Take 20 g by mouth nightly as needed      Lactobacillus (PROBIOTIC ACIDOPHILUS) CAPS Take 1 capsule by mouth Daily with lunch      

## 2019-10-23 PROBLEM — L97.511 SKIN ULCER OF RIGHT FOOT, LIMITED TO BREAKDOWN OF SKIN (HCC): Status: ACTIVE | Noted: 2019-01-01

## 2019-10-23 NOTE — DISCHARGE SUMMARY
release tablet Take 1 tablet by mouth 2 times daily, Disp-30 tablet, R-3Normal      predniSONE (DELTASONE) 10 MG tablet Take 1 tablet by mouth daily for 3 days, Disp-3 tablet, R-0Normal      metOLazone (ZAROXOLYN) 2.5 MG tablet Take 1 tablet by mouth once a week, Disp-30 tablet, R-3Normal      magnesium oxide (MAG-OX) 400 (241.3 Mg) MG TABS tablet Take 0.5 tablets by mouth Daily with lunch, Disp-30 tablet, R-3Normal      potassium chloride (KLOR-CON M) 20 MEQ extended release tablet Take 2 tablets by mouth 2 times daily, Disp-60 tablet, R-3Normal      HYDROcodone-acetaminophen (NORCO) 5-325 MG per tablet Take 1 tablet by mouth every 6 hours as needed (For Mild Pain (1-3) or Moderate Pain (4-6)) for up to 30 days. , Disp-30 tablet, R-0NO PRINT      HYDROcodone-acetaminophen (NORCO)  MG per tablet Take 1 tablet by mouth every 6 hours as needed (For Severe Pain 7-10) for up to 30 days. , Disp-30 tablet, R-0NO PRINT         CONTINUE these medications which have NOT CHANGED    Details   levothyroxine (SYNTHROID) 125 MCG tablet Take 125 mcg by mouth DailyHistorical Med      Simethicone 80 MG TABS Take 160 mg by mouth Daily with lunchHistorical Med      calcium carbonate 600 MG TABS tablet Take 1 tablet by mouth Daily with lunch Historical Med      clotrimazole (LOTRIMIN) 1 % cream APPLY TOPICALLY 2 TIMES DAILY AS NEEDED, R-2, Historical Med      fexofenadine (ALLEGRA) 180 MG tablet TAKE ONE TABLET BY MOUTH EVERY MORNING, R-5Historical Med      furosemide (LASIX) 40 MG tablet Take 1 tablet by mouth 2 times daily, Disp-60 tablet, R-3Normal      !! rOPINIRole (REQUIP) 0.5 MG tablet Take 0.5 mg by mouth daily At 3pmHistorical Med      senna (SENOKOT) 8.6 MG tablet Take 1 tablet by mouth every morningHistorical Med      lactulose (CHRONULAC) 10 GM/15ML solution Take 20 g by mouth nightly as neededHistorical Med      Lactobacillus (PROBIOTIC ACIDOPHILUS) CAPS Take 1 capsule by mouth Daily with lunch Historical Med

## 2019-11-15 PROBLEM — J96.01 ACUTE RESPIRATORY FAILURE WITH HYPOXIA (HCC): Status: ACTIVE | Noted: 2019-01-01

## 2019-11-16 PROBLEM — I26.99 PULMONARY EMBOLISM (HCC): Status: ACTIVE | Noted: 2019-01-01

## 2019-11-16 PROBLEM — M33.20 POLYMYOSITIS (HCC): Status: ACTIVE | Noted: 2019-01-01

## 2019-11-23 PROBLEM — J20.4 PARAINFLUENZA VIRUS BRONCHITIS: Status: ACTIVE | Noted: 2019-01-01

## 2019-12-16 PROBLEM — C50.919 CARCINOMA OF BREAST (HCC): Status: ACTIVE | Noted: 2019-01-01

## 2019-12-16 PROBLEM — R00.1 BRADYCARDIA: Status: ACTIVE | Noted: 2019-01-01

## 2019-12-16 PROBLEM — I50.9 CONGESTIVE HEART FAILURE (HCC): Status: ACTIVE | Noted: 2019-01-01

## 2020-01-01 ENCOUNTER — HOSPITAL ENCOUNTER (INPATIENT)
Age: 85
LOS: 8 days | Discharge: HOSPICE | DRG: 193 | End: 2020-01-25
Attending: EMERGENCY MEDICINE | Admitting: FAMILY MEDICINE
Payer: MEDICARE

## 2020-01-01 ENCOUNTER — TELEPHONE (OUTPATIENT)
Dept: CARDIOLOGY CLINIC | Age: 85
End: 2020-01-01

## 2020-01-01 ENCOUNTER — APPOINTMENT (OUTPATIENT)
Dept: CT IMAGING | Age: 85
DRG: 193 | End: 2020-01-01
Payer: MEDICARE

## 2020-01-01 ENCOUNTER — APPOINTMENT (OUTPATIENT)
Dept: GENERAL RADIOLOGY | Age: 85
DRG: 193 | End: 2020-01-01
Payer: MEDICARE

## 2020-01-01 ENCOUNTER — FOLLOWUP TELEPHONE ENCOUNTER (OUTPATIENT)
Dept: AUDIOLOGY | Age: 85
End: 2020-01-01

## 2020-01-01 VITALS
WEIGHT: 201.3 LBS | HEIGHT: 60 IN | HEART RATE: 83 BPM | RESPIRATION RATE: 18 BRPM | BODY MASS INDEX: 39.52 KG/M2 | TEMPERATURE: 98.5 F | DIASTOLIC BLOOD PRESSURE: 77 MMHG | SYSTOLIC BLOOD PRESSURE: 134 MMHG | OXYGEN SATURATION: 98 %

## 2020-01-01 LAB
ALBUMIN SERPL-MCNC: 3.1 G/DL (ref 3.5–5.2)
ALBUMIN SERPL-MCNC: 3.2 G/DL (ref 3.5–5.2)
ALBUMIN SERPL-MCNC: 3.2 G/DL (ref 3.5–5.2)
ALBUMIN SERPL-MCNC: 3.3 G/DL (ref 3.5–5.2)
ALBUMIN SERPL-MCNC: 3.3 G/DL (ref 3.5–5.2)
ALBUMIN SERPL-MCNC: 3.4 G/DL (ref 3.5–5.2)
ALBUMIN SERPL-MCNC: 3.5 G/DL (ref 3.5–5.2)
ALP BLD-CCNC: 105 U/L (ref 35–104)
ALP BLD-CCNC: 119 U/L (ref 35–104)
ALP BLD-CCNC: 121 U/L (ref 35–104)
ALP BLD-CCNC: 126 U/L (ref 35–104)
ALP BLD-CCNC: 130 U/L (ref 35–104)
ALP BLD-CCNC: 133 U/L (ref 35–104)
ALP BLD-CCNC: 134 U/L (ref 35–104)
ALP BLD-CCNC: 136 U/L (ref 35–104)
ALT SERPL-CCNC: 39 U/L (ref 0–32)
ALT SERPL-CCNC: 44 U/L (ref 0–32)
ALT SERPL-CCNC: 46 U/L (ref 0–32)
ALT SERPL-CCNC: 47 U/L (ref 0–32)
ALT SERPL-CCNC: 50 U/L (ref 0–32)
ALT SERPL-CCNC: 53 U/L (ref 0–32)
ALT SERPL-CCNC: 59 U/L (ref 0–32)
ALT SERPL-CCNC: 62 U/L (ref 0–32)
ANION GAP SERPL CALCULATED.3IONS-SCNC: 10 MMOL/L (ref 7–16)
ANION GAP SERPL CALCULATED.3IONS-SCNC: 12 MMOL/L (ref 7–16)
ANION GAP SERPL CALCULATED.3IONS-SCNC: 12 MMOL/L (ref 7–16)
ANION GAP SERPL CALCULATED.3IONS-SCNC: 13 MMOL/L (ref 7–16)
ANION GAP SERPL CALCULATED.3IONS-SCNC: 13 MMOL/L (ref 7–16)
ANION GAP SERPL CALCULATED.3IONS-SCNC: 14 MMOL/L (ref 7–16)
ANION GAP SERPL CALCULATED.3IONS-SCNC: 15 MMOL/L (ref 7–16)
ANION GAP SERPL CALCULATED.3IONS-SCNC: 16 MMOL/L (ref 7–16)
ANION GAP SERPL CALCULATED.3IONS-SCNC: 9 MMOL/L (ref 7–16)
AST SERPL-CCNC: 18 U/L (ref 0–31)
AST SERPL-CCNC: 19 U/L (ref 0–31)
AST SERPL-CCNC: 24 U/L (ref 0–31)
AST SERPL-CCNC: 25 U/L (ref 0–31)
AST SERPL-CCNC: 36 U/L (ref 0–31)
AST SERPL-CCNC: 36 U/L (ref 0–31)
AST SERPL-CCNC: 46 U/L (ref 0–31)
AST SERPL-CCNC: 62 U/L (ref 0–31)
B.E.: 12.9 MMOL/L (ref -3–3)
B.E.: 12.9 MMOL/L (ref -3–3)
B.E.: 5.8 MMOL/L (ref -3–3)
BACTERIA: ABNORMAL /HPF
BASOPHILS ABSOLUTE: 0.04 E9/L (ref 0–0.2)
BASOPHILS RELATIVE PERCENT: 0.5 % (ref 0–2)
BILIRUB SERPL-MCNC: 0.5 MG/DL (ref 0–1.2)
BILIRUB SERPL-MCNC: 0.7 MG/DL (ref 0–1.2)
BILIRUB SERPL-MCNC: 0.8 MG/DL (ref 0–1.2)
BILIRUB SERPL-MCNC: 1.1 MG/DL (ref 0–1.2)
BILIRUB SERPL-MCNC: 1.4 MG/DL (ref 0–1.2)
BILIRUB SERPL-MCNC: 1.5 MG/DL (ref 0–1.2)
BILIRUBIN URINE: NEGATIVE
BLOOD CULTURE, ROUTINE: NORMAL
BLOOD, URINE: ABNORMAL
BUN BLDV-MCNC: 19 MG/DL (ref 8–23)
BUN BLDV-MCNC: 20 MG/DL (ref 8–23)
BUN BLDV-MCNC: 26 MG/DL (ref 8–23)
BUN BLDV-MCNC: 34 MG/DL (ref 8–23)
BUN BLDV-MCNC: 41 MG/DL (ref 8–23)
BUN BLDV-MCNC: 57 MG/DL (ref 8–23)
BUN BLDV-MCNC: 59 MG/DL (ref 8–23)
BUN BLDV-MCNC: 65 MG/DL (ref 8–23)
BUN BLDV-MCNC: 68 MG/DL (ref 8–23)
CALCIUM SERPL-MCNC: 8.6 MG/DL (ref 8.6–10.2)
CALCIUM SERPL-MCNC: 8.6 MG/DL (ref 8.6–10.2)
CALCIUM SERPL-MCNC: 8.8 MG/DL (ref 8.6–10.2)
CALCIUM SERPL-MCNC: 8.8 MG/DL (ref 8.6–10.2)
CALCIUM SERPL-MCNC: 8.9 MG/DL (ref 8.6–10.2)
CALCIUM SERPL-MCNC: 8.9 MG/DL (ref 8.6–10.2)
CALCIUM SERPL-MCNC: 9 MG/DL (ref 8.6–10.2)
CALCIUM SERPL-MCNC: 9.1 MG/DL (ref 8.6–10.2)
CALCIUM SERPL-MCNC: 9.1 MG/DL (ref 8.6–10.2)
CHLORIDE BLD-SCNC: 86 MMOL/L (ref 98–107)
CHLORIDE BLD-SCNC: 89 MMOL/L (ref 98–107)
CHLORIDE BLD-SCNC: 92 MMOL/L (ref 98–107)
CHLORIDE BLD-SCNC: 94 MMOL/L (ref 98–107)
CHLORIDE BLD-SCNC: 94 MMOL/L (ref 98–107)
CHLORIDE BLD-SCNC: 95 MMOL/L (ref 98–107)
CHLORIDE BLD-SCNC: 96 MMOL/L (ref 98–107)
CHLORIDE BLD-SCNC: 96 MMOL/L (ref 98–107)
CHLORIDE BLD-SCNC: 97 MMOL/L (ref 98–107)
CLARITY: CLEAR
CO2: 26 MMOL/L (ref 22–29)
CO2: 27 MMOL/L (ref 22–29)
CO2: 28 MMOL/L (ref 22–29)
CO2: 30 MMOL/L (ref 22–29)
CO2: 32 MMOL/L (ref 22–29)
CO2: 35 MMOL/L (ref 22–29)
CO2: 39 MMOL/L (ref 22–29)
CO2: 41 MMOL/L (ref 22–29)
CO2: 43 MMOL/L (ref 22–29)
COHB: 0.9 % (ref 0–1.5)
COHB: 0.9 % (ref 0–1.5)
COHB: 1.2 % (ref 0–1.5)
COLOR: YELLOW
CREAT SERPL-MCNC: 1 MG/DL (ref 0.5–1)
CREAT SERPL-MCNC: 1 MG/DL (ref 0.5–1)
CREAT SERPL-MCNC: 1.1 MG/DL (ref 0.5–1)
CREAT SERPL-MCNC: 1.3 MG/DL (ref 0.5–1)
CREAT SERPL-MCNC: 1.3 MG/DL (ref 0.5–1)
CRITICAL: ABNORMAL
CULTURE, BLOOD 2: NORMAL
DATE ANALYZED: ABNORMAL
DATE OF COLLECTION: ABNORMAL
EKG ATRIAL RATE: 127 BPM
EKG Q-T INTERVAL: 366 MS
EKG QRS DURATION: 112 MS
EKG QTC CALCULATION (BAZETT): 477 MS
EKG R AXIS: -59 DEGREES
EKG T AXIS: 136 DEGREES
EKG VENTRICULAR RATE: 102 BPM
EOSINOPHILS ABSOLUTE: 0.05 E9/L (ref 0.05–0.5)
EOSINOPHILS RELATIVE PERCENT: 0.6 % (ref 0–6)
FERRITIN: 380 NG/ML
GFR AFRICAN AMERICAN: 46
GFR AFRICAN AMERICAN: 46
GFR AFRICAN AMERICAN: 56
GFR AFRICAN AMERICAN: >60
GFR AFRICAN AMERICAN: >60
GFR NON-AFRICAN AMERICAN: 38 ML/MIN/1.73
GFR NON-AFRICAN AMERICAN: 38 ML/MIN/1.73
GFR NON-AFRICAN AMERICAN: 46 ML/MIN/1.73
GFR NON-AFRICAN AMERICAN: 52 ML/MIN/1.73
GFR NON-AFRICAN AMERICAN: 52 ML/MIN/1.73
GLUCOSE BLD-MCNC: 151 MG/DL (ref 74–99)
GLUCOSE BLD-MCNC: 203 MG/DL (ref 74–99)
GLUCOSE BLD-MCNC: 231 MG/DL (ref 74–99)
GLUCOSE BLD-MCNC: 272 MG/DL (ref 74–99)
GLUCOSE BLD-MCNC: 297 MG/DL (ref 74–99)
GLUCOSE BLD-MCNC: 317 MG/DL (ref 74–99)
GLUCOSE BLD-MCNC: 326 MG/DL (ref 74–99)
GLUCOSE BLD-MCNC: 357 MG/DL (ref 74–99)
GLUCOSE BLD-MCNC: 369 MG/DL (ref 74–99)
GLUCOSE URINE: >=1000 MG/DL
HCO3: 30.8 MMOL/L (ref 22–26)
HCO3: 36.3 MMOL/L (ref 22–26)
HCO3: 37.7 MMOL/L (ref 22–26)
HCT VFR BLD CALC: 35.6 % (ref 34–48)
HCT VFR BLD CALC: 36.1 % (ref 34–48)
HCT VFR BLD CALC: 37 % (ref 34–48)
HCT VFR BLD CALC: 37.1 % (ref 34–48)
HCT VFR BLD CALC: 37.4 % (ref 34–48)
HCT VFR BLD CALC: 37.5 % (ref 34–48)
HCT VFR BLD CALC: 38.2 % (ref 34–48)
HCT VFR BLD CALC: 39.2 % (ref 34–48)
HCT VFR BLD CALC: 39.5 % (ref 34–48)
HEMOGLOBIN: 11 G/DL (ref 11.5–15.5)
HEMOGLOBIN: 11.4 G/DL (ref 11.5–15.5)
HEMOGLOBIN: 11.6 G/DL (ref 11.5–15.5)
HEMOGLOBIN: 11.6 G/DL (ref 11.5–15.5)
HEMOGLOBIN: 11.8 G/DL (ref 11.5–15.5)
HEMOGLOBIN: 11.9 G/DL (ref 11.5–15.5)
HHB: 6.7 % (ref 0–5)
HHB: 7.2 % (ref 0–5)
HHB: 7.7 % (ref 0–5)
IMMATURE GRANULOCYTES #: 0.16 E9/L
IMMATURE GRANULOCYTES %: 1.8 % (ref 0–5)
INFLUENZA A BY PCR: NOT DETECTED
INFLUENZA B BY PCR: DETECTED
IRON SATURATION: 20 % (ref 15–50)
IRON: 49 MCG/DL (ref 37–145)
KETONES, URINE: NEGATIVE MG/DL
L. PNEUMOPHILA SEROGP 1 UR AG: NORMAL
LAB: ABNORMAL
LACTIC ACID: 1.7 MMOL/L (ref 0.5–2.2)
LEUKOCYTE ESTERASE, URINE: ABNORMAL
LV EF: 65 %
LVEF MODALITY: NORMAL
LYMPHOCYTES ABSOLUTE: 1.12 E9/L (ref 1.5–4)
LYMPHOCYTES RELATIVE PERCENT: 12.9 % (ref 20–42)
Lab: ABNORMAL
MAGNESIUM: 2.4 MG/DL (ref 1.6–2.6)
MAGNESIUM: 2.5 MG/DL (ref 1.6–2.6)
MAGNESIUM: 2.6 MG/DL (ref 1.6–2.6)
MCH RBC QN AUTO: 29.3 PG (ref 26–35)
MCH RBC QN AUTO: 29.3 PG (ref 26–35)
MCH RBC QN AUTO: 29.6 PG (ref 26–35)
MCH RBC QN AUTO: 29.7 PG (ref 26–35)
MCH RBC QN AUTO: 29.7 PG (ref 26–35)
MCH RBC QN AUTO: 29.8 PG (ref 26–35)
MCH RBC QN AUTO: 30 PG (ref 26–35)
MCH RBC QN AUTO: 30.2 PG (ref 26–35)
MCH RBC QN AUTO: 30.4 PG (ref 26–35)
MCHC RBC AUTO-ENTMCNC: 29.9 % (ref 32–34.5)
MCHC RBC AUTO-ENTMCNC: 30.4 % (ref 32–34.5)
MCHC RBC AUTO-ENTMCNC: 30.9 % (ref 32–34.5)
MCHC RBC AUTO-ENTMCNC: 30.9 % (ref 32–34.5)
MCHC RBC AUTO-ENTMCNC: 31.3 % (ref 32–34.5)
MCHC RBC AUTO-ENTMCNC: 31.4 % (ref 32–34.5)
MCHC RBC AUTO-ENTMCNC: 31.5 % (ref 32–34.5)
MCHC RBC AUTO-ENTMCNC: 31.6 % (ref 32–34.5)
MCHC RBC AUTO-ENTMCNC: 31.6 % (ref 32–34.5)
MCV RBC AUTO: 94.2 FL (ref 80–99.9)
MCV RBC AUTO: 94.8 FL (ref 80–99.9)
MCV RBC AUTO: 94.9 FL (ref 80–99.9)
MCV RBC AUTO: 95.5 FL (ref 80–99.9)
MCV RBC AUTO: 95.7 FL (ref 80–99.9)
MCV RBC AUTO: 95.9 FL (ref 80–99.9)
MCV RBC AUTO: 96.6 FL (ref 80–99.9)
MCV RBC AUTO: 98 FL (ref 80–99.9)
MCV RBC AUTO: 98.2 FL (ref 80–99.9)
METER GLUCOSE: 206 MG/DL (ref 74–99)
METER GLUCOSE: 229 MG/DL (ref 74–99)
METER GLUCOSE: 235 MG/DL (ref 74–99)
METER GLUCOSE: 260 MG/DL (ref 74–99)
METER GLUCOSE: 266 MG/DL (ref 74–99)
METER GLUCOSE: 267 MG/DL (ref 74–99)
METER GLUCOSE: 286 MG/DL (ref 74–99)
METER GLUCOSE: 293 MG/DL (ref 74–99)
METER GLUCOSE: 295 MG/DL (ref 74–99)
METER GLUCOSE: 295 MG/DL (ref 74–99)
METER GLUCOSE: 305 MG/DL (ref 74–99)
METER GLUCOSE: 308 MG/DL (ref 74–99)
METER GLUCOSE: 325 MG/DL (ref 74–99)
METER GLUCOSE: 333 MG/DL (ref 74–99)
METER GLUCOSE: 344 MG/DL (ref 74–99)
METER GLUCOSE: 350 MG/DL (ref 74–99)
METER GLUCOSE: 363 MG/DL (ref 74–99)
METER GLUCOSE: 365 MG/DL (ref 74–99)
METER GLUCOSE: 373 MG/DL (ref 74–99)
METER GLUCOSE: 375 MG/DL (ref 74–99)
METER GLUCOSE: 378 MG/DL (ref 74–99)
METER GLUCOSE: 378 MG/DL (ref 74–99)
METER GLUCOSE: 407 MG/DL (ref 74–99)
METER GLUCOSE: 438 MG/DL (ref 74–99)
METER GLUCOSE: 439 MG/DL (ref 74–99)
METHB: 0 % (ref 0–1.5)
METHB: 0.1 % (ref 0–1.5)
METHB: 0.3 % (ref 0–1.5)
MODE: ABNORMAL
MONOCYTES ABSOLUTE: 0.6 E9/L (ref 0.1–0.95)
MONOCYTES RELATIVE PERCENT: 6.9 % (ref 2–12)
NEUTROPHILS ABSOLUTE: 6.73 E9/L (ref 1.8–7.3)
NEUTROPHILS RELATIVE PERCENT: 77.3 % (ref 43–80)
NITRITE, URINE: NEGATIVE
O2 CONTENT: 16.4 ML/DL
O2 CONTENT: 16.5 ML/DL
O2 CONTENT: 16.8 ML/DL
O2 SATURATION: 92.2 % (ref 92–98.5)
O2 SATURATION: 92.7 % (ref 92–98.5)
O2 SATURATION: 93.2 % (ref 92–98.5)
O2HB: 91 % (ref 94–97)
O2HB: 91.9 % (ref 94–97)
O2HB: 92.1 % (ref 94–97)
OPERATOR ID: 2325
OPERATOR ID: 3114
OPERATOR ID: 316
PARATHYROID HORMONE INTACT: 49 PG/ML (ref 15–65)
PATIENT TEMP: 37 C
PCO2: 41.3 MMHG (ref 35–45)
PCO2: 45.9 MMHG (ref 35–45)
PCO2: 48.7 MMHG (ref 35–45)
PDW BLD-RTO: 12.7 FL (ref 11.5–15)
PDW BLD-RTO: 12.7 FL (ref 11.5–15)
PDW BLD-RTO: 12.9 FL (ref 11.5–15)
PDW BLD-RTO: 13 FL (ref 11.5–15)
PDW BLD-RTO: 13 FL (ref 11.5–15)
PDW BLD-RTO: 13.2 FL (ref 11.5–15)
PDW BLD-RTO: 13.2 FL (ref 11.5–15)
PDW BLD-RTO: 13.3 FL (ref 11.5–15)
PDW BLD-RTO: 13.4 FL (ref 11.5–15)
PH BLOOD GAS: 7.44 (ref 7.35–7.45)
PH BLOOD GAS: 7.51 (ref 7.35–7.45)
PH BLOOD GAS: 7.56 (ref 7.35–7.45)
PH UA: 5.5 (ref 5–9)
PHOSPHORUS: 2.8 MG/DL (ref 2.5–4.5)
PHOSPHORUS: 3.2 MG/DL (ref 2.5–4.5)
PHOSPHORUS: 3.3 MG/DL (ref 2.5–4.5)
PLATELET # BLD: 187 E9/L (ref 130–450)
PLATELET # BLD: 193 E9/L (ref 130–450)
PLATELET # BLD: 194 E9/L (ref 130–450)
PLATELET # BLD: 201 E9/L (ref 130–450)
PLATELET # BLD: 202 E9/L (ref 130–450)
PLATELET # BLD: 202 E9/L (ref 130–450)
PLATELET # BLD: 215 E9/L (ref 130–450)
PLATELET # BLD: 217 E9/L (ref 130–450)
PLATELET # BLD: 219 E9/L (ref 130–450)
PMV BLD AUTO: 10.5 FL (ref 7–12)
PMV BLD AUTO: 10.6 FL (ref 7–12)
PMV BLD AUTO: 10.9 FL (ref 7–12)
PMV BLD AUTO: 10.9 FL (ref 7–12)
PMV BLD AUTO: 11 FL (ref 7–12)
PMV BLD AUTO: 11 FL (ref 7–12)
PMV BLD AUTO: 11.1 FL (ref 7–12)
PMV BLD AUTO: 11.1 FL (ref 7–12)
PMV BLD AUTO: 11.2 FL (ref 7–12)
PO2: 60.4 MMHG (ref 75–100)
PO2: 63 MMHG (ref 60–100)
PO2: 66.4 MMHG (ref 60–100)
POTASSIUM REFLEX MAGNESIUM: 3.8 MMOL/L (ref 3.5–5)
POTASSIUM SERPL-SCNC: 3.1 MMOL/L (ref 3.5–5)
POTASSIUM SERPL-SCNC: 3.2 MMOL/L (ref 3.5–5)
POTASSIUM SERPL-SCNC: 3.2 MMOL/L (ref 3.5–5)
POTASSIUM SERPL-SCNC: 3.4 MMOL/L (ref 3.5–5)
POTASSIUM SERPL-SCNC: 3.7 MMOL/L (ref 3.5–5)
POTASSIUM SERPL-SCNC: 3.8 MMOL/L (ref 3.5–5)
POTASSIUM SERPL-SCNC: 3.9 MMOL/L (ref 3.5–5)
POTASSIUM SERPL-SCNC: 4 MMOL/L (ref 3.5–5)
POTASSIUM SERPL-SCNC: 4.3 MMOL/L (ref 3.5–5)
PRO-BNP: 2000 PG/ML (ref 0–450)
PRO-BNP: 4263 PG/ML (ref 0–450)
PROTEIN UA: NEGATIVE MG/DL
RBC # BLD: 3.72 E12/L (ref 3.5–5.5)
RBC # BLD: 3.78 E12/L (ref 3.5–5.5)
RBC # BLD: 3.87 E12/L (ref 3.5–5.5)
RBC # BLD: 3.88 E12/L (ref 3.5–5.5)
RBC # BLD: 3.9 E12/L (ref 3.5–5.5)
RBC # BLD: 3.97 E12/L (ref 3.5–5.5)
RBC # BLD: 3.99 E12/L (ref 3.5–5.5)
RBC # BLD: 4.03 E12/L (ref 3.5–5.5)
RBC # BLD: 4.03 E12/L (ref 3.5–5.5)
RBC UA: ABNORMAL /HPF (ref 0–2)
SODIUM BLD-SCNC: 136 MMOL/L (ref 132–146)
SODIUM BLD-SCNC: 136 MMOL/L (ref 132–146)
SODIUM BLD-SCNC: 137 MMOL/L (ref 132–146)
SODIUM BLD-SCNC: 137 MMOL/L (ref 132–146)
SODIUM BLD-SCNC: 139 MMOL/L (ref 132–146)
SODIUM BLD-SCNC: 141 MMOL/L (ref 132–146)
SODIUM BLD-SCNC: 142 MMOL/L (ref 132–146)
SODIUM BLD-SCNC: 143 MMOL/L (ref 132–146)
SODIUM BLD-SCNC: 143 MMOL/L (ref 132–146)
SOURCE, BLOOD GAS: ABNORMAL
SPECIFIC GRAVITY UA: 1.01 (ref 1–1.03)
STREP PNEUMONIAE ANTIGEN, URINE: NORMAL
THB: 12.7 G/DL (ref 11.5–16.5)
THB: 12.7 G/DL (ref 11.5–16.5)
THB: 13.1 G/DL (ref 11.5–16.5)
TIME ANALYZED: 1015
TIME ANALYZED: 1103
TIME ANALYZED: 1859
TOTAL IRON BINDING CAPACITY: 243 MCG/DL (ref 250–450)
TOTAL PROTEIN: 5.1 G/DL (ref 6.4–8.3)
TOTAL PROTEIN: 5.1 G/DL (ref 6.4–8.3)
TOTAL PROTEIN: 5.2 G/DL (ref 6.4–8.3)
TOTAL PROTEIN: 5.3 G/DL (ref 6.4–8.3)
TOTAL PROTEIN: 5.3 G/DL (ref 6.4–8.3)
TOTAL PROTEIN: 5.7 G/DL (ref 6.4–8.3)
TOTAL PROTEIN: 5.9 G/DL (ref 6.4–8.3)
TOTAL PROTEIN: 6 G/DL (ref 6.4–8.3)
TROPONIN: 0.02 NG/ML (ref 0–0.03)
UROBILINOGEN, URINE: 0.2 E.U./DL
VITAMIN D 25-HYDROXY: 31 NG/ML (ref 30–100)
WBC # BLD: 10.1 E9/L (ref 4.5–11.5)
WBC # BLD: 5.7 E9/L (ref 4.5–11.5)
WBC # BLD: 6.9 E9/L (ref 4.5–11.5)
WBC # BLD: 7 E9/L (ref 4.5–11.5)
WBC # BLD: 7.3 E9/L (ref 4.5–11.5)
WBC # BLD: 7.8 E9/L (ref 4.5–11.5)
WBC # BLD: 8.4 E9/L (ref 4.5–11.5)
WBC # BLD: 8.7 E9/L (ref 4.5–11.5)
WBC # BLD: 8.8 E9/L (ref 4.5–11.5)
WBC UA: ABNORMAL /HPF (ref 0–5)

## 2020-01-01 PROCEDURE — 36415 COLL VENOUS BLD VENIPUNCTURE: CPT

## 2020-01-01 PROCEDURE — 94640 AIRWAY INHALATION TREATMENT: CPT

## 2020-01-01 PROCEDURE — 82962 GLUCOSE BLOOD TEST: CPT

## 2020-01-01 PROCEDURE — 84132 ASSAY OF SERUM POTASSIUM: CPT

## 2020-01-01 PROCEDURE — 2580000003 HC RX 258

## 2020-01-01 PROCEDURE — 82728 ASSAY OF FERRITIN: CPT

## 2020-01-01 PROCEDURE — 2700000000 HC OXYGEN THERAPY PER DAY

## 2020-01-01 PROCEDURE — 6360000002 HC RX W HCPCS: Performed by: NURSE PRACTITIONER

## 2020-01-01 PROCEDURE — 94660 CPAP INITIATION&MGMT: CPT

## 2020-01-01 PROCEDURE — 83735 ASSAY OF MAGNESIUM: CPT

## 2020-01-01 PROCEDURE — 6370000000 HC RX 637 (ALT 250 FOR IP): Performed by: INTERNAL MEDICINE

## 2020-01-01 PROCEDURE — 6370000000 HC RX 637 (ALT 250 FOR IP): Performed by: FAMILY MEDICINE

## 2020-01-01 PROCEDURE — 2500000003 HC RX 250 WO HCPCS: Performed by: INTERNAL MEDICINE

## 2020-01-01 PROCEDURE — 6360000002 HC RX W HCPCS: Performed by: INTERNAL MEDICINE

## 2020-01-01 PROCEDURE — 82306 VITAMIN D 25 HYDROXY: CPT

## 2020-01-01 PROCEDURE — 6360000002 HC RX W HCPCS: Performed by: FAMILY MEDICINE

## 2020-01-01 PROCEDURE — 97530 THERAPEUTIC ACTIVITIES: CPT

## 2020-01-01 PROCEDURE — 94664 DEMO&/EVAL PT USE INHALER: CPT

## 2020-01-01 PROCEDURE — 2580000003 HC RX 258: Performed by: FAMILY MEDICINE

## 2020-01-01 PROCEDURE — 2580000003 HC RX 258: Performed by: NURSE PRACTITIONER

## 2020-01-01 PROCEDURE — 71045 X-RAY EXAM CHEST 1 VIEW: CPT

## 2020-01-01 PROCEDURE — 2060000000 HC ICU INTERMEDIATE R&B

## 2020-01-01 PROCEDURE — 87450 HC DIRECT STREP B ANTIGEN: CPT

## 2020-01-01 PROCEDURE — 94761 N-INVAS EAR/PLS OXIMETRY MLT: CPT

## 2020-01-01 PROCEDURE — 94668 MNPJ CHEST WALL SBSQ: CPT

## 2020-01-01 PROCEDURE — 84100 ASSAY OF PHOSPHORUS: CPT

## 2020-01-01 PROCEDURE — 85027 COMPLETE CBC AUTOMATED: CPT

## 2020-01-01 PROCEDURE — 6370000000 HC RX 637 (ALT 250 FOR IP): Performed by: NURSE PRACTITIONER

## 2020-01-01 PROCEDURE — 80053 COMPREHEN METABOLIC PANEL: CPT

## 2020-01-01 PROCEDURE — 97535 SELF CARE MNGMENT TRAINING: CPT

## 2020-01-01 PROCEDURE — 97165 OT EVAL LOW COMPLEX 30 MIN: CPT

## 2020-01-01 PROCEDURE — 81001 URINALYSIS AUTO W/SCOPE: CPT

## 2020-01-01 PROCEDURE — 99233 SBSQ HOSP IP/OBS HIGH 50: CPT | Performed by: INTERNAL MEDICINE

## 2020-01-01 PROCEDURE — 2580000003 HC RX 258: Performed by: INTERNAL MEDICINE

## 2020-01-01 PROCEDURE — 74176 CT ABD & PELVIS W/O CONTRAST: CPT

## 2020-01-01 PROCEDURE — 99223 1ST HOSP IP/OBS HIGH 75: CPT | Performed by: INTERNAL MEDICINE

## 2020-01-01 PROCEDURE — 99232 SBSQ HOSP IP/OBS MODERATE 35: CPT | Performed by: INTERNAL MEDICINE

## 2020-01-01 PROCEDURE — 83540 ASSAY OF IRON: CPT

## 2020-01-01 PROCEDURE — 84484 ASSAY OF TROPONIN QUANT: CPT

## 2020-01-01 PROCEDURE — 51702 INSERT TEMP BLADDER CATH: CPT

## 2020-01-01 PROCEDURE — 94669 MECHANICAL CHEST WALL OSCILL: CPT

## 2020-01-01 PROCEDURE — 6370000000 HC RX 637 (ALT 250 FOR IP): Performed by: STUDENT IN AN ORGANIZED HEALTH CARE EDUCATION/TRAINING PROGRAM

## 2020-01-01 PROCEDURE — 93306 TTE W/DOPPLER COMPLETE: CPT

## 2020-01-01 PROCEDURE — 6370000000 HC RX 637 (ALT 250 FOR IP): Performed by: EMERGENCY MEDICINE

## 2020-01-01 PROCEDURE — 99223 1ST HOSP IP/OBS HIGH 75: CPT | Performed by: NURSE PRACTITIONER

## 2020-01-01 PROCEDURE — 36600 WITHDRAWAL OF ARTERIAL BLOOD: CPT

## 2020-01-01 PROCEDURE — 83970 ASSAY OF PARATHORMONE: CPT

## 2020-01-01 PROCEDURE — 82805 BLOOD GASES W/O2 SATURATION: CPT

## 2020-01-01 PROCEDURE — 2500000003 HC RX 250 WO HCPCS: Performed by: FAMILY MEDICINE

## 2020-01-01 PROCEDURE — 85025 COMPLETE CBC W/AUTO DIFF WBC: CPT

## 2020-01-01 PROCEDURE — 83605 ASSAY OF LACTIC ACID: CPT

## 2020-01-01 PROCEDURE — 2580000003 HC RX 258: Performed by: STUDENT IN AN ORGANIZED HEALTH CARE EDUCATION/TRAINING PROGRAM

## 2020-01-01 PROCEDURE — 83880 ASSAY OF NATRIURETIC PEPTIDE: CPT

## 2020-01-01 PROCEDURE — APPSS60 APP SPLIT SHARED TIME 46-60 MINUTES: Performed by: NURSE PRACTITIONER

## 2020-01-01 PROCEDURE — 71046 X-RAY EXAM CHEST 2 VIEWS: CPT

## 2020-01-01 PROCEDURE — 6360000002 HC RX W HCPCS: Performed by: STUDENT IN AN ORGANIZED HEALTH CARE EDUCATION/TRAINING PROGRAM

## 2020-01-01 PROCEDURE — 97161 PT EVAL LOW COMPLEX 20 MIN: CPT

## 2020-01-01 PROCEDURE — 70450 CT HEAD/BRAIN W/O DYE: CPT

## 2020-01-01 PROCEDURE — 82040 ASSAY OF SERUM ALBUMIN: CPT

## 2020-01-01 PROCEDURE — 96374 THER/PROPH/DIAG INJ IV PUSH: CPT

## 2020-01-01 PROCEDURE — 80048 BASIC METABOLIC PNL TOTAL CA: CPT

## 2020-01-01 PROCEDURE — 93005 ELECTROCARDIOGRAM TRACING: CPT | Performed by: STUDENT IN AN ORGANIZED HEALTH CARE EDUCATION/TRAINING PROGRAM

## 2020-01-01 PROCEDURE — 87502 INFLUENZA DNA AMP PROBE: CPT

## 2020-01-01 PROCEDURE — 99285 EMERGENCY DEPT VISIT HI MDM: CPT

## 2020-01-01 PROCEDURE — 87040 BLOOD CULTURE FOR BACTERIA: CPT

## 2020-01-01 PROCEDURE — 83550 IRON BINDING TEST: CPT

## 2020-01-01 PROCEDURE — 94667 MNPJ CHEST WALL 1ST: CPT

## 2020-01-01 RX ORDER — LIDOCAINE HYDROCHLORIDE 10 MG/ML
5 INJECTION, SOLUTION EPIDURAL; INFILTRATION; INTRACAUDAL; PERINEURAL 2 TIMES DAILY
Status: DISCONTINUED | OUTPATIENT
Start: 2020-01-01 | End: 2020-01-01

## 2020-01-01 RX ORDER — MORPHINE SULFATE 2 MG/ML
1 INJECTION, SOLUTION INTRAMUSCULAR; INTRAVENOUS
Status: DISCONTINUED | OUTPATIENT
Start: 2020-01-01 | End: 2020-01-01 | Stop reason: HOSPADM

## 2020-01-01 RX ORDER — ACETYLCYSTEINE 100 MG/ML
4 SOLUTION ORAL; RESPIRATORY (INHALATION) 2 TIMES DAILY
Status: DISCONTINUED | OUTPATIENT
Start: 2020-01-01 | End: 2020-01-01 | Stop reason: CLARIF

## 2020-01-01 RX ORDER — OXYCODONE HYDROCHLORIDE 5 MG/1
7.5 TABLET ORAL EVERY 8 HOURS PRN
Status: DISCONTINUED | OUTPATIENT
Start: 2020-01-01 | End: 2020-01-01

## 2020-01-01 RX ORDER — METHYLPREDNISOLONE SODIUM SUCCINATE 125 MG/2ML
125 INJECTION, POWDER, LYOPHILIZED, FOR SOLUTION INTRAMUSCULAR; INTRAVENOUS ONCE
Status: COMPLETED | OUTPATIENT
Start: 2020-01-01 | End: 2020-01-01

## 2020-01-01 RX ORDER — SENNA PLUS 8.6 MG/1
2 TABLET ORAL DAILY PRN
Status: DISCONTINUED | OUTPATIENT
Start: 2020-01-01 | End: 2020-01-01 | Stop reason: HOSPADM

## 2020-01-01 RX ORDER — ALBUTEROL SULFATE 2.5 MG/3ML
2.5 SOLUTION RESPIRATORY (INHALATION) EVERY 4 HOURS
Status: DISCONTINUED | OUTPATIENT
Start: 2020-01-01 | End: 2020-01-01 | Stop reason: HOSPADM

## 2020-01-01 RX ORDER — IPRATROPIUM BROMIDE AND ALBUTEROL SULFATE 2.5; .5 MG/3ML; MG/3ML
1 SOLUTION RESPIRATORY (INHALATION)
Status: DISCONTINUED | OUTPATIENT
Start: 2020-01-01 | End: 2020-01-01

## 2020-01-01 RX ORDER — OSELTAMIVIR PHOSPHATE 30 MG/1
30 CAPSULE ORAL 2 TIMES DAILY
Status: DISCONTINUED | OUTPATIENT
Start: 2020-01-01 | End: 2020-01-01

## 2020-01-01 RX ORDER — DIGOXIN 0.25 MG/ML
250 INJECTION INTRAMUSCULAR; INTRAVENOUS EVERY 6 HOURS
Status: COMPLETED | OUTPATIENT
Start: 2020-01-01 | End: 2020-01-01

## 2020-01-01 RX ORDER — BENZONATATE 100 MG/1
200 CAPSULE ORAL 3 TIMES DAILY
Status: DISCONTINUED | OUTPATIENT
Start: 2020-01-01 | End: 2020-01-01 | Stop reason: HOSPADM

## 2020-01-01 RX ORDER — GABAPENTIN 100 MG/1
100 CAPSULE ORAL NIGHTLY
Status: DISCONTINUED | OUTPATIENT
Start: 2020-01-01 | End: 2020-01-01

## 2020-01-01 RX ORDER — OXYCODONE HYDROCHLORIDE 5 MG/1
10 TABLET ORAL EVERY 6 HOURS PRN
Status: DISCONTINUED | OUTPATIENT
Start: 2020-01-01 | End: 2020-01-01 | Stop reason: ALTCHOICE

## 2020-01-01 RX ORDER — PREDNISONE 20 MG/1
40 TABLET ORAL DAILY
Status: DISCONTINUED | OUTPATIENT
Start: 2020-01-01 | End: 2020-01-01 | Stop reason: HOSPADM

## 2020-01-01 RX ORDER — OXYCODONE HYDROCHLORIDE 5 MG/1
7.5 CAPSULE ORAL EVERY 8 HOURS PRN
COMMUNITY

## 2020-01-01 RX ORDER — CLOTRIMAZOLE AND BETAMETHASONE DIPROPIONATE 10; .64 MG/G; MG/G
CREAM TOPICAL 2 TIMES DAILY PRN
Status: DISCONTINUED | OUTPATIENT
Start: 2020-01-01 | End: 2020-01-01 | Stop reason: HOSPADM

## 2020-01-01 RX ORDER — PREDNISONE 1 MG/1
10 TABLET ORAL DAILY
Status: DISCONTINUED | OUTPATIENT
Start: 2020-01-01 | End: 2020-01-01

## 2020-01-01 RX ORDER — BENZONATATE 100 MG/1
100 CAPSULE ORAL 3 TIMES DAILY PRN
Status: DISCONTINUED | OUTPATIENT
Start: 2020-01-01 | End: 2020-01-01

## 2020-01-01 RX ORDER — METHYLPREDNISOLONE SODIUM SUCCINATE 125 MG/2ML
60 INJECTION, POWDER, LYOPHILIZED, FOR SOLUTION INTRAMUSCULAR; INTRAVENOUS EVERY 8 HOURS
Status: DISCONTINUED | OUTPATIENT
Start: 2020-01-01 | End: 2020-01-01

## 2020-01-01 RX ORDER — ALBUTEROL SULFATE 2.5 MG/3ML
2.5 SOLUTION RESPIRATORY (INHALATION) EVERY 4 HOURS
Status: COMPLETED | OUTPATIENT
Start: 2020-01-01 | End: 2020-01-01

## 2020-01-01 RX ORDER — METHYLPREDNISOLONE SODIUM SUCCINATE 40 MG/ML
40 INJECTION, POWDER, LYOPHILIZED, FOR SOLUTION INTRAMUSCULAR; INTRAVENOUS EVERY 12 HOURS
Status: COMPLETED | OUTPATIENT
Start: 2020-01-01 | End: 2020-01-01

## 2020-01-01 RX ORDER — OXYCODONE HCL 10 MG/1
10 TABLET, FILM COATED, EXTENDED RELEASE ORAL ONCE
Status: COMPLETED | OUTPATIENT
Start: 2020-01-01 | End: 2020-01-01

## 2020-01-01 RX ORDER — ROPINIROLE 0.5 MG/1
0.5 TABLET, FILM COATED ORAL
Status: DISCONTINUED | OUTPATIENT
Start: 2020-01-01 | End: 2020-01-01 | Stop reason: HOSPADM

## 2020-01-01 RX ORDER — BUMETANIDE 0.25 MG/ML
1 INJECTION, SOLUTION INTRAMUSCULAR; INTRAVENOUS ONCE
Status: COMPLETED | OUTPATIENT
Start: 2020-01-01 | End: 2020-01-01

## 2020-01-01 RX ORDER — OXYCODONE HYDROCHLORIDE 5 MG/1
5 TABLET ORAL 2 TIMES DAILY PRN
Status: DISCONTINUED | OUTPATIENT
Start: 2020-01-01 | End: 2020-01-01

## 2020-01-01 RX ORDER — LORAZEPAM 2 MG/ML
0.5 INJECTION INTRAMUSCULAR ONCE
Status: COMPLETED | OUTPATIENT
Start: 2020-01-01 | End: 2020-01-01

## 2020-01-01 RX ORDER — POTASSIUM CHLORIDE 20 MEQ/1
40 TABLET, EXTENDED RELEASE ORAL
Status: DISCONTINUED | OUTPATIENT
Start: 2020-01-01 | End: 2020-01-01

## 2020-01-01 RX ORDER — FERROUS SULFATE 325(65) MG
325 TABLET ORAL
Status: DISCONTINUED | OUTPATIENT
Start: 2020-01-01 | End: 2020-01-01 | Stop reason: HOSPADM

## 2020-01-01 RX ORDER — LACTULOSE 10 G/15ML
20 SOLUTION ORAL 2 TIMES DAILY PRN
Status: DISCONTINUED | OUTPATIENT
Start: 2020-01-01 | End: 2020-01-01 | Stop reason: HOSPADM

## 2020-01-01 RX ORDER — FUROSEMIDE 40 MG/1
40 TABLET ORAL DAILY PRN
COMMUNITY

## 2020-01-01 RX ORDER — LIDOCAINE HYDROCHLORIDE 10 MG/ML
5 INJECTION, SOLUTION EPIDURAL; INFILTRATION; INTRACAUDAL; PERINEURAL 2 TIMES DAILY PRN
Status: DISCONTINUED | OUTPATIENT
Start: 2020-01-01 | End: 2020-01-01

## 2020-01-01 RX ORDER — DEXTROSE MONOHYDRATE 50 MG/ML
100 INJECTION, SOLUTION INTRAVENOUS PRN
Status: DISCONTINUED | OUTPATIENT
Start: 2020-01-01 | End: 2020-01-01 | Stop reason: HOSPADM

## 2020-01-01 RX ORDER — RANITIDINE 150 MG/1
300 TABLET ORAL DAILY
Status: DISCONTINUED | OUTPATIENT
Start: 2020-01-01 | End: 2020-01-01 | Stop reason: CLARIF

## 2020-01-01 RX ORDER — OSELTAMIVIR PHOSPHATE 75 MG/1
75 CAPSULE ORAL ONCE
Status: COMPLETED | OUTPATIENT
Start: 2020-01-01 | End: 2020-01-01

## 2020-01-01 RX ORDER — BUDESONIDE 0.5 MG/2ML
1000 INHALANT ORAL 2 TIMES DAILY
Status: DISCONTINUED | OUTPATIENT
Start: 2020-01-01 | End: 2020-01-01 | Stop reason: HOSPADM

## 2020-01-01 RX ORDER — OXYCODONE HYDROCHLORIDE 5 MG/1
10 TABLET ORAL EVERY 6 HOURS
Status: DISCONTINUED | OUTPATIENT
Start: 2020-01-01 | End: 2020-01-01

## 2020-01-01 RX ORDER — SENNA PLUS 8.6 MG/1
2 TABLET ORAL EVERY MORNING
Status: DISCONTINUED | OUTPATIENT
Start: 2020-01-01 | End: 2020-01-01

## 2020-01-01 RX ORDER — POTASSIUM CHLORIDE 20 MEQ/1
40 TABLET, EXTENDED RELEASE ORAL ONCE
Status: COMPLETED | OUTPATIENT
Start: 2020-01-01 | End: 2020-01-01

## 2020-01-01 RX ORDER — CHOLECALCIFEROL (VITAMIN D3) 10 MCG
1 TABLET ORAL EVERY MORNING
Status: DISCONTINUED | OUTPATIENT
Start: 2020-01-01 | End: 2020-01-01 | Stop reason: HOSPADM

## 2020-01-01 RX ORDER — FLUTICASONE PROPIONATE 50 MCG
1 SPRAY, SUSPENSION (ML) NASAL DAILY
Status: DISCONTINUED | OUTPATIENT
Start: 2020-01-01 | End: 2020-01-01 | Stop reason: HOSPADM

## 2020-01-01 RX ORDER — LIDOCAINE HYDROCHLORIDE 10 MG/ML
5 INJECTION, SOLUTION EPIDURAL; INFILTRATION; INTRACAUDAL; PERINEURAL 2 TIMES DAILY
Status: DISCONTINUED | OUTPATIENT
Start: 2020-01-01 | End: 2020-01-01 | Stop reason: SDUPTHER

## 2020-01-01 RX ORDER — ACETAMINOPHEN 500 MG
500 TABLET ORAL EVERY 6 HOURS PRN
Status: DISCONTINUED | OUTPATIENT
Start: 2020-01-01 | End: 2020-01-01 | Stop reason: HOSPADM

## 2020-01-01 RX ORDER — IPRATROPIUM BROMIDE AND ALBUTEROL SULFATE 2.5; .5 MG/3ML; MG/3ML
1 SOLUTION RESPIRATORY (INHALATION)
Status: COMPLETED | OUTPATIENT
Start: 2020-01-01 | End: 2020-01-01

## 2020-01-01 RX ORDER — OSELTAMIVIR PHOSPHATE 30 MG/1
30 CAPSULE ORAL 2 TIMES DAILY
Status: COMPLETED | OUTPATIENT
Start: 2020-01-01 | End: 2020-01-01

## 2020-01-01 RX ORDER — CHLORHEXIDINE GLUCONATE 0.12 MG/ML
15 RINSE ORAL 3 TIMES DAILY
Status: DISCONTINUED | OUTPATIENT
Start: 2020-01-01 | End: 2020-01-01 | Stop reason: HOSPADM

## 2020-01-01 RX ORDER — MORPHINE SULFATE 2 MG/ML
0.5 INJECTION, SOLUTION INTRAMUSCULAR; INTRAVENOUS EVERY 4 HOURS PRN
Status: DISCONTINUED | OUTPATIENT
Start: 2020-01-01 | End: 2020-01-01 | Stop reason: HOSPADM

## 2020-01-01 RX ORDER — METHYLPREDNISOLONE SODIUM SUCCINATE 125 MG/2ML
80 INJECTION, POWDER, LYOPHILIZED, FOR SOLUTION INTRAMUSCULAR; INTRAVENOUS EVERY 12 HOURS
Status: DISCONTINUED | OUTPATIENT
Start: 2020-01-01 | End: 2020-01-01

## 2020-01-01 RX ORDER — GUAIFENESIN 400 MG/1
400 TABLET ORAL 3 TIMES DAILY
Status: DISCONTINUED | OUTPATIENT
Start: 2020-01-01 | End: 2020-01-01

## 2020-01-01 RX ORDER — ACETAZOLAMIDE 500 MG/5ML
500 INJECTION, POWDER, LYOPHILIZED, FOR SOLUTION INTRAVENOUS EVERY 12 HOURS
Status: DISCONTINUED | OUTPATIENT
Start: 2020-01-01 | End: 2020-01-01 | Stop reason: HOSPADM

## 2020-01-01 RX ORDER — OXYCODONE HYDROCHLORIDE 5 MG/1
5 TABLET ORAL DAILY PRN
Status: DISCONTINUED | OUTPATIENT
Start: 2020-01-01 | End: 2020-01-01

## 2020-01-01 RX ORDER — GABAPENTIN 100 MG/1
100 CAPSULE ORAL NIGHTLY
COMMUNITY

## 2020-01-01 RX ORDER — GUAIFENESIN 400 MG/1
400 TABLET ORAL 4 TIMES DAILY
Status: DISCONTINUED | OUTPATIENT
Start: 2020-01-01 | End: 2020-01-01 | Stop reason: HOSPADM

## 2020-01-01 RX ORDER — METHYLPREDNISOLONE SODIUM SUCCINATE 40 MG/ML
40 INJECTION, POWDER, LYOPHILIZED, FOR SOLUTION INTRAMUSCULAR; INTRAVENOUS EVERY 8 HOURS
Status: DISCONTINUED | OUTPATIENT
Start: 2020-01-01 | End: 2020-01-01

## 2020-01-01 RX ORDER — FUROSEMIDE 10 MG/ML
40 INJECTION INTRAMUSCULAR; INTRAVENOUS DAILY
Status: DISCONTINUED | OUTPATIENT
Start: 2020-01-01 | End: 2020-01-01

## 2020-01-01 RX ORDER — HYDROCORTISONE ACETATE 25 MG/1
25 SUPPOSITORY RECTAL NIGHTLY
Status: DISCONTINUED | OUTPATIENT
Start: 2020-01-01 | End: 2020-01-01 | Stop reason: HOSPADM

## 2020-01-01 RX ORDER — ACETAMINOPHEN 500 MG
1000 TABLET ORAL EVERY 6 HOURS PRN
COMMUNITY

## 2020-01-01 RX ORDER — SIMETHICONE 80 MG
160 TABLET,CHEWABLE ORAL 3 TIMES DAILY
Status: DISCONTINUED | OUTPATIENT
Start: 2020-01-01 | End: 2020-01-01 | Stop reason: HOSPADM

## 2020-01-01 RX ORDER — POTASSIUM CHLORIDE 20 MEQ/1
40 TABLET, EXTENDED RELEASE ORAL 2 TIMES DAILY WITH MEALS
Status: DISCONTINUED | OUTPATIENT
Start: 2020-01-01 | End: 2020-01-01 | Stop reason: HOSPADM

## 2020-01-01 RX ORDER — FUROSEMIDE 10 MG/ML
40 INJECTION INTRAMUSCULAR; INTRAVENOUS ONCE
Status: DISCONTINUED | OUTPATIENT
Start: 2020-01-01 | End: 2020-01-01

## 2020-01-01 RX ORDER — 0.9 % SODIUM CHLORIDE 0.9 %
500 INTRAVENOUS SOLUTION INTRAVENOUS ONCE
Status: COMPLETED | OUTPATIENT
Start: 2020-01-01 | End: 2020-01-01

## 2020-01-01 RX ORDER — CHOLECALCIFEROL (VITAMIN D3) 50 MCG
2000 TABLET ORAL
Status: DISCONTINUED | OUTPATIENT
Start: 2020-01-01 | End: 2020-01-01 | Stop reason: HOSPADM

## 2020-01-01 RX ORDER — FUROSEMIDE 10 MG/ML
60 INJECTION INTRAMUSCULAR; INTRAVENOUS EVERY 12 HOURS
Status: DISCONTINUED | OUTPATIENT
Start: 2020-01-01 | End: 2020-01-01

## 2020-01-01 RX ORDER — ROPINIROLE 0.25 MG/1
0.25 TABLET, FILM COATED ORAL NIGHTLY
Status: DISCONTINUED | OUTPATIENT
Start: 2020-01-01 | End: 2020-01-01 | Stop reason: HOSPADM

## 2020-01-01 RX ORDER — DEXTROSE MONOHYDRATE 25 G/50ML
12.5 INJECTION, SOLUTION INTRAVENOUS PRN
Status: DISCONTINUED | OUTPATIENT
Start: 2020-01-01 | End: 2020-01-01 | Stop reason: HOSPADM

## 2020-01-01 RX ORDER — OXYCODONE HYDROCHLORIDE 10 MG/1
10 TABLET ORAL EVERY 6 HOURS
COMMUNITY

## 2020-01-01 RX ORDER — AMIODARONE HYDROCHLORIDE 200 MG/1
200 TABLET ORAL DAILY
Status: DISCONTINUED | OUTPATIENT
Start: 2020-01-01 | End: 2020-01-01

## 2020-01-01 RX ORDER — OSELTAMIVIR PHOSPHATE 75 MG/1
75 CAPSULE ORAL 2 TIMES DAILY
Status: DISCONTINUED | OUTPATIENT
Start: 2020-01-01 | End: 2020-01-01 | Stop reason: DRUGHIGH

## 2020-01-01 RX ORDER — SODIUM CHLORIDE FOR INHALATION 3 %
4 VIAL, NEBULIZER (ML) INHALATION 2 TIMES DAILY
Status: DISCONTINUED | OUTPATIENT
Start: 2020-01-01 | End: 2020-01-01 | Stop reason: HOSPADM

## 2020-01-01 RX ORDER — LEVOTHYROXINE SODIUM 0.12 MG/1
125 TABLET ORAL DAILY
Status: DISCONTINUED | OUTPATIENT
Start: 2020-01-01 | End: 2020-01-01 | Stop reason: HOSPADM

## 2020-01-01 RX ORDER — LACTULOSE 10 G/15ML
20 SOLUTION ORAL 2 TIMES DAILY
Status: DISCONTINUED | OUTPATIENT
Start: 2020-01-01 | End: 2020-01-01

## 2020-01-01 RX ORDER — LEVALBUTEROL INHALATION SOLUTION 0.63 MG/3ML
0.63 SOLUTION RESPIRATORY (INHALATION) EVERY 6 HOURS
Status: DISCONTINUED | OUTPATIENT
Start: 2020-01-01 | End: 2020-01-01

## 2020-01-01 RX ORDER — HYDROCODONE BITARTRATE AND ACETAMINOPHEN 10; 325 MG/1; MG/1
1 TABLET ORAL 2 TIMES DAILY
Status: DISCONTINUED | OUTPATIENT
Start: 2020-01-01 | End: 2020-01-01 | Stop reason: HOSPADM

## 2020-01-01 RX ORDER — POTASSIUM CHLORIDE 20 MEQ/1
40 TABLET, EXTENDED RELEASE ORAL 2 TIMES DAILY
Status: DISCONTINUED | OUTPATIENT
Start: 2020-01-01 | End: 2020-01-01

## 2020-01-01 RX ORDER — MORPHINE SULFATE 2 MG/ML
2 INJECTION, SOLUTION INTRAMUSCULAR; INTRAVENOUS
Status: DISCONTINUED | OUTPATIENT
Start: 2020-01-01 | End: 2020-01-01 | Stop reason: HOSPADM

## 2020-01-01 RX ORDER — OXYCODONE HYDROCHLORIDE 5 MG/1
5 TABLET ORAL NIGHTLY
Status: DISCONTINUED | OUTPATIENT
Start: 2020-01-01 | End: 2020-01-01

## 2020-01-01 RX ORDER — KETOTIFEN FUMARATE 0.35 MG/ML
1 SOLUTION/ DROPS OPHTHALMIC 2 TIMES DAILY
Status: DISCONTINUED | OUTPATIENT
Start: 2020-01-01 | End: 2020-01-01 | Stop reason: HOSPADM

## 2020-01-01 RX ORDER — FUROSEMIDE 40 MG/1
40 TABLET ORAL 2 TIMES DAILY
Status: DISCONTINUED | OUTPATIENT
Start: 2020-01-01 | End: 2020-01-01

## 2020-01-01 RX ORDER — FAMOTIDINE 20 MG/1
20 TABLET, FILM COATED ORAL DAILY
Status: DISCONTINUED | OUTPATIENT
Start: 2020-01-01 | End: 2020-01-01 | Stop reason: HOSPADM

## 2020-01-01 RX ORDER — ONDANSETRON 4 MG/1
4 TABLET, FILM COATED ORAL EVERY 8 HOURS PRN
Status: DISCONTINUED | OUTPATIENT
Start: 2020-01-01 | End: 2020-01-01 | Stop reason: HOSPADM

## 2020-01-01 RX ORDER — GUAIFENESIN/DEXTROMETHORPHAN 100-10MG/5
10 SYRUP ORAL EVERY 6 HOURS
Status: DISCONTINUED | OUTPATIENT
Start: 2020-01-01 | End: 2020-01-01

## 2020-01-01 RX ORDER — 0.9 % SODIUM CHLORIDE 0.9 %
1000 INTRAVENOUS SOLUTION INTRAVENOUS ONCE
Status: DISCONTINUED | OUTPATIENT
Start: 2020-01-01 | End: 2020-01-01

## 2020-01-01 RX ORDER — FUROSEMIDE 10 MG/ML
40 INJECTION INTRAMUSCULAR; INTRAVENOUS EVERY 12 HOURS
Status: DISCONTINUED | OUTPATIENT
Start: 2020-01-01 | End: 2020-01-01

## 2020-01-01 RX ORDER — SODIUM CHLORIDE 0.9 % (FLUSH) 0.9 %
10 SYRINGE (ML) INJECTION 2 TIMES DAILY
Status: DISCONTINUED | OUTPATIENT
Start: 2020-01-01 | End: 2020-01-01 | Stop reason: HOSPADM

## 2020-01-01 RX ORDER — SIMETHICONE 80 MG
160 TABLET,CHEWABLE ORAL
Status: DISCONTINUED | OUTPATIENT
Start: 2020-01-01 | End: 2020-01-01

## 2020-01-01 RX ORDER — POTASSIUM CHLORIDE 20 MEQ/1
40 TABLET, EXTENDED RELEASE ORAL ONCE
Status: DISCONTINUED | OUTPATIENT
Start: 2020-01-01 | End: 2020-01-01

## 2020-01-01 RX ORDER — FORMOTEROL FUMARATE 20 UG/2ML
20 SOLUTION RESPIRATORY (INHALATION) EVERY 12 HOURS
Status: DISCONTINUED | OUTPATIENT
Start: 2020-01-01 | End: 2020-01-01 | Stop reason: HOSPADM

## 2020-01-01 RX ORDER — POTASSIUM CHLORIDE 1.5 G/1.77G
40 POWDER, FOR SOLUTION ORAL ONCE
Status: DISCONTINUED | OUTPATIENT
Start: 2020-01-01 | End: 2020-01-01 | Stop reason: CLARIF

## 2020-01-01 RX ORDER — HYDROCODONE BITARTRATE AND ACETAMINOPHEN 10; 325 MG/1; MG/1
1 TABLET ORAL 2 TIMES DAILY PRN
Status: DISCONTINUED | OUTPATIENT
Start: 2020-01-01 | End: 2020-01-01 | Stop reason: HOSPADM

## 2020-01-01 RX ORDER — DIGOXIN 0.25 MG/ML
500 INJECTION INTRAMUSCULAR; INTRAVENOUS ONCE
Status: COMPLETED | OUTPATIENT
Start: 2020-01-01 | End: 2020-01-01

## 2020-01-01 RX ORDER — FUROSEMIDE 10 MG/ML
40 INJECTION INTRAMUSCULAR; INTRAVENOUS 2 TIMES DAILY
Status: DISCONTINUED | OUTPATIENT
Start: 2020-01-01 | End: 2020-01-01

## 2020-01-01 RX ORDER — METHYLPREDNISOLONE SODIUM SUCCINATE 125 MG/2ML
80 INJECTION, POWDER, LYOPHILIZED, FOR SOLUTION INTRAMUSCULAR; INTRAVENOUS EVERY 8 HOURS
Status: DISCONTINUED | OUTPATIENT
Start: 2020-01-01 | End: 2020-01-01

## 2020-01-01 RX ORDER — NICOTINE POLACRILEX 4 MG
15 LOZENGE BUCCAL PRN
Status: DISCONTINUED | OUTPATIENT
Start: 2020-01-01 | End: 2020-01-01 | Stop reason: HOSPADM

## 2020-01-01 RX ORDER — METOPROLOL TARTRATE 5 MG/5ML
5 INJECTION INTRAVENOUS ONCE
Status: DISCONTINUED | OUTPATIENT
Start: 2020-01-01 | End: 2020-01-01

## 2020-01-01 RX ADMIN — FAMOTIDINE 20 MG: 20 TABLET ORAL at 09:19

## 2020-01-01 RX ADMIN — LEVOTHYROXINE SODIUM 125 MCG: 125 TABLET ORAL at 06:17

## 2020-01-01 RX ADMIN — GUAIFENESIN 400 MG: 400 TABLET, FILM COATED ORAL at 15:29

## 2020-01-01 RX ADMIN — APIXABAN 5 MG: 5 TABLET, FILM COATED ORAL at 10:25

## 2020-01-01 RX ADMIN — BENZONATATE 200 MG: 100 CAPSULE ORAL at 21:16

## 2020-01-01 RX ADMIN — METHYLPREDNISOLONE SODIUM SUCCINATE 40 MG: 40 INJECTION, POWDER, LYOPHILIZED, FOR SOLUTION INTRAMUSCULAR; INTRAVENOUS at 21:16

## 2020-01-01 RX ADMIN — METHYLPREDNISOLONE SODIUM SUCCINATE 40 MG: 40 INJECTION, POWDER, LYOPHILIZED, FOR SOLUTION INTRAMUSCULAR; INTRAVENOUS at 06:03

## 2020-01-01 RX ADMIN — METHYLPREDNISOLONE SODIUM SUCCINATE 80 MG: 125 INJECTION, POWDER, LYOPHILIZED, FOR SOLUTION INTRAMUSCULAR; INTRAVENOUS at 05:54

## 2020-01-01 RX ADMIN — ACETAMINOPHEN 500 MG: 500 TABLET ORAL at 08:48

## 2020-01-01 RX ADMIN — BENZONATATE 200 MG: 100 CAPSULE ORAL at 09:38

## 2020-01-01 RX ADMIN — MICONAZOLE NITRATE: 20 POWDER TOPICAL at 09:20

## 2020-01-01 RX ADMIN — POTASSIUM CHLORIDE 40 MEQ: 20 TABLET, EXTENDED RELEASE ORAL at 09:19

## 2020-01-01 RX ADMIN — FUROSEMIDE 60 MG: 10 INJECTION, SOLUTION INTRAMUSCULAR; INTRAVENOUS at 18:27

## 2020-01-01 RX ADMIN — OXYCODONE HYDROCHLORIDE 10 MG: 5 TABLET ORAL at 10:01

## 2020-01-01 RX ADMIN — OSELTAMIVIR PHOSPHATE 30 MG: 30 CAPSULE ORAL at 08:33

## 2020-01-01 RX ADMIN — ROPINIROLE HYDROCHLORIDE 0.5 MG: 0.5 TABLET, FILM COATED ORAL at 17:11

## 2020-01-01 RX ADMIN — INSULIN LISPRO 4 UNITS: 100 INJECTION, SOLUTION INTRAVENOUS; SUBCUTANEOUS at 12:09

## 2020-01-01 RX ADMIN — ALBUTEROL SULFATE 2.5 MG: 2.5 SOLUTION RESPIRATORY (INHALATION) at 19:34

## 2020-01-01 RX ADMIN — BENZONATATE 200 MG: 100 CAPSULE ORAL at 13:51

## 2020-01-01 RX ADMIN — LEVALBUTEROL HYDROCHLORIDE 0.63 MG: 0.63 SOLUTION RESPIRATORY (INHALATION) at 01:09

## 2020-01-01 RX ADMIN — CHOLECALCIFEROL TAB 50 MCG (2000 UNIT) 2000 UNITS: 50 TAB at 12:50

## 2020-01-01 RX ADMIN — HYDROCORTISONE ACETATE 25 MG: 25 SUPPOSITORY RECTAL at 20:34

## 2020-01-01 RX ADMIN — LEVOTHYROXINE SODIUM 125 MCG: 125 TABLET ORAL at 06:23

## 2020-01-01 RX ADMIN — LEVALBUTEROL HYDROCHLORIDE 0.63 MG: 0.63 SOLUTION RESPIRATORY (INHALATION) at 04:20

## 2020-01-01 RX ADMIN — BUDESONIDE 1000 MCG: 0.5 SUSPENSION RESPIRATORY (INHALATION) at 18:54

## 2020-01-01 RX ADMIN — INSULIN LISPRO 6 UNITS: 100 INJECTION, SOLUTION INTRAVENOUS; SUBCUTANEOUS at 06:14

## 2020-01-01 RX ADMIN — FUROSEMIDE 40 MG: 10 INJECTION, SOLUTION INTRAMUSCULAR; INTRAVENOUS at 05:54

## 2020-01-01 RX ADMIN — CHLOROTHIAZIDE SODIUM 125 MG: 500 INJECTION, POWDER, LYOPHILIZED, FOR SOLUTION INTRAVENOUS at 22:23

## 2020-01-01 RX ADMIN — APIXABAN 5 MG: 5 TABLET, FILM COATED ORAL at 20:54

## 2020-01-01 RX ADMIN — BUDESONIDE 1000 MCG: 0.5 SUSPENSION RESPIRATORY (INHALATION) at 08:38

## 2020-01-01 RX ADMIN — OXYCODONE HYDROCHLORIDE 10 MG: 5 TABLET ORAL at 08:46

## 2020-01-01 RX ADMIN — HYDROCODONE BITARTRATE AND ACETAMINOPHEN 1 TABLET: 10; 325 TABLET ORAL at 09:50

## 2020-01-01 RX ADMIN — INSULIN LISPRO 3 UNITS: 100 INJECTION, SOLUTION INTRAVENOUS; SUBCUTANEOUS at 22:26

## 2020-01-01 RX ADMIN — FUROSEMIDE 40 MG: 10 INJECTION, SOLUTION INTRAMUSCULAR; INTRAVENOUS at 18:56

## 2020-01-01 RX ADMIN — CHOLECALCIFEROL TAB 50 MCG (2000 UNIT) 2000 UNITS: 50 TAB at 11:55

## 2020-01-01 RX ADMIN — FAMOTIDINE 20 MG: 20 TABLET ORAL at 10:23

## 2020-01-01 RX ADMIN — OSELTAMIVIR PHOSPHATE 30 MG: 30 CAPSULE ORAL at 10:05

## 2020-01-01 RX ADMIN — WATER: 1 INJECTION INTRAMUSCULAR; INTRAVENOUS; SUBCUTANEOUS at 14:15

## 2020-01-01 RX ADMIN — SENNOSIDES 17.2 MG: 8.6 TABLET, FILM COATED ORAL at 08:46

## 2020-01-01 RX ADMIN — METOPROLOL TARTRATE 37.5 MG: 25 TABLET ORAL at 20:14

## 2020-01-01 RX ADMIN — GUAIFENESIN 400 MG: 400 TABLET, FILM COATED ORAL at 10:22

## 2020-01-01 RX ADMIN — MORPHINE SULFATE 0.5 MG: 2 INJECTION, SOLUTION INTRAMUSCULAR; INTRAVENOUS at 15:04

## 2020-01-01 RX ADMIN — FORMOTEROL FUMARATE DIHYDRATE 20 MCG: 20 SOLUTION RESPIRATORY (INHALATION) at 08:09

## 2020-01-01 RX ADMIN — OXYCODONE HYDROCHLORIDE 10 MG: 5 TABLET ORAL at 13:53

## 2020-01-01 RX ADMIN — FORMOTEROL FUMARATE DIHYDRATE 20 MCG: 20 SOLUTION RESPIRATORY (INHALATION) at 21:04

## 2020-01-01 RX ADMIN — INSULIN LISPRO 8 UNITS: 100 INJECTION, SOLUTION INTRAVENOUS; SUBCUTANEOUS at 06:36

## 2020-01-01 RX ADMIN — POTASSIUM CHLORIDE 40 MEQ: 20 TABLET, EXTENDED RELEASE ORAL at 16:58

## 2020-01-01 RX ADMIN — FLUTICASONE PROPIONATE 1 SPRAY: 50 SPRAY, METERED NASAL at 08:32

## 2020-01-01 RX ADMIN — LEVOTHYROXINE SODIUM 125 MCG: 125 TABLET ORAL at 06:24

## 2020-01-01 RX ADMIN — CHOLECALCIFEROL TAB 50 MCG (2000 UNIT) 2000 UNITS: 50 TAB at 11:48

## 2020-01-01 RX ADMIN — METHYLPREDNISOLONE SODIUM SUCCINATE 80 MG: 125 INJECTION, POWDER, LYOPHILIZED, FOR SOLUTION INTRAMUSCULAR; INTRAVENOUS at 05:46

## 2020-01-01 RX ADMIN — METOPROLOL TARTRATE 25 MG: 25 TABLET ORAL at 08:47

## 2020-01-01 RX ADMIN — NEPHROCAP 1 MG: 1 CAP ORAL at 08:31

## 2020-01-01 RX ADMIN — HYDROCODONE BITARTRATE AND HOMATROPINE METHYLBROMIDE 5 ML: 5; 1.5 SOLUTION ORAL at 23:50

## 2020-01-01 RX ADMIN — KETOTIFEN FUMARATE 1 DROP: 0.35 SOLUTION/ DROPS OPHTHALMIC at 09:24

## 2020-01-01 RX ADMIN — METOPROLOL TARTRATE 37.5 MG: 25 TABLET ORAL at 10:22

## 2020-01-01 RX ADMIN — FORMOTEROL FUMARATE DIHYDRATE 20 MCG: 20 SOLUTION RESPIRATORY (INHALATION) at 19:23

## 2020-01-01 RX ADMIN — LACTULOSE 20 G: 20 SOLUTION ORAL at 21:00

## 2020-01-01 RX ADMIN — MICONAZOLE NITRATE: 20 POWDER TOPICAL at 08:32

## 2020-01-01 RX ADMIN — MAGNESIUM GLUCONATE 500 MG ORAL TABLET 400 MG: 500 TABLET ORAL at 09:18

## 2020-01-01 RX ADMIN — METHYLPREDNISOLONE SODIUM SUCCINATE 80 MG: 125 INJECTION, POWDER, LYOPHILIZED, FOR SOLUTION INTRAMUSCULAR; INTRAVENOUS at 13:36

## 2020-01-01 RX ADMIN — SODIUM CHLORIDE, PRESERVATIVE FREE 10 ML: 5 INJECTION INTRAVENOUS at 21:16

## 2020-01-01 RX ADMIN — APIXABAN 5 MG: 5 TABLET, FILM COATED ORAL at 20:34

## 2020-01-01 RX ADMIN — METHYLPREDNISOLONE SODIUM SUCCINATE 80 MG: 125 INJECTION, POWDER, LYOPHILIZED, FOR SOLUTION INTRAMUSCULAR; INTRAVENOUS at 13:29

## 2020-01-01 RX ADMIN — BUDESONIDE 1000 MCG: 0.5 SUSPENSION RESPIRATORY (INHALATION) at 20:28

## 2020-01-01 RX ADMIN — MICONAZOLE NITRATE: 20 POWDER TOPICAL at 21:06

## 2020-01-01 RX ADMIN — FORMOTEROL FUMARATE DIHYDRATE 20 MCG: 20 SOLUTION RESPIRATORY (INHALATION) at 21:41

## 2020-01-01 RX ADMIN — FORMOTEROL FUMARATE DIHYDRATE 20 MCG: 20 SOLUTION RESPIRATORY (INHALATION) at 07:40

## 2020-01-01 RX ADMIN — LEVALBUTEROL HYDROCHLORIDE 0.63 MG: 0.63 SOLUTION RESPIRATORY (INHALATION) at 01:58

## 2020-01-01 RX ADMIN — ALBUTEROL SULFATE 2.5 MG: 2.5 SOLUTION RESPIRATORY (INHALATION) at 09:39

## 2020-01-01 RX ADMIN — METOPROLOL TARTRATE 25 MG: 25 TABLET ORAL at 20:25

## 2020-01-01 RX ADMIN — INSULIN LISPRO 5 UNITS: 100 INJECTION, SOLUTION INTRAVENOUS; SUBCUTANEOUS at 20:28

## 2020-01-01 RX ADMIN — BENZONATATE 200 MG: 100 CAPSULE ORAL at 10:21

## 2020-01-01 RX ADMIN — GUAIFENESIN 400 MG: 400 TABLET ORAL at 16:46

## 2020-01-01 RX ADMIN — FERROUS SULFATE TAB 325 MG (65 MG ELEMENTAL FE) 325 MG: 325 (65 FE) TAB at 12:50

## 2020-01-01 RX ADMIN — METHYLPREDNISOLONE SODIUM SUCCINATE 80 MG: 125 INJECTION, POWDER, LYOPHILIZED, FOR SOLUTION INTRAMUSCULAR; INTRAVENOUS at 20:59

## 2020-01-01 RX ADMIN — OXYCODONE HYDROCHLORIDE 10 MG: 5 TABLET ORAL at 07:38

## 2020-01-01 RX ADMIN — ALBUTEROL SULFATE 2.5 MG: 2.5 SOLUTION RESPIRATORY (INHALATION) at 08:32

## 2020-01-01 RX ADMIN — DIGOXIN 250 MCG: 0.25 INJECTION INTRAMUSCULAR; INTRAVENOUS at 15:31

## 2020-01-01 RX ADMIN — IPRATROPIUM BROMIDE AND ALBUTEROL SULFATE 1 AMPULE: 2.5; .5 SOLUTION RESPIRATORY (INHALATION) at 11:25

## 2020-01-01 RX ADMIN — SODIUM CHLORIDE, PRESERVATIVE FREE 10 ML: 5 INJECTION INTRAVENOUS at 20:23

## 2020-01-01 RX ADMIN — FUROSEMIDE 40 MG: 10 INJECTION, SOLUTION INTRAMUSCULAR; INTRAVENOUS at 10:05

## 2020-01-01 RX ADMIN — INSULIN LISPRO 6 UNITS: 100 INJECTION, SOLUTION INTRAVENOUS; SUBCUTANEOUS at 10:41

## 2020-01-01 RX ADMIN — BUDESONIDE 1000 MCG: 0.5 SUSPENSION RESPIRATORY (INHALATION) at 08:36

## 2020-01-01 RX ADMIN — FUROSEMIDE 60 MG: 10 INJECTION, SOLUTION INTRAMUSCULAR; INTRAVENOUS at 17:56

## 2020-01-01 RX ADMIN — BENZONATATE 200 MG: 100 CAPSULE ORAL at 13:44

## 2020-01-01 RX ADMIN — OXYCODONE HYDROCHLORIDE 5 MG: 5 TABLET ORAL at 06:37

## 2020-01-01 RX ADMIN — OSELTAMIVIR PHOSPHATE 30 MG: 30 CAPSULE ORAL at 20:33

## 2020-01-01 RX ADMIN — SODIUM CHLORIDE, PRESERVATIVE FREE 10 ML: 5 INJECTION INTRAVENOUS at 09:49

## 2020-01-01 RX ADMIN — KETOTIFEN FUMARATE 1 DROP: 0.35 SOLUTION/ DROPS OPHTHALMIC at 10:28

## 2020-01-01 RX ADMIN — NEPHROCAP 1 MG: 1 CAP ORAL at 08:46

## 2020-01-01 RX ADMIN — CLOTRIMAZOLE AND BETAMETHASONE DIPROPIONATE: 10; .5 CREAM TOPICAL at 20:34

## 2020-01-01 RX ADMIN — LEVOTHYROXINE SODIUM 125 MCG: 125 TABLET ORAL at 05:49

## 2020-01-01 RX ADMIN — LACTULOSE 20 G: 20 SOLUTION ORAL at 08:49

## 2020-01-01 RX ADMIN — NEPHROCAP 1 MG: 1 CAP ORAL at 10:26

## 2020-01-01 RX ADMIN — INSULIN LISPRO 8 UNITS: 100 INJECTION, SOLUTION INTRAVENOUS; SUBCUTANEOUS at 11:28

## 2020-01-01 RX ADMIN — WATER 10 ML: 1 INJECTION INTRAMUSCULAR; INTRAVENOUS; SUBCUTANEOUS at 13:37

## 2020-01-01 RX ADMIN — PREDNISONE 40 MG: 20 TABLET ORAL at 09:30

## 2020-01-01 RX ADMIN — CLOTRIMAZOLE AND BETAMETHASONE DIPROPIONATE: 10; .5 CREAM TOPICAL at 08:32

## 2020-01-01 RX ADMIN — METOPROLOL TARTRATE 37.5 MG: 25 TABLET ORAL at 10:23

## 2020-01-01 RX ADMIN — GABAPENTIN 100 MG: 100 CAPSULE ORAL at 20:25

## 2020-01-01 RX ADMIN — KETOTIFEN FUMARATE 1 DROP: 0.35 SOLUTION/ DROPS OPHTHALMIC at 10:24

## 2020-01-01 RX ADMIN — OXYCODONE HYDROCHLORIDE 10 MG: 5 TABLET ORAL at 20:26

## 2020-01-01 RX ADMIN — POTASSIUM CHLORIDE 40 MEQ: 20 TABLET, EXTENDED RELEASE ORAL at 20:01

## 2020-01-01 RX ADMIN — APIXABAN 5 MG: 5 TABLET, FILM COATED ORAL at 10:04

## 2020-01-01 RX ADMIN — SENNOSIDES 17.2 MG: 8.6 TABLET, FILM COATED ORAL at 09:19

## 2020-01-01 RX ADMIN — MICONAZOLE NITRATE: 20 POWDER TOPICAL at 20:35

## 2020-01-01 RX ADMIN — BENZONATATE 200 MG: 100 CAPSULE ORAL at 09:51

## 2020-01-01 RX ADMIN — METHYLPREDNISOLONE SODIUM SUCCINATE 80 MG: 125 INJECTION, POWDER, LYOPHILIZED, FOR SOLUTION INTRAMUSCULAR; INTRAVENOUS at 20:22

## 2020-01-01 RX ADMIN — INSULIN LISPRO 10 UNITS: 100 INJECTION, SOLUTION INTRAVENOUS; SUBCUTANEOUS at 12:15

## 2020-01-01 RX ADMIN — FORMOTEROL FUMARATE DIHYDRATE 20 MCG: 20 SOLUTION RESPIRATORY (INHALATION) at 20:29

## 2020-01-01 RX ADMIN — BUDESONIDE 1000 MCG: 0.5 SUSPENSION RESPIRATORY (INHALATION) at 19:34

## 2020-01-01 RX ADMIN — LEVALBUTEROL HYDROCHLORIDE 0.63 MG: 0.63 SOLUTION RESPIRATORY (INHALATION) at 08:09

## 2020-01-01 RX ADMIN — LEVOTHYROXINE SODIUM 125 MCG: 125 TABLET ORAL at 05:14

## 2020-01-01 RX ADMIN — KETOTIFEN FUMARATE 1 DROP: 0.35 SOLUTION/ DROPS OPHTHALMIC at 20:01

## 2020-01-01 RX ADMIN — ALBUTEROL SULFATE 2.5 MG: 2.5 SOLUTION RESPIRATORY (INHALATION) at 12:03

## 2020-01-01 RX ADMIN — OYSTER SHELL CALCIUM WITH VITAMIN D 1 TABLET: 500; 200 TABLET, FILM COATED ORAL at 10:33

## 2020-01-01 RX ADMIN — FLUTICASONE PROPIONATE 1 SPRAY: 50 SPRAY, METERED NASAL at 09:04

## 2020-01-01 RX ADMIN — MORPHINE SULFATE 2 MG: 2 INJECTION, SOLUTION INTRAMUSCULAR; INTRAVENOUS at 19:37

## 2020-01-01 RX ADMIN — SIMETHICONE CHEW TAB 80 MG 160 MG: 80 TABLET ORAL at 10:22

## 2020-01-01 RX ADMIN — INSULIN LISPRO 6 UNITS: 100 INJECTION, SOLUTION INTRAVENOUS; SUBCUTANEOUS at 06:02

## 2020-01-01 RX ADMIN — FUROSEMIDE 60 MG: 10 INJECTION, SOLUTION INTRAMUSCULAR; INTRAVENOUS at 06:23

## 2020-01-01 RX ADMIN — METOPROLOL TARTRATE 25 MG: 25 TABLET ORAL at 10:33

## 2020-01-01 RX ADMIN — BENZONATATE 200 MG: 100 CAPSULE ORAL at 13:37

## 2020-01-01 RX ADMIN — HYDROCODONE BITARTRATE AND ACETAMINOPHEN 1 TABLET: 10; 325 TABLET ORAL at 09:29

## 2020-01-01 RX ADMIN — BUDESONIDE 1000 MCG: 0.5 SUSPENSION RESPIRATORY (INHALATION) at 08:09

## 2020-01-01 RX ADMIN — CHOLECALCIFEROL TAB 50 MCG (2000 UNIT) 2000 UNITS: 50 TAB at 10:33

## 2020-01-01 RX ADMIN — NEPHROCAP 1 MG: 1 CAP ORAL at 10:03

## 2020-01-01 RX ADMIN — HYDROCODONE BITARTRATE AND HOMATROPINE METHYLBROMIDE 5 ML: 5; 1.5 SOLUTION ORAL at 04:23

## 2020-01-01 RX ADMIN — FORMOTEROL FUMARATE DIHYDRATE 20 MCG: 20 SOLUTION RESPIRATORY (INHALATION) at 08:36

## 2020-01-01 RX ADMIN — MICONAZOLE NITRATE: 20 POWDER TOPICAL at 10:07

## 2020-01-01 RX ADMIN — FERROUS SULFATE TAB 325 MG (65 MG ELEMENTAL FE) 325 MG: 325 (65 FE) TAB at 10:34

## 2020-01-01 RX ADMIN — SODIUM CHLORIDE, PRESERVATIVE FREE 10 ML: 5 INJECTION INTRAVENOUS at 10:25

## 2020-01-01 RX ADMIN — OXYCODONE HYDROCHLORIDE 10 MG: 5 TABLET ORAL at 15:29

## 2020-01-01 RX ADMIN — MICONAZOLE NITRATE: 20 POWDER TOPICAL at 20:01

## 2020-01-01 RX ADMIN — CHLOROTHIAZIDE SODIUM 250 MG: 500 INJECTION, POWDER, LYOPHILIZED, FOR SOLUTION INTRAVENOUS at 06:36

## 2020-01-01 RX ADMIN — GABAPENTIN 100 MG: 100 CAPSULE ORAL at 21:42

## 2020-01-01 RX ADMIN — MICONAZOLE NITRATE: 20 POWDER TOPICAL at 21:21

## 2020-01-01 RX ADMIN — LACTULOSE 20 G: 20 SOLUTION ORAL at 08:32

## 2020-01-01 RX ADMIN — INSULIN LISPRO 10 UNITS: 100 INJECTION, SOLUTION INTRAVENOUS; SUBCUTANEOUS at 17:07

## 2020-01-01 RX ADMIN — METHYLPREDNISOLONE SODIUM SUCCINATE 40 MG: 40 INJECTION, POWDER, LYOPHILIZED, FOR SOLUTION INTRAMUSCULAR; INTRAVENOUS at 13:53

## 2020-01-01 RX ADMIN — SIMETHICONE CHEW TAB 80 MG 160 MG: 80 TABLET ORAL at 11:48

## 2020-01-01 RX ADMIN — CHLOROTHIAZIDE SODIUM 250 MG: 500 INJECTION, POWDER, LYOPHILIZED, FOR SOLUTION INTRAVENOUS at 12:04

## 2020-01-01 RX ADMIN — ACETAZOLAMIDE 250 MG: 500 INJECTION, POWDER, LYOPHILIZED, FOR SOLUTION INTRAVENOUS at 18:30

## 2020-01-01 RX ADMIN — HYDROCORTISONE ACETATE 25 MG: 25 SUPPOSITORY RECTAL at 20:15

## 2020-01-01 RX ADMIN — LEVALBUTEROL HYDROCHLORIDE 0.63 MG: 0.63 SOLUTION RESPIRATORY (INHALATION) at 19:23

## 2020-01-01 RX ADMIN — LEVALBUTEROL HYDROCHLORIDE 0.63 MG: 0.63 SOLUTION RESPIRATORY (INHALATION) at 21:04

## 2020-01-01 RX ADMIN — ROPINIROLE HYDROCHLORIDE 0.25 MG: 0.25 TABLET, FILM COATED ORAL at 21:17

## 2020-01-01 RX ADMIN — APIXABAN 5 MG: 5 TABLET, FILM COATED ORAL at 21:17

## 2020-01-01 RX ADMIN — ROPINIROLE HYDROCHLORIDE 0.5 MG: 0.5 TABLET, FILM COATED ORAL at 16:35

## 2020-01-01 RX ADMIN — METHYLPREDNISOLONE SODIUM SUCCINATE 80 MG: 125 INJECTION, POWDER, LYOPHILIZED, FOR SOLUTION INTRAMUSCULAR; INTRAVENOUS at 20:35

## 2020-01-01 RX ADMIN — HYDROCORTISONE ACETATE 25 MG: 25 SUPPOSITORY RECTAL at 21:17

## 2020-01-01 RX ADMIN — APIXABAN 5 MG: 5 TABLET, FILM COATED ORAL at 09:18

## 2020-01-01 RX ADMIN — INSULIN LISPRO 8 UNITS: 100 INJECTION, SOLUTION INTRAVENOUS; SUBCUTANEOUS at 06:39

## 2020-01-01 RX ADMIN — LEVALBUTEROL HYDROCHLORIDE 0.63 MG: 0.63 SOLUTION RESPIRATORY (INHALATION) at 19:34

## 2020-01-01 RX ADMIN — FORMOTEROL FUMARATE DIHYDRATE 20 MCG: 20 SOLUTION RESPIRATORY (INHALATION) at 09:39

## 2020-01-01 RX ADMIN — OXYCODONE HYDROCHLORIDE 10 MG: 5 TABLET ORAL at 09:18

## 2020-01-01 RX ADMIN — OSELTAMIVIR PHOSPHATE 30 MG: 30 CAPSULE ORAL at 08:46

## 2020-01-01 RX ADMIN — ROPINIROLE HYDROCHLORIDE 0.25 MG: 0.25 TABLET, FILM COATED ORAL at 20:34

## 2020-01-01 RX ADMIN — INSULIN LISPRO 12 UNITS: 100 INJECTION, SOLUTION INTRAVENOUS; SUBCUTANEOUS at 17:57

## 2020-01-01 RX ADMIN — CHLOROTHIAZIDE SODIUM 125 MG: 500 INJECTION, POWDER, LYOPHILIZED, FOR SOLUTION INTRAVENOUS at 11:07

## 2020-01-01 RX ADMIN — ROPINIROLE HYDROCHLORIDE 0.5 MG: 0.5 TABLET, FILM COATED ORAL at 17:02

## 2020-01-01 RX ADMIN — KETOTIFEN FUMARATE 1 DROP: 0.35 SOLUTION/ DROPS OPHTHALMIC at 20:15

## 2020-01-01 RX ADMIN — OYSTER SHELL CALCIUM WITH VITAMIN D 1 TABLET: 500; 200 TABLET, FILM COATED ORAL at 11:56

## 2020-01-01 RX ADMIN — LACTULOSE 20 G: 20 SOLUTION ORAL at 21:43

## 2020-01-01 RX ADMIN — GABAPENTIN 100 MG: 100 CAPSULE ORAL at 20:54

## 2020-01-01 RX ADMIN — LEVALBUTEROL HYDROCHLORIDE 0.63 MG: 0.63 SOLUTION RESPIRATORY (INHALATION) at 13:18

## 2020-01-01 RX ADMIN — INSULIN LISPRO 4 UNITS: 100 INJECTION, SOLUTION INTRAVENOUS; SUBCUTANEOUS at 06:24

## 2020-01-01 RX ADMIN — OXYCODONE HYDROCHLORIDE 10 MG: 5 TABLET ORAL at 15:32

## 2020-01-01 RX ADMIN — FAMOTIDINE 20 MG: 20 TABLET ORAL at 09:23

## 2020-01-01 RX ADMIN — LEVALBUTEROL HYDROCHLORIDE 0.63 MG: 0.63 SOLUTION RESPIRATORY (INHALATION) at 00:58

## 2020-01-01 RX ADMIN — LEVALBUTEROL HYDROCHLORIDE 0.63 MG: 0.63 SOLUTION RESPIRATORY (INHALATION) at 07:41

## 2020-01-01 RX ADMIN — FUROSEMIDE 40 MG: 10 INJECTION, SOLUTION INTRAMUSCULAR; INTRAVENOUS at 17:45

## 2020-01-01 RX ADMIN — POTASSIUM CHLORIDE 40 MEQ: 20 TABLET, EXTENDED RELEASE ORAL at 10:22

## 2020-01-01 RX ADMIN — INSULIN LISPRO 6 UNITS: 100 INJECTION, SOLUTION INTRAVENOUS; SUBCUTANEOUS at 15:33

## 2020-01-01 RX ADMIN — CLOTRIMAZOLE AND BETAMETHASONE DIPROPIONATE: 10; .5 CREAM TOPICAL at 08:50

## 2020-01-01 RX ADMIN — METOPROLOL TARTRATE 25 MG: 25 TABLET ORAL at 20:54

## 2020-01-01 RX ADMIN — OSELTAMIVIR PHOSPHATE 30 MG: 30 CAPSULE ORAL at 21:42

## 2020-01-01 RX ADMIN — FUROSEMIDE 40 MG: 10 INJECTION, SOLUTION INTRAMUSCULAR; INTRAVENOUS at 08:49

## 2020-01-01 RX ADMIN — FERROUS SULFATE TAB 325 MG (65 MG ELEMENTAL FE) 325 MG: 325 (65 FE) TAB at 12:09

## 2020-01-01 RX ADMIN — GUAIFENESIN 400 MG: 400 TABLET ORAL at 12:50

## 2020-01-01 RX ADMIN — LEVALBUTEROL HYDROCHLORIDE 0.63 MG: 0.63 SOLUTION RESPIRATORY (INHALATION) at 08:38

## 2020-01-01 RX ADMIN — FUROSEMIDE 60 MG: 10 INJECTION, SOLUTION INTRAMUSCULAR; INTRAVENOUS at 06:03

## 2020-01-01 RX ADMIN — SIMETHICONE CHEW TAB 80 MG 160 MG: 80 TABLET ORAL at 12:06

## 2020-01-01 RX ADMIN — POTASSIUM CHLORIDE 40 MEQ: 20 TABLET, EXTENDED RELEASE ORAL at 20:54

## 2020-01-01 RX ADMIN — BUDESONIDE 1000 MCG: 0.5 SUSPENSION RESPIRATORY (INHALATION) at 20:56

## 2020-01-01 RX ADMIN — POTASSIUM CHLORIDE 40 MEQ: 20 TABLET, EXTENDED RELEASE ORAL at 09:24

## 2020-01-01 RX ADMIN — OSELTAMIVIR PHOSPHATE 30 MG: 30 CAPSULE ORAL at 20:25

## 2020-01-01 RX ADMIN — BENZONATATE 200 MG: 100 CAPSULE ORAL at 10:11

## 2020-01-01 RX ADMIN — BENZONATATE 200 MG: 100 CAPSULE ORAL at 20:01

## 2020-01-01 RX ADMIN — FORMOTEROL FUMARATE DIHYDRATE 20 MCG: 20 SOLUTION RESPIRATORY (INHALATION) at 20:56

## 2020-01-01 RX ADMIN — CHOLECALCIFEROL TAB 50 MCG (2000 UNIT) 2000 UNITS: 50 TAB at 12:14

## 2020-01-01 RX ADMIN — POTASSIUM CHLORIDE 40 MEQ: 20 TABLET, EXTENDED RELEASE ORAL at 10:28

## 2020-01-01 RX ADMIN — METHYLPREDNISOLONE SODIUM SUCCINATE 40 MG: 40 INJECTION, POWDER, LYOPHILIZED, FOR SOLUTION INTRAMUSCULAR; INTRAVENOUS at 17:56

## 2020-01-01 RX ADMIN — FORMOTEROL FUMARATE DIHYDRATE 20 MCG: 20 SOLUTION RESPIRATORY (INHALATION) at 07:33

## 2020-01-01 RX ADMIN — MICONAZOLE NITRATE: 20 POWDER TOPICAL at 10:29

## 2020-01-01 RX ADMIN — GUAIFENESIN 400 MG: 400 TABLET, FILM COATED ORAL at 20:15

## 2020-01-01 RX ADMIN — NEPHROCAP 1 MG: 1 CAP ORAL at 09:28

## 2020-01-01 RX ADMIN — OXYCODONE HYDROCHLORIDE 10 MG: 5 TABLET ORAL at 03:04

## 2020-01-01 RX ADMIN — ROPINIROLE HYDROCHLORIDE 0.5 MG: 0.5 TABLET, FILM COATED ORAL at 16:46

## 2020-01-01 RX ADMIN — INSULIN LISPRO 4 UNITS: 100 INJECTION, SOLUTION INTRAVENOUS; SUBCUTANEOUS at 06:23

## 2020-01-01 RX ADMIN — GUAIFENESIN 400 MG: 400 TABLET ORAL at 20:01

## 2020-01-01 RX ADMIN — BUDESONIDE 1000 MCG: 0.5 SUSPENSION RESPIRATORY (INHALATION) at 08:41

## 2020-01-01 RX ADMIN — METOPROLOL TARTRATE 37.5 MG: 25 TABLET ORAL at 09:27

## 2020-01-01 RX ADMIN — NEPHROCAP 1 MG: 1 CAP ORAL at 10:23

## 2020-01-01 RX ADMIN — OYSTER SHELL CALCIUM WITH VITAMIN D 1 TABLET: 500; 200 TABLET, FILM COATED ORAL at 11:28

## 2020-01-01 RX ADMIN — SODIUM CHLORIDE, PRESERVATIVE FREE 10 ML: 5 INJECTION INTRAVENOUS at 20:14

## 2020-01-01 RX ADMIN — MICONAZOLE NITRATE: 20 POWDER TOPICAL at 10:24

## 2020-01-01 RX ADMIN — ROPINIROLE HYDROCHLORIDE 0.5 MG: 0.5 TABLET, FILM COATED ORAL at 15:49

## 2020-01-01 RX ADMIN — CLOTRIMAZOLE AND BETAMETHASONE DIPROPIONATE: 10; .5 CREAM TOPICAL at 09:20

## 2020-01-01 RX ADMIN — OXYCODONE HYDROCHLORIDE 10 MG: 5 TABLET ORAL at 20:54

## 2020-01-01 RX ADMIN — MAGNESIUM GLUCONATE 500 MG ORAL TABLET 400 MG: 500 TABLET ORAL at 10:22

## 2020-01-01 RX ADMIN — ALBUTEROL SULFATE 2.5 MG: 2.5 SOLUTION RESPIRATORY (INHALATION) at 21:42

## 2020-01-01 RX ADMIN — BENZONATATE 200 MG: 100 CAPSULE ORAL at 20:15

## 2020-01-01 RX ADMIN — INSULIN LISPRO 5 UNITS: 100 INJECTION, SOLUTION INTRAVENOUS; SUBCUTANEOUS at 20:13

## 2020-01-01 RX ADMIN — FORMOTEROL FUMARATE DIHYDRATE 20 MCG: 20 SOLUTION RESPIRATORY (INHALATION) at 20:27

## 2020-01-01 RX ADMIN — MAGNESIUM GLUCONATE 500 MG ORAL TABLET 400 MG: 500 TABLET ORAL at 08:31

## 2020-01-01 RX ADMIN — ALBUTEROL SULFATE 2.5 MG: 2.5 SOLUTION RESPIRATORY (INHALATION) at 16:00

## 2020-01-01 RX ADMIN — METOPROLOL TARTRATE 25 MG: 25 TABLET ORAL at 08:32

## 2020-01-01 RX ADMIN — SENNOSIDES 17.2 MG: 8.6 TABLET, FILM COATED ORAL at 10:03

## 2020-01-01 RX ADMIN — INSULIN LISPRO 12 UNITS: 100 INJECTION, SOLUTION INTRAVENOUS; SUBCUTANEOUS at 16:59

## 2020-01-01 RX ADMIN — APIXABAN 5 MG: 5 TABLET, FILM COATED ORAL at 09:28

## 2020-01-01 RX ADMIN — FUROSEMIDE 40 MG: 40 TABLET ORAL at 17:11

## 2020-01-01 RX ADMIN — FUROSEMIDE 40 MG: 10 INJECTION, SOLUTION INTRAMUSCULAR; INTRAVENOUS at 18:26

## 2020-01-01 RX ADMIN — KETOTIFEN FUMARATE 1 DROP: 0.35 SOLUTION/ DROPS OPHTHALMIC at 21:21

## 2020-01-01 RX ADMIN — CHLOROTHIAZIDE SODIUM 250 MG: 500 INJECTION, POWDER, LYOPHILIZED, FOR SOLUTION INTRAVENOUS at 20:19

## 2020-01-01 RX ADMIN — HYDROCORTISONE ACETATE 25 MG: 25 SUPPOSITORY RECTAL at 22:46

## 2020-01-01 RX ADMIN — BUDESONIDE 1000 MCG: 0.5 SUSPENSION RESPIRATORY (INHALATION) at 09:54

## 2020-01-01 RX ADMIN — POTASSIUM CHLORIDE 40 MEQ: 20 TABLET, EXTENDED RELEASE ORAL at 09:27

## 2020-01-01 RX ADMIN — ROPINIROLE HYDROCHLORIDE 0.25 MG: 0.25 TABLET, FILM COATED ORAL at 20:15

## 2020-01-01 RX ADMIN — LORAZEPAM 0.5 MG: 2 INJECTION INTRAMUSCULAR; INTRAVENOUS at 19:37

## 2020-01-01 RX ADMIN — HYDROCODONE BITARTRATE AND ACETAMINOPHEN 1 TABLET: 10; 325 TABLET ORAL at 13:44

## 2020-01-01 RX ADMIN — OYSTER SHELL CALCIUM WITH VITAMIN D 1 TABLET: 500; 200 TABLET, FILM COATED ORAL at 12:15

## 2020-01-01 RX ADMIN — BUDESONIDE 1000 MCG: 0.5 SUSPENSION RESPIRATORY (INHALATION) at 20:29

## 2020-01-01 RX ADMIN — BUDESONIDE 1000 MCG: 0.5 SUSPENSION RESPIRATORY (INHALATION) at 19:23

## 2020-01-01 RX ADMIN — GUAIFENESIN 400 MG: 400 TABLET, FILM COATED ORAL at 10:37

## 2020-01-01 RX ADMIN — POTASSIUM CHLORIDE 40 MEQ: 20 TABLET, EXTENDED RELEASE ORAL at 08:31

## 2020-01-01 RX ADMIN — FUROSEMIDE 40 MG: 10 INJECTION, SOLUTION INTRAMUSCULAR; INTRAVENOUS at 06:17

## 2020-01-01 RX ADMIN — LACTULOSE 20 G: 20 SOLUTION ORAL at 10:03

## 2020-01-01 RX ADMIN — METOPROLOL TARTRATE 25 MG: 25 TABLET ORAL at 21:42

## 2020-01-01 RX ADMIN — MAGNESIUM GLUCONATE 500 MG ORAL TABLET 400 MG: 500 TABLET ORAL at 09:28

## 2020-01-01 RX ADMIN — SODIUM CHLORIDE, PRESERVATIVE FREE 10 ML: 5 INJECTION INTRAVENOUS at 21:30

## 2020-01-01 RX ADMIN — CLOTRIMAZOLE AND BETAMETHASONE DIPROPIONATE: 10; .5 CREAM TOPICAL at 09:49

## 2020-01-01 RX ADMIN — CHOLECALCIFEROL TAB 50 MCG (2000 UNIT) 2000 UNITS: 50 TAB at 12:06

## 2020-01-01 RX ADMIN — APIXABAN 5 MG: 5 TABLET, FILM COATED ORAL at 20:15

## 2020-01-01 RX ADMIN — ALBUTEROL SULFATE 2.5 MG: 2.5 SOLUTION RESPIRATORY (INHALATION) at 03:33

## 2020-01-01 RX ADMIN — GUAIFENESIN 400 MG: 400 TABLET ORAL at 12:15

## 2020-01-01 RX ADMIN — FAMOTIDINE 20 MG: 20 TABLET ORAL at 08:47

## 2020-01-01 RX ADMIN — ROPINIROLE HYDROCHLORIDE 0.25 MG: 0.25 TABLET, FILM COATED ORAL at 20:54

## 2020-01-01 RX ADMIN — ROPINIROLE HYDROCHLORIDE 0.5 MG: 0.5 TABLET, FILM COATED ORAL at 15:40

## 2020-01-01 RX ADMIN — LEVOTHYROXINE SODIUM 125 MCG: 125 TABLET ORAL at 05:54

## 2020-01-01 RX ADMIN — LEVOTHYROXINE SODIUM 125 MCG: 125 TABLET ORAL at 06:37

## 2020-01-01 RX ADMIN — INSULIN LISPRO 10 UNITS: 100 INJECTION, SOLUTION INTRAVENOUS; SUBCUTANEOUS at 12:49

## 2020-01-01 RX ADMIN — GUAIFENESIN 400 MG: 400 TABLET ORAL at 09:51

## 2020-01-01 RX ADMIN — DIGOXIN 250 MCG: 0.25 INJECTION INTRAMUSCULAR; INTRAVENOUS at 20:15

## 2020-01-01 RX ADMIN — SODIUM CHLORIDE SOLN NEBU 3% 4 ML: 3 NEBU SOLN at 21:41

## 2020-01-01 RX ADMIN — SODIUM CHLORIDE, PRESERVATIVE FREE 10 ML: 5 INJECTION INTRAVENOUS at 08:51

## 2020-01-01 RX ADMIN — LEVALBUTEROL HYDROCHLORIDE 0.63 MG: 0.63 SOLUTION RESPIRATORY (INHALATION) at 13:52

## 2020-01-01 RX ADMIN — ROPINIROLE HYDROCHLORIDE 0.5 MG: 0.5 TABLET, FILM COATED ORAL at 16:47

## 2020-01-01 RX ADMIN — GABAPENTIN 100 MG: 100 CAPSULE ORAL at 20:34

## 2020-01-01 RX ADMIN — METOPROLOL TARTRATE 37.5 MG: 25 TABLET ORAL at 09:51

## 2020-01-01 RX ADMIN — CHLOROTHIAZIDE SODIUM 125 MG: 500 INJECTION, POWDER, LYOPHILIZED, FOR SOLUTION INTRAVENOUS at 21:45

## 2020-01-01 RX ADMIN — SODIUM CHLORIDE SOLN NEBU 3% 4 ML: 3 NEBU SOLN at 19:34

## 2020-01-01 RX ADMIN — GUAIFENESIN 400 MG: 400 TABLET ORAL at 09:28

## 2020-01-01 RX ADMIN — FAMOTIDINE 20 MG: 20 TABLET ORAL at 09:51

## 2020-01-01 RX ADMIN — SIMETHICONE CHEW TAB 80 MG 160 MG: 80 TABLET ORAL at 11:55

## 2020-01-01 RX ADMIN — GUAIFENESIN 400 MG: 400 TABLET ORAL at 13:51

## 2020-01-01 RX ADMIN — OXYCODONE HYDROCHLORIDE 5 MG: 5 TABLET ORAL at 13:32

## 2020-01-01 RX ADMIN — SIMETHICONE CHEW TAB 80 MG 160 MG: 80 TABLET ORAL at 09:29

## 2020-01-01 RX ADMIN — KETOTIFEN FUMARATE 1 DROP: 0.35 SOLUTION/ DROPS OPHTHALMIC at 10:06

## 2020-01-01 RX ADMIN — FERROUS SULFATE TAB 325 MG (65 MG ELEMENTAL FE) 325 MG: 325 (65 FE) TAB at 11:28

## 2020-01-01 RX ADMIN — ACETAZOLAMIDE 250 MG: 500 INJECTION, POWDER, LYOPHILIZED, FOR SOLUTION INTRAVENOUS at 03:11

## 2020-01-01 RX ADMIN — OXYCODONE HYDROCHLORIDE 10 MG: 5 TABLET ORAL at 17:11

## 2020-01-01 RX ADMIN — MAGNESIUM GLUCONATE 500 MG ORAL TABLET 400 MG: 500 TABLET ORAL at 08:46

## 2020-01-01 RX ADMIN — SIMETHICONE CHEW TAB 80 MG 160 MG: 80 TABLET ORAL at 20:15

## 2020-01-01 RX ADMIN — METHYLPREDNISOLONE SODIUM SUCCINATE 125 MG: 125 INJECTION, POWDER, FOR SOLUTION INTRAMUSCULAR; INTRAVENOUS at 12:45

## 2020-01-01 RX ADMIN — LACTULOSE 20 G: 20 SOLUTION ORAL at 20:34

## 2020-01-01 RX ADMIN — OXYCODONE HYDROCHLORIDE 10 MG: 5 TABLET ORAL at 02:44

## 2020-01-01 RX ADMIN — FLUTICASONE PROPIONATE 1 SPRAY: 50 SPRAY, METERED NASAL at 10:26

## 2020-01-01 RX ADMIN — KETOTIFEN FUMARATE 1 DROP: 0.35 SOLUTION/ DROPS OPHTHALMIC at 09:28

## 2020-01-01 RX ADMIN — MICONAZOLE NITRATE: 20 POWDER TOPICAL at 10:09

## 2020-01-01 RX ADMIN — INSULIN LISPRO 8 UNITS: 100 INJECTION, SOLUTION INTRAVENOUS; SUBCUTANEOUS at 11:27

## 2020-01-01 RX ADMIN — INSULIN LISPRO 6 UNITS: 100 INJECTION, SOLUTION INTRAVENOUS; SUBCUTANEOUS at 16:48

## 2020-01-01 RX ADMIN — SODIUM CHLORIDE, PRESERVATIVE FREE 10 ML: 5 INJECTION INTRAVENOUS at 20:01

## 2020-01-01 RX ADMIN — APIXABAN 5 MG: 5 TABLET, FILM COATED ORAL at 08:47

## 2020-01-01 RX ADMIN — SODIUM CHLORIDE, PRESERVATIVE FREE 10 ML: 5 INJECTION INTRAVENOUS at 20:16

## 2020-01-01 RX ADMIN — SODIUM CHLORIDE, PRESERVATIVE FREE 10 ML: 5 INJECTION INTRAVENOUS at 21:43

## 2020-01-01 RX ADMIN — APIXABAN 5 MG: 5 TABLET, FILM COATED ORAL at 10:22

## 2020-01-01 RX ADMIN — APIXABAN 5 MG: 5 TABLET, FILM COATED ORAL at 20:01

## 2020-01-01 RX ADMIN — GUAIFENESIN 400 MG: 400 TABLET, FILM COATED ORAL at 10:11

## 2020-01-01 RX ADMIN — BUDESONIDE 1000 MCG: 0.5 SUSPENSION RESPIRATORY (INHALATION) at 21:04

## 2020-01-01 RX ADMIN — SODIUM CHLORIDE, PRESERVATIVE FREE 10 ML: 5 INJECTION INTRAVENOUS at 08:33

## 2020-01-01 RX ADMIN — METHYLPREDNISOLONE SODIUM SUCCINATE 80 MG: 125 INJECTION, POWDER, LYOPHILIZED, FOR SOLUTION INTRAMUSCULAR; INTRAVENOUS at 05:14

## 2020-01-01 RX ADMIN — FORMOTEROL FUMARATE DIHYDRATE 20 MCG: 20 SOLUTION RESPIRATORY (INHALATION) at 19:34

## 2020-01-01 RX ADMIN — SODIUM CHLORIDE 500 ML: 9 INJECTION, SOLUTION INTRAVENOUS at 13:30

## 2020-01-01 RX ADMIN — LEVALBUTEROL HYDROCHLORIDE 0.63 MG: 0.63 SOLUTION RESPIRATORY (INHALATION) at 01:14

## 2020-01-01 RX ADMIN — HYDROCORTISONE ACETATE 25 MG: 25 SUPPOSITORY RECTAL at 20:27

## 2020-01-01 RX ADMIN — SODIUM CHLORIDE, PRESERVATIVE FREE 10 ML: 5 INJECTION INTRAVENOUS at 10:10

## 2020-01-01 RX ADMIN — METOPROLOL TARTRATE 25 MG: 25 TABLET ORAL at 21:18

## 2020-01-01 RX ADMIN — OXYCODONE HYDROCHLORIDE 10 MG: 5 TABLET ORAL at 13:37

## 2020-01-01 RX ADMIN — FORMOTEROL FUMARATE DIHYDRATE 20 MCG: 20 SOLUTION RESPIRATORY (INHALATION) at 09:54

## 2020-01-01 RX ADMIN — MICONAZOLE NITRATE: 20 POWDER TOPICAL at 09:49

## 2020-01-01 RX ADMIN — NEPHROCAP 1 MG: 1 CAP ORAL at 09:18

## 2020-01-01 RX ADMIN — SIMETHICONE CHEW TAB 80 MG 160 MG: 80 TABLET ORAL at 13:37

## 2020-01-01 RX ADMIN — SIMETHICONE CHEW TAB 80 MG 160 MG: 80 TABLET ORAL at 20:01

## 2020-01-01 RX ADMIN — BUMETANIDE 1 MG: 0.25 INJECTION INTRAMUSCULAR; INTRAVENOUS at 16:58

## 2020-01-01 RX ADMIN — LEVALBUTEROL HYDROCHLORIDE 0.63 MG: 0.63 SOLUTION RESPIRATORY (INHALATION) at 09:54

## 2020-01-01 RX ADMIN — FLUTICASONE PROPIONATE 1 SPRAY: 50 SPRAY, METERED NASAL at 09:20

## 2020-01-01 RX ADMIN — HYDROCORTISONE ACETATE 25 MG: 25 SUPPOSITORY RECTAL at 20:01

## 2020-01-01 RX ADMIN — LEVALBUTEROL HYDROCHLORIDE 0.63 MG: 0.63 SOLUTION RESPIRATORY (INHALATION) at 13:43

## 2020-01-01 RX ADMIN — CHOLECALCIFEROL TAB 50 MCG (2000 UNIT) 2000 UNITS: 50 TAB at 11:28

## 2020-01-01 RX ADMIN — LEVALBUTEROL HYDROCHLORIDE 0.63 MG: 0.63 SOLUTION RESPIRATORY (INHALATION) at 14:03

## 2020-01-01 RX ADMIN — SODIUM CHLORIDE, PRESERVATIVE FREE 10 ML: 5 INJECTION INTRAVENOUS at 10:26

## 2020-01-01 RX ADMIN — LEVALBUTEROL HYDROCHLORIDE 0.63 MG: 0.63 SOLUTION RESPIRATORY (INHALATION) at 21:46

## 2020-01-01 RX ADMIN — SODIUM CHLORIDE SOLN NEBU 3% 4 ML: 3 NEBU SOLN at 09:39

## 2020-01-01 RX ADMIN — INSULIN LISPRO 5 UNITS: 100 INJECTION, SOLUTION INTRAVENOUS; SUBCUTANEOUS at 20:01

## 2020-01-01 RX ADMIN — APIXABAN 5 MG: 5 TABLET, FILM COATED ORAL at 08:32

## 2020-01-01 RX ADMIN — OYSTER SHELL CALCIUM WITH VITAMIN D 1 TABLET: 500; 200 TABLET, FILM COATED ORAL at 11:48

## 2020-01-01 RX ADMIN — MAGNESIUM GLUCONATE 500 MG ORAL TABLET 400 MG: 500 TABLET ORAL at 10:03

## 2020-01-01 RX ADMIN — OXYCODONE HYDROCHLORIDE 10 MG: 5 TABLET ORAL at 14:15

## 2020-01-01 RX ADMIN — PREDNISONE 40 MG: 20 TABLET ORAL at 09:52

## 2020-01-01 RX ADMIN — FORMOTEROL FUMARATE DIHYDRATE 20 MCG: 20 SOLUTION RESPIRATORY (INHALATION) at 08:38

## 2020-01-01 RX ADMIN — SIMETHICONE CHEW TAB 80 MG 160 MG: 80 TABLET ORAL at 15:30

## 2020-01-01 RX ADMIN — ALBUTEROL SULFATE 2.5 MG: 2.5 SOLUTION RESPIRATORY (INHALATION) at 12:57

## 2020-01-01 RX ADMIN — GUAIFENESIN 400 MG: 400 TABLET, FILM COATED ORAL at 13:53

## 2020-01-01 RX ADMIN — LEVOTHYROXINE SODIUM 125 MCG: 125 TABLET ORAL at 06:03

## 2020-01-01 RX ADMIN — OSELTAMIVIR PHOSPHATE 30 MG: 30 CAPSULE ORAL at 21:17

## 2020-01-01 RX ADMIN — POTASSIUM CHLORIDE 40 MEQ: 20 TABLET, EXTENDED RELEASE ORAL at 20:27

## 2020-01-01 RX ADMIN — FAMOTIDINE 20 MG: 20 TABLET ORAL at 09:28

## 2020-01-01 RX ADMIN — CHLOROTHIAZIDE SODIUM 250 MG: 500 INJECTION, POWDER, LYOPHILIZED, FOR SOLUTION INTRAVENOUS at 13:18

## 2020-01-01 RX ADMIN — POTASSIUM CHLORIDE 40 MEQ: 20 TABLET, EXTENDED RELEASE ORAL at 20:33

## 2020-01-01 RX ADMIN — WATER 10 ML: 1 INJECTION INTRAMUSCULAR; INTRAVENOUS; SUBCUTANEOUS at 13:53

## 2020-01-01 RX ADMIN — INSULIN LISPRO 3 UNITS: 100 INJECTION, SOLUTION INTRAVENOUS; SUBCUTANEOUS at 21:44

## 2020-01-01 RX ADMIN — HYDROCODONE BITARTRATE AND ACETAMINOPHEN 1 TABLET: 10; 325 TABLET ORAL at 15:49

## 2020-01-01 RX ADMIN — OSELTAMIVIR PHOSPHATE 75 MG: 75 CAPSULE ORAL at 12:47

## 2020-01-01 RX ADMIN — INSULIN LISPRO 12 UNITS: 100 INJECTION, SOLUTION INTRAVENOUS; SUBCUTANEOUS at 17:02

## 2020-01-01 RX ADMIN — BUDESONIDE 1000 MCG: 0.5 SUSPENSION RESPIRATORY (INHALATION) at 07:40

## 2020-01-01 RX ADMIN — FLUTICASONE PROPIONATE 1 SPRAY: 50 SPRAY, METERED NASAL at 10:10

## 2020-01-01 RX ADMIN — CHLOROTHIAZIDE SODIUM 250 MG: 500 INJECTION, POWDER, LYOPHILIZED, FOR SOLUTION INTRAVENOUS at 21:16

## 2020-01-01 RX ADMIN — CHLOROTHIAZIDE SODIUM 125 MG: 500 INJECTION, POWDER, LYOPHILIZED, FOR SOLUTION INTRAVENOUS at 10:31

## 2020-01-01 RX ADMIN — MICONAZOLE NITRATE: 20 POWDER TOPICAL at 20:28

## 2020-01-01 RX ADMIN — SENNOSIDES 17.2 MG: 8.6 TABLET, FILM COATED ORAL at 08:31

## 2020-01-01 RX ADMIN — MICONAZOLE NITRATE: 20 POWDER TOPICAL at 20:15

## 2020-01-01 RX ADMIN — SIMETHICONE CHEW TAB 80 MG 160 MG: 80 TABLET ORAL at 09:51

## 2020-01-01 RX ADMIN — ALBUTEROL SULFATE 2.5 MG: 2.5 SOLUTION RESPIRATORY (INHALATION) at 00:36

## 2020-01-01 RX ADMIN — FLUTICASONE PROPIONATE 1 SPRAY: 50 SPRAY, METERED NASAL at 10:24

## 2020-01-01 RX ADMIN — MICONAZOLE NITRATE: 20 POWDER TOPICAL at 21:43

## 2020-01-01 RX ADMIN — ROPINIROLE HYDROCHLORIDE 0.25 MG: 0.25 TABLET, FILM COATED ORAL at 20:25

## 2020-01-01 RX ADMIN — LEVALBUTEROL HYDROCHLORIDE 0.63 MG: 0.63 SOLUTION RESPIRATORY (INHALATION) at 20:28

## 2020-01-01 RX ADMIN — BENZONATATE 200 MG: 100 CAPSULE ORAL at 10:23

## 2020-01-01 RX ADMIN — IPRATROPIUM BROMIDE AND ALBUTEROL SULFATE 1 AMPULE: 2.5; .5 SOLUTION RESPIRATORY (INHALATION) at 11:28

## 2020-01-01 RX ADMIN — FERROUS SULFATE TAB 325 MG (65 MG ELEMENTAL FE) 325 MG: 325 (65 FE) TAB at 11:48

## 2020-01-01 RX ADMIN — CHLOROTHIAZIDE SODIUM 250 MG: 500 INJECTION, POWDER, LYOPHILIZED, FOR SOLUTION INTRAVENOUS at 07:33

## 2020-01-01 RX ADMIN — FORMOTEROL FUMARATE DIHYDRATE 20 MCG: 20 SOLUTION RESPIRATORY (INHALATION) at 08:41

## 2020-01-01 RX ADMIN — ROPINIROLE HYDROCHLORIDE 0.25 MG: 0.25 TABLET, FILM COATED ORAL at 21:42

## 2020-01-01 RX ADMIN — BENZONATATE 200 MG: 100 CAPSULE ORAL at 13:53

## 2020-01-01 RX ADMIN — ROPINIROLE HYDROCHLORIDE 0.5 MG: 0.5 TABLET, FILM COATED ORAL at 15:41

## 2020-01-01 RX ADMIN — APIXABAN 5 MG: 5 TABLET, FILM COATED ORAL at 21:42

## 2020-01-01 RX ADMIN — SODIUM CHLORIDE, PRESERVATIVE FREE 10 ML: 5 INJECTION INTRAVENOUS at 09:52

## 2020-01-01 RX ADMIN — OYSTER SHELL CALCIUM WITH VITAMIN D 1 TABLET: 500; 200 TABLET, FILM COATED ORAL at 12:50

## 2020-01-01 RX ADMIN — FUROSEMIDE 60 MG: 10 INJECTION, SOLUTION INTRAMUSCULAR; INTRAVENOUS at 16:47

## 2020-01-01 RX ADMIN — SODIUM CHLORIDE, PRESERVATIVE FREE 10 ML: 5 INJECTION INTRAVENOUS at 11:00

## 2020-01-01 RX ADMIN — OYSTER SHELL CALCIUM WITH VITAMIN D 1 TABLET: 500; 200 TABLET, FILM COATED ORAL at 13:53

## 2020-01-01 RX ADMIN — SIMETHICONE CHEW TAB 80 MG 160 MG: 80 TABLET ORAL at 10:23

## 2020-01-01 RX ADMIN — GUAIFENESIN 400 MG: 400 TABLET ORAL at 17:02

## 2020-01-01 RX ADMIN — BUDESONIDE 1000 MCG: 0.5 SUSPENSION RESPIRATORY (INHALATION) at 09:39

## 2020-01-01 RX ADMIN — ROPINIROLE HYDROCHLORIDE 0.25 MG: 0.25 TABLET, FILM COATED ORAL at 21:34

## 2020-01-01 RX ADMIN — FORMOTEROL FUMARATE DIHYDRATE 20 MCG: 20 SOLUTION RESPIRATORY (INHALATION) at 18:54

## 2020-01-01 RX ADMIN — FERROUS SULFATE TAB 325 MG (65 MG ELEMENTAL FE) 325 MG: 325 (65 FE) TAB at 11:55

## 2020-01-01 RX ADMIN — POTASSIUM CHLORIDE 40 MEQ: 20 TABLET, EXTENDED RELEASE ORAL at 09:50

## 2020-01-01 RX ADMIN — POTASSIUM CHLORIDE 40 MEQ: 20 TABLET, EXTENDED RELEASE ORAL at 10:04

## 2020-01-01 RX ADMIN — METHYLPREDNISOLONE SODIUM SUCCINATE 80 MG: 125 INJECTION, POWDER, LYOPHILIZED, FOR SOLUTION INTRAMUSCULAR; INTRAVENOUS at 17:44

## 2020-01-01 RX ADMIN — FUROSEMIDE 60 MG: 10 INJECTION, SOLUTION INTRAMUSCULAR; INTRAVENOUS at 06:37

## 2020-01-01 RX ADMIN — BENZONATATE 200 MG: 100 CAPSULE ORAL at 15:30

## 2020-01-01 RX ADMIN — SODIUM CHLORIDE SOLN NEBU 3% 4 ML: 3 NEBU SOLN at 12:03

## 2020-01-01 RX ADMIN — METOPROLOL TARTRATE 37.5 MG: 25 TABLET ORAL at 20:01

## 2020-01-01 RX ADMIN — INSULIN LISPRO 4 UNITS: 100 INJECTION, SOLUTION INTRAVENOUS; SUBCUTANEOUS at 21:16

## 2020-01-01 RX ADMIN — FERROUS SULFATE TAB 325 MG (65 MG ELEMENTAL FE) 325 MG: 325 (65 FE) TAB at 12:06

## 2020-01-01 RX ADMIN — ROPINIROLE HYDROCHLORIDE 0.5 MG: 0.5 TABLET, FILM COATED ORAL at 16:58

## 2020-01-01 RX ADMIN — OSELTAMIVIR PHOSPHATE 30 MG: 30 CAPSULE ORAL at 20:54

## 2020-01-01 RX ADMIN — HYDROCORTISONE ACETATE 25 MG: 25 SUPPOSITORY RECTAL at 21:43

## 2020-01-01 RX ADMIN — SODIUM CHLORIDE, PRESERVATIVE FREE 10 ML: 5 INJECTION INTRAVENOUS at 20:35

## 2020-01-01 RX ADMIN — MICONAZOLE NITRATE: 20 POWDER TOPICAL at 22:26

## 2020-01-01 RX ADMIN — CHLOROTHIAZIDE SODIUM 250 MG: 500 INJECTION, POWDER, LYOPHILIZED, FOR SOLUTION INTRAVENOUS at 20:47

## 2020-01-01 RX ADMIN — FAMOTIDINE 20 MG: 20 TABLET ORAL at 10:05

## 2020-01-01 RX ADMIN — IPRATROPIUM BROMIDE AND ALBUTEROL SULFATE 1 AMPULE: 2.5; .5 SOLUTION RESPIRATORY (INHALATION) at 11:27

## 2020-01-01 RX ADMIN — GUAIFENESIN 400 MG: 400 TABLET, FILM COATED ORAL at 21:16

## 2020-01-01 RX ADMIN — OXYCODONE HYDROCHLORIDE 5 MG: 5 TABLET ORAL at 20:01

## 2020-01-01 RX ADMIN — METHYLPREDNISOLONE SODIUM SUCCINATE 40 MG: 40 INJECTION, POWDER, LYOPHILIZED, FOR SOLUTION INTRAMUSCULAR; INTRAVENOUS at 16:46

## 2020-01-01 RX ADMIN — LACTULOSE 20 G: 20 SOLUTION ORAL at 13:37

## 2020-01-01 RX ADMIN — OSELTAMIVIR PHOSPHATE 30 MG: 30 CAPSULE ORAL at 10:23

## 2020-01-01 RX ADMIN — SIMETHICONE CHEW TAB 80 MG 160 MG: 80 TABLET ORAL at 13:44

## 2020-01-01 RX ADMIN — OXYCODONE HYDROCHLORIDE 10 MG: 5 TABLET ORAL at 03:05

## 2020-01-01 RX ADMIN — APIXABAN 5 MG: 5 TABLET, FILM COATED ORAL at 20:25

## 2020-01-01 RX ADMIN — SODIUM CHLORIDE, PRESERVATIVE FREE 10 ML: 5 INJECTION INTRAVENOUS at 09:50

## 2020-01-01 RX ADMIN — METOPROLOL TARTRATE 25 MG: 25 TABLET ORAL at 10:04

## 2020-01-01 RX ADMIN — ALBUTEROL SULFATE 2.5 MG: 2.5 SOLUTION RESPIRATORY (INHALATION) at 04:25

## 2020-01-01 RX ADMIN — HYDROCODONE BITARTRATE AND HOMATROPINE METHYLBROMIDE 5 ML: 5; 1.5 SOLUTION ORAL at 11:28

## 2020-01-01 RX ADMIN — SODIUM CHLORIDE 500 ML: 9 INJECTION, SOLUTION INTRAVENOUS at 12:48

## 2020-01-01 RX ADMIN — OSELTAMIVIR PHOSPHATE 30 MG: 30 CAPSULE ORAL at 09:19

## 2020-01-01 RX ADMIN — MICONAZOLE NITRATE: 20 POWDER TOPICAL at 08:50

## 2020-01-01 RX ADMIN — HYDROCODONE BITARTRATE AND HOMATROPINE METHYLBROMIDE 5 ML: 5; 1.5 SOLUTION ORAL at 11:03

## 2020-01-01 RX ADMIN — FERROUS SULFATE TAB 325 MG (65 MG ELEMENTAL FE) 325 MG: 325 (65 FE) TAB at 12:14

## 2020-01-01 RX ADMIN — DIGOXIN 500 MCG: 0.25 INJECTION INTRAMUSCULAR; INTRAVENOUS at 10:21

## 2020-01-01 RX ADMIN — ACETAZOLAMIDE 500 MG: 500 INJECTION, POWDER, LYOPHILIZED, FOR SOLUTION INTRAVENOUS at 16:38

## 2020-01-01 RX ADMIN — LEVALBUTEROL HYDROCHLORIDE 0.63 MG: 0.63 SOLUTION RESPIRATORY (INHALATION) at 12:59

## 2020-01-01 RX ADMIN — BUDESONIDE 1000 MCG: 0.5 SUSPENSION RESPIRATORY (INHALATION) at 21:41

## 2020-01-01 RX ADMIN — ALBUTEROL SULFATE 2.5 MG: 2.5 SOLUTION RESPIRATORY (INHALATION) at 12:51

## 2020-01-01 RX ADMIN — SODIUM CHLORIDE SOLN NEBU 3% 4 ML: 3 NEBU SOLN at 08:32

## 2020-01-01 RX ADMIN — POTASSIUM CHLORIDE 40 MEQ: 20 TABLET, EXTENDED RELEASE ORAL at 21:17

## 2020-01-01 RX ADMIN — POTASSIUM BICARBONATE 20 MEQ: 782 TABLET, EFFERVESCENT ORAL at 16:46

## 2020-01-01 RX ADMIN — MAGNESIUM GLUCONATE 500 MG ORAL TABLET 400 MG: 500 TABLET ORAL at 09:51

## 2020-01-01 RX ADMIN — METHYLPREDNISOLONE SODIUM SUCCINATE 40 MG: 40 INJECTION, POWDER, LYOPHILIZED, FOR SOLUTION INTRAMUSCULAR; INTRAVENOUS at 06:23

## 2020-01-01 RX ADMIN — BUDESONIDE 1000 MCG: 0.5 SUSPENSION RESPIRATORY (INHALATION) at 07:33

## 2020-01-01 RX ADMIN — GABAPENTIN 100 MG: 100 CAPSULE ORAL at 20:14

## 2020-01-01 RX ADMIN — NEPHROCAP 1 MG: 1 CAP ORAL at 09:51

## 2020-01-01 RX ADMIN — POTASSIUM CHLORIDE 40 MEQ: 20 TABLET, EXTENDED RELEASE ORAL at 20:14

## 2020-01-01 RX ADMIN — LACTULOSE 20 G: 20 SOLUTION ORAL at 20:22

## 2020-01-01 RX ADMIN — FLUTICASONE PROPIONATE 1 SPRAY: 50 SPRAY, METERED NASAL at 10:06

## 2020-01-01 RX ADMIN — POTASSIUM CHLORIDE 40 MEQ: 20 TABLET, EXTENDED RELEASE ORAL at 08:46

## 2020-01-01 RX ADMIN — POTASSIUM CHLORIDE 40 MEQ: 20 TABLET, EXTENDED RELEASE ORAL at 21:42

## 2020-01-01 RX ADMIN — MORPHINE SULFATE 0.5 MG: 2 INJECTION, SOLUTION INTRAMUSCULAR; INTRAVENOUS at 20:10

## 2020-01-01 RX ADMIN — SIMETHICONE CHEW TAB 80 MG 160 MG: 80 TABLET ORAL at 10:34

## 2020-01-01 RX ADMIN — APIXABAN 5 MG: 5 TABLET, FILM COATED ORAL at 10:23

## 2020-01-01 RX ADMIN — OXYCODONE HYDROCHLORIDE 10 MG: 10 TABLET, FILM COATED, EXTENDED RELEASE ORAL at 12:03

## 2020-01-01 RX ADMIN — GABAPENTIN 100 MG: 100 CAPSULE ORAL at 21:17

## 2020-01-01 RX ADMIN — METHYLPREDNISOLONE SODIUM SUCCINATE 80 MG: 125 INJECTION, POWDER, LYOPHILIZED, FOR SOLUTION INTRAMUSCULAR; INTRAVENOUS at 06:17

## 2020-01-01 ASSESSMENT — ENCOUNTER SYMPTOMS
TROUBLE SWALLOWING: 0
DIARRHEA: 0
BACK PAIN: 0
COUGH: 1
NAUSEA: 0
SHORTNESS OF BREATH: 1
PHOTOPHOBIA: 0
WHEEZING: 1
VOMITING: 0
ABDOMINAL PAIN: 0

## 2020-01-01 ASSESSMENT — PAIN DESCRIPTION - ORIENTATION
ORIENTATION: LOWER

## 2020-01-01 ASSESSMENT — PAIN SCALES - GENERAL
PAINLEVEL_OUTOF10: 8
PAINLEVEL_OUTOF10: 8
PAINLEVEL_OUTOF10: 6
PAINLEVEL_OUTOF10: 7
PAINLEVEL_OUTOF10: 5
PAINLEVEL_OUTOF10: 8
PAINLEVEL_OUTOF10: 0
PAINLEVEL_OUTOF10: 8
PAINLEVEL_OUTOF10: 6
PAINLEVEL_OUTOF10: 0
PAINLEVEL_OUTOF10: 0
PAINLEVEL_OUTOF10: 8
PAINLEVEL_OUTOF10: 0
PAINLEVEL_OUTOF10: 7
PAINLEVEL_OUTOF10: 0
PAINLEVEL_OUTOF10: 8
PAINLEVEL_OUTOF10: 8
PAINLEVEL_OUTOF10: 0
PAINLEVEL_OUTOF10: 4
PAINLEVEL_OUTOF10: 9
PAINLEVEL_OUTOF10: 8
PAINLEVEL_OUTOF10: 8
PAINLEVEL_OUTOF10: 5
PAINLEVEL_OUTOF10: 0
PAINLEVEL_OUTOF10: 8
PAINLEVEL_OUTOF10: 8
PAINLEVEL_OUTOF10: 0
PAINLEVEL_OUTOF10: 8
PAINLEVEL_OUTOF10: 7
PAINLEVEL_OUTOF10: 6
PAINLEVEL_OUTOF10: 8
PAINLEVEL_OUTOF10: 5
PAINLEVEL_OUTOF10: 8
PAINLEVEL_OUTOF10: 0
PAINLEVEL_OUTOF10: 7
PAINLEVEL_OUTOF10: 0
PAINLEVEL_OUTOF10: 8
PAINLEVEL_OUTOF10: 0
PAINLEVEL_OUTOF10: 8
PAINLEVEL_OUTOF10: 0
PAINLEVEL_OUTOF10: 5

## 2020-01-01 ASSESSMENT — PAIN DESCRIPTION - LOCATION
LOCATION: BACK

## 2020-01-01 ASSESSMENT — PAIN DESCRIPTION - PAIN TYPE
TYPE: ACUTE PAIN
TYPE: CHRONIC PAIN
TYPE: ACUTE PAIN;CHRONIC PAIN
TYPE: CHRONIC PAIN
TYPE: ACUTE PAIN
TYPE: CHRONIC PAIN

## 2020-01-01 ASSESSMENT — PAIN DESCRIPTION - PROGRESSION
CLINICAL_PROGRESSION: NOT CHANGED

## 2020-01-01 ASSESSMENT — PAIN DESCRIPTION - ONSET
ONSET: ON-GOING

## 2020-01-01 ASSESSMENT — PAIN DESCRIPTION - DESCRIPTORS
DESCRIPTORS: ACHING
DESCRIPTORS: ACHING;DISCOMFORT
DESCRIPTORS: ACHING
DESCRIPTORS: ACHING;DISCOMFORT
DESCRIPTORS: ACHING
DESCRIPTORS: ACHING
DESCRIPTORS: ACHING;DISCOMFORT
DESCRIPTORS: CONSTANT;ACHING;DISCOMFORT
DESCRIPTORS: ACHING
DESCRIPTORS: ACHING;DISCOMFORT
DESCRIPTORS: ACHING;DISCOMFORT

## 2020-01-01 ASSESSMENT — PAIN - FUNCTIONAL ASSESSMENT
PAIN_FUNCTIONAL_ASSESSMENT: PREVENTS OR INTERFERES SOME ACTIVE ACTIVITIES AND ADLS

## 2020-01-01 ASSESSMENT — PAIN DESCRIPTION - FREQUENCY
FREQUENCY: INTERMITTENT
FREQUENCY: CONTINUOUS
FREQUENCY: INTERMITTENT
FREQUENCY: CONTINUOUS

## 2020-01-13 NOTE — TELEPHONE ENCOUNTER
Increase the Lasix to 3 times daily tomorrow only. He will have to confirm that as needed order when he gets back.

## 2020-01-17 PROBLEM — J10.1 INFLUENZA B: Status: ACTIVE | Noted: 2020-01-01

## 2020-01-17 NOTE — ED PROVIDER NOTES
Skin:     General: Skin is warm and dry. Neurological:      Mental Status: She is alert and oriented to person, place, and time. Motor: No tremor or abnormal muscle tone. Coordination: Coordination normal.          Procedures     MDM  Number of Diagnoses or Management Options  Acute on chronic diastolic congestive heart failure [facility]):   Acute respiratory failure with hypoxia [facility]):   Atrial fibrillation, unspecified type [facility]): Influenza B:   Pleural effusion, bilateral:   Diagnosis management comments: The patient is a 80-year-old female who presents to the emergency department complaining of weight gain and leg swelling. Patient is tachycardic on arrival and pulse ox is 92% on 3 L nasal cannula. She is chronically ill in appearance but in no acute distress. I am considering CHF exacerbation versus pneumonia versus COPD exacerbation versus influenza. With her clinical presentation she definitely is in fluid overload, will proceed with chest x-ray, labs, EKG, treat the patient with duo nebs, Solu-Medrol, and reevaluate. There is no leukocytosis, no electrolyte abnormalities, creatinine is 1.1, BUN is elevated at 2000, influenza B positive, influenza A negative, troponin 0.02, no lactic acidosis, albumin 3.5, chest x-ray consistent with cardiomegaly and bilateral pleural effusions along with atelectasis at the left base. Patient's blood pressure was labile and she was treated with 1 L of IV fluids, did respond well to IV fluids as did her heart rate. Consulted with PCP who accepted the patient for admission. Consultations were placed to pulmonology and cardiology. Discussed results and plan with patient and family at bedside, they verbalized understanding and agreement to treatment plan and admission.        Amount and/or Complexity of Data Reviewed  Decide to obtain previous medical records or to obtain history from someone other than the patient: yes         ED Course as of Jan 17 2140   Fri Jan 17, 2020   1041 ECHO November 2019:   Ejection fraction is visually estimated at 55%. There is doppler evidence of stage II diastolic dysfunction. [JA]   1119 EKG: This EKG is signed and interpreted by me. Rate: 102  Rhythm: Atrial fibrillation  Interpretation: atrial fibrillation (chronic), nonspecific T wave changes  Comparison: stable as compared to patient's most recent EKG      [JA]   1248 Consulted with Dr. Marycarmen Rincon, PCP, who accepted the patient for admission. Consults place to pulmonology and cardiology. [KG]   1248 I did order a small IVF bolus as the patient's heart rate increased to the 120-130s and her BP is 99/65. [KG]   1330 Reevaluated the patient. Her blood pressure did improve to 103/72 after the IV fluids. Upon recheck her blood pressure was 93/72 and pulse improved to 95. We will treat the patient with 500 cc of IV fluids, recheck blood pressure, and observe a little bit longer before sending her to the floor as her blood pressure is labile at this time. [KG]   1358 Reassessed patient. She is resting comfortably. BP improved to 534 systolic after the second 500 cc bolus. Will observe to make sure her pressure doesn't decline again prior to admission to the floor. [KG]   1408 Recheck blood pressure and is now improved to 113/57. The patient is sleeping and resting comfortably and in no acute distress. We will plan on admission to intermediate unit at this time. [KG]      ED Course User Index  [JA] Shireen Cruz MD  [KG] Evelia Marsh DO        EKG: This EKG is signed and interpreted by me. Rate: 102  Rhythm: Atrial fibrillation  Interpretation: Atrial fibrillation with rapid ventricular response, left axis deviation, nonspecific ST changes, no ST elevation  Comparison: changes compared to previous EKG 11/16/19     ED Course as of Jan 17 2141 Fri Jan 17, 2020 1041 ECHO November 2019:   Ejection fraction is visually estimated at 55%.    There is doppler evidence of stage II diastolic dysfunction. [JA]   1119 EKG: This EKG is signed and interpreted by me. Rate: 102  Rhythm: Atrial fibrillation  Interpretation: atrial fibrillation (chronic), nonspecific T wave changes  Comparison: stable as compared to patient's most recent EKG      [JA]   1248 Consulted with Dr. Lissa Morillo, PCP, who accepted the patient for admission. Consults place to pulmonology and cardiology. [KG]   1248 I did order a small IVF bolus as the patient's heart rate increased to the 120-130s and her BP is 99/65. [KG]   1330 Reevaluated the patient. Her blood pressure did improve to 103/72 after the IV fluids. Upon recheck her blood pressure was 93/72 and pulse improved to 95. We will treat the patient with 500 cc of IV fluids, recheck blood pressure, and observe a little bit longer before sending her to the floor as her blood pressure is labile at this time. [KG]   1358 Reassessed patient. She is resting comfortably. BP improved to 252 systolic after the second 500 cc bolus. Will observe to make sure her pressure doesn't decline again prior to admission to the floor. [KG]   1408 Recheck blood pressure and is now improved to 113/57. The patient is sleeping and resting comfortably and in no acute distress. We will plan on admission to intermediate unit at this time.     [KG]      ED Course User Index  [JA] Cortez Sheldon MD  [KG] Kenny Melendez, DO       --------------------------------------------- PAST HISTORY ---------------------------------------------  Past Medical History:  has a past medical history of AC (acromioclavicular) joint bone spurs, Acid reflux, Ascending aortic aneurysm ([facility] [address] Atrial fibrillation ([facility] [address] CAD (coronary artery disease), Cancer ([facility] [address] CHF (congestive heart failure) ([facility] [address] Chronic back pain, CKD (chronic kidney disease) stage 4, GFR 15-29 ml/min ([facility] [address] DJD (degenerative joint disease), Hyperlipidemia, Hypertension, Hypothyroidism, Iron deficiency anemia, Lung nodule, MVP (mitral valve prolapse), Osteoarthritis, Osteoporosis, Partial bowel obstruction (HCC), Pneumonia, Polymyalgia rheumatica (HCC), Scoliosis, Sinus arrhythmia, Sleep apnea, Spinal stenosis, Thoracic ascending aortic aneurysm ([facility] [address] and Thyroid disease. Past Surgical History:  has a past surgical history that includes Artery Biopsy (2008); cervical fusion (1994); Toe Surgery; Wrist surgery (Right, YRS AGO); Foot surgery (2010,2011); Hysterectomy (1960's); Colon surgery (1980's); Cardiac catheterization (2010); Coronary angioplasty with stent (06/2011); eye surgery (Bilateral, 2004); hernia repair (1996, 1998); Cholecystectomy (1980's); back surgery (1996); Dilatation, esophagus; transesophageal echocardiogram (02/13/2018); Cardioversion (02/13/2018); Mastectomy (Right); and bronchoscopy (N/A, 11/18/2019). Social History:  reports that she has never smoked. She has never used smokeless tobacco. She reports that she does not drink alcohol or use drugs. Family History: family history includes Brain Cancer in her mother; Heart Attack in her father; Heart Surgery in her maternal aunt; Lung Cancer in her brother; Stroke in her maternal aunt. The patients home medications have been reviewed. Allergies: Judit-d [diphenhydramine hcl];  Benadryl [diphenhydramine]; and Phenergan [promethazine hcl]    -------------------------------------------------- RESULTS -------------------------------------------------    LABS:  Results for orders placed or performed during the hospital encounter of 01/17/20   Rapid influenza A/B antigens   Result Value Ref Range    Influenza A by PCR Not Detected Not Detected    Influenza B by PCR DETECTED (A) Not Detected   CBC auto differential   Result Value Ref Range    WBC 8.7 4.5 - 11.5 E9/L    RBC 3.99 3.50 - 5.50 E12/L    Hemoglobin 11.9 11.5 - 15.5 g/dL    Hematocrit 39.2 34.0 - 48.0 %    MCV 98.2 80.0 - 99.9 fL    MCH 29.8 26.0 - 35.0 pg    MCHC 30.4 (L) 32.0 - 34.5 %    RDW 13.4 11.5 - 15.0 fL    Platelets 600 349 - 356 E9/L    MPV 10.6 7.0 - 12.0 fL    Neutrophils % 77.3 43.0 - 80.0 %    Immature Granulocytes % 1.8 0.0 - 5.0 %    Lymphocytes % 12.9 (L) 20.0 - 42.0 %    Monocytes % 6.9 2.0 - 12.0 %    Eosinophils % 0.6 0.0 - 6.0 %    Basophils % 0.5 0.0 - 2.0 %    Neutrophils Absolute 6.73 1.80 - 7.30 E9/L    Immature Granulocytes # 0.16 E9/L    Lymphocytes Absolute 1.12 (L) 1.50 - 4.00 E9/L    Monocytes Absolute 0.60 0.10 - 0.95 E9/L    Eosinophils Absolute 0.05 0.05 - 0.50 E9/L    Basophils Absolute 0.04 0.00 - 0.20 R3/B   Basic Metabolic Panel w/ Reflex to MG   Result Value Ref Range    Sodium 139 132 - 146 mmol/L    Potassium reflex Magnesium 3.8 3.5 - 5.0 mmol/L    Chloride 97 (L) 98 - 107 mmol/L    CO2 30 (H) 22 - 29 mmol/L    Anion Gap 12 7 - 16 mmol/L    Glucose 151 (H) 74 - 99 mg/dL    BUN 19 8 - 23 mg/dL    CREATININE 1.1 (H) 0.5 - 1.0 mg/dL    GFR Non-African American 46 >=60 mL/min/1.73    GFR African American 56     Calcium 8.6 8.6 - 10.2 mg/dL   Brain Natriuretic Peptide   Result Value Ref Range    Pro-BNP 2,000 (H) 0 - 450 pg/mL   Troponin   Result Value Ref Range    Troponin 0.02 0.00 - 0.03 ng/mL   Lactic Acid, Plasma   Result Value Ref Range    Lactic Acid 1.7 0.5 - 2.2 mmol/L   Albumin   Result Value Ref Range    Alb 3.5 3.5 - 5.2 g/dL   EKG 12 Lead   Result Value Ref Range    Ventricular Rate 102 BPM    Atrial Rate 127 BPM    QRS Duration 112 ms    Q-T Interval 366 ms    QTc Calculation (Bazett) 477 ms    R Axis -59 degrees    T Axis 136 degrees       RADIOLOGY:  XR CHEST STANDARD (2 VW)   Final Result      1. Cardiomegaly and borderline pulmonary vascular congestion which   could indicate early congestive failure. 2. Bilateral pleural effusions. 3. Likely atelectasis at the left base.             XR CHEST PORTABLE    (Results Pending)       ------------------------- NURSING NOTES AND VITALS REVIEWED

## 2020-01-17 NOTE — PLAN OF CARE
Problem: Falls - Risk of:  Goal: Will remain free from falls  Description  Will remain free from falls  1/17/2020 1855 by Mark Duque RN  Outcome: Met This Shift  1/17/2020 1350 by Africa Escalante RN  Outcome: Met This Shift  Goal: Absence of physical injury  Description  Absence of physical injury  1/17/2020 1855 by Mark Duque RN  Outcome: Met This Shift  1/17/2020 1350 by Africa Escalante RN  Outcome: Met This Shift     Problem: Risk for Impaired Skin Integrity  Goal: Tissue integrity - skin and mucous membranes  Description  Structural intactness and normal physiological function of skin and  mucous membranes.   1/17/2020 1855 by Mark Duque RN  Outcome: Met This Shift  1/17/2020 1350 by Africa Escalante RN  Outcome: Ongoing     Problem: [facility] FLUID RETENTION-CHF  Goal: Absence of fluid overload signs and symptoms  1/17/2020 1855 by Mark Duque RN  Outcome: Met This Shift  1/17/2020 1350 by Africa Escalante RN  Outcome: Ongoing     Problem: Fluid and Electrolyte Imbalance  Goal: Fluid and electrolyte balance are achieved/maintained  1/17/2020 1855 by Mark Duque RN  Outcome: Met This Shift  1/17/2020 1350 by Africa Escalante RN  Outcome: Ongoing     Problem: Wound:  Goal: Will show signs of wound healing; wound closure and no evidence of infection  Description  Will show signs of wound healing; wound closure and no evidence of infection     1/17/2020 1855 by Mark Duque RN  Outcome: Met This Shift  1/17/2020 1350 by Africa Escalante RN  Outcome: Ongoing

## 2020-01-17 NOTE — ED NOTES
Bed: 06  Expected date:   Expected time:   Means of arrival:   Comments:  LF - CHF exacer. Marija Hicks.  Corona Montoya RN  01/17/20 1038

## 2020-01-17 NOTE — CONSULTS
Inpatient Cardiology Consultation      Reason for Consult:  Atrial Fibrillation     Consulting Physician: Dr. Keegan Martino    Requesting Physician:  Dr. Mayco Valencia     Date of Consultation: 1/17/2020    HISTORY OF PRESENT ILLNESS:   Ms. Jose Antonio Dc is a 80year old  female who follows with Dr. Tiffanie Hughes. She was most recently seen in the office with Caryle Mori PA on 12/11/2019. Her medical history includes persistent Atrial Fibrillation with chronic Eliquis therapy discontinued 08/08/2019 due to recurrent falls (patient and family decision) with re initiation in 11/2019 in the setting of recurrent A Fib RVR and RLL pulmonary emboli, CAD s/p MC to LAD in 2011, HTN, HLD, TARIK, chronic LAFB, remote right breast CA s/p right mastectomy (no chemo or radiation), and chronic lumbar back pain with lumbar stenosis s/p multiple epidural injections. Ms. Jose Antonio Dc presented to [facility] on 01/17/2020 with complaints of cough. She states that prior to presentation she has been having a non productive cough with dyspnea at rest and orthopnea and PND for the past week. She complains of abdominal and BLE edema with a poor PO intake. States compliance with her medications including additional Lasix 40mg yesterday without improvement. Denies fever and chills. Upon arrival to the ED her VS were 98.4-397-/89-92% on 3 liters. EKG A Fib with RVR. CXR with early CHF and bilateral effusions. Influenza screen positive for influenza B. She received Tamiflu, NS one liter, Duoneb, Solumedrol 125mg IV, and Oxycodone. She was admitted to a telemetry monitored unit. Pulmonology was consulted. Cardiology was consulted by Dr. Mayco Valencia for management of Atrial Fibrillation. Please note: past medical records were reviewed per electronic medical record (EMR) - see detailed reports under Past Medical/ Surgical History. Past Medical and Surgical History:    1.  Persistent atrial fibrillation (diagnosed 12/2014) - recurrent atrial fibrillation s/p PEACE/CVN on 2/13/18, switched Toprol XL to BID dosing on 2/12/18 (continue), decision made by patient/family in the past to discontinue anticoagulation (she is now agreeable), eliquis 5 mg BID added 2/12/18 prior to [address] (continue) --> maintaining SR, amiodarone started in 5/2018. Eliquis stopped 08/08/2019 per patient and family request after recurrent fall  2. Chronic diastolic heart CHF (hospitalized in 7/2017 for acute on chronic CHF exacerbation). 3. CAD s/p MC to mid LAD in 2011   4. Cardiac catheterization 10/24/2013: Status post remote stent of LAD with demonstration of moderate in-stent restenosis today. 50% ostial 2nd diagonal branch stenosis. Normal left ventricular systolic function. Aortic root dilatation. 5. PEACE 02/13/2018 (Dr. Wheeler Hidden): Ejection fraction is visually estimated at 55%. No regional wall motion abnormalities seen. Right ventricular segmental wall motion is normal. Mild to moderate mitral regurgitation is present. Mild to moderate tricuspid regurgitation. Aortic sclerosis is noted.   Mild to moderate aortic regurgitation is noted. No evidence of thrombus within left atrium. 6. Echocardiogram 03/19/2019: EF 44%. Mild concentric LVH. No WMA. Moderate MR. Mild to moderate AR. Stage II Diastolic Dysfunction. 7. Hypertension  8. Hyperlipidemia  9. Obesity  10. Reported ascending aortic aneurysm -- no aneurysm noted on 7/12/17 CTA chest  11. Hypothyroidism: on replacement therapy  12. TARIK - AHI 5 on 12/2014 study; CPAP initiated 04/2019  13. Chronic left anterior fascicular block   14. Lumbar stenosis s/p prior epidural injections  15. Severe lumbar spinal canal stenosis L4-L5 per MRI of the lumbar spine 08/2019  16. Admission [facility] 05/09/2019 with MIKE and hypokalemia s/p IVF. Lasix and Zaroxolyn (QOD) resumed at discharge. 17. GERD  18. Right breast CA s/p Right mastectomy >20+ years ago per patient (no need for chemo or radiation)  19.  Admission [facility] 08/22/2019-08/24/2019 11/16/19.   Patient taking differently: Take 200 mg by mouth daily  12/11/19  Yes RONAN Hernandez   budesonide (PULMICORT) 0.5 MG/2ML nebulizer suspension Take 4 mLs by nebulization 2 times daily 11/30/19  Yes Gary Bustamante MD   formoterol (PERFOROMIST) 20 MCG/2ML nebulizer solution Take 2 mLs by nebulization every 12 hours 11/30/19  Yes True Borders, MD   apixaban (ELIQUIS) 5 MG TABS tablet Take 1 tablet by mouth 2 times daily 11/30/19  Yes Gary Bustamante MD   rOPINIRole (REQUIP) 0.5 MG tablet Take 1 tablet by mouth daily  Patient taking differently: Take 0.25 mg by mouth every evening  11/30/19  Yes Gary Bustamante MD   metoprolol tartrate (LOPRESSOR) 25 MG tablet Take 0.5 tablets by mouth 2 times daily  Patient taking differently: Take 25 mg by mouth 2 times daily  11/30/19  Yes Gary Bustamante MD   fluticasone [facility]) 50 MCG/ACT nasal spray 1 spray by Each Nostril route daily 12/1/19  Yes Gary Bustamante MD   hydrocortisone (ANUSOL-HC) 25 MG suppository Place 1 suppository rectally 2 times daily as needed for Hemorrhoids  Patient taking differently: Place 25 mg rectally nightly  11/30/19  Yes True Borders, MD   predniSONE (DELTASONE) 10 MG tablet Take 10 mg by mouth daily   Yes Historical Provider, MD   Calcium Carb-Cholecalciferol (CALCIUM-VITAMIN D) 600-400 MG-UNIT TABS Take 1 tablet by mouth Daily with lunch   Yes Historical Provider, MD   Co-Enzyme Q-10 100 MG CAPS Take 1 tablet by mouth Daily with lunch   Yes Historical Provider, MD   B Complex-C-Folic Acid (RENAL-MIREILLE) 0.8 MG TABS Take 1 tablet by mouth every morning   Yes Historical Provider, MD   magnesium oxide (MAG-OX) 400 (241.3 Mg) MG TABS tablet Take 0.5 tablets by mouth Daily with lunch  Patient taking differently: Take 400 mg by mouth every morning  8/30/19  Yes Madison Monk MD   potassium chloride (KLOR-CON M) 20 MEQ extended release tablet Take 2 tablets by mouth 2 times daily 8/30/19  Yes Marques CALIXTO Ciro Burnette MD   Simethicone 80 MG TABS Take 160 mg by mouth Daily with lunch   Yes Historical Provider, MD   furosemide (LASIX) 40 MG tablet Take 1 tablet by mouth 2 times daily  Patient taking differently: Take 40 mg by mouth 2 times daily 0800 and 1600 4/16/19  Yes Richard Hinojosa MD   lactulose (CHRONULAC) 10 GM/15ML solution Take 20 g by mouth 2 times daily    Yes Historical Provider, MD   ranitidine (ZANTAC) 300 MG tablet TAKE ONE TABLET BY MOUTH EVERY MORNING 3/21/17  Yes Historical Provider, MD   OXYGEN Inhale 2 L into the lungs nightly Indications: BMS And PRN during day   Yes Historical Provider, MD   ferrous sulfate 325 (65 FE) MG tablet Take 325 mg by mouth Daily with lunch    Yes Historical Provider, MD   Cholecalciferol (VITAMIN D) 2000 UNITS TABS Take 1 tablet by mouth Daily with lunch    Yes Historical Provider, MD   nystatin (MYCOSTATIN) 515176 UNIT/GM powder Apply topically daily as needed     Historical Provider, MD   ondansetron (ZOFRAN) 4 MG tablet Take 4 mg by mouth every 8 hours as needed for Nausea or Vomiting    Historical Provider, MD   cyanocobalamin 1000 MCG/ML injection Inject 1,000 mcg into the muscle every 30 days 1st every month    Historical Provider, MD       Current Medications:    No current facility-administered medications for this encounter. Allergies:  Judit-d [diphenhydramine hcl]; Benadryl [diphenhydramine]; and Phenergan [promethazine hcl]    Social History:    Denies tobacco use. Family History:   Please note this information was not obtained at this time as it is limited in nature due to the patient's advanced age. REVIEW OF SYSTEMS:     · Constitutional: Denies fevers, chills, night sweats, and fatigue  · HEENT: Denies headaches, nose bleeds, and blurred vision,oral pain, abscess or lesion. · Musculoskeletal: Denies falls, pain to BLE with ambulation and edema to BLE.   · Neurological: Denies dizziness and lightheadedness, numbness and tingling  · Cardiovascular: Denies chest pain, palpitations, and feelings of heart racing. · Respiratory:Complains of cough, dyspnea at rest, orthopnea and PND  · Gastrointestinal: Denies heartburn, nausea/vomiting, diarrhea and constipation, black/bloody, and tarry stools. · Genitourinary: Denies dysuria and hematuria  · Hematologic: Denies excessive bruising or bleeding  · Lymphatic: Denies lumps and bumps to neck, axilla, breast, and groin  · Endocrine: Denies excessive thirst. Denies intolerance to hot and cold  · GYN: Postmenopausal state; Denies vaginal bleeding. · Psychiatric: Denies anxiety and depression. PHYSICAL EXAM:   /71   Pulse 110   Temp 98.1 °F (36.7 °C) (Oral)   Resp 18   Ht 5' (1.524 m)   Wt 213 lb (96.6 kg)   SpO2 95%   BMI 41.60 kg/m²    Physical Exam to follow as per Dr. Fidel Favre:    ECG: A Fib with RVR  Tele strips: A Fib with HR in low 100s  Diagnostic:      Intake/Output Summary (Last 24 hours) at 1/17/2020 1516  Last data filed at 1/17/2020 1441  Gross per 24 hour   Intake --   Output 400 ml   Net -400 ml       Labs:   CBC:   Recent Labs     01/17/20  1134   WBC 8.7   HGB 11.9   HCT 39.2        BMP:   Recent Labs     01/17/20  1134      K 3.8   CO2 30*   BUN 19   CREATININE 1.1*   LABGLOM 46   CALCIUM 8.6   CARDIAC ENZYMES:  Recent Labs     01/17/20  1134   TROPONINI 0.02     FASTING LIPID PANEL:  Lab Results   Component Value Date    CHOL 136 10/25/2013    HDL 37.0 10/25/2013    LDLCALC 77 10/25/2013    TRIG 108 10/25/2013     LIVER PROFILE:  Recent Labs     01/17/20  1134   LABALBU 3.5     01/17/2020 CXR:   1. Cardiomegaly and borderline pulmonary vascular congestion which  could indicate early congestive failure. 2. Bilateral pleural effusions.   3. Likely atelectasis at the left base.     IMPRESSION and PLAN to follow as per Dr. Cameron Garnica    Electronically signed by Gail Grossman, APRN - CNP on 1/17/2020 at 3:16 PM 112 ms. Nonspecific ST and T wave changes. A lot of artifact on this EKG. Assessment/Plan:   1. Acute on chronic heart failure with preserved ejection fraction  2. Acute influenza B  3. Persistent atrial fibrillation, mild rvr. Diagnosed 2014. PEACE/DCC and amiodarone started 2018. On and off AC, resumed 11/2019. A. fib recurred 11/2019 in setting of parainfluenza. Amiodarone continued but remains in A. Fib. 4. CAD, PCI to LAD 2011  5. Valvular heart disease: Mild MR  6.  Comorbid disease: Hypertension, hyperlipidemia, obesity, hypothyroidism, TARIK, LAFB, chronic back pain, GERD, history of right breast CA    Recommendations:  · IV diuresis -increase to 40 twice daily  · Watch kidney function and electrolytes  · Recommend holding amiodarone, has been in A. fib since 11/2019 and is not particularly symptomatic with it  · Rate with metoprolol, add IV diltiazem if needed  · Continue apixaban  · Pulmonary is requesting repeat echocardiogram, this will be performed  · Oseltamavir per primary    Maxine Keys MD  [facility]) Cardiology

## 2020-01-17 NOTE — FLOWSHEET NOTE
Initial Inpatient Wound Care    Admit Date: 1/17/2020 10:38 AM    Reason for consult:  wounds  Significant history:  Adm from SNF for weight gain  Wound history:  POA  Findings: awake and verbal, daughters present  Bilateral legs and feet edematous  Left arm with small mepilex in place over 1x1x0.1 superficial area circular skin loss. Will leave in place til monday    01/17/20 1541   Wound 11/15/19 Heel Lateral;Right   Date First Assessed/Time First Assessed: 11/15/19 1828   Present on Hospital Admission: Yes  Location: Heel  Wound Location Orientation: Lateral;Right   Wound Image    Wound Length (cm) 1 cm   Wound Width (cm) 1.8 cm   Wound Depth (cm)   (utd)   Wound Surface Area (cm^2) 1.8 cm^2   Change in Wound Size % (l*w) 40   Wound Assessment Dry   Drainage Amount None   Odor None   Maria E-wound Assessment Dry   Wound 01/17/20 Buttocks Left sacral   Date First Assessed/Time First Assessed: 01/17/20 1540   Location: Buttocks  Wound Location Orientation: Left  Wound Description (Comments): sacral   Wound Image    Wound Length (cm) 2 cm   Wound Width (cm) 2 cm   Wound Depth (cm) 0.1 cm   Wound Surface Area (cm^2) 4 cm^2   Wound Volume (cm^3) 0.4 cm^3   Wound Assessment Pink   Drainage Amount Scant   Drainage Description Serosanguinous   Odor None   Maria E-wound Assessment Pink  (loose skin)   hx unna boot- patient did not tolerate well  Rt lateral leg.  Purplish red with scattered small blisters from edema  Impression: wound buttocks, unknown cause  Heel ulcer- chronic  Interventions in place:  Comfort glide  Heel protectors applied    Plan: lo air loss  Heel-dry  Buttocks  Wound gel, opticel, duoderm  continue preventive  dietician      Nerissa Pollard 1/17/2020 3:47 PM

## 2020-01-17 NOTE — CONSULTS
Erna Mckeon M.D.,[facility]  Gary Coombs D.O., HUGO., Landen Baker M.D. Sherwin Dyson M.D., Laurita Corona M.D. Patient:  Lele Knapp 80 y.o. female MRN: [id number]     Date of Service: 1/17/2020      PULMONARY CONSULTATION    Reason for Consultation: hypoxia  Referring Physician: Dr. Magnus Wilson with the referring physician will be sent via the electronic medical record. Chief Complaint: shortness of breath    CODE STATUS: DNRCCA, no compressions    SUBJECTIVE:  HPI:  Lele Knapp is a 80 y.o. female well known to the practice and seen by Dr. Taty Rossi as outpatient. She has a significant history of TARIK treated with CPAP, diastolic congestive heart failure, atrial fibrillation on anticoagulation, aortic valve insufficiency, mitral valve regurgitation, hypothyroidism, CKD, thoracic ascending aortic aneurysm, polymyalgia rheumatica, and a history of pulmonary embolism. She started feeling symptomatic a couple of days ago with increased shortness of breath, cough, and wheezing, edema, weakness and tremor. Her oxygen requirements increased from 2L nocturnally bled into CPAP to 3L during the day. She had recently been hospitalized for PE and CHF exacerbation. She is compliant with diuretics. Today, she is seen lying in bed in no apparent distress. She is on 3L via NC. She has a cough with little production. Lung sounds are diminished in the bases L>R, wheezy throughout, with bibasilar crackles. She tested positive for influenza B by PCR. She denies dizziness, nausea, vomiting, chest pain. Family says her appetite is poor. Past Medical History:   Diagnosis Date    AC (acromioclavicular) joint bone spurs     in feet    Acid reflux     Ascending aortic aneurysm (HCC)     4.0 cm,(SEE LETTERS DR. PENALOZA) IS BEING MONITORED    Atrial fibrillation ([address]     CAD (coronary artery disease)     Cancer (HCC) 16 YRS AGO    breast RIGHT    CHF (congestive heart      Spouse name: Not on file    Number of children: Not on file    Years of education: Not on file    Highest education level: Not on file   Occupational History    Occupation: retired-   Social Needs    Financial resource strain: Not on file    Food insecurity:     Worry: Not on file     Inability: Not on file    Transportation needs:     Medical: Not on file     Non-medical: Not on file   Tobacco Use    Smoking status: Never Smoker    Smokeless tobacco: Never Used   Substance and Sexual Activity    Alcohol use: Never     Alcohol/week: 0.0 standard drinks     Frequency: Never    Drug use: Never    Sexual activity: Never     Partners: Male   Lifestyle    Physical activity:     Days per week: Not on file     Minutes per session: Not on file    Stress: Not on file   Relationships    Social connections:     Talks on phone: Not on file     Gets together: Not on file     Attends [employer] service: Not on file     Active member of club or organization: Not on file     Attends meetings of clubs or organizations: Not on file     Relationship status: Not on file    Intimate partner violence:     Fear of current or ex partner: Not on file     Emotionally abused: Not on file     Physically abused: Not on file     Forced sexual activity: Not on file   Other Topics Concern    Not on file   Social History Narrative    Drinks 3-4 cups of coffee daily. Smoking history: The patient is a Never smoker  ETOH:   reports no history of alcohol use. Exposures: There  is not history of TB or TB exposure. There is not asbestos or silica dust exposure. The patient reports does not have coal, foundry, quarry or [employer] exposure. Recent travel history none. There is not  history of recreational or IV drug use. There is not hot tub exposure. The patient does not have any exotic pets, turtles or exotic birds.        Vaccines:    Influenza:  Up-to-date  Pneumococcal Polysaccharide:  Up-to-date; therapy as needed, wean for oxygen saturations >92%  2. CPAP therapy, may use mask and/or machine from home  3. Tamiflu  4. Repeat Echo  5. cxr tomorrow  6. Convert PO Lasix to IV  7. Increase steroids, bolus given in ER, solu medrol 80mg q8 hours, will wean with improvement  8. GI/DVT prophylaxis  9. Bacterial antigens, sputum culture      Thank you for allowing me to participate in the care of Mirella Pink. Please feel free to call with questions. This plan of care was reviewed in collaboration with Dr. Antonio Almeida    Electronically signed by ZHANNA Roberson CNP on 1/17/2020 at 3:53 PM    I personally saw, examined, and cared for the patient. Labs, medications, radiographs reviewed. I agree with history exam and plans detailed in NP note. Ill for 2 days with fever and nonproductive cough. Declining over last several months with poor PO intake and protein-calorie malnutrition. Significant edema particularly lower extremities. Rapid flu positive for flu-B. Vaccinatedon last admission but had been receiving steroids and is on prednisone 10 mg daily for PMR. On Exam respiratory symptoms more prominent on left. Repeat CXR in AM. Check Sputum Cx, Urine Legionella and Strep Ag. Obtain pro-calcitonin. Tamiflu 75 mg BID. Colloid resuscitation if further fluid needed. Gentle diuresis. Repeat Echo. Will follow with you  Thanks!!!   Toni Andrea D.O.

## 2020-01-17 NOTE — PROGRESS NOTES
Database complete. Medications reconciled. Care plans and education initiated. [address]. Wears 3L 02 continuous and CPAP +3L bleed nightly. Pressure ulcer to left buttocks, Right heel, left arm wound, and blisters bilateral legs. Cardiologist is Dr. Caldwell Cousins is Dr. Stefania Pruett. Poor appetite for past 2-3 weeks. Recent Cipro BID from 1/4-1/11 for uti.

## 2020-01-18 PROBLEM — N28.9 ACUTE RENAL INSUFFICIENCY: Status: RESOLVED | Noted: 2019-01-01 | Resolved: 2020-01-01

## 2020-01-18 PROBLEM — I48.91 ATRIAL FIBRILLATION WITH RVR (HCC): Status: ACTIVE | Noted: 2020-01-01

## 2020-01-18 PROBLEM — N18.30 CKD (CHRONIC KIDNEY DISEASE) STAGE 3, GFR 30-59 ML/MIN (HCC): Chronic | Status: ACTIVE | Noted: 2020-01-01

## 2020-01-18 PROBLEM — N18.4 CKD (CHRONIC KIDNEY DISEASE) STAGE 4, GFR 15-29 ML/MIN (HCC): Status: RESOLVED | Noted: 2019-01-01 | Resolved: 2020-01-01

## 2020-01-18 PROBLEM — J96.21 ACUTE ON CHRONIC RESPIRATORY FAILURE WITH HYPOXIA (HCC): Status: ACTIVE | Noted: 2020-01-01

## 2020-01-18 PROBLEM — R00.1 BRADYCARDIA: Status: RESOLVED | Noted: 2019-01-01 | Resolved: 2020-01-01

## 2020-01-18 PROBLEM — N17.9 AKI (ACUTE KIDNEY INJURY) (HCC): Status: RESOLVED | Noted: 2019-01-01 | Resolved: 2020-01-01

## 2020-01-18 NOTE — PROGRESS NOTES
01/17/20 2224   NIV Type   $NIV $Daily Charge   Skin Protection for O2 Device Yes   Mode CPAP   Mask Type Full face mask   Mask Size Medium   Settings/Measurements   CPAP/EPAP 9 cmH2O   Resp 12   O2 Flow Rate (L/min) 4 L/min   Vt Exhaled 444 mL   Minute Volume 5.6 Liters   Mask Leak (lpm) 17 lpm   Comfort Level Good   Using Accessory Muscles No   SpO2 94   Alarm Settings   Alarms On Y

## 2020-01-18 NOTE — PLAN OF CARE
Problem: Falls - Risk of:  Goal: Will remain free from falls  Description  Will remain free from falls  1/18/2020 0543 by Agata Mendoza RN  Outcome: Met This Shift  1/17/2020 1855 by Kerrie Sebastian RN  Outcome: Met This Shift  Goal: Absence of physical injury  Description  Absence of physical injury  1/18/2020 0543 by Agata Mendoza RN  Outcome: Met This Shift  1/17/2020 1855 by Kerrie Sebastian RN  Outcome: Met This Shift     Problem: Risk for Impaired Skin Integrity  Goal: Tissue integrity - skin and mucous membranes  Description  Structural intactness and normal physiological function of skin and  mucous membranes.   1/18/2020 0543 by Agata Mendoza RN  Outcome: Ongoing  1/17/2020 1855 by Kerrie Sebastian RN  Outcome: Met This Shift     Problem: Pain:  Goal: Pain level will decrease  Description  Pain level will decrease  Outcome: Ongoing     Problem: HH FLUID RETENTION-CHF  Goal: Absence of fluid overload signs and symptoms  1/17/2020 1855 by Kerrie Sebastian RN  Outcome: Met This Shift     Problem: Fluid and Electrolyte Imbalance  Goal: Fluid and electrolyte balance are achieved/maintained  1/17/2020 1855 by Kerrie Sebastian RN  Outcome: Met This Shift

## 2020-01-18 NOTE — PROGRESS NOTES
Date: 1/18/2020    Time: 2:20 AM    Patient Placed On BIPAP/CPAP/ Non-Invasive Ventilation? Patient continues on bipap    If no must comment. Facial area red/color change? No           If YES are Blister/Lesion present? No   If yes must notify nursing staff  BIPAP/CPAP skin barrier?   Yes    Skin barrier type:mepilex       Comments:        Lucas Ward, RRT

## 2020-01-18 NOTE — PROGRESS NOTES
[facility] Quality Flow/Interdisciplinary Rounds Progress Note        Quality Flow Rounds held on January 18, 2020    Disciplines Attending:  Bedside Nurse, ,  and Nursing Unit Leadership    Jona De La Paz was admitted on 1/17/2020 10:38 AM    Anticipated Discharge Date:  Expected Discharge Date: 01/20/20    Disposition:    David Score:  David Scale Score: 14    Readmission Risk              Risk of Unplanned Readmission:        44           Discussed patient goal for the day, patient clinical progression, and barriers to discharge.   The following Goal(s) of the Day/Commitment(s) have been identified:  Other  Continue tamiflu and IV lasix       Georgena Soo  January 18, 2020

## 2020-01-18 NOTE — PLAN OF CARE
Problem: Falls - Risk of:  Goal: Will remain free from falls  Description  Will remain free from falls  1/18/2020 1626 by Leesa Foss RN  Outcome: Met This Shift  1/18/2020 1032 by Abhijeet Aquino RN  Outcome: Met This Shift  1/18/2020 0543 by Darrell Aguilar RN  Outcome: Met This Shift  Goal: Absence of physical injury  Description  Absence of physical injury  1/18/2020 1626 by Leesa Foss RN  Outcome: Met This Shift  1/18/2020 1032 by Abhijeet Aquino RN  Outcome: Met This Shift  1/18/2020 0543 by Darrell Aguilar RN  Outcome: Met This Shift     Problem: Risk for Impaired Skin Integrity  Goal: Tissue integrity - skin and mucous membranes  Description  Structural intactness and normal physiological function of skin and  mucous membranes.   1/18/2020 1626 by Leesa Foss RN  Outcome: Met This Shift  1/18/2020 1032 by Abhijeet Aquino RN  Outcome: Met This Shift  1/18/2020 0543 by Darrell Aguilar RN  Outcome: Ongoing     Problem: Infection:  Goal: Will show no infection signs and symptoms  Description  Will show no infection signs and symptoms     1/18/2020 1626 by Leesa Foss RN  Outcome: Met This Shift  1/18/2020 0543 by Darrell Aguilar RN  Outcome: Ongoing     Problem: Wound:  Goal: Will show signs of wound healing; wound closure and no evidence of infection  Description  Will show signs of wound healing; wound closure and no evidence of infection     Outcome: Met This Shift

## 2020-01-18 NOTE — H&P
[id number] [address]                              HISTORY AND PHYSICAL    PATIENT NAME: Malaika Canales                    :        1927  MED REC NO:   [id number]                            ROOM:       [id number]  ACCOUNT NO:   [de-identified]                           ADMIT DATE: 2020  PROVIDER:     Chet Moon DO    CHIEF COMPLAINT:  Leg edema and weight gain. HISTORY OF PRESENT ILLNESS:  The patient is a 77-year-old   female who presented to the emergency room from rehab with complaints of  cough, nonproductive; weight gain; leg edema for the past several days. Diagnostic evaluation in the emergency room revealed positive influenza  B, atrial fib with RVR, volume overload. The patient was admitted for  further evaluation and treatment. PAST MEDICAL HISTORY:  Chronic hypoxic respiratory failure, chronic  atrial fibrillation, chronic anticoagulation, history of PE, coronary  artery disease, breast cancer, CKD, hyperlipidemia, hypertension,  hypothyroidism, iron deficiency anemia, osteoarthritis, polymyalgia  rheumatica, TARIK on CPAP. PAST SURGICAL HISTORY:  Temporal artery biopsy, cholecystectomy, ventral  hernia repair x2, hysterectomy, open reduction and internal fixation of  right wrist, cervical fusion, right mastectomy, bilateral eye cataract  surgery, small bowel resection. MEDICATIONS PRIOR TO ADMISSION:  Gabapentin, OxyIR, Lasix p.r.n.,  oxycodone, Tylenol p.r.n., Synthroid, Cordarone, Pulmicort, Perforomist,  Eliquis, Requip, Lopressor, Flonase, Anusol-HC p.r.n., Deltasone,  calcium with vitamin D, CoQ10, Mag-Ox, potassium chloride, simethicone,  Lasix 40 b.i.d., Chronulac, Zantac, oxygen, ferrous sulfate, vitamin D,  Mycostatin topical, Zofran p.r.n., cyanocobalamin monthly.     REVIEW OF SYSTEMS:  Remarkable for above-stated chief complaint plus  ALLERGY TO BENADRYL AND PHENERGAN. PRIMARY CARE PHYSICIAN:  Nory Ames MD.    PHYSICAL EXAMINATION:  GENERAL APPEARANCE:  Physical exam reveals a 27-year-old   female who is alert, cooperative, and a poor historian. VITAL SIGNS:  On admission, temperature 98.4, pulse 114, respirations  14, blood pressure 117/89. HEAD:  Normocephalic, atraumatic. EYES:  Pupils equal and reactive to light. Extraocular muscles intact. Fundi not well visualized. NOSE:  No obstruction, polyp, or discharge noted. MOUTH:  Mucosa without lesion. TEETH:  Fair repair. PHARYNX:  Non-injected without exudate. NECK:  Supple. No JVD. No thyromegaly. No carotid bruits. HEART:  Irregularly irregular, tachycardic without murmur. LUNGS:  With upper airway rhonchi. ABDOMEN:  Positive bowel sounds. Soft, nontender. No rebound, no  guarding, no hepatosplenomegaly, no masses. BACK:  With increased thoracic kyphosis. EXTREMITIES:  With 1+ pitting edema in the bilateral lower extremities. There is a blister noted on the left dorsal foot. SKIN:  With _____ left dorsal foot. LYMPH NODES:  No adenopathy noted. IMPRESSION:  Influenza B, atrial fib with RVR, acute-on-chronic  diastolic CHF, acute-on-chronic hypoxic respiratory failure, history of  PE, chronic anticoagulation, coronary artery disease status post  coronary artery stent, polymyalgia rheumatica, hyperlipidemia,  osteoarthritis, history of right breast CA, history of lung nodule,  hypothyroidism. PLAN:  Admit. Aerosols. Steroids. Diurese. Cardiology and Pulmonary  to see. Tamiflu. Discharge plan home when stable.         Eva Hernandez DO    D: 01/18/2020 8:38:23       T: 01/18/2020 8:49:26     [id number]  Job#: [id number]     Doc#: [id number]    CC:

## 2020-01-19 NOTE — PROGRESS NOTES
Date: 1/19/2020    Time: 4:58 AM    Patient Placed On BIPAP/CPAP/ Non-Invasive Ventilation? No    If no must comment. Facial area red/color change? No           If YES are Blister/Lesion present? No   If yes must notify nursing staff  BIPAP/CPAP skin barrier? No    Skin barrier type:NA       Comments: Pt remains on CPAP for the night, using home nasal mask.         Fritz Fletcher      01/19/20 0400   NIV Type   Mode CPAP   Mask Type Nasal mask   Settings/Measurements   CPAP/EPAP 9 cmH2O   Resp 13   O2 Flow Rate (L/min) 4 L/min   Vt Exhaled 374 mL   Minute Volume 6.8 Liters   Mask Leak (lpm) 27 lpm   Comfort Level Good   Using Accessory Muscles No

## 2020-01-19 NOTE — PROGRESS NOTES
Nebulization, Q12H, Juan Alvarenga MD, 20 mcg at 01/19/20 0740    gabapentin (NEURONTIN) capsule 100 mg, 100 mg, Oral, Nightly, Juan Alvarenga MD, 100 mg at 01/18/20 2054    hydrocortisone (ANUSOL-HC) suppository 25 mg, 25 mg, Rectal, Nightly, Juan Alvarenga MD, 25 mg at 01/18/20 2246    lactulose (CHRONULAC) 10 GM/15ML solution 20 g, 20 g, Oral, BID, Juan Alvarenga MD, 20 g at 01/19/20 1003    levothyroxine (SYNTHROID) tablet 125 mcg, 125 mcg, Oral, Daily, Juan Alvarenga MD, 125 mcg at 01/19/20 0549    magnesium oxide (MAG-OX) tablet 400 mg, 400 mg, Oral, QAM, Juan Alvarenga MD, 400 mg at 01/19/20 1003    metoprolol tartrate (LOPRESSOR) tablet 25 mg, 25 mg, Oral, BID, Juan Alvarenga MD, 25 mg at 01/19/20 1004    miconazole (MICOTIN) 2 % powder, , Topical, BID, Juan Alvarenga MD    ondansetron [facility]) tablet 4 mg, 4 mg, Oral, Q8H PRN, Juan Alvarenga MD    oxyCODONE (ROXICODONE) immediate release tablet 10 mg, 10 mg, Oral, Q6H, Juan Alvarenga MD, 10 mg at 01/19/20 1337    potassium chloride (KLOR-CON M) extended release tablet 40 mEq, 40 mEq, Oral, BID, Juan Alvarenga MD, 40 mEq at 01/19/20 1004    senna (SENOKOT) tablet 17.2 mg, 2 tablet, Oral, QAM, Juan Alvarenga MD, 17.2 mg at 01/19/20 1003    simethicone (MYLICON) chewable tablet 160 mg, 160 mg, Oral, Lunch, Juan Alvarenga MD, 160 mg at 01/19/20 1148    methylPREDNISolone sodium (SOLU-MEDROL) injection 80 mg, 80 mg, Intravenous, Q8H, Marylee Rhein, APRN - CNP, 80 mg at 01/19/20 1336    famotidine (PEPCID) tablet 20 mg, 20 mg, Oral, Daily, Juan Alvarenga MD, 20 mg at 01/19/20 1005    oxyCODONE (ROXICODONE) immediate release tablet 7.5 mg, 7.5 mg, Oral, Q8H PRN, Juan Alvarenga MD    magnesium hydroxide (MILK OF MAGNESIA) 400 MG/5ML suspension 30 mL, 30 mL, Oral, Daily PRN, Juan Alvarenga MD    sodium chloride (OCEAN, BABY AYR) 0.65 % nasal spray 1 spray, 1 spray, Each Nostril, Q4H PRN, Juan Alvarenga MD    sodium chloride flush 0.9 % injection

## 2020-01-19 NOTE — PROGRESS NOTES
[facility] Quality Flow/Interdisciplinary Rounds Progress Note        Quality Flow Rounds held on January 19, 2020    Disciplines Attending:  Bedside Nurse, ,  and Nursing Unit Leadership    Govind Peoples was admitted on 1/17/2020 10:38 AM    Anticipated Discharge Date:  Expected Discharge Date: 01/20/20    Disposition:    David Score:  David Scale Score: 14    Readmission Risk              Risk of Unplanned Readmission:        42           Discussed patient goal for the day, patient clinical progression, and barriers to discharge.   The following Goal(s) of the Day/Commitment(s) have been identified:  Other  Continue to monitor      Ari Varela  January 19, 2020

## 2020-01-19 NOTE — PROGRESS NOTES
Patient placed on focus with own nasal mask at CPAP +9 with 4LPM bleed in. Skin protection not necessary with current mask.  Good comfort

## 2020-01-20 NOTE — PROGRESS NOTES
Debora Foote M.D.,[facility]  Lloly Cannon D.O., F.CHAPARRO.MARILY.O.I., Lopez Barrow M.D. Eileen Solomon M.D., Jenn Samuels M.D. Grecia Hagan D.O. Daily Pulmonary Progress Note    Patient:  Umair Yancey 80 y.o. female MRN: [id number]     Date of Service: 1/20/2020      Synopsis     We are following patient for hypoxia, influenza B    \"CC\" shortness of breath    Code status: DNRCCA, no compressions      Subjective      Patient was seen and examined sitting in a chair in no apparent distress. She is still very sleepy with no appetite. She is on 3L and denies cough. Review of Systems:  Constitutional: Denies fever, weight loss, night sweats, and fatigue  Skin: Denies pigmentation, dark lesions, and rashes   HEENT: Denies hearing loss, tinnitus, ear drainage, epistaxis, sore throat, and hoarseness. Cardiovascular: Denies palpitations, chest pain, and chest pressure. Respiratory: Denies cough, dyspnea at rest, hemoptysis, apnea, and choking.   Gastrointestinal: Denies nausea, vomiting, poor appetite, diarrhea, heartburn or reflux  Genitourinary: Denies dysuria, frequency, urgency or hematuria  Musculoskeletal: Denies myalgias, muscle weakness, and bone pain  Neurological: Denies dizziness, vertigo, headache, and focal weakness  Psychological: Denies anxiety and depression  Endocrine: Denies heat intolerance and cold intolerance  Hematopoietic/Lymphatic: Denies bleeding problems and blood transfusions    24-hour events:  none    Objective   Vitals: /80   Pulse 98   Temp 98.7 °F (37.1 °C) (Oral)   Resp 18   Ht 5' (1.524 m)   Wt 214 lb 6.4 oz (97.3 kg)   SpO2 96%   BMI 41.87 kg/m²     I/O:    Intake/Output Summary (Last 24 hours) at 1/20/2020 1129  Last data filed at 1/20/2020 0600  Gross per 24 hour   Intake 180 ml   Output 1500 ml   Net -1320 ml     CPAP/EPAP: 9 cmH2O     CURRENT MEDS :  Scheduled Meds:   insulin lispro  0-12 Units Subcutaneous TID WC    insulin lispro 0-6 Units Subcutaneous Nightly    furosemide  40 mg Intravenous Q12H    chlorothiazide (DIURIL) IVPB  125 mg Intravenous 2 times per day    levalbuterol  0.63 mg Nebulization Q6H    rOPINIRole  0.5 mg Oral Dinner    apixaban  5 mg Oral BID    b complex-C-folic acid  1 mg Oral QAM    budesonide  1,000 mcg Nebulization BID    calcium-vitamin D  1 tablet Oral Lunch    vitamin D  2,000 Units Oral Lunch    ferrous sulfate  325 mg Oral Lunch    fluticasone  1 spray Each Nostril Daily    formoterol  20 mcg Nebulization Q12H    gabapentin  100 mg Oral Nightly    hydrocortisone  25 mg Rectal Nightly    lactulose  20 g Oral BID    levothyroxine  125 mcg Oral Daily    magnesium oxide  400 mg Oral QAM    metoprolol tartrate  25 mg Oral BID    miconazole   Topical BID    oxyCODONE HCl  10 mg Oral Q6H    potassium chloride  40 mEq Oral BID    senna  2 tablet Oral QAM    simethicone  160 mg Oral Lunch    methylPREDNISolone  80 mg Intravenous Q8H    famotidine  20 mg Oral Daily    sodium chloride flush  10 mL Intravenous BID    oseltamivir  30 mg Oral BID    rOPINIRole  0.25 mg Oral Nightly       Physical Exam:  General Appearance: appears comfortable in no acute distress. obese  HEENT: Normocephalic atraumatic without obvious abnormality   Neck: Lips, mucosa, and tongue normal.  Supple, symmetrical, trachea midline, no adenopathy;thyroid:  no enlargement/tenderness/nodules or JVD. Lung: Breath sounds wheezy with scattered rhonchi. Respirations   unlabored. Symmetrical expansion. Heart: RRR, normal S1, S2. No MRG  Abdomen: Soft, NT, ND. BS present x 4 quadrants. No bruit or organomegaly. Extremities: Pedal pulses 2+ symmetric b/l. Extremities normal, no cyanosis, clubbing. +3 pitting edema  Musculokeletal: No joint swelling, no muscle tenderness. ROM normal in all joints of extremities. Neurologic: Mental status: Alert and Oriented X3 .     Pertinent/ New Labs and Imaging Studies     Imaging level of antigen may be below the  detection limit. Normal Range: Presumptive Negative            Assessment:    1. Influenza B  2. Basilar atelectasis  3. Acute on chronic respiratory failure with hypoxia  4. TARIK on CPAP  5. CHF, diastolic  6. Protein calorie malnutrition  7. Persistent atrial fibrillation  8. Obesity  9. GERD      Plan:   1. Tamiflu  2. Aerosol treatment: Pulmicort, Perforomist, Xopenex  3. CPAP therapy qHS and PRN  4. Recruitment techniques  5. Magic cups  6. Continue diuresis  7. Nephrology   8. Repeat Echo, see above  9. Follow cxr  10. Solu medrol, wean to 80mg q12 hours  11. Wean oxygen as tolerated      This plan of care was reviewed in collaboration with Dr. Lynna Curling  Electronically signed by ZHANNA Dinero CNP on 1/20/2020 at 11:29 AM    I personally saw, examined, and cared for the patient. Labs, medications, radiographs reviewed.  I agree with history exam and plans detailed in NP note with the following additions:    CXR with perhaps more congestion  She is having wheezing/rhonchi still  Continue nebs  Add chest vest  IV steroids  Incentive  PT/OT    Electronically signed by Lowell Sheppard MD on 1/20/2020 at 6:34 PM

## 2020-01-20 NOTE — PROGRESS NOTES
Devin Lara, DO, 8 Units at 01/20/20 1764    insulin lispro (HUMALOG) injection vial 0-6 Units, 0-6 Units, Subcutaneous, Nightly, Hannah Esquivel DO, 5 Units at 01/19/20 2028    glucose (GLUTOSE) 40 % oral gel 15 g, 15 g, Oral, PRN, Hannah Esquivel, DO    dextrose 50 % IV solution, 12.5 g, Intravenous, PRN, Hannah Esquivel, DO    glucagon (rDNA) injection 1 mg, 1 mg, Intramuscular, PRN, Hannah Esquivel, DO    dextrose 5 % solution, 100 mL/hr, Intravenous, PRN, Hannah Esquivel, DO    furosemide (LASIX) injection 40 mg, 40 mg, Intravenous, Q12H, Paulina Hernandez MD, 40 mg at 01/20/20 0617    chlorothiazide (DIURIL) 125 mg in sodium chloride 0.9 % 50 mL IVPB, 125 mg, Intravenous, 2 times per day, Paulina Hernandez MD, Stopped at 01/19/20 2207    levalbuterol (XOPENEX) nebulization 0.63 mg, 0.63 mg, Nebulization, Q6H, Wilder Marlow DO, 0.63 mg at 01/20/20 0058    rOPINIRole (REQUIP) tablet 0.5 mg, 0.5 mg, Oral, Dinner, Catarina Anne MD, 0.5 mg at 01/19/20 1635    acetaminophen (TYLENOL) tablet 500 mg, 500 mg, Oral, Q6H PRN, Catarina Anne MD    apixaban Filomena Breath) tablet 5 mg, 5 mg, Oral, BID, Catarina Anne MD, 5 mg at 01/19/20 2025    b complex-C-folic acid (NEPHROCAPS) capsule 1 mg, 1 mg, Oral, QAM, Catarina Anne MD, 1 mg at 01/19/20 1003    budesonide (PULMICORT) nebulizer suspension 1,000 mcg, 1,000 mcg, Nebulization, BID, Catarina Anne MD, 1,000 mcg at 01/1934    calcium-vitamin D 500-200 MG-UNIT per tablet 1 tablet, 1 tablet, Oral, Lunch, Catarina Anne MD, 1 tablet at 01/19/20 1148    vitamin D tablet 2,000 Units, 2,000 Units, Oral, Lunch, Catarina Anne MD, 2,000 Units at 01/19/20 1148    clotrimazole-betamethasone (LOTRISONE) cream, , Topical, BID PRN, Catarina Anne MD    ferrous sulfate tablet 325 mg, 325 mg, Oral, Lunch, Catarina Anne MD, 325 mg at 01/19/20 1148    fluticasone (FLONASE) 50 MCG/ACT nasal spray 1 spray, 1 spray, Each Nostril, Daily, Marques Marcelo, MD, 1 spray at 01/19/20 1010    formoterol (PERFOROMIST) nebulizer solution 20 mcg, 20 mcg, Nebulization, Q12H, Kristopher Casper MD, 20 mcg at 01/1934    gabapentin (NEURONTIN) capsule 100 mg, 100 mg, Oral, Nightly, Kristopher Casper MD, 100 mg at 01/19/20 2025    hydrocortisone (ANUSOL-HC) suppository 25 mg, 25 mg, Rectal, Nightly, Kristopher Casper MD, 25 mg at 01/19/20 2027    lactulose (CHRONULAC) 10 GM/15ML solution 20 g, 20 g, Oral, BID, Kristopher Casper MD, 20 g at 01/19/20 2022    levothyroxine (SYNTHROID) tablet 125 mcg, 125 mcg, Oral, Daily, Kristopher Casper MD, 125 mcg at 01/20/20 0617    magnesium oxide (MAG-OX) tablet 400 mg, 400 mg, Oral, QAM, Kristopher Casper MD, 400 mg at 01/19/20 1003    metoprolol tartrate (LOPRESSOR) tablet 25 mg, 25 mg, Oral, BID, Kristopher Casper MD, 25 mg at 01/19/20 2025    miconazole (MICOTIN) 2 % powder, , Topical, BID, Kristopher Casper MD    ondansetron [facility]) tablet 4 mg, 4 mg, Oral, Q8H PRN, Kristopher Casper MD    oxyCODONE (ROXICODONE) immediate release tablet 10 mg, 10 mg, Oral, Q6H, Kristopher Casper MD, 10 mg at 01/20/20 0304    potassium chloride (KLOR-CON M) extended release tablet 40 mEq, 40 mEq, Oral, BID, Kristopher Casper MD, 40 mEq at 01/19/20 2027    senna (SENOKOT) tablet 17.2 mg, 2 tablet, Oral, QAM, Kristopher Casper MD, 17.2 mg at 01/19/20 1003    simethicone (MYLICON) chewable tablet 160 mg, 160 mg, Oral, Lunch, Kristopher Casper MD, 160 mg at 01/19/20 1148    methylPREDNISolone sodium (SOLU-MEDROL) injection 80 mg, 80 mg, Intravenous, Q8H, ZHANNA Sheikh - CNP, 80 mg at 01/20/20 0617    famotidine (PEPCID) tablet 20 mg, 20 mg, Oral, Daily, Kristopher Casper MD, 20 mg at 01/19/20 1005    oxyCODONE (ROXICODONE) immediate release tablet 7.5 mg, 7.5 mg, Oral, Q8H PRN, Kristopher Casper MD    magnesium hydroxide (MILK OF MAGNESIA) 400 MG/5ML suspension 30 mL, 30 mL, Oral, Daily PRN, Kristopher Casper MD    sodium chloride (OCEAN, BABY AYR) 0.65 % nasal spray 1 spray, 1 spray, Each Nostril, Q4H PRN, Dali Burnette MD    sodium chloride flush 0.9 % injection 10 mL, 10 mL, Intravenous, BID, Dali Burnette MD, 10 mL at 01/19/20 2023    oseltamivir (TAMIFLU) capsule 30 mg, 30 mg, Oral, BID, Dali Burnette MD, 30 mg at 01/19/20 2025    rOPINIRole (REQUIP) tablet 0.25 mg, 0.25 mg, Oral, Nightly, Dali Burnette MD, 0.25 mg at 01/19/20 2025    Physical Exam:  /85   Pulse 99   Temp 97.7 °F (36.5 °C) (Oral)   Resp 19   Ht 5' (1.524 m)   Wt 214 lb 6.4 oz (97.3 kg)   SpO2 95%   BMI 41.87 kg/m²   Wt Readings from Last 3 Encounters:   01/20/20 214 lb 6.4 oz (97.3 kg)   12/16/19 185 lb (83.9 kg)   12/11/19 193 lb 3.2 oz (87.6 kg)     Appearance: Elderly lady lying in bed, awake, alert, no acute respiratory distress  Skin: Intact, no rash  Head: Normocephalic, atraumatic  Eyes: EOMI, no conjunctival erythema  ENMT: No pharyngeal erythema, MMM, no rhinorrhea  Neck: Supple, no elevated JVP, no carotid bruits  Lungs: Bilateral wheezing, bilateral rales mid lower lung fields  Cardiac: PMI nondisplaced, irregular rhythm with a normal rate, S1 & S2 normal, no murmurs  Abdomen: Soft, nontender, +bowel sounds  Extremities: Moves all extremities x 4, 2-3+ lower extremity edema  Neurologic: No focal motor deficits apparent, normal mood and affect  Peripheral Pulses: Intact posterior tibial pulses bilaterally    Intake/Output:    Intake/Output Summary (Last 24 hours) at 1/20/2020 0837  Last data filed at 1/20/2020 0600  Gross per 24 hour   Intake 420 ml   Output 1500 ml   Net -1080 ml     No intake/output data recorded.     Laboratory Tests:  Recent Labs     01/18/20  1152 01/19/20  0855 01/20/20  0500    136 137   K 3.8 4.3 3.7   CL 94* 96* 95*   CO2 26 27 28   BUN 20 26* 34*   CREATININE 1.0 1.1* 1.1*   GLUCOSE 369* 357* 326*   CALCIUM 8.8 9.1 8.8     Lab Results   Component Value Date    MG 1.9 11/30/2019     Recent Labs     01/18/20  1152 01/19/20  0855 01/20/20  0500   ALKPHOS

## 2020-01-20 NOTE — DISCHARGE INSTR - COC
Continuity of Care Form    Patient Name: Claudean Larsen   :  1927  MRN:  [id number]    Admit date:  2020  Discharge date:  ***    Code Status Order: Prior   Advance Directives:   Advance Care Flowsheet Documentation     Date/Time Healthcare Directive Type of Healthcare Directive Copy in [address] Agent's Name Healthcare Agent's Phone Number    [phone]  Yes, patient has an advance directive for healthcare treatment  Living will;Health care treatment directive  Yes, copy in chart  Adult Children  [facility]  [phone] / [phone]          Admitting Physician:  Marifer Davidson MD  PCP: Marifer Davidson MD    Discharging Nurse: [facility] Unit/Room#: 0607/0607-A  Discharging Unit Phone Number: ***    Emergency Contact:   Extended Emergency Contact Information  Primary Emergency Contact: Maricarmen Flynn ([facility])  Address: [address] [place] [address] Phone: [phone]  Mobile Phone: [phone]  Relation: Child  Secondary Emergency Contact: Monika Daniels [facility] [address]  Phone: [phone]  Mobile Phone: [phone]  Relation: Child    Past Surgical History:  Past Surgical History:   Procedure Laterality Date    ARTERY BIOPSY      temporal   [facility],  [address] [place] [address] N/A 2019    BRONCHOSCOPY   (BEDSIDE) performed by Bhakti Sandoval MD at [facility] 1690      CARDIOVERSION  2018    CERVICAL FUSION  1994    c3-4    CHOLECYSTECTOMY  's    OPEN    COLON SURGERY  's    repair of partial bowel obstruction    CORONARY ANGIOPLASTY WITH STENT PLACEMENT  2011    Dr. Omer Resendiz, ESOPHAGUS     [address] Bilateral 2004    CATARACTS WITH LENS IMPLANTS    FOOT SURGERY  ,    ct guided injections of feet, toenail removal   [address], [address] [place] [address] 26's    MASTECTOMY Right     TOE SURGERY      Removal of bony outgrowth, right great toe    TRANSESOPHAGEAL ECHOCARDIOGRAM  02/13/2018    WRIST SURGERY Right YRS AGO       Immunization History:   Immunization History   Administered Date(s) Administered    Influenza Vaccine, unspecified formulation 09/29/2015, 12/15/2015, 09/04/2016, 11/01/2016, 10/16/2017    Influenza Virus Vaccine 10/23/2019    Influenza, High Dose (Fluzone 65 yrs and older) 10/07/2018    Pneumococcal Conjugate 13-valent (Antony Amairani) 03/23/2016, 02/14/2018    Pneumococcal Polysaccharide ([id number]) 09/04/2015    Pneumococcal Vaccine 01/12/2020    Tdap (Boostrix, Adacel) 11/23/2017       Active Problems:  Patient Active Problem List   Diagnosis Code    Hypothyroidism E03.9    TARIK (obstructive sleep apnea) G47.33    Hypertensive disorder I10    Hypercholesterolemia E78.00    CAD in native artery I25.10    Acute diastolic CHF (congestive heart failure) ([facility]) I50.31    Restless legs G25.81    Acute on chronic diastolic congestive heart failure ([facility]) I50.33    Essential hypertension I10    Lung nodule R91.1    Paroxysmal atrial fibrillation ([facility]) I48.0    CHF (congestive heart failure), NYHA class I, acute on chronic, combined ([facility]) I50.43    Congestive heart failure (HCC) I50.9    Nonrheumatic aortic valve insufficiency I35.1    Non-rheumatic mitral regurgitation K80.9    Diastolic CHF ([facility]) W33.34    Cystitis N30.90    Intractable back pain M54.9    Thoracic ascending aortic aneurysm ([facility]) I71.2    Polymyalgia rheumatica ([facility]) M35.3    Congestive heart failure with cardiomyopathy ([facility]) I50.9, I42.9    Acute diastolic congestive heart failure ([facility]) I50.31    Skin ulcer of right foot, limited to breakdown of skin ([facility] L97.511    Acute respiratory failure with hypoxia ([facility]) J96.01    Polymyositis ([facility]) M33.20    Pulmonary embolism ([facility]) I26.99    Parainfluenza virus bronchitis J20.4    Carcinoma of breast ([facility] C50.919    Influenza B J10.1    Atrial fibrillation with RVR ([facility]) I48.91    CKD (chronic kidney disease) stage 3, GFR 30-59 ml/min ([facility]) N18.3    Acute on chronic respiratory failure with hypoxia ([facility]) J96.21       Isolation/Infection:   Isolation          Droplet        Patient Infection Status     Infection Onset Added Last Indicated Last Indicated By Review Planned Expiration Resolved Resolved By    INFLUENZA 20 Rapid influenza A/B antigens 20            Nurse Assessment:  Last Vital Signs: /85   Pulse 98   Temp 98.7 °F (37.1 °C) (Oral)   Resp 18   Ht 5' (1.524 m)   Wt 214 lb 6.4 oz (97.3 kg)   SpO2 96%   BMI 41.87 kg/m²     Last documented pain score (0-10 scale): Pain Level: 8  Last Weight:   Wt Readings from Last 1 Encounters:   20 214 lb 6.4 oz (97.3 kg)     Mental Status:  {IP PT MENTAL STATUS:93575}    IV Access:  { OLENA IV ACCESS:133988716}    Nursing Mobility/ADLs:  Walking   {P DME EIIY:203488299}  Transfer  {P DME UFHS:502434262}  Bathing  {CHP DME SMCJ:966825933}  Dressing  {P DME OCFL:053866055}  Toileting  {CHP DME IPD}  Feeding  {P DME LQEZ:787445363}  Med Admin  {[facility] DME BGJZ:226957016}  Med Delivery   {[facility] [place] Delivery:218817513}    Wound Care Documentation and Therapy:  Wound 11/15/19 Heel Lateral;Right (Active)   Wound Image   2020  3:41 PM   Dressing/Treatment Open to air 2020  8:18 PM   Wound Length (cm) 1 cm 2020  3:41 PM   Wound Width (cm) 1.8 cm 2020  3:41 PM   Wound Surface Area (cm^2) 1.8 cm^2 2020  3:41 PM   Change in Wound Size % (l*w) 40 2020  3:41 PM   Wound Assessment Dry;Clean 2020 12:06 PM   Drainage Amount None 2020 12:06 PM   Odor None 2020 12:06 PM   Margins Undefined edges 2020 12:06 PM   Maria E-wound Assessment Dry 2020  3:41 PM   Number of days: 65       Wound 19 Left forearm (Active)   Number of days: 60       Wound 20 Buttocks Left sacral (Active)   Wound Image   1/17/2020  3:41 PM   Dressing Status Dry; Intact 1/19/2020  8:18 PM   Dressing Changed Changed/New 1/18/2020  6:00 PM   Dressing/Treatment Other (comment) 1/18/2020  8:43 PM   Wound Cleansed Rinsed/Irrigated with saline 1/18/2020  6:00 PM   Dressing Change Due 01/20/20 1/19/2020  8:18 PM   Wound Length (cm) 2 cm 1/17/2020  3:41 PM   Wound Width (cm) 2 cm 1/17/2020  3:41 PM   Wound Depth (cm) 0.1 cm 1/17/2020  3:41 PM   Wound Surface Area (cm^2) 4 cm^2 1/17/2020  3:41 PM   Wound Volume (cm^3) 0.4 cm^3 1/17/2020  3:41 PM   Wound Assessment Other (Comment) 1/18/2020  8:43 PM   Drainage Amount Small 1/18/2020  8:43 PM   Drainage Description Serosanguinous 1/18/2020  6:00 PM   Odor None 1/18/2020  6:00 PM   Margins Undefined edges 1/18/2020  6:00 PM   Maria E-wound Assessment Pink 1/18/2020  6:00 PM   Number of days: 2       Wound 01/17/20 Arm Left; Lower;Dorsal 1x1 skin tear (Active)   Wound Skin Tear 1/17/2020  2:45 PM   Dressing Status Clean;Dry; Intact 1/19/2020  8:18 PM   Dressing Changed Dressing reinforced 1/17/2020  2:45 PM   Dressing/Treatment Dry dressing 1/19/2020  8:18 PM   Wound Length (cm) 1 cm 1/17/2020  2:45 PM   Wound Width (cm) 1 cm 1/17/2020  2:45 PM   Wound Depth (cm) 0 cm 1/17/2020  2:45 PM   Wound Surface Area (cm^2) 1 cm^2 1/17/2020  2:45 PM   Wound Volume (cm^3) 0 cm^3 1/17/2020  2:45 PM   Wound Assessment Red 1/17/2020  2:45 PM   Drainage Amount Scant 1/17/2020  2:45 PM   Drainage Description Serosanguinous 1/17/2020  2:45 PM   Odor None 1/17/2020  2:45 PM   Number of days: 2        Elimination:  Continence:   · Bowel: {YES / AE:51957}  · Bladder: {YES / AC:66827}  Urinary Catheter: {Urinary Catheter:461606618}   Colostomy/Ileostomy/Ileal Conduit: {YES / TJ:00859}       Date of Last BM: ***    Intake/Output Summary (Last 24 hours) at 1/20/2020 1018  Last data filed at 1/20/2020 0600  Gross per 24 hour   Intake 180 ml   Output 1500 ml   Net -1320 ml     I/O information and transfer of Tollie Medal  is necessary for the continuing treatment of the diagnosis listed and that she requires Intermediate Nursing Care for greater 30 days.      Update Admission H&P: {CHP DME Changes in DSDAS:073568098}    PHYSICIAN SIGNATURE:  {Esignature:954513965}

## 2020-01-20 NOTE — PROGRESS NOTES
Family Medicine    Subjective: The patient is doing ok this AM. Still with cough and mild dyspnea. Denies chest pain and angina. Objective:  /85   Pulse 99   Temp 97.7 °F (36.5 °C) (Oral)   Resp 19   Ht 5' (1.524 m)   Wt 214 lb 6.4 oz (97.3 kg)   SpO2 95%   BMI 41.87 kg/m²     HEENT: MMM. Heart: Irregularly irregular. Lungs: Decreased breath sounds with scattered wheezing. Abd: bowel sounds present, nontender, nondistended, no masses. Extrem:  No clubbing or cyanosis. 2+ edema. Neuro: Intact without deficits. CBC with Differential:    Lab Results   Component Value Date    WBC 7.8 01/20/2020    RBC 3.78 01/20/2020    HGB 11.4 01/20/2020    HCT 36.1 01/20/2020     01/20/2020    MCV 95.5 01/20/2020    MCH 30.2 01/20/2020    MCHC 31.6 01/20/2020    RDW 13.2 01/20/2020    NRBC 0.0 08/22/2019    SEGSPCT 54 11/21/2013    METASPCT 1.0 11/19/2019    LYMPHOPCT 12.9 01/17/2020    MONOPCT 6.9 01/17/2020    BASOPCT 0.5 01/17/2020    MONOSABS 0.60 01/17/2020    LYMPHSABS 1.12 01/17/2020    EOSABS 0.05 01/17/2020    BASOSABS 0.04 01/17/2020     CMP:    Lab Results   Component Value Date     01/20/2020    K 3.7 01/20/2020    K 3.8 01/17/2020    CL 95 01/20/2020    CO2 28 01/20/2020    BUN 34 01/20/2020    CREATININE 1.1 01/20/2020    GFRAA 56 01/20/2020    LABGLOM 46 01/20/2020    GLUCOSE 326 01/20/2020    GLUCOSE 106 10/04/2011    PROT 5.7 01/20/2020    LABALBU 3.1 01/20/2020    LABALBU 3.5 07/07/2011    CALCIUM 8.8 01/20/2020    BILITOT 0.5 01/20/2020    ALKPHOS 133 01/20/2020    AST 36 01/20/2020    ALT 59 01/20/2020     Assessment/Plan:  1. Hypoxic respiratory failure - multifactorial. Secondary to influenza virus. On O2.  2. Influenza virus - On Tamiflu. Pulmonary on case. On nebs and O2.   3. Atrial fibrillation with RVR - exacerbated by pulmonary status. 2D Echo stable. Cardiology on case. Rate controlled. Anticoagulated on Eliquis. 4. CHF - acute on chronic diastolic.  Diuresis per

## 2020-01-20 NOTE — PROGRESS NOTES
Nutrition Assessment    Type and Reason for Visit: Initial, Positive Nutrition Screen, Consult(Wounds)    Nutrition Recommendations: Continue Diet. Continue Magic Cups Frozen ONS BID. Start Mark Wound Healing ONS BID. Pt declines all other ONS options at this time. Nutrition Assessment: Pt appears nourished upon admit, however at nutritional risk AEB poor intake d/t poor appetite per pt and pt's dtr. Pt at further risk d/t increased needs 2/2 healing of multiple wounds. Malnutrition Assessment:  · Malnutrition Status: Insufficient data  · Context: Acute illness or injury  · Findings of the 6 clinical characteristics of malnutrition (Minimum of 2 out of 6 clinical characteristics is required to make the diagnosis of moderate or severe Protein Calorie Malnutrition based on AND/ASPEN Guidelines):  1. Energy Intake-Less than or equal to 75% of estimated energy requirement, Greater than or equal to 1 month    2. Weight Loss-Unable to assess(2/2 CHF hx w/ fluid shifts), unable to assess  3. Fat Loss-No significant subcutaneous fat loss    4. Muscle Loss-No significant muscle mass loss    5. Fluid Accumulation-Unable to assess(2/2 CHF hx)    6.  Strength-Not measured    Nutrition Risk Level:  Moderate    Nutrient Needs:  · Estimated Daily Total Kcal: 9068-2700(MSJ x 1.3 SF per CBW)  · Estimated Daily Protein (g): 80-90(1.8-2.0 gm/kg per IBW)  · Estimated Daily Total Fluid (ml/day): 5692-9330(1 ml/kcal)    Nutrition Diagnosis:   · Problem: Increased nutrient needs  · Etiology: related to Increased demand for energy/nutrients(2/2 wound healing)     Signs and symptoms:  as evidenced by Presence of wounds, Intake 50-75%    Objective Information:  · Nutrition-Focused Physical Findings: A&O, dentition WNL, distended/non-tender Abd, +BS, Gen/BUE +1/+2 and BLE +3 Edema, -I/O's  · Wound Type: Multiple, Pressure Ulcer, Skin Tears(blister noted as well)  · Current Nutrition Therapies:  · Oral Diet Orders: Cardiac, 2gm Sodium   · Oral Diet intake: 51-75%(sporadic intakes per flowsheets and per pt's dtr)  · Oral Nutrition Supplement (ONS) Orders: Frozen Oral Supplement(BID)  · ONS intake: %(per pt's dtr and per lunch tray)  · Anthropometric Measures:  · Ht: 5' (152.4 cm)   · Current Body Wt: 214 lb (97.1 kg)(bed 1/20)  · Admission Body Wt: 213 lb (96.6 kg)(bed 1/17)  · Usual Body Wt: 182 lb (82.6 kg)(stand scale 3/22/19 per EMR, pt's dtr confirms at this time)  · % Weight Change:  wt gain noted per EMR likely 2/2 CHF w/ fluid overload at this time; pt's dtr confirms at this time  · Ideal Body Wt: 100 lb (45.4 kg), % Ideal Body 214% per CBW  · BMI Classification: BMI > or equal to 40.0 Obese Class III    Nutrition Interventions:   Continue current diet, Continue current ONS(Continue Diet. Continue Magic Cups Frozen ONS BID. Start Mark Wound Healing ONS BID. Pt declines all other ONS options at this time.  )  Continued Inpatient Monitoring, Education Initiated(ONS options/benefits)    Nutrition Evaluation:   · Evaluation: Goals set   · Goals: PO intake >75% of meals/ONS.      · Monitoring: Meal Intake, Supplement Intake, Diet Tolerance, Skin Integrity, Wound Healing, I&O, Weight, Pertinent Labs, Monitor Bowel Function      Electronically signed by Lolly Crane RD, LD on 1/20/20 at 5:01 PM    Contact Number: ext [phone]

## 2020-01-20 NOTE — PROGRESS NOTES
Physical Therapy    Facility/Department: [facility]  Initial Assessment    NAME: Rosalina Hernandez  : 1927  MRN: [id number]    Date of Service: 2020       REQUIRES PT FOLLOW UP: Yes       Patient Diagnosis(es): The primary encounter diagnosis was Acute respiratory failure with hypoxia ([address] Diagnoses of Influenza B, Acute on chronic diastolic congestive heart failure ([address] Atrial fibrillation, unspecified type ([address] and Pleural effusion, bilateral were also pertinent to this visit. has a past medical history of AC (acromioclavicular) joint bone spurs, Acid reflux, Ascending aortic aneurysm (HCC), Atrial fibrillation ([address] CAD (coronary artery disease), Cancer ([address] CHF (congestive heart failure) ([address] Chronic back pain, CKD (chronic kidney disease) stage 4, GFR 15-29 ml/min ([address] DJD (degenerative joint disease), Hyperlipidemia, Hypertension, Hypothyroidism, Iron deficiency anemia, Lung nodule, MVP (mitral valve prolapse), Osteoarthritis, Osteoporosis, Partial bowel obstruction ([address] Pneumonia, Polymyalgia rheumatica (HCC), Scoliosis, Sinus arrhythmia, Sleep apnea, Spinal stenosis, Thoracic ascending aortic aneurysm ([address] and Thyroid disease. has a past surgical history that includes Artery Biopsy (); cervical fusion (); Toe Surgery; Wrist surgery (Right, YRS AGO); Foot surgery (,); Hysterectomy ('s); Colon surgery (s); Cardiac catheterization (); Coronary angioplasty with stent (2011); eye surgery (Bilateral, ); hernia repair (, ); Cholecystectomy (); back surgery (); Dilatation, esophagus; transesophageal echocardiogram (2018); Cardioversion (2018); Mastectomy (Right); and bronchoscopy (N/A, 2019).     Evaluating Therapist: Karlene Solis PT         Room #: 552  DIAGNOSIS: influenza B   PRECAUTIONS: fall risk, O2, droplet  isolation     Social:  Pt lives with her daughter and son-in-law in a 2 floor plan 2 steps provided in accordance with the objectives noted above. Whenever appropriate, clear delegation orders will be provided for nursing staff. Exercises and functional mobility practice will be used as well as appropriate assistive devices or modalities to obtain goals.  Patient and family education will also be administered as needed.     Frequency of treatments will be 2-5x/week x 7-10 days.     Time in: 0916   Time out: [address]  [phone]

## 2020-01-20 NOTE — PLAN OF CARE
Problem: Increased nutrient needs (NI-5.1)  Goal: Food and/or Nutrient Delivery  Continue Diet. Continue Magic Cups Frozen ONS BID. Start Mark Wound Healing ONS BID. Pt declines all other ONS options at this time. Description  Individualized approach for food/nutrient provision.   Outcome: Met This Shift

## 2020-01-20 NOTE — CONSULTS
tablet 0.5 mg, Dinner  acetaminophen (TYLENOL) tablet 500 mg, Q6H PRN  apixaban (ELIQUIS) tablet 5 mg, BID  b complex-C-folic acid (NEPHROCAPS) capsule 1 mg, QAM  budesonide (PULMICORT) nebulizer suspension 1,000 mcg, BID  calcium-vitamin D 500-200 MG-UNIT per tablet 1 tablet, Lunch  vitamin D tablet 2,000 Units, Lunch  clotrimazole-betamethasone (LOTRISONE) cream, BID PRN  ferrous sulfate tablet 325 mg, Lunch  fluticasone (FLONASE) 50 MCG/ACT nasal spray 1 spray, Daily  formoterol (PERFOROMIST) nebulizer solution 20 mcg, Q12H  gabapentin (NEURONTIN) capsule 100 mg, Nightly  hydrocortisone (ANUSOL-HC) suppository 25 mg, Nightly  lactulose (CHRONULAC) 10 GM/15ML solution 20 g, BID  levothyroxine (SYNTHROID) tablet 125 mcg, Daily  magnesium oxide (MAG-OX) tablet 400 mg, QAM  metoprolol tartrate (LOPRESSOR) tablet 25 mg, BID  miconazole (MICOTIN) 2 % powder, BID  ondansetron (ZOFRAN) tablet 4 mg, Q8H PRN  oxyCODONE (ROXICODONE) immediate release tablet 10 mg, Q6H  potassium chloride (KLOR-CON M) extended release tablet 40 mEq, BID  senna (SENOKOT) tablet 17.2 mg, QAM  simethicone (MYLICON) chewable tablet 160 mg, Lunch  methylPREDNISolone sodium (SOLU-MEDROL) injection 80 mg, Q8H  famotidine (PEPCID) tablet 20 mg, Daily  oxyCODONE (ROXICODONE) immediate release tablet 7.5 mg, Q8H PRN  magnesium hydroxide (MILK OF MAGNESIA) 400 MG/5ML suspension 30 mL, Daily PRN  sodium chloride (OCEAN, BABY AYR) 0.65 % nasal spray 1 spray, Q4H PRN  sodium chloride flush 0.9 % injection 10 mL, BID  oseltamivir (TAMIFLU) capsule 30 mg, BID  rOPINIRole (REQUIP) tablet 0.25 mg, Nightly  furosemide (LASIX) injection 40 mg, BID        Review of Systems:   Pertinent items are noted in HPI. Remainder of a complete review of systems is (-) other than stated in the HPI    Physical exam:   Constitutional:  Elderly female visibly SOB  Vitals: VITALS:  /85   Pulse 99   Temp 97.7 °F (36.5 °C) (Oral)   Resp 18   Ht 5' (1.524 m)   Wt 212 lb 14.4 oz (96.6 kg)   SpO2 95%   BMI 41.58 kg/m²   24HR INTAKE/OUTPUT:      Intake/Output Summary (Last 24 hours) at 1/19/2020 2055  Last data filed at 1/19/2020 2048  Gross per 24 hour   Intake 420 ml   Output 1700 ml   Net -1280 ml     URINARY CATHETER OUTPUT (Chung):  Urethral Catheter Non-latex 16 fr-Output (mL): 650 mL  Skin: erythema of the back and upper thorax, turgor wnl  Heent:  eomi, mmm, nc,at  Neck: no bruits or jvd noted  Cardiovascular: PMI displaced lat Irreg Irreg no S3  Respiratory: Bilat crackles few wheezes  Abdomen:  +bs, soft, nt, nd  Ext: R>L  lower extremity edema2-3+  Neuro: A&O x3 no focal motor neuro deficit    Data:   Labs:  CBC:   Lab Results   Component Value Date    WBC 10.1 01/19/2020    RBC 3.88 01/19/2020    HGB 11.8 01/19/2020    HCT 37.5 01/19/2020    MCV 96.6 01/19/2020    MCH 30.4 01/19/2020    MCHC 31.5 01/19/2020    RDW 13.2 01/19/2020     01/19/2020    MPV 11.0 01/19/2020     BMP:    Lab Results   Component Value Date     01/19/2020    K 4.3 01/19/2020    K 3.8 01/17/2020    CL 96 01/19/2020    CO2 27 01/19/2020    BUN 26 01/19/2020    LABALBU 3.3 01/19/2020    LABALBU 3.5 07/07/2011    CREATININE 1.1 01/19/2020    CALCIUM 9.1 01/19/2020    GFRAA 56 01/19/2020    LABGLOM 46 01/19/2020    GLUCOSE 357 01/19/2020    GLUCOSE 106 10/04/2011     Albumin:    Lab Results   Component Value Date    LABALBU 3.3 01/19/2020    LABALBU 3.5 07/07/2011     Ionized Calcium:  No results found for: IONCA  Magnesium:    Lab Results   Component Value Date    MG 1.9 11/30/2019     Phosphorus:    Lab Results   Component Value Date    PHOS 3.0 11/30/2019     VITAMIN B12: No components found for: B12  FOLATE:    Lab Results   Component Value Date    FOLATE 15.5 11/17/2019     IRON:    Lab Results   Component Value Date    IRON 48 11/17/2019     Iron Saturation:  No components found for: PERCENTFE  TIBC:    Lab Results   Component Value Date    TIBC 203 11/17/2019     FERRITIN:    Lab Results   Component Value Date    FERRITIN 854 11/17/2019        Imaging:  Type of Study      TTE procedure:Echo Complete W/ Dop & Color Flow. Procedure Date  Date: 11/15/2019 Start: 03:03 PM     Study Location: Echo Lab  Technical Quality: Adequate visualization     Indications:Pulmonary embolus.     Patient Status: STAT     Height: 60 inches     HR: 88 bpm BP: 119/59 mmHg      Findings      Left Ventricle   Left ventricle is normal in size . Borderline concentric left ventricular hypertrophy. No regional wall motion abnormalities seen. Normal left ventricular ejection fraction. Ejection fraction is visually estimated at 55%. There is doppler evidence of stage II diastolic dysfunction. Right Ventricle   The right ventricle was not clearly visualized. Normal right ventricular size. Left Atrium   Normal sized left atrium. Interatrial septum appears intact. Right Atrium   Right Atrium is not clearly visualized. Normal right atrium size. Mitral Valve   Structurally normal mitral valve. Mild mitral annular calcification. Mild mitral regurgitation is present. Tricuspid Valve   The tricuspid valve was not well visualized. RVSP is 40-45 mmHg. Pulmonary hypertension is mild . Aortic Valve   The aortic valve leaflets were not well visualized. Mild aortic regurgitation is noted. Pulmonic Valve   The pulmonic valve was not well visualized. Pericardial Effusion   Fat pad versus small effusion cannot be ruled out. Aorta   Aortic root dimension within normal limits. 2D ECHO 11/15/19   Summary   Technically suboptimal and limited study. Left ventricle is normal in size . Borderline concentric left ventricular hypertrophy. No regional wall motion abnormalities seen. Normal left ventricular ejection fraction. There is doppler evidence of stage II diastolic dysfunction. Mild mitral annular calcification.    Mild mitral regurgitation is present. The tricuspid valve was not well visualized. Pulmonary hypertension is mild . 2D ECHO 1/17/20:  Summary   Ejection fraction is visually estimated at 65%. No regional wall motion abnormalities seen. Mild left ventricular concentric hypertrophy noted. Mildly dilated right ventricle with reduced function. TAPSE is 1.5 cm. Mild mitral regurgitation is present. Moderate tricuspid regurgitation. Pulmonary hypertension is moderate. RVSP is 53 mmHg. Assessment/Plan     1. CKD G3A in the setting of HTN, CAD, CHF and afib with presumed microvascular disease.  -Baseline creatinine 1.0-1.1mg/dl with an e-GFR=46-52ml/min  -check UA     2.  HTN with CKD I-IV  -BP low on admission now improved  -follow with diuresis    3. Anasarca in the setting of the HFpEF and Hypoalbuminemia  -currently on furosemide 40mg twice daily will add diuril with the loop    4. Transaminitis- in the setting of the Inflenza B-follow    5.  Shortness of breath  -+Influenza B  -On Oseltamivir    Thank you Dr. Soraida Gardner MD for allowing us to participate in care of Aldo Davie Cartwright MD  8:55 PM  1/19/2020

## 2020-01-20 NOTE — PROGRESS NOTES
Daughter stopped by stating Megan Taveras hear with hearing aids. They are functioning. She is ill with pneumonia and is on O2. As a courtesy, did tympanogram to see if HA would need increased gain. Right ear shows middle ear effusion on tympanogram.     Increased gain for both aids. Decreased gain for soft sounds to try to reduce background noise. Uncoupled hearing aid volume control so she can turn up right hearing aid independent of left until fluid resolves. Suggest full evaluation when discharged to program hearing aids to current hearing levels for maximum hearing benefit.      Valencia Dalton M.A., [address] [facility] [id number]  Electronically signed by Hardy Madden on 1/20/2020 at 1:37 PM

## 2020-01-20 NOTE — PROGRESS NOTES
Occupational Therapy  OCCUPATIONAL THERAPY INITIAL EVALUATION      Date:2020  Patient Name: Cynthia Aiken  MRN: [id number]  : 1927  Room: [address] [place], [place] [address]    Evaluating OT: Bart Brian OTR/L [id number]    AM-PAC Daily Activity Raw Score:     Recommended Adaptive Equipment: TBD    Diagnosis: influenza B. Pt presents to ED from SNF. Pertinent Medical History: spinal stenosis, polymyalgia rheumatica, scoliosis, osteoporosis, OA, MVP, lung nodule, HTN, DJD, CKD, CHF, CAD, afib  Precautions:  Falls, O2, droplet isolation     Home Living: Pt admitted from SNF. Daughter reports prior to approx 1 week ago pt was 1 person assist in transfers, mobility with [employer] and toileting/dressing. Pt lives with daughter and PHU in a 2 story home with B/ B 2nd floor, stairglide to 2nd floor. Bathroom setup: tub/shower combo with tub bench     Prior Level of Function: assist with ADLs, assist with IADLs; completed functional mobility with [employer]    Pain Level: none reported    Cognition: A&O: Pt is alert and pleasant, distracted with tasks and increasing confusion when feeling SOB   Problem solving:  poor   Judgement/safety:  Poor   Direction followin step instruction with min cues for follow through     Functional Assessment:   Initial Eval Status  Date: 20 Treatment session:  Short Term Goals  Treatment frequency: 2-5x/wk PRN x1-3 wks   Feeding Set up     Grooming Min A  Set up   UB Dressing Mod A  Set up   LB Dressing Dependent  Management of B socks  Mod A    Bathing Max A  Mod A   Toileting Mod A  Use of grab bar at commode for assist in transfer and to maintain balance during completion of toileting care  Min A for commode transfer  Max A for posterior cristopher care  Min A   Bed Mobility  Supine to sit:  Max A     Functional Transfers STS: Min A  SBA   Functional Mobility Min A with WW initially progressing to Mod A with fatigue  Bed <> bathroom  SBA during ADLs   Balance Sitting: fair at EOB    Standing: fair

## 2020-01-20 NOTE — FLOWSHEET NOTE
Inpatient Wound Care    Admit Date: 1/17/2020 10:38 AM    Reason for consult:  Re-visit new issue      Findings: patient in chair.  daughter    01/20/20 1445   Wound 01/20/20 Foot Left;Dorsal   Date First Assessed/Time First Assessed: 01/20/20 1508   Present on Hospital Admission: No  Location: Foot  Wound Location Orientation: Left;Dorsal   Wound Image    Dressing Change Due 01/20/20   Wound Length (cm) 4 cm   Wound Width (cm) 5 cm   Wound Depth (cm)   (bulla, clear yellow)   Wound Surface Area (cm^2) 20 cm^2   Wound Assessment   (intact bulla)   Drainage Amount None   Odor None     Impression:  Blister from edema      Plan: adaptic and dressing to left foot dorsum  Continue alisia Pollard 1/20/2020 3:10 PM

## 2020-01-20 NOTE — PROGRESS NOTES
P Quality Flow/Interdisciplinary Rounds Progress Note        Quality Flow Rounds held on January 20, 2020    Disciplines Attending:  Bedside Nurse, ,  and Nursing Unit Leadership    Thomas Braun was admitted on 1/17/2020 10:38 AM    Anticipated Discharge Date:  Expected Discharge Date: 01/20/20    Disposition:    David Score:  David Scale Score: 14    Readmission Risk              Risk of Unplanned Readmission:        53           Discussed patient goal for the day, patient clinical progression, and barriers to discharge.   The following Goal(s) of the Day/Commitment(s) have been identified:  James, check renal plan      Lizandro Puga  January 20, 2020

## 2020-01-20 NOTE — PROGRESS NOTES
Department of Internal Medicine  Pulmonary/Critical Care Medicine  Consultant Progress Note      SUBJECTIVE:  I personally saw, examined, cared for the patient today. Labs, Radiographs, Medications reviewed. OBJECTIVE:  Up in chair at bedside. Feeling better. Some episodes of dyspnea and hypoxemia particularly when laid flat for patient care. Much less wheezing but still basilar crackles.   Medications    Current Facility-Administered Medications: insulin lispro (HUMALOG) injection vial 0-12 Units, 0-12 Units, Subcutaneous, TID WC  insulin lispro (HUMALOG) injection vial 0-6 Units, 0-6 Units, Subcutaneous, Nightly  glucose (GLUTOSE) 40 % oral gel 15 g, 15 g, Oral, PRN  dextrose 50 % IV solution, 12.5 g, Intravenous, PRN  glucagon (rDNA) injection 1 mg, 1 mg, Intramuscular, PRN  dextrose 5 % solution, 100 mL/hr, Intravenous, PRN  [START ON 1/20/2020] furosemide (LASIX) injection 40 mg, 40 mg, Intravenous, Q12H  chlorothiazide (DIURIL) 125 mg in sodium chloride 0.9 % 50 mL IVPB, 125 mg, Intravenous, 2 times per day  levalbuterol (XOPENEX) nebulization 0.63 mg, 0.63 mg, Nebulization, Q6H  rOPINIRole (REQUIP) tablet 0.5 mg, 0.5 mg, Oral, Dinner  acetaminophen (TYLENOL) tablet 500 mg, 500 mg, Oral, Q6H PRN  apixaban (ELIQUIS) tablet 5 mg, 5 mg, Oral, BID  b complex-C-folic acid (NEPHROCAPS) capsule 1 mg, 1 mg, Oral, QAM  budesonide (PULMICORT) nebulizer suspension 1,000 mcg, 1,000 mcg, Nebulization, BID  calcium-vitamin D 500-200 MG-UNIT per tablet 1 tablet, 1 tablet, Oral, Lunch  vitamin D tablet 2,000 Units, 2,000 Units, Oral, Lunch  clotrimazole-betamethasone (LOTRISONE) cream, , Topical, BID PRN  ferrous sulfate tablet 325 mg, 325 mg, Oral, Lunch  fluticasone (FLONASE) 50 MCG/ACT nasal spray 1 spray, 1 spray, Each Nostril, Daily  formoterol (PERFOROMIST) nebulizer solution 20 mcg, 20 mcg, Nebulization, Q12H  gabapentin (NEURONTIN) capsule 100 mg, 100 mg, Oral, Nightly  hydrocortisone (ANUSOL-HC) suppository 25 mg, 25 mg, Rectal, Nightly  lactulose (CHRONULAC) 10 GM/15ML solution 20 g, 20 g, Oral, BID  levothyroxine (SYNTHROID) tablet 125 mcg, 125 mcg, Oral, Daily  magnesium oxide (MAG-OX) tablet 400 mg, 400 mg, Oral, QAM  metoprolol tartrate (LOPRESSOR) tablet 25 mg, 25 mg, Oral, BID  miconazole (MICOTIN) 2 % powder, , Topical, BID  ondansetron (ZOFRAN) tablet 4 mg, 4 mg, Oral, Q8H PRN  oxyCODONE (ROXICODONE) immediate release tablet 10 mg, 10 mg, Oral, Q6H  potassium chloride (KLOR-CON M) extended release tablet 40 mEq, 40 mEq, Oral, BID  senna (SENOKOT) tablet 17.2 mg, 2 tablet, Oral, QAM  simethicone (MYLICON) chewable tablet 160 mg, 160 mg, Oral, Lunch  methylPREDNISolone sodium (SOLU-MEDROL) injection 80 mg, 80 mg, Intravenous, Q8H  famotidine (PEPCID) tablet 20 mg, 20 mg, Oral, Daily  oxyCODONE (ROXICODONE) immediate release tablet 7.5 mg, 7.5 mg, Oral, Q8H PRN  magnesium hydroxide (MILK OF MAGNESIA) 400 MG/5ML suspension 30 mL, 30 mL, Oral, Daily PRN  sodium chloride (OCEAN, BABY AYR) 0.65 % nasal spray 1 spray, 1 spray, Each Nostril, Q4H PRN  sodium chloride flush 0.9 % injection 10 mL, 10 mL, Intravenous, BID  oseltamivir (TAMIFLU) capsule 30 mg, 30 mg, Oral, BID  rOPINIRole (REQUIP) tablet 0.25 mg, 0.25 mg, Oral, Nightly  Physical    TEMPERATURE:  Current - Temp: 97.7 °F (36.5 °C);  Max - Temp  Av.7 °F (36.5 °C)  Min: 97.5 °F (36.4 °C)  Max: 97.9 °F (36.6 °C)  RESPIRATIONS RANGE: Resp  Av.8  Min: 12  Max: 24  PULSE RANGE: Pulse  Av  Min: 77  Max: 111  BLOOD PRESSURE RANGE:  Systolic (28VTC), THS:506 , Min:107 , FGX:056   ; Diastolic (12EUG), OMX:77, Min:73, Max:85    PULSE OXIMETRY RANGE: SpO2  Av %  Min: 94 %  Max: 96 %  24HR INTAKE/OUTPUT:    Intake/Output Summary (Last 24 hours) at 2020  Last data filed at 2020  Gross per 24 hour   Intake 420 ml   Output 1700 ml   Net -1280 ml     CONSTITUTIONAL:  awake, alert, cooperative, no apparent distress, and appears stated age  NECK: Supple, symmetrical, trachea midline, no adenopathy, thyroid symmetric, not enlarged and no tenderness, skin normal  HEMATOLOGIC/LYMPHATICS:  no cervical lymphadenopathy and no supraclavicular lymphadenopathy  BACK:  Symmetric, no curvature, spinous processes are non-tender on palpation, paraspinous muscles are non-tender on palpation, no costal vertebral tenderness  LUNGS:  no increased work of breathing, good air exchange and crackles right base and left base  CARDIOVASCULAR:  Normal apical impulse, regular rate and rhythm, normal S1 and S2, no S3 or S4, and no murmur noted  ABDOMEN:  No scars, normal bowel sounds, soft, non-distended, non-tender, no masses palpated, no hepatosplenomegally  Data    CBC with Differential:    Lab Results   Component Value Date    WBC 10.1 01/19/2020    RBC 3.88 01/19/2020    HGB 11.8 01/19/2020    HCT 37.5 01/19/2020     01/19/2020    MCV 96.6 01/19/2020    MCH 30.4 01/19/2020    MCHC 31.5 01/19/2020    RDW 13.2 01/19/2020    NRBC 0.0 08/22/2019    SEGSPCT 54 11/21/2013    METASPCT 1.0 11/19/2019    LYMPHOPCT 12.9 01/17/2020    MONOPCT 6.9 01/17/2020    BASOPCT 0.5 01/17/2020    MONOSABS 0.60 01/17/2020    LYMPHSABS 1.12 01/17/2020    EOSABS 0.05 01/17/2020    BASOSABS 0.04 01/17/2020     BMP:    Lab Results   Component Value Date     01/19/2020    K 4.3 01/19/2020    K 3.8 01/17/2020    CL 96 01/19/2020    CO2 27 01/19/2020    BUN 26 01/19/2020    LABALBU 3.3 01/19/2020    LABALBU 3.5 07/07/2011    CREATININE 1.1 01/19/2020    CALCIUM 9.1 01/19/2020    GFRAA 56 01/19/2020    LABGLOM 46 01/19/2020    GLUCOSE 357 01/19/2020    GLUCOSE 106 10/04/2011     ABG:    Lab Results   Component Value Date    PH 7.455 11/23/2019    PCO2 41.1 11/23/2019    PO2 66.1 11/23/2019    HCO3 28.2 11/23/2019    BE 4.0 11/23/2019    O2SAT 93.0 11/23/2019       ASSESSMENT AND PLAN          Assessment:   Influenza B  CHF  Basilar atelectasis  Protein-calorie malnutrition      Plan: Tamiflu  Aerosols  Use CPAP nocturnal and PRN  Magic cups  Recruitment techniques  Diurese  Nephrology to see    Gary Coombs D.O.

## 2020-01-20 NOTE — PROGRESS NOTES
Date: 1/20/2020    Time: 3:55 AM    Patient Placed On BIPAP/CPAP/ Non-Invasive Ventilation? No    If no must comment. Facial area red/color change? No           If YES are Blister/Lesion present? No   If yes must notify nursing staff  BIPAP/CPAP skin barrier? No    Skin barrier type:na (home mask)      Comments: Pt remains on CPAP. Tidal volumes noted to be reduced when mouth is open, pt however doesn't want to use a full face mask and requesting to remains on her nasal mask. Will continue to monitor.         [place]ila Coronado     01/20/20 0354   NIV Type   Mode CPAP   Mask Type Nasal mask   Settings/Measurements   CPAP/EPAP 9 cmH2O   Resp 19   O2 Flow Rate (L/min) 3 L/min   Vt Exhaled 202 mL  (pt's mouth open (smaller VT))   Minute Volume 4 Liters   Mask Leak (lpm) 21 lpm   Comfort Level Good   Using Accessory Muscles No

## 2020-01-20 NOTE — PROGRESS NOTES
Nephrology Progress Note  Patient's Name: Ifeoma Henriquez  1:20 PM  1/20/2020    Nephrologist: Dr. Daphney Bumpers     Reason for Consult:   CKD G3 with Vol Overload  Requesting Physician:  Varsha House MD    Chief Complaint:  Edema  and SOB    History Obtained From:  patient, relative(s) and past medical records    History of Present Ilness:    Ifeoma Henriquez is a 80 y.o. female with PMH significant for HTN, CHF and CAD who presented to the emergency department for increased edema and SOB. She has gained 10# over the preceding week and is now having difficulty ambulating due to the edema. She complained of associated shortness of breath and tested (+) Influenza B. In the ED the patient was noted to be hypotensive. She is known to the practice from a previous [facility] in August 2019 admission 11/2019 for vol overloadand a She has a history of CKD G3 with baseline serum creat 1.0-1.mg/dl with an e-GFR 46-52ml/min. Upon assessment she states she is feeling a little better today. She still gets SOB with minimal exertion. Her family is at the bedside and were updated on her renal status. 1/20/20: Pt states SOB earlier today necessitating BIPAP, less SOB and off BIPAP currently    Past Medical History:   Diagnosis Date    AC (acromioclavicular) joint bone spurs     in feet    Acid reflux     Ascending aortic aneurysm (HCC)     4.0 cm,(SEE LETTERS DR. PENALOZA) IS BEING MONITORED    Atrial fibrillation ([address]     CAD (coronary artery disease)     Cancer (HCC) 16 YRS AGO    breast RIGHT    CHF (congestive heart failure) (HCC)     Chronic back pain     stenosis, receives epidurals bi-monthly    CKD (chronic kidney disease) stage 4, GFR 15-29 ml/min ([facility]) 8/24/2019    DJD (degenerative joint disease)     Hyperlipidemia     Hypertension     Hypothyroidism     Iron deficiency anemia     Lung nodule     RIGHT LOWER LOBE, BEING MONITORED BY DR. Crystal Higgins, LAST CT EPIC 3/2014    MVP (mitral valve prolapse)     PRN  dextrose 50 % IV solution, PRN  glucagon (rDNA) injection 1 mg, PRN  dextrose 5 % solution, PRN  furosemide (LASIX) injection 40 mg, Q12H  chlorothiazide (DIURIL) 125 mg in sodium chloride 0.9 % 50 mL IVPB, 2 times per day  levalbuterol (XOPENEX) nebulization 0.63 mg, Q6H  rOPINIRole (REQUIP) tablet 0.5 mg, Dinner  acetaminophen (TYLENOL) tablet 500 mg, Q6H PRN  apixaban (ELIQUIS) tablet 5 mg, BID  b complex-C-folic acid (NEPHROCAPS) capsule 1 mg, QAM  budesonide (PULMICORT) nebulizer suspension 1,000 mcg, BID  calcium-vitamin D 500-200 MG-UNIT per tablet 1 tablet, Lunch  vitamin D tablet 2,000 Units, Lunch  clotrimazole-betamethasone (LOTRISONE) cream, BID PRN  ferrous sulfate tablet 325 mg, Lunch  fluticasone (FLONASE) 50 MCG/ACT nasal spray 1 spray, Daily  formoterol (PERFOROMIST) nebulizer solution 20 mcg, Q12H  gabapentin (NEURONTIN) capsule 100 mg, Nightly  hydrocortisone (ANUSOL-HC) suppository 25 mg, Nightly  lactulose (CHRONULAC) 10 GM/15ML solution 20 g, BID  levothyroxine (SYNTHROID) tablet 125 mcg, Daily  magnesium oxide (MAG-OX) tablet 400 mg, QAM  metoprolol tartrate (LOPRESSOR) tablet 25 mg, BID  miconazole (MICOTIN) 2 % powder, BID  ondansetron (ZOFRAN) tablet 4 mg, Q8H PRN  oxyCODONE (ROXICODONE) immediate release tablet 10 mg, Q6H  potassium chloride (KLOR-CON M) extended release tablet 40 mEq, BID  senna (SENOKOT) tablet 17.2 mg, QAM  simethicone (MYLICON) chewable tablet 160 mg, Lunch  famotidine (PEPCID) tablet 20 mg, Daily  oxyCODONE (ROXICODONE) immediate release tablet 7.5 mg, Q8H PRN  magnesium hydroxide (MILK OF MAGNESIA) 400 MG/5ML suspension 30 mL, Daily PRN  sodium chloride (OCEAN, BABY AYR) 0.65 % nasal spray 1 spray, Q4H PRN  sodium chloride flush 0.9 % injection 10 mL, BID  oseltamivir (TAMIFLU) capsule 30 mg, BID  rOPINIRole (REQUIP) tablet 0.25 mg, Nightly        Review of Systems:   Pertinent items are noted in HPI. Remainder of a complete review of systems is (-) other than stated in the HPI    Physical exam:   Constitutional:  Elderly female visibly SOB  Vitals: VITALS:  /80   Pulse 98   Temp 98.7 °F (37.1 °C) (Oral)   Resp 18   Ht 5' (1.524 m)   Wt 214 lb 6.4 oz (97.3 kg)   SpO2 96%   BMI 41.87 kg/m²   24HR INTAKE/OUTPUT:      Intake/Output Summary (Last 24 hours) at 1/20/2020 1320  Last data filed at 1/20/2020 0600  Gross per 24 hour   Intake 180 ml   Output 1500 ml   Net -1320 ml     URINARY CATHETER OUTPUT (Chung):  Urethral Catheter Non-latex 16 fr-Output (mL): 450 mL  Skin: erythema of the back and upper thorax, turgor wnl  Heent:  eomi, mmm, nc,at  Neck: no bruits or jvd noted  Cardiovascular: PMI displaced lat Irreg Irreg no S3  Respiratory: Bilat crackles few wheezes  Abdomen:  +bs, soft, nt, nd  Ext: R>L  lower extremity edema2-3+  Neuro: A&O x3 no focal motor neuro deficit    Data:   Labs:  CBC:   Lab Results   Component Value Date    WBC 7.8 01/20/2020    RBC 3.78 01/20/2020    HGB 11.4 01/20/2020    HCT 36.1 01/20/2020    MCV 95.5 01/20/2020    MCH 30.2 01/20/2020    MCHC 31.6 01/20/2020    RDW 13.2 01/20/2020     01/20/2020    MPV 10.9 01/20/2020     BMP:    Lab Results   Component Value Date     01/20/2020    K 3.7 01/20/2020    K 3.8 01/17/2020    CL 95 01/20/2020    CO2 28 01/20/2020    BUN 34 01/20/2020    LABALBU 3.1 01/20/2020    LABALBU 3.5 07/07/2011    CREATININE 1.1 01/20/2020    CALCIUM 8.8 01/20/2020    GFRAA 56 01/20/2020    LABGLOM 46 01/20/2020    GLUCOSE 326 01/20/2020    GLUCOSE 106 10/04/2011     Albumin:    Lab Results   Component Value Date    LABALBU 3.1 01/20/2020    LABALBU 3.5 07/07/2011     Ionized Calcium:  No results found for: IONCA  Magnesium:    Lab Results   Component Value Date    MG 1.9 11/30/2019     Phosphorus:    Lab Results   Component Value Date    PHOS 3.0 11/30/2019     VITAMIN B12: No components found for: B12  FOLATE:    Lab Results   Component Value Date    FOLATE 15.5 11/17/2019     IRON:    Lab abnormalities seen. Normal left ventricular ejection fraction. There is doppler evidence of stage II diastolic dysfunction. Mild mitral annular calcification. Mild mitral regurgitation is present. The tricuspid valve was not well visualized. Pulmonary hypertension is mild . 2D ECHO 1/17/20:  Summary   Ejection fraction is visually estimated at 65%. No regional wall motion abnormalities seen. Mild left ventricular concentric hypertrophy noted. Mildly dilated right ventricle with reduced function. TAPSE is 1.5 cm. Mild mitral regurgitation is present. Moderate tricuspid regurgitation. Pulmonary hypertension is moderate. RVSP is 53 mmHg. Assessment/Plan     1. CKD G3A in the setting of HTN, CAD, CHF and afib with presumed microvascular disease.  -Baseline creatinine 1.0-1.1mg/dl with an e-GFR=46-52ml/min  - UA mod blood, glucose >1000, trace LE, mod bacteria WBC 2-5  -cr at baseline     2.  HTN with CKD I-IV  -BP stable  -follow with diuresis    3. Anasarca in the setting of the HFpEF and Hypoalbuminemia  -currently on furosemide 40mg twice daily with diuril 125mg bid   -continue the same for today    4. Transaminitis- in the setting of the Inflenza B-ydnmelur-fldsnt    5. Shortness of breath  -+Influenza B  -On Oseltamivir    6.  Sec HPTH of Renal Origin-PTH 49 and Vit D 31-Ca++ WNL    Thank you Dr. Poli Rouse MD for allowing us to participate in care of Woo Cartwright MD  1:20 PM  1/20/2020

## 2020-01-21 NOTE — PROGRESS NOTES
ALEKSEY Kay, APRN - CNP, 80 mg at 01/21/20 0554    insulin lispro (HUMALOG) injection vial 0-12 Units, 0-12 Units, Subcutaneous, TID WC, Carlos Esquivel, DO, 6 Units at 01/21/20 0883    insulin lispro (HUMALOG) injection vial 0-6 Units, 0-6 Units, Subcutaneous, Nightly, Carlos Esquivel, DO, 3 Units at 01/20/20 2144    glucose (GLUTOSE) 40 % oral gel 15 g, 15 g, Oral, PRN, Carlos Esquivel, DO    dextrose 50 % IV solution, 12.5 g, Intravenous, PRN, Carlos Esquivel, DO    glucagon (rDNA) injection 1 mg, 1 mg, Intramuscular, PRN, Carlos Esquivel, DO    dextrose 5 % solution, 100 mL/hr, Intravenous, PRN, Carlos Esquivel, DO    furosemide (LASIX) injection 40 mg, 40 mg, Intravenous, Q12H, Manuel Griffith MD, 40 mg at 01/21/20 0554    chlorothiazide (DIURIL) 125 mg in sodium chloride 0.9 % 50 mL IVPB, 125 mg, Intravenous, 2 times per day, Manuel Griffith MD, Stopped at 01/20/20 2253    levalbuterol (XOPENEX) nebulization 0.63 mg, 0.63 mg, Nebulization, Q6H, Dann Sears DO, 0.63 mg at 01/21/20 0114    rOPINIRole (REQUIP) tablet 0.5 mg, 0.5 mg, Oral, Dinner, Jocelyn Kidd MD, 0.5 mg at 01/20/20 1540    acetaminophen (TYLENOL) tablet 500 mg, 500 mg, Oral, Q6H PRN, Jocelyn Kidd MD    apixaban [place]) tablet 5 mg, 5 mg, Oral, BID, Jocelyn Kidd MD, 5 mg at 01/20/20 2142    b complex-C-folic acid (NEPHROCAPS) capsule 1 mg, 1 mg, Oral, QAM, Jocelyn Kidd MD, 1 mg at 01/20/20 4290    budesonide (PULMICORT) nebulizer suspension 1,000 mcg, 1,000 mcg, Nebulization, BID, Jocelyn Kidd MD, 1,000 mcg at 01/20/20 2104    calcium-vitamin D 500-200 MG-UNIT per tablet 1 tablet, 1 tablet, Oral, Lunch, Jocelyn Kidd MD, 1 tablet at 01/20/20 1033    vitamin D tablet 2,000 Units, 2,000 Units, Oral, Lunch, Jocelyn Kidd MD, 2,000 Units at 01/20/20 1033    clotrimazole-betamethasone (LOTRISONE) cream, , Topical, BID PRN, Jocelyn Kidd MD    ferrous sulfate tablet 325 mg, 325 mg, Oral, LunchMarques spray, 1 spray, Each Nostril, Q4H PRN, Madison Monk MD    sodium chloride flush 0.9 % injection 10 mL, 10 mL, Intravenous, BID, Madison Monk MD, 10 mL at 01/20/20 2143    oseltamivir (TAMIFLU) capsule 30 mg, 30 mg, Oral, BID, Madison Monk MD, 30 mg at 01/20/20 2142    rOPINIRole (REQUIP) tablet 0.25 mg, 0.25 mg, Oral, Nightly, Madison Monk MD, 0.25 mg at 01/20/20 2142    Physical Exam:  /76   Pulse 79   Temp 97.4 °F (36.3 °C) (Oral)   Resp 18   Ht 5' (1.524 m)   Wt 227 lb (103 kg)   SpO2 94%   BMI 44.33 kg/m²   Wt Readings from Last 3 Encounters:   01/21/20 227 lb (103 kg)   12/16/19 185 lb (83.9 kg)   12/11/19 193 lb 3.2 oz (87.6 kg)     Appearance: Elderly lady lying in bed, awake, alert, no acute respiratory distress, on nasal CPAP  Skin: Intact, no rash  Head: Normocephalic, atraumatic  Eyes: EOMI, no conjunctival erythema  ENMT: No pharyngeal erythema, MMM, no rhinorrhea  Neck: Supple, no elevated JVP, no carotid bruits  Lungs: Bilateral wheezing, bilateral rales mid lower lung fields  Cardiac: PMI nondisplaced, irregular rhythm with a normal rate, S1 & S2 normal, no murmurs  Abdomen: Soft, nontender, +bowel sounds  Extremities: Moves all extremities x 4, 2-3+ lower extremity edema  Neurologic: No focal motor deficits apparent, normal mood and affect  Peripheral Pulses: Intact posterior tibial pulses bilaterally    Intake/Output:    Intake/Output Summary (Last 24 hours) at 1/21/2020 0715  Last data filed at 1/20/2020 1556  Gross per 24 hour   Intake 360 ml   Output 900 ml   Net -540 ml     No intake/output data recorded.     Laboratory Tests:  Recent Labs     01/19/20  0855 01/20/20  0500 01/21/20  0342    137 143   K 4.3 3.7 3.4*   CL 96* 95* 96*   CO2 27 28 32*   BUN 26* 34* 41*   CREATININE 1.1* 1.1* 1.3*   GLUCOSE 357* 326* 272*   CALCIUM 9.1 8.8 8.6     Lab Results   Component Value Date    MG 1.9 11/30/2019     Recent Labs     01/19/20  0855 01/20/20  0500 01/21/20  0342 calcified      Impression:       No significant change from the previous exam.     Echocardiogram: 11/15/2019 (Dr. Marie Kurtz): EF 55%. Technically suboptimal and limited study. Left ventricle is normal in size Borderline concentric left ventricular hypertrophy. No regional wall motion abnormalities seen. Normal left ventricular ejection fraction. There is doppler evidence of stage II diastolic dysfunction. Mild mitral annular calcification. Mild mitral regurgitation is present. The tricuspid valve was not well visualized. Pulmonary hypertension is mild     Echo 1/19/2020   Summary   Ejection fraction is visually estimated at 65%. No regional wall motion abnormalities seen. Mild left ventricular concentric hypertrophy noted. Mildly dilated right ventricle with reduced function. TAPSE is 1.5 cm. Mild mitral regurgitation is present. Moderate tricuspid regurgitation. Pulmonary hypertension is moderate. RVSP is 53 mmHg. Stress test:      Cardiac catheterization: 10/24/2013: Status post remote stent of LAD with demonstration of moderate in-stent restenosis today. 50% ostial 2nd diagonal branch stenosis. Normal left ventricular systolic function. Aortic root dilatation.     ASSESSMENT / PLAN:  1. Acute on chronic heart failure with preserved ejection fraction. -3.7 L. Still volume overloaded/anasarca  2. Acute influenza B  3. Persistent atrial fibrillation, rate controlled. Diagnosed 2014. PEACE/DCC and amiodarone 2018. On and off AC, resumed 11/2019. A. fib recurred 11/2019 in setting of parainfluenza. Amiodarone continued but remained in A. Fib. Currently rate controlled  4. CAD, PCI to LAD 2011  5. Valvular heart disease: Mild MR  6. MIKE/CKD  7. Comorbid disease: Hypertension, hyperlipidemia, obesity, hypothyroidism, TARIK, LAFB, chronic back pain, GERD, history of right breast CA    Recommendations:  Still remains volume overloaded. A. fib rate reasonably well controlled.   Echo showing no new changes,

## 2020-01-21 NOTE — PROGRESS NOTES
hour   Intake 360 ml   Output 2700 ml   Net -2340 ml     CPAP/EPAP: (S) 12 cmH2O(patient not achieving adequate tidal volmes)     CURRENT MEDS :  Scheduled Meds:   ketotifen  1 drop Both Eyes BID    benzonatate  200 mg Oral TID    guaiFENesin  400 mg Oral TID    lidocaine PF  5 mL Nebulization BID    methylPREDNISolone  40 mg Intravenous Q8H    insulin lispro  0-12 Units Subcutaneous TID WC    insulin lispro  0-6 Units Subcutaneous Nightly    furosemide  40 mg Intravenous Q12H    chlorothiazide (DIURIL) IVPB  125 mg Intravenous 2 times per day    levalbuterol  0.63 mg Nebulization Q6H    rOPINIRole  0.5 mg Oral Dinner    apixaban  5 mg Oral BID    b complex-C-folic acid  1 mg Oral QAM    budesonide  1,000 mcg Nebulization BID    calcium-vitamin D  1 tablet Oral Lunch    vitamin D  2,000 Units Oral Lunch    ferrous sulfate  325 mg Oral Lunch    fluticasone  1 spray Each Nostril Daily    formoterol  20 mcg Nebulization Q12H    gabapentin  100 mg Oral Nightly    hydrocortisone  25 mg Rectal Nightly    lactulose  20 g Oral BID    levothyroxine  125 mcg Oral Daily    magnesium oxide  400 mg Oral QAM    metoprolol tartrate  25 mg Oral BID    miconazole   Topical BID    oxyCODONE HCl  10 mg Oral Q6H    potassium chloride  40 mEq Oral BID    senna  2 tablet Oral QAM    simethicone  160 mg Oral Lunch    famotidine  20 mg Oral Daily    sodium chloride flush  10 mL Intravenous BID    oseltamivir  30 mg Oral BID    rOPINIRole  0.25 mg Oral Nightly       Physical Exam:  General Appearance: appears comfortable in no acute distress. obese  HEENT: Normocephalic atraumatic without obvious abnormality   Neck: Lips, mucosa, and tongue normal.  Supple, symmetrical, trachea midline, no adenopathy;thyroid:  no enlargement/tenderness/nodules or JVD. Lung: Breath sounds bilateral expiratory wheezing with scattered rhonchi. Respirations   unlabored. Symmetrical expansion. Heart: RRR, normal S1, S2.  No prophylaxis-pepcid     This plan of care was reviewed in collaboration with Dr. Dc Peterson  Electronically signed by ZHANNA Cruz CNP on 1/21/2020 at 12:12 PM       I personally saw, examined, and cared for the patient. Labs, medications, radiographs reviewed. I agree with history exam and plans detailed in NP note. Improving slowly   Monitor cxr   Extra dose diuretic today as she has edema and slight decline in respiratory status   Taper steroids in am if she has improvement. Jordon Farrell M.D.    Pulmonary/Critical Care Medicine

## 2020-01-21 NOTE — PROGRESS NOTES
Partial bowel obstruction ([address]     Pneumonia 2008    Polymyalgia rheumatica ([address]     Scoliosis     Sinus arrhythmia     1/2014 last ekg, nsr with pacs, epic    Sleep apnea     uses cpap    Spinal stenosis     Thoracic ascending aortic aneurysm ([address]     Thyroid disease     hypothyroidism       Past Surgical History:   Procedure Laterality Date    ARTERY BIOPSY  2008    temporal   [facility],  BONE SPURS    BRONCHOSCOPY N/A 11/18/2019    BRONCHOSCOPY   (BEDSIDE) performed by Christian Lopez MD at [facility]  2010    CARDIOVERSION  02/13/2018    CERVICAL FUSION  1994    c3-4    CHOLECYSTECTOMY  1980's    OPEN    COLON SURGERY  1980's    repair of partial bowel obstruction    CORONARY ANGIOPLASTY WITH STENT PLACEMENT  06/2011    Dr. Peter Lee, ESOPHAGUS      EYE SURGERY Bilateral 2004    CATARACTS WITH LENS IMPLANTS    FOOT SURGERY  2010,2011    ct guided injections of feet, toenail removal    HERNIA REPAIR  2131, 2409    UMBILICAL, RIH    HYSTERECTOMY  1960's    MASTECTOMY Right     TOE SURGERY      Removal of bony outgrowth, right great toe    TRANSESOPHAGEAL ECHOCARDIOGRAM  02/13/2018    WRIST SURGERY Right YRS AGO       Family History   Problem Relation Age of Onset    Brain Cancer Mother     Lung Cancer Brother     Heart Attack Father     Heart Surgery Maternal Aunt     Stroke Maternal Aunt         reports that she has never smoked. She has never used smokeless tobacco. She reports that she does not drink alcohol or use drugs. Allergies:  [place]-d [diphenhydramine hcl];  Benadryl [diphenhydramine]; and Phenergan [promethazine hcl]    Current Medications:    ketotifen (ZADITOR) 0.025 % ophthalmic solution 1 drop, BID  benzonatate (TESSALON) capsule 200 mg, TID  HYDROcodone-homatropine (HYCODAN) 5-1.5 MG/5ML syrup 5 mL, Q4H PRN  guaiFENesin tablet 400 mg, TID  lidocaine PF 1 % injection 5 mL, BID  methylPREDNISolone sodium (SOLU-MEDROL) injection 40 mg, Q8H  senna (SENOKOT) tablet 17.2 mg, Daily PRN  lactulose (CHRONULAC) 10 GM/15ML solution 20 g, BID PRN  insulin lispro (HUMALOG) injection vial 0-12 Units, TID WC  insulin lispro (HUMALOG) injection vial 0-6 Units, Nightly  glucose (GLUTOSE) 40 % oral gel 15 g, PRN  dextrose 50 % IV solution, PRN  glucagon (rDNA) injection 1 mg, PRN  dextrose 5 % solution, PRN  furosemide (LASIX) injection 40 mg, Q12H  chlorothiazide (DIURIL) 125 mg in sodium chloride 0.9 % 50 mL IVPB, 2 times per day  levalbuterol (XOPENEX) nebulization 0.63 mg, Q6H  rOPINIRole (REQUIP) tablet 0.5 mg, Dinner  acetaminophen (TYLENOL) tablet 500 mg, Q6H PRN  apixaban (ELIQUIS) tablet 5 mg, BID  b complex-C-folic acid (NEPHROCAPS) capsule 1 mg, QAM  budesonide (PULMICORT) nebulizer suspension 1,000 mcg, BID  calcium-vitamin D 500-200 MG-UNIT per tablet 1 tablet, Lunch  vitamin D tablet 2,000 Units, Lunch  clotrimazole-betamethasone (LOTRISONE) cream, BID PRN  ferrous sulfate tablet 325 mg, Lunch  fluticasone (FLONASE) 50 MCG/ACT nasal spray 1 spray, Daily  formoterol (PERFOROMIST) nebulizer solution 20 mcg, Q12H  gabapentin (NEURONTIN) capsule 100 mg, Nightly  hydrocortisone (ANUSOL-HC) suppository 25 mg, Nightly  levothyroxine (SYNTHROID) tablet 125 mcg, Daily  magnesium oxide (MAG-OX) tablet 400 mg, QAM  metoprolol tartrate (LOPRESSOR) tablet 25 mg, BID  miconazole (MICOTIN) 2 % powder, BID  ondansetron (ZOFRAN) tablet 4 mg, Q8H PRN  oxyCODONE (ROXICODONE) immediate release tablet 10 mg, Q6H  potassium chloride (KLOR-CON M) extended release tablet 40 mEq, BID  simethicone (MYLICON) chewable tablet 160 mg, Lunch  famotidine (PEPCID) tablet 20 mg, Daily  oxyCODONE (ROXICODONE) immediate release tablet 7.5 mg, Q8H PRN  magnesium hydroxide (MILK OF MAGNESIA) 400 MG/5ML suspension 30 mL, Daily PRN  sodium chloride (OCEAN, BABY AYR) 0.65 % nasal spray 1 spray, Q4H PRN  sodium chloride flush 0.9 % injection 10 mL, Phosphorus:    Lab Results   Component Value Date    PHOS 3.0 11/30/2019     VITAMIN B12: No components found for: B12  FOLATE:    Lab Results   Component Value Date    FOLATE 15.5 11/17/2019     IRON:    Lab Results   Component Value Date    IRON 49 01/20/2020     Iron Saturation:  No components found for: PERCENTFE  TIBC:    Lab Results   Component Value Date    TIBC 243 01/20/2020     FERRITIN:    Lab Results   Component Value Date    FERRITIN 380 01/20/2020        Imaging:  Type of Study      TTE procedure:Echo Complete W/ Dop & Color Flow. Procedure Date  Date: 11/15/2019 Start: 03:03 PM     Study Location: Echo Lab  Technical Quality: Adequate visualization     Indications:Pulmonary embolus.     Patient Status: STAT     Height: 60 inches     HR: 88 bpm BP: 119/59 mmHg      Findings      Left Ventricle   Left ventricle is normal in size . Borderline concentric left ventricular hypertrophy. No regional wall motion abnormalities seen. Normal left ventricular ejection fraction. Ejection fraction is visually estimated at 55%. There is doppler evidence of stage II diastolic dysfunction. Right Ventricle   The right ventricle was not clearly visualized. Normal right ventricular size. Left Atrium   Normal sized left atrium. Interatrial septum appears intact. Right Atrium   Right Atrium is not clearly visualized. Normal right atrium size. Mitral Valve   Structurally normal mitral valve. Mild mitral annular calcification. Mild mitral regurgitation is present. Tricuspid Valve   The tricuspid valve was not well visualized. RVSP is 40-45 mmHg. Pulmonary hypertension is mild . Aortic Valve   The aortic valve leaflets were not well visualized. Mild aortic regurgitation is noted. Pulmonic Valve   The pulmonic valve was not well visualized. Pericardial Effusion   Fat pad versus small effusion cannot be ruled out.       Aorta   Aortic root dimension within normal limits. 2D ECHO 11/15/19   Summary   Technically suboptimal and limited study. Left ventricle is normal in size . Borderline concentric left ventricular hypertrophy. No regional wall motion abnormalities seen. Normal left ventricular ejection fraction. There is doppler evidence of stage II diastolic dysfunction. Mild mitral annular calcification. Mild mitral regurgitation is present. The tricuspid valve was not well visualized. Pulmonary hypertension is mild . 2D ECHO 1/17/20:  Summary   Ejection fraction is visually estimated at 65%. No regional wall motion abnormalities seen. Mild left ventricular concentric hypertrophy noted. Mildly dilated right ventricle with reduced function. TAPSE is 1.5 cm. Mild mitral regurgitation is present. Moderate tricuspid regurgitation. Pulmonary hypertension is moderate. RVSP is 53 mmHg. Assessment/Plan     1. CKD G3A in the setting of HTN, CAD, CHF and afib with presumed microvascular disease.  -Baseline creatinine 1.0-1.1mg/dl with an e-GFR=46-52ml/min  - UA mod blood, glucose >1000, trace LE, mod bacteria WBC 2-5  -cr up to 1.3mg/dl-follow with the titration of the diuretics     2.  HTN with CKD I-IV  -BP at goal <130/80  -follow with diuresis    3. Anasarca in the setting of the HFpEF and Hypoalbuminemia  -currently on furosemide 40mg twice daily with diuril 125mg bid will titrate to furosemide 60mg tid and diuril 250mg iv tid   -discussed with Pulmonary    4. Transaminitis- in the setting of the Inflenza [id number]    5. Shortness of breath  -+Influenza B  -On Oseltamivir    6.  Sec HPTH of Renal Origin-PTH 49 and Vit D 31-Ca++ WNL    Thank you Dr. Raimundo Miranda MD for allowing us to participate in care of Karol Ivette Cartwright MD  5:10 PM  1/21/2020

## 2020-01-21 NOTE — PROGRESS NOTES
ABG drawn x 1 from Left Radial. Patient had NormalAllen's Test.  Patient was on 3 liters/min via nasal cannula  at time of puncture. Pressure held for 3. No bleeding or bruising noted at puncture site.   Patient tolerated procedure well      Performed by Marco A Gray

## 2020-01-21 NOTE — PROGRESS NOTES
bath on second floor.  Stair glide to the second floor.   Prior to admission pt walked with walker.       Pt admitted from rehab        Initial Evaluation  Date: 1/20/20 Treatment  1/21/2020  Short Term/ Long Term   Goals   Was pt agreeable to Eval/treatment? yes yes      Does pt have pain? None reported        Bed Mobility  Rolling: NT   Supine to sit: Max A  Sit to supine: NT  Scooting: Mod A to sitting EOB  NT, pt in recliner upon arrival. Requested to stay up on recliner  Min A   Transfers Sit to stand: Min A  Stand to sit: Min A  Stand pivot: Min  A with w/w  Sit <> stand : min assist  CGA   Ambulation    15 feet x 2 with ww min assist, progressing to mod assist with fatigue    5 feet forward and back min assist with ww  25 feet with w/w with CGA   AM-PAC raw score 13/24            Edema: x 4 extremities   Endurance: poor     Chair alarm: no - daughter present     ASSESSMENT     Pt displays functional ability as noted in the objective portion of this treatment        Comments/Treatment:  Pt reports she is having a bad day. Performed seated APs, LAQs,and hip flex. Minimal gait due to pt feeling weak and SOB. Pulse ox at rest and after mobility 96%.  after minimal activity.  Pt with poor activity tolerance.   At end of session, pt left sitting up in the recliner with call light in reach and family present.       Patient education  Pt educated on PT objectives during eval and while in the hospital, hand placement during transfers.     Patient response to education:   Pt verbalized understanding Pt demonstrated skill Pt requires further education in this area   Yes  With cueing yes              PLAN     Con't with PT POC.      Time in: 1500  Time out: 1520      [facility]  PT [id number]

## 2020-01-21 NOTE — PROGRESS NOTES
Occupational Therapy  OT BEDSIDE TREATMENT NOTE      Date:2020  Patient Name: Peggy Duarte  MRN: [id number]  : 1927  Room: [address] [place], [place] [address]A     Evaluating OT: Sabrina Dan OTR/L [id number]     AM-PAC Daily Activity Raw Score:      Recommended Adaptive Equipment: TBD     Diagnosis: influenza B. Pt presents to ED from SNF. Pertinent Medical History: spinal stenosis, polymyalgia rheumatica, scoliosis, osteoporosis, OA, MVP, lung nodule, HTN, DJD, CKD, CHF, CAD, afib  Precautions:  Falls, O2, droplet isolation     Home Living: Pt admitted from SNF. Daughter reports prior to approx 1 week ago pt was 1 person assist in transfers, mobility with [employer] and toileting/dressing. Pt lives with daughter and PHU in a 2 story home with B/ B 2nd floor, stairglide to 2nd floor. Bathroom setup: tub/shower combo with tub bench      Prior Level of Function: assist with ADLs, assist with IADLs; completed functional mobility with [employer]     Pain Level: No c/o pain     Cognition: A&O: Pt alert and conversing with safety awareness deficits demoed. Functional Assessment:    Initial Eval Status  Date: 20 Treatment session: 20 Short Term Goals  Treatment frequency: 2-5x/wk PRN x1-3 wks   Feeding Set up       Grooming Min A Min A seated  Set up   UB Dressing Mod A Mod A to doff/ don gown  Set up   LB Dressing Dependent  Management of B socks Max A to manage B socks  Mod A    Bathing Max A  UE: Mod A seated    LE: Max A seated/ standig Mod A   Toileting Mod A  Use of grab bar at commode for assist in transfer and to maintain balance during completion of toileting care  Min A for commode transfer  Max A for posterior cristopher care  Max A 2xs during tx session. Pt found soiled requiring assistance with hygiene and then requested to utilize [facility] during tx. Min A   Bed Mobility  Supine to sit: Max A  Supine to sit:  Max A     Functional Transfers STS: Min A  STS: Mod A progressing to Min A from EOB, arm chair and

## 2020-01-21 NOTE — PROGRESS NOTES
Family Medicine    Subjective: The patient is doing ok this AM. Still with cough and mild dyspnea. States to feel improved. Had some lethargy yesterday which is improved with holding narcotics. Denies chest pain and angina. Objective:  /76   Pulse 79   Temp 97.4 °F (36.3 °C) (Oral)   Resp 18   Ht 5' (1.524 m)   Wt 227 lb (103 kg)   SpO2 94%   BMI 44.33 kg/m²     HEENT: MMM. Heart: Irregularly irregular. Lungs: Decreased breath sounds with scattered wheezing. Abd: bowel sounds present, nontender, nondistended, no masses. Extrem:  No clubbing or cyanosis. 2+ edema. Neuro: Intact without deficits. CBC with Differential:    Lab Results   Component Value Date    WBC 7.3 01/21/2020    RBC 3.90 01/21/2020    HGB 11.6 01/21/2020    HCT 37.0 01/21/2020     01/21/2020    MCV 94.9 01/21/2020    MCH 29.7 01/21/2020    MCHC 31.4 01/21/2020    RDW 13.0 01/21/2020    NRBC 0.0 08/22/2019    SEGSPCT 54 11/21/2013    METASPCT 1.0 11/19/2019    LYMPHOPCT 12.9 01/17/2020    MONOPCT 6.9 01/17/2020    BASOPCT 0.5 01/17/2020    MONOSABS 0.60 01/17/2020    LYMPHSABS 1.12 01/17/2020    EOSABS 0.05 01/17/2020    BASOSABS 0.04 01/17/2020     CMP:    Lab Results   Component Value Date     01/21/2020    K 3.4 01/21/2020    K 3.8 01/17/2020    CL 96 01/21/2020    CO2 32 01/21/2020    BUN 41 01/21/2020    CREATININE 1.3 01/21/2020    GFRAA 46 01/21/2020    LABGLOM 38 01/21/2020    GLUCOSE 272 01/21/2020    GLUCOSE 106 10/04/2011    PROT 5.1 01/21/2020    LABALBU 3.2 01/21/2020    LABALBU 3.5 07/07/2011    CALCIUM 8.6 01/21/2020    BILITOT 0.7 01/21/2020    ALKPHOS 126 01/21/2020    AST 25 01/21/2020    ALT 53 01/21/2020     Assessment/Plan:  1. Hypoxic respiratory failure - multifactorial. Secondary to influenza virus. On O2.  2. Influenza virus - On Tamiflu. Pulmonary on case. On nebs and Steroids and O2. Cough suppressants added. 3. Atrial fibrillation with RVR - exacerbated by pulmonary status.  2D Echo stable. Cardiology on case. Rate controlled. Anticoagulated on Eliquis. 4. CHF - acute on chronic diastolic. Diuresis per renal.   5. CKD - chronic disease. Nephrology on case. Creatinine stable. 6. AMS - ABG normal. Labs stable. Questionable medication effect. Hold narcotics at this time and monitor. 7. HTN - stable. 8. Hyperlipidemia - statin. 9. Hypothyroidism - Synthroid. 10. CAD - stable. 11. TARIK - on BiPAP. 12. Thoracic aortic aneurysm - stable. 13. Pulmonary nodule - stable. 14. PMR - on chronic prednisone at 10mg daily. 15. GERD - PPI. 16. LDD - pain control. 17. Disposition - as above. Continue same.     Blayne Hurtado  7:33 AM  1/21/2020

## 2020-01-22 NOTE — PROGRESS NOTES
I/O:    Intake/Output Summary (Last 24 hours) at 1/22/2020 1208  Last data filed at 1/22/2020 1144  Gross per 24 hour   Intake 430 ml   Output 3725 ml   Net -3295 ml     CPAP/EPAP: (S) 12 cmH2O(patient not achieving adequate tidal volmes)     CURRENT MEDS :  Scheduled Meds:   metoprolol tartrate  37.5 mg Oral BID    digoxin  250 mcg Intravenous Q6H    simethicone  160 mg Oral TID    methylPREDNISolone  40 mg Intravenous Q12H    ketotifen  1 drop Both Eyes BID    benzonatate  200 mg Oral TID    guaiFENesin  400 mg Oral TID    lidocaine PF  5 mL Nebulization BID    chlorothiazide (DIURIL) IVPB  250 mg Intravenous TID    furosemide  60 mg Intravenous Q12H    insulin lispro  0-12 Units Subcutaneous TID WC    insulin lispro  0-6 Units Subcutaneous Nightly    levalbuterol  0.63 mg Nebulization Q6H    rOPINIRole  0.5 mg Oral Dinner    apixaban  5 mg Oral BID    b complex-C-folic acid  1 mg Oral QAM    budesonide  1,000 mcg Nebulization BID    calcium-vitamin D  1 tablet Oral Lunch    vitamin D  2,000 Units Oral Lunch    ferrous sulfate  325 mg Oral Lunch    fluticasone  1 spray Each Nostril Daily    formoterol  20 mcg Nebulization Q12H    gabapentin  100 mg Oral Nightly    hydrocortisone  25 mg Rectal Nightly    levothyroxine  125 mcg Oral Daily    magnesium oxide  400 mg Oral QAM    miconazole   Topical BID    oxyCODONE HCl  10 mg Oral Q6H    potassium chloride  40 mEq Oral BID    famotidine  20 mg Oral Daily    sodium chloride flush  10 mL Intravenous BID    rOPINIRole  0.25 mg Oral Nightly       Physical Exam:  General Appearance: appears comfortable in no acute distress. obese  HEENT: Normocephalic atraumatic without obvious abnormality   Neck: Lips, mucosa, and tongue normal.  Supple, symmetrical, trachea midline, no adenopathy;thyroid:  no enlargement/tenderness/nodules or JVD. Lung: Breath sounds bilateral expiratory wheezing with scattered rhonchi. Respirations   unlabored. 1/22/2020 10:15   Source: Unknown Blood Arterial   pH, Blood Gas Latest Ref Range: 7.350 - 7.450  7.507 (H)   PCO2 Latest Ref Range: 35.0 - 45.0 mmHg 48.7 (H)   pO2 Latest Ref Range: 60.0 - 100.0 mmHg 63.0   HCO3 Latest Ref Range: 22.0 - 26.0 mmol/L 37.7 (H)   Base Excess Latest Ref Range: -3.0 - 3.0 mmol/L 12.9 (H)   O2 Sat Latest Ref Range: 92.0 - 98.5 % 92.7   tHb (est) Latest Ref Range: 11.5 - 16.5 g/dL 12.7   O2Hb Latest Ref Range: 94.0 - 97.0 % 91.9 (L)   COHb Latest Ref Range: 0.0 - 1.5 % 0.9   MetHb Latest Ref Range: 0.0 - 1.5 % 0.0   HHb Latest Ref Range: 0.0 - 5.0 % 7.2 (H)   O2 Content Latest Units: mL/dL 16.4   Pt Temp Latest Units: C 37.0   Critical(s) Notified Unknown . No Critical Values   Time Analyzed Unknown 1015   Mode Unknown NC-  4L        Assessment:    1. Influenza B  2. Basilar atelectasis  3. Acute on chronic respiratory failure with hypoxia and hypercapnia  4. Contraction respiratory alkalosis secondary to diuretic therapy  5. TARIK on CPAP  6. CHF, diastolic  7. Anasarca volume overload  8. Hypoalbuminemia   9. Protein calorie malnutrition  10. Persistent atrial fibrillation  11. Obesity  12. GERD      Plan:   1. Oxygen therapy 4 L NC  2. CPAP 12 cm H2O at HS and PRN daytime  3. Tamiflu x 5 total days until 1/22/2020  4. Aerosol treatment: Pulmicort and Perforomist BID, Xopenex Q 6 hrs  5. Recruitment techniques- EZPAP QID, Chest vest BID, Flutter valve  6. Hycodan at HS PRN,  guaifenesin TID, Tessalon TID for cough. Lidocaine nebulized for cough paroxysms BID as needed. 7. flonase daily saline as needed for nasal congestion  8. Follow CXR  9. Solumedrol adjusted 40 IV Q 12  Hrs today hope to transition to prednisone tomorrow  10. Magic cups, nutritional support  11. Continue diuresis per nephrology- Lasix 60 mg IV BID, diuril 250 mg IV TID. 12. Maintain negative fluid balance -8.6 L   13.  Check Abg this am- contraction alkalosis, may benefit from short term diamox -defer to nephrology 14. Repeat echo completed  15. DVT prophylaxis-Eliquis BID for a fib, GI prophylaxis-pepcid     This plan of care was reviewed in collaboration with Dr. Dia Toth   Electronically signed by Meryle Alf, APRN - CNP on 1/22/2020 at 12:08 PM     I personally saw, examined, and cared for the patient. Labs, medications, radiographs reviewed.  I agree with history exam and plans detailed in NP note with the following additions:    Slow to improve and getting weaker  I feel her lungs sound better with less wheezing  She does have a persistent cough as expected  Change to oral steroids  PT/OT  Her ABG demonstrates a metabolic alkalosis secondary to diuresis - would consider reducing loop diuretics    Electronically signed by Salinas Ralph MD on 1/22/2020 at 5:19 PM

## 2020-01-22 NOTE — PROGRESS NOTES
[facility] Quality Flow/Interdisciplinary Rounds Progress Note        Quality Flow Rounds held on January 22, 2020    Disciplines Attending:  Bedside Nurse, ,  and Nursing Unit Leadership    Patricia Rosen was admitted on 1/17/2020 10:38 AM    Anticipated Discharge Date:  Expected Discharge Date: 01/20/20    Disposition:    David Score:  David Scale Score: 18    Readmission Risk              Risk of Unplanned Readmission:        56           Discussed patient goal for the day, patient clinical progression, and barriers to discharge.   The following Goal(s) of the Day/Commitment(s) have been identified:  ana Ramirez  January 22, 2020

## 2020-01-22 NOTE — PROGRESS NOTES
INPATIENT CARDIOLOGY FOLLOW-UP    Name: Marely Valdez    Age: 80 y.o. Date of Admission: 1/17/2020 10:38 AM    Date of Service: 1/22/2020    Chief Complaint: Follow-up for acute on chronic heart failure with preserved EF, persistent atrial fibrillation, coronary artery disease    Interim History:  Still with cough. Denies chest pain or shortness of breath.   Tachycardic on the monitor A. fib in the 140s  Almost 5 L negative yesterday, total -8 L    Review of Systems:   Negative except as described above    Problem List:  Patient Active Problem List   Diagnosis    Hypothyroidism    TARIK (obstructive sleep apnea)    Hypertensive disorder    Hypercholesterolemia    CAD in native artery    Acute diastolic CHF [facility]congestive heart failure) [facility])    Restless legs    Acute on chronic diastolic congestive heart failure ([facility])    Essential hypertension    Lung nodule    Paroxysmal atrial fibrillation ([address]    CHF (congestive heart failure), NYHA class I, acute on chronic, combined ([facility])    Congestive heart failure ([facility])    Nonrheumatic aortic valve insufficiency    Non-rheumatic mitral regurgitation    Diastolic CHF ([address]    Cystitis    Intractable back pain    Thoracic ascending aortic aneurysm ([facility])    Polymyalgia rheumatica ([facility])    Congestive heart failure with cardiomyopathy ([address]    Acute diastolic congestive heart failure ([address]    Skin ulcer of right foot, limited to breakdown of skin ([address]    Acute respiratory failure with hypoxia ([address]    Polymyositis ([address]    Pulmonary embolism (HCC)    Parainfluenza virus bronchitis    Carcinoma of breast ([address]    Influenza B    Atrial fibrillation with RVR ([facility])    CKD (chronic kidney disease) stage 3, GFR 30-59 ml/min ([facility])    Acute on chronic respiratory failure with hypoxia ([facility])       Current Medications:    Current Facility-Administered Medications:     metoprolol tartrate (LOPRESSOR) tablet 37.5 mg, 37.5 mg, Oral, BID, Carlos Diaz MD    digoxin (LANOXIN) injection 500 mcg, 500 mcg, Intravenous, Once, Monet Gotti MD    digoxin [facility] TRANSPL[facility]) injection 250 mcg, 250 mcg, Intravenous, Q6H, Monet Gotti MD    ketotifen (ZADITOR) 0.025 % ophthalmic solution 1 drop, 1 drop, Both Eyes, BID, Rolfe Boas, MD, 1 drop at 01/21/20 2121    benzonatate (TESSALON) capsule 200 mg, 200 mg, Oral, TID, ZHANNA Joseph CNP, 200 mg at 01/21/20 2116    HYDROcodone-homatropine (HYCODAN) 5-1.5 MG/5ML syrup 5 mL, 5 mL, Oral, Q4H PRN, ZHANNA Joseph CNP, 5 mL at 01/22/20 0423    guaiFENesin tablet 400 mg, 400 mg, Oral, TID, ZHANNA Joseph CNP, 400 mg at 01/21/20 2116    lidocaine PF 1 % injection 5 mL, 5 mL, Nebulization, BID, ZHANNA Joseph CNP, Stopped at 01/21/20 1323    methylPREDNISolone sodium (SOLU-MEDROL) injection 40 mg, 40 mg, Intravenous, Q8H, ZHANNA Joseph CNP, 40 mg at 01/22/20 0603    senna (SENOKOT) tablet 17.2 mg, 2 tablet, Oral, Daily PRN, Rolfe Boas, MD    lactulose (CHRONULAC) 10 GM/15ML solution 20 g, 20 g, Oral, BID PRN, Rolfe Boas, MD    chlorothiazide (DIURIL) 250 mg in sodium chloride 0.9 % 50 mL IVPB, 250 mg, Intravenous, TID, Croey Church MD, Stopped at 01/21/20 2146    furosemide (LASIX) injection 60 mg, 60 mg, Intravenous, Q12H, Corey Church MD, 60 mg at 01/22/20 0603    insulin lispro (HUMALOG) injection vial 0-12 Units, 0-12 Units, Subcutaneous, TID WC, Katy Esquivel DO, 6 Units at 01/22/20 0602    insulin lispro (HUMALOG) injection vial 0-6 Units, 0-6 Units, Subcutaneous, Nightly, Katy Esquivel DO, 4 Units at 01/21/20 2116    glucose (GLUTOSE) 40 % oral gel 15 g, 15 g, Oral, PRN, Katy Esquivel DO    dextrose 50 % IV solution, 12.5 g, Intravenous, PRN, Katy Esquivel DO    glucagon (rDNA) injection 1 mg, 1 mg, Intramuscular, PRN, Katy Esquivel DO    dextrose 5 % solution, 100 mL/hr, Intravenous, PRN, Gege Hollins,   [facility] levalbuterol (XOPENEX) nebulization 0.63 mg, 0.63 mg, Nebulization, Q6H, Wilder Marlow, , 0.63 mg at 01/22/20 0158    rOPINIRole (REQUIP) tablet 0.5 mg, 0.5 mg, Oral, Dinner, Howie Andersen MD, 0.5 mg at 01/21/20 1658    acetaminophen (TYLENOL) tablet 500 mg, 500 mg, Oral, Q6H PRN, Howie Andersen MD, 500 mg at 01/21/20 0848    apixaban (ELIQUIS) tablet 5 mg, 5 mg, Oral, BID, Howie Andersen MD, 5 mg at 01/21/20 2117    b complex-C-folic acid (NEPHROCAPS) capsule 1 mg, 1 mg, Oral, QAM, Howie Andersen MD, 1 mg at 01/21/20 0831    budesonide (PULMICORT) nebulizer suspension 1,000 mcg, 1,000 mcg, Nebulization, BID, Howie Andersen MD, 1,000 mcg at 01/21/20 2029    calcium-vitamin D 500-200 MG-UNIT per tablet 1 tablet, 1 tablet, Oral, Lunch, Howie Andersen MD, 1 tablet at 01/21/20 1353    vitamin D tablet 2,000 Units, 2,000 Units, Oral, Lunch, Howie Andersen MD, 2,000 Units at 01/21/20 1206    clotrimazole-betamethasone (LOTRISONE) cream, , Topical, BID PRN, Howie Andersen MD    ferrous sulfate tablet 325 mg, 325 mg, Oral, Lunch, Howie Andersen MD, 325 mg at 01/21/20 1206    fluticasone (FLONASE) 50 MCG/ACT nasal spray 1 spray, 1 spray, Each Nostril, Daily, Howie Andersen MD, 1 spray at 01/21/20 6932    formoterol (PERFOROMIST) nebulizer solution 20 mcg, 20 mcg, Nebulization, Q12H, Howie Andersen MD, 20 mcg at 01/21/20 2029    gabapentin (NEURONTIN) capsule 100 mg, 100 mg, Oral, Nightly, Howie Andersen MD, 100 mg at 01/21/20 2117    hydrocortisone (ANUSOL-HC) suppository 25 mg, 25 mg, Rectal, Nightly, Howie Andersen MD, 25 mg at 01/21/20 2117    levothyroxine (SYNTHROID) tablet 125 mcg, 125 mcg, Oral, Daily, Howie Andersen MD, 125 mcg at 01/22/20 0603    magnesium oxide (MAG-OX) tablet 400 mg, 400 mg, Oral, QAM, Howie Andersen MD, 400 mg at 01/21/20 0831    miconazole (MICOTIN) 2 % powder, , Topical, BID, Howie Andersen MD    ondansetron [facility]) tablet 4 mg, 4 mg, Oral, Q8H PRN, MD Ervin Villalba oxyCODONE (ROXICODONE) immediate release tablet 10 mg, 10 mg, Oral, Q6H, Ace Reid MD, 10 mg at 01/21/20 1353    potassium chloride (KLOR-CON M) extended release tablet 40 mEq, 40 mEq, Oral, BID, Ace Reid MD, 40 mEq at 01/21/20 2117    simethicone (MYLICON) chewable tablet 160 mg, 160 mg, Oral, Lunch, Ace Reid MD, 160 mg at 01/21/20 1206    famotidine (PEPCID) tablet 20 mg, 20 mg, Oral, Daily, Ace Reid MD, 20 mg at 01/21/20 5641    oxyCODONE (ROXICODONE) immediate release tablet 7.5 mg, 7.5 mg, Oral, Q8H PRN, Ace Reid MD    magnesium hydroxide (MILK OF MAGNESIA) 400 MG/5ML suspension 30 mL, 30 mL, Oral, Daily PRN, Ace Reid MD    sodium chloride (OCEAN, BABY AYR) 0.65 % nasal spray 1 spray, 1 spray, Each Nostril, Q4H PRN, Ace Reid MD    sodium chloride flush 0.9 % injection 10 mL, 10 mL, Intravenous, BID, Ace Reid MD, 10 mL at 01/21/20 2116    oseltamivir (TAMIFLU) capsule 30 mg, 30 mg, Oral, BID, Ace Reid MD, 30 mg at 01/21/20 2117    rOPINIRole (REQUIP) tablet 0.25 mg, 0.25 mg, Oral, Nightly, Ace Reid MD, 0.25 mg at 01/21/20 2117    Physical Exam:  /70   Pulse 106   Temp 98.5 °F (36.9 °C) (Axillary)   Resp 20   Ht 5' (1.524 m)   Wt 210 lb 4.8 oz (95.4 kg)   SpO2 96%   BMI 41.07 kg/m²   Wt Readings from Last 3 Encounters:   01/22/20 210 lb 4.8 oz (95.4 kg)   12/16/19 185 lb (83.9 kg)   12/11/19 193 lb 3.2 oz (87.6 kg)     Appearance: Elderly lady lying in bed, awake, alert, no acute respiratory distress, frequent coughing  Skin: Intact, no rash  Head: Normocephalic, atraumatic  Eyes: EOMI, no conjunctival erythema  ENMT: No pharyngeal erythema, MMM, no rhinorrhea  Neck: Supple, no elevated JVP, no carotid bruits  Lungs: Bibasilar rales  Cardiac: PMI nondisplaced, irregular rhythm with a tachycardic rate, S1 & S2 normal, no murmurs  Abdomen: Soft, nontender, +bowel sounds  Extremities: Moves all extremities x 4, 2-3+ lower extremity edema  Neurologic: No focal motor deficits apparent, normal mood and affect  Peripheral Pulses: Intact posterior tibial pulses bilaterally    Intake/Output:    Intake/Output Summary (Last 24 hours) at 1/22/2020 0701  Last data filed at 1/22/2020 0551  Gross per 24 hour   Intake 420 ml   Output 4800 ml   Net -4380 ml     No intake/output data recorded.     Laboratory Tests:  Recent Labs     01/20/20  0500 01/21/20  0342 01/22/20  0402    143 142   K 3.7 3.4* 4.0   CL 95* 96* 94*   CO2 28 32* 35*   BUN 34* 41* 57*   CREATININE 1.1* 1.3* 1.3*   GLUCOSE 326* 272* 297*   CALCIUM 8.8 8.6 9.0     Lab Results   Component Value Date    MG 2.4 01/22/2020     Recent Labs     01/20/20  0500 01/21/20  0342 01/22/20  0402   ALKPHOS 133* 126* 130*   ALT 59* 53* 50*   AST 36* 25 24   PROT 5.7* 5.1* 5.3*   BILITOT 0.5 0.7 0.8   LABALBU 3.1* 3.2* 3.2*     Recent Labs     01/20/20  0500 01/21/20  0342 01/22/20  0402   WBC 7.8 7.3 8.4   RBC 3.78 3.90 3.97   HGB 11.4* 11.6 11.8   HCT 36.1 37.0 37.4   MCV 95.5 94.9 94.2   MCH 30.2 29.7 29.7   MCHC 31.6* 31.4* 31.6*   RDW 13.2 13.0 13.0    194 215   MPV 10.9 11.1 11.2     Lab Results   Component Value Date    CKTOTAL 71 10/24/2013    CKMB 0.8 10/24/2013    TROPONINI 0.02 01/17/2020    TROPONINI 0.02 11/16/2019    TROPONINI 0.02 11/16/2019     Lab Results   Component Value Date    INR 1.0 08/27/2019    INR 1.9 08/22/2019    INR 1.6 02/15/2018    PROTIME 11.6 08/27/2019    PROTIME 21.3 (H) 08/22/2019    PROTIME 18.0 (H) 02/15/2018     Lab Results   Component Value Date    TSH 0.650 08/30/2019     No results found for: LABA1C  No results found for: EAG  Lab Results   Component Value Date    CHOL 136 10/25/2013    CHOL 176 10/05/2011    CHOL 146 07/07/2011     Lab Results   Component Value Date    TRIG 108 10/25/2013    TRIG 169 (H) 10/05/2011    TRIG 108 07/07/2011     Lab Results   Component Value Date    HDL 37.0 (A) 10/25/2013    HDL 36.0 (A) 10/05/2011    HDL 34.0 (A) 07/07/2011     Lab Results   Component Value Date    LDLCALC 77 10/25/2013    LDLCALC 106 (H) 10/05/2011    [address] 07/07/2011     No results found for: LABVLDL, VLDL  No results found for: CHOLHDLRATIO  No results for input(s): PROBNP in the last 72 hours. Cardiac Tests:  ECG: Atrial fibrillation 102 bpm.  Left axis deviation. Nonspecific IVCD QRS duration 112 ms. Nonspecific ST and T wave changes. A lot of artifact on this EKG. Telemetry findings reviewed: A. fib, rates predominantly well-controlled    Chest X-ray: 1/19  The heart is enlarged. Lung fields demonstrate bilateral interstitial opacities. There is a left pleural effusion, and probable right pleural effusion. The aorta is tortuous and calcified      Impression:       No significant change from the previous exam.     Echocardiogram: 11/15/2019 (Dr. Lori Guerin): EF 55%. Technically suboptimal and limited study. Left ventricle is normal in size Borderline concentric left ventricular hypertrophy. No regional wall motion abnormalities seen. Normal left ventricular ejection fraction. There is doppler evidence of stage II diastolic dysfunction. Mild mitral annular calcification. Mild mitral regurgitation is present. The tricuspid valve was not well visualized. Pulmonary hypertension is mild     Echo 1/19/2020   Summary   Ejection fraction is visually estimated at 65%. No regional wall motion abnormalities seen. Mild left ventricular concentric hypertrophy noted. Mildly dilated right ventricle with reduced function. TAPSE is 1.5 cm. Mild mitral regurgitation is present. Moderate tricuspid regurgitation. Pulmonary hypertension is moderate. RVSP is 53 mmHg. Stress test:      Cardiac catheterization: 10/24/2013: Status post remote stent of LAD with demonstration of moderate in-stent restenosis today. 50% ostial 2nd diagonal branch stenosis. Normal left ventricular systolic function.  Aortic root dilatation.     ASSESSMENT / PLAN:  1. Acute on chronic heart failure with preserved ejection fraction. - 8 L. Still volume overloaded/anasarca  2. Acute influenza B  3. Persistent atrial fibrillation, currently RVR. Diagnosed 2014. PEACE/DCC and amiodarone 2018. On and off AC, resumed 11/2019. A. fib recurred 11/2019 in setting of parainfluenza. Amiodarone continued but remained in A. Fib. 4. CAD, PCI to LAD 2011  5. Valvular heart disease: Mild MR  6. MIKE/CKD  7.  Comorbid disease: Hypertension, hyperlipidemia, obesity, hypothyroidism, TARIK, LAFB, chronic back pain, GERD, history of right breast CA    Recommendations:    · Diuretics per nephrology  · Increase metoprolol tartrate 37.5 mg twice daily  · Load with IV digoxin for improved rate control  · Remains off amiodarone    Lexy Martino MD  [facility]) Cardiology

## 2020-01-22 NOTE — PROGRESS NOTES
Nephrology Progress Note  Patient's Name: Uziel Alvarez  3:56 PM  1/22/2020    Nephrologist: Dr. Bruna Ahn     Reason for Consult:   CKD G3 with Vol Overload  Requesting Physician:  Natali Whitehead MD    Chief Complaint:  Edema  and SOB    History Obtained From:  patient, relative(s) and past medical records    History of Present Ilness:    Uziel Alvarez is a 80 y.o. female with PMH significant for HTN, CHF and CAD who presented to the emergency department for increased edema and SOB. She has gained 10# over the preceding week and is now having difficulty ambulating due to the edema. She complained of associated shortness of breath and tested (+) Influenza B. In the ED the patient was noted to be hypotensive. She is known to the practice from a previous MIKE in August 2019 admission 11/2019 for vol overloadand a She has a history of CKD G3 with baseline serum creat 1.0-1.mg/dl with an e-GFR 46-52ml/min. Upon assessment she states she is feeling a little better today. She still gets SOB with minimal exertion. Her family is at the bedside and were updated on her renal status. 1/22/20: Pt per her dtr had a \"bad\" night last night due to SOB    Past Medical History:   Diagnosis Date    AC (acromioclavicular) joint bone spurs     in feet    Acid reflux     Ascending aortic aneurysm (HCC)     4.0 cm,(SEE LETTERS DR. PENALOZA) IS BEING MONITORED    [facility]     CAD (coronary artery disease)     Cancer (HCC) 16 YRS AGO    breast RIGHT    CHF (congestive heart failure) (HCC)     Chronic back pain     stenosis, receives epidurals bi-monthly    CKD (chronic kidney disease) stage 4, GFR 15-29 ml/min ([facility]) 8/24/2019    DJD (degenerative joint disease)     Hyperlipidemia     Hypertension     Hypothyroidism     Iron deficiency anemia     Lung nodule     RIGHT LOWER LOBE, BEING MONITORED BY DR. Kirt Fuentes, LAST CT EPIC 3/2014    MVP (mitral valve prolapse)     Osteoarthritis     Osteoporosis     5 mL, Nightly PRN  lidocaine PF 1 % injection 5 mL, BID PRN  ketotifen (ZADITOR) 0.025 % ophthalmic solution 1 drop, BID  benzonatate (TESSALON) capsule 200 mg, TID  guaiFENesin tablet 400 mg, TID  senna (SENOKOT) tablet 17.2 mg, Daily PRN  lactulose (CHRONULAC) 10 GM/15ML solution 20 g, BID PRN  chlorothiazide (DIURIL) 250 mg in sodium chloride 0.9 % 50 mL IVPB, TID  furosemide (LASIX) injection 60 mg, Q12H  insulin lispro (HUMALOG) injection vial 0-12 Units, TID WC  insulin lispro (HUMALOG) injection vial 0-6 Units, Nightly  glucose (GLUTOSE) 40 % oral gel 15 g, PRN  dextrose 50 % IV solution, PRN  glucagon (rDNA) injection 1 mg, PRN  dextrose 5 % solution, PRN  levalbuterol (XOPENEX) nebulization 0.63 mg, Q6H  rOPINIRole (REQUIP) tablet 0.5 mg, Dinner  acetaminophen (TYLENOL) tablet 500 mg, Q6H PRN  apixaban (ELIQUIS) tablet 5 mg, BID  b complex-C-folic acid (NEPHROCAPS) capsule 1 mg, QAM  budesonide (PULMICORT) nebulizer suspension 1,000 mcg, BID  calcium-vitamin D 500-200 MG-UNIT per tablet 1 tablet, Lunch  vitamin D tablet 2,000 Units, Lunch  clotrimazole-betamethasone (LOTRISONE) cream, BID PRN  ferrous sulfate tablet 325 mg, Lunch  fluticasone (FLONASE) 50 MCG/ACT nasal spray 1 spray, Daily  formoterol (PERFOROMIST) nebulizer solution 20 mcg, Q12H  gabapentin (NEURONTIN) capsule 100 mg, Nightly  hydrocortisone (ANUSOL-HC) suppository 25 mg, Nightly  levothyroxine (SYNTHROID) tablet 125 mcg, Daily  magnesium oxide (MAG-OX) tablet 400 mg, QAM  miconazole (MICOTIN) 2 % powder, BID  ondansetron (ZOFRAN) tablet 4 mg, Q8H PRN  oxyCODONE (ROXICODONE) immediate release tablet 10 mg, Q6H  potassium chloride (KLOR-CON M) extended release tablet 40 mEq, BID  famotidine (PEPCID) tablet 20 mg, Daily  oxyCODONE (ROXICODONE) immediate release tablet 7.5 mg, Q8H PRN  magnesium hydroxide (MILK OF MAGNESIA) 400 MG/5ML suspension 30 mL, Daily PRN  sodium chloride (OCEAN, BABY AYR) 0.65 % nasal spray 1 spray, Q4H PRN  sodium normal limits. 2D ECHO 11/15/19   Summary   Technically suboptimal and limited study. Left ventricle is normal in size . Borderline concentric left ventricular hypertrophy. No regional wall motion abnormalities seen. Normal left ventricular ejection fraction. There is doppler evidence of stage II diastolic dysfunction. Mild mitral annular calcification. Mild mitral regurgitation is present. The tricuspid valve was not well visualized. Pulmonary hypertension is mild . 2D ECHO 1/17/20:  Summary   Ejection fraction is visually estimated at 65%. No regional wall motion abnormalities seen. Mild left ventricular concentric hypertrophy noted. Mildly dilated right ventricle with reduced function. TAPSE is 1.5 cm. Mild mitral regurgitation is present. Moderate tricuspid regurgitation. Pulmonary hypertension is moderate. RVSP is 53 mmHg. Assessment/Plan     1. CKD G3A in the setting of HTN, CAD, CHF and afib with presumed microvascular disease.  -Baseline creatinine 1.0-1.1mg/dl with an e-GFR=46-52ml/min  - UA mod blood, glucose >1000, trace LE, mod bacteria WBC 2-5  -cr stable at  1.3mg/dl-follow with  the current diuretics     2.  HTN with CKD I-IV  -BP at goal <130/80  -follow with diuresis    3. Anasarca in the setting of the HFpEF and Hypoalbuminemia  -currently on furosemide 60mg twice daily with diuril 250mg tid with 4.8L vol removal    4. Transaminitis- in the setting of the Inflenza [id number]    5. Shortness of breath  -+Influenza B  -On Oseltamivir    6.  Sec HPTH of Renal Origin-PTH 49 and Vit D 31-Ca++ and PO4 WNL    Thank you Dr. Varsha House MD for allowing us to participate in care of Ramiro Cartwright MD  3:56 PM  1/22/2020

## 2020-01-22 NOTE — PROGRESS NOTES
Occupational Therapy  OT BEDSIDE TREATMENT NOTE      Date:2020  Patient Name: Peggy Duarte  MRN: [id number]  : 1927  Room: [address] [place] [address]     Evaluating OT: Sabrina Dan OTR/L [id number]     AM-PAC Daily Activity Raw Score:      Recommended Adaptive Equipment: TBD     Diagnosis: influenza B. Pt presents to ED from SNF.      Pertinent Medical History: spinal stenosis, polymyalgia rheumatica, scoliosis, osteoporosis, OA, MVP, lung nodule, HTN, DJD, CKD, CHF, CAD, afib  Precautions:  Falls, O2, droplet isolation     Home Living: Pt admitted from SNF. Daughter reports prior to approx 1 week ago pt was 1 person assist in transfers, mobility with [employer] and toileting/dressing. Pt lives with daughter and Beth Gutiérrez a 2 story home with B/ B 2nd floor, stairglide to 2nd floor. Bathroom setup: tub/shower combo with tub bench      Prior Level of Function: assist with ADLs, assist with IADLs; completed functional mobility with [employer]     Pain Level: No c/o pain     Cognition: A&O: Pt alert and conversing with mod vc for safety. Pt demoed difficulty with carry over of safety training from previous session.     Functional Assessment:    Initial Eval Status  Date: 20 Treatment session: 20 Short Term Goals  Treatment frequency: 2-5x/wk PRN x1-3 wks   Feeding Set up       Grooming Min A  Set up   UB Dressing Mod A  Set up   LB Dressing Dependent  Management of B socks Max A to manage B socks  Mod A    Bathing Max A  Mod A   Toileting Mod A  Use of grab bar at commode for assist in transfer and to maintain balance during completion of toileting care  Min A for commode transfer  Max A for posterior cristopher care  Min A   Bed Mobility  Supine to sit: Max A      Functional Transfers STS: Min A  STS: Min A from arm chair 4xs. SBA   Functional Mobility Min A with WW initially progressing to Mod A with fatigue  Bed <> bathroom  Min A using ww limited distance in room.   Assistance provided for AD management to prevent LOB SBA during ADLs   Balance Sitting: fair at EOB     Standing: fair minus at [employer]  Sitting: Sup     Standing: Min A during dynamic standing balance activity     Activity Tolerance Poor plus. Increasing fatigue/weakness and SOB with activity. 3L O2 throughout sat post activity 94% Limited requiring seated rest breaks and extended time during tasks. SpO2 was above 90% during activity despite SOB. standing sergey x5-6 min with fair plus balance during self care tasks         B UE were [place] during tasks. Comments: Upon arrival pt was sitting in arm chair with daughter present. At end of session pt was transferred back to chair with B LE elevated, alarm on, all lines and tubes intact and call light within reach. Education: Hand placement during transfers and AD management to maximize safety during future functional tasks. · Pt has made good progress towards set goals.    · Continue with current plan of care      Total Tx Time: [address] TOVAR/L [id number]

## 2020-01-22 NOTE — PLAN OF CARE
Problem: Falls - Risk of:  Goal: Will remain free from falls  Description  Will remain free from falls  Outcome: Ongoing  Goal: Absence of physical injury  Description  Absence of physical injury  Outcome: Ongoing     Problem: Pain:  Goal: Pain level will decrease  Description  Pain level will decrease  Outcome: Ongoing  Goal: Control of acute pain  Description  Control of acute pain  Outcome: Ongoing  Goal: Control of chronic pain  Description  Control of chronic pain  Outcome: Ongoing     Problem: Risk for Impaired Skin Integrity  Goal: Tissue integrity - skin and mucous membranes  Description  Structural intactness and normal physiological function of skin and  mucous membranes.   Outcome: Ongoing     Problem: HH FLUID RETENTION-CHF  Goal: Absence of fluid overload signs and symptoms  Outcome: Ongoing     Problem: Fluid and Electrolyte Imbalance  Goal: Fluid and electrolyte balance are achieved/maintained  Outcome: Ongoing     Problem: Infection:  Goal: Will show no infection signs and symptoms  Description  Will show no infection signs and symptoms     Outcome: Ongoing     Problem: Wound:  Goal: Will show signs of wound healing; wound closure and no evidence of infection  Description  Will show signs of wound healing; wound closure and no evidence of infection     Outcome: Ongoing

## 2020-01-22 NOTE — PROGRESS NOTES
Family Medicine    Subjective: The patient is doing ok this AM. Feeling about the same. Still with cough and mild dyspnea. Objective:  /70   Pulse 106   Temp 98.5 °F (36.9 °C) (Axillary)   Resp 20   Ht 5' (1.524 m)   Wt 210 lb 4.8 oz (95.4 kg)   SpO2 96%   BMI 41.07 kg/m²     HEENT: MMM. Heart: Irregularly irregular. Lungs: Decreased breath sounds with scattered wheezing. Abd: bowel sounds present, nontender, nondistended, no masses. Extrem:  No clubbing or cyanosis. 2+ edema. Neuro: Intact without deficits. CBC with Differential:    Lab Results   Component Value Date    WBC 8.4 01/22/2020    RBC 3.97 01/22/2020    HGB 11.8 01/22/2020    HCT 37.4 01/22/2020     01/22/2020    MCV 94.2 01/22/2020    MCH 29.7 01/22/2020    MCHC 31.6 01/22/2020    RDW 13.0 01/22/2020    NRBC 0.0 08/22/2019    SEGSPCT 54 11/21/2013    METASPCT 1.0 11/19/2019    LYMPHOPCT 12.9 01/17/2020    MONOPCT 6.9 01/17/2020    BASOPCT 0.5 01/17/2020    MONOSABS 0.60 01/17/2020    LYMPHSABS 1.12 01/17/2020    EOSABS 0.05 01/17/2020    BASOSABS 0.04 01/17/2020     CMP:    Lab Results   Component Value Date     01/22/2020    K 4.0 01/22/2020    K 3.8 01/17/2020    CL 94 01/22/2020    CO2 35 01/22/2020    BUN 57 01/22/2020    CREATININE 1.3 01/22/2020    GFRAA 46 01/22/2020    LABGLOM 38 01/22/2020    GLUCOSE 297 01/22/2020    GLUCOSE 106 10/04/2011    PROT 5.3 01/22/2020    LABALBU 3.2 01/22/2020    LABALBU 3.5 07/07/2011    CALCIUM 9.0 01/22/2020    BILITOT 0.8 01/22/2020    ALKPHOS 130 01/22/2020    AST 24 01/22/2020    ALT 50 01/22/2020     Assessment/Plan:  1. Hypoxic respiratory failure - multifactorial. Secondary to influenza virus. On O2. Slowly improving. 2. Influenza virus - On Tamiflu. Pulmonary on case. On nebs, steroids, O2, and cough suppressants. 3. Atrial fibrillation with RVR - exacerbated by pulmonary status. 2D Echo stable. Cardiology on case. RVR overnight - medications adjusted.  Anticoagulated on Eliquis. 4. CHF - acute on chronic diastolic. Diuresis per renal.   5. CKD - chronic disease. Nephrology on case. Creatinine stable. 6. AMS - ABG normal. Labs stable. Developed lethargy again this evening. Medication effect. Hold narcotics at this time and monitor. Restart at lower dose once improves. 7. HTN - stable. 8. Hyperlipidemia - statin. 9. Hypothyroidism - Synthroid. 10. CAD - stable. 11. TARIK - on BiPAP. 12. Thoracic aortic aneurysm - stable. 13. Pulmonary nodule - stable. 14. PMR - on chronic prednisone at 10mg daily. 15. GERD - PPI. 16. LDD - pain control. 17. Disposition - as above. Continue same.     Natali Whitehead  7:35 AM  1/22/2020

## 2020-01-23 NOTE — PROGRESS NOTES
[facility] Quality Flow/Interdisciplinary Rounds Progress Note        Quality Flow Rounds held on January 23, 2020    Disciplines Attending:  Bedside Nurse, ,  and Nursing Unit Leadership    Cosmo Diaz was admitted on 1/17/2020 10:38 AM    Anticipated Discharge Date:  Expected Discharge Date: 01/20/20    Disposition:    David Score:  David Scale Score: 18    Readmission Risk              Risk of Unplanned Readmission:        55           Discussed patient goal for the day, patient clinical progression, and barriers to discharge. The following Goal(s) of the Day/Commitment(s) have been identified:  Continue to diurese, check Pulmonary and Cardiology plans-Hospice?       Juliane Herrera  January 23, 2020

## 2020-01-23 NOTE — PROGRESS NOTES
Nephrology Progress Note  Patient's Name: Umair Yancey  2:39 PM  1/23/2020    Nephrologist: Dr. Chaitanya Tristan     Reason for Consult:   CKD G3 with Vol Overload  Requesting Physician:  Rolfe Boas, MD    Chief Complaint:  Edema  and SOB    History Obtained From:  patient, relative(s) and past medical records    History of Present Ilness:    Umair Yancey is a 80 y.o. female with PMH significant for HTN, CHF and CAD who presented to the emergency department for increased edema and SOB. She has gained 10# over the preceding week and is now having difficulty ambulating due to the edema. She complained of associated shortness of breath and tested (+) Influenza B. In the ED the patient was noted to be hypotensive. She is known to the practice from a previous MIKE in August 2019 admission 11/2019 for vol overloadand a She has a history of CKD G3 with baseline serum creat 1.0-1.mg/dl with an e-GFR 46-52ml/min. Upon assessment she states she is feeling a little better today. She still gets SOB with minimal exertion. Her family is at the bedside and were updated on her renal status. 1/22/20: Pt per her dtr had a \"bad\" night last night due to SOB    1/23/20: Alert, sitting up in bed. Daughter present. States she feels ok, denies shortness of breath, chest pain, abd pain. Is hard of hearing. Past Medical History:   Diagnosis Date    AC (acromioclavicular) joint bone spurs     in feet    Acid reflux     Ascending aortic aneurysm (HCC)     4.0 cm,(SEE LETTERS DR. PENALOZA) IS BEING MONITORED    Atrial fibrillation ([address]     CAD (coronary artery disease)     Cancer (HCC) 16 YRS AGO    breast RIGHT    CHF (congestive heart failure) (HCC)     Chronic back pain     stenosis, receives epidurals bi-monthly    CKD (chronic kidney disease) stage 4, GFR 15-29 ml/min ([facility]) 8/24/2019    DJD (degenerative joint disease)     Hyperlipidemia     Hypertension     Hypothyroidism     Iron deficiency anemia     Lung nodule RIGHT LOWER LOBE, BEING MONITORED BY DR. Ella Dang, LAST CT EPIC 3/2014    MVP (mitral valve prolapse)     Osteoarthritis     Osteoporosis     Partial bowel obstruction ([address]     Pneumonia 2008    Polymyalgia rheumatica ([address]     Scoliosis     Sinus arrhythmia     1/2014 last ekg, nsr with pacs, epic    Sleep apnea     uses cpap    Spinal stenosis     Thoracic ascending aortic aneurysm ([address]     Thyroid disease     hypothyroidism       Past Surgical History:   Procedure Laterality Date    ARTERY BIOPSY  2008    temporal   [facility],  BONE SPURS    BRONCHOSCOPY N/A 11/18/2019    BRONCHOSCOPY   (BEDSIDE) performed by Miriam Herrera [facility] at [facility]  2010    CARDIOVERSION  02/13/2018    CERVICAL FUSION  1994    c3-4    CHOLECYSTECTOMY  1980's    OPEN    COLON SURGERY  1980's    repair of partial bowel obstruction    CORONARY ANGIOPLASTY WITH STENT PLACEMENT  06/2011    Dr. Nancy Umana, ESOPHAGUS      EYE SURGERY Bilateral 2004    CATARACTS WITH LENS IMPLANTS    FOOT SURGERY  2010,2011    ct guided injections of feet, toenail removal    HERNIA REPAIR  5463, 3479    UMBILICAL, RIH    HYSTERECTOMY  1960's    MASTECTOMY Right     TOE SURGERY      Removal of bony outgrowth, right great toe    TRANSESOPHAGEAL ECHOCARDIOGRAM  02/13/2018    WRIST SURGERY Right YRS AGO       Family History   Problem Relation Age of Onset    Brain Cancer Mother     Lung Cancer Brother     Heart Attack Father     Heart Surgery Maternal Aunt     Stroke Maternal Aunt         reports that she has never smoked. She has never used smokeless tobacco. She reports that she does not drink alcohol or use drugs. Allergies:  [place]-d [diphenhydramine hcl];  Benadryl [diphenhydramine]; and Phenergan [promethazine hcl]    Current Medications:    oxyCODONE (ROXICODONE) immediate release tablet 5 mg, Nightly  oxyCODONE (ROXICODONE) immediate release tablet 5

## 2020-01-23 NOTE — PROGRESS NOTES
CHF - acute on chronic diastolic. Diuresis per renal.   5. CKD - chronic disease. Nephrology on case. Creatinine stable. 6. AMS - improved with decreased opioid dose. 7. HTN - stable. 8. Hyperlipidemia - statin. 9. Hypothyroidism - Synthroid. 10. CAD - stable. 11. TARIK - on BiPAP. 12. Thoracic aortic aneurysm - stable. 13. Pulmonary nodule - stable. 14. PMR - on chronic prednisone at 10mg daily. 15. GERD - PPI. 16. LDD - pain control. 17. Disposition - as above. Continue same.     Minor Nestle  7:54 AM  1/23/2020

## 2020-01-23 NOTE — PROGRESS NOTES
Occupational Therapy  OT BEDSIDE TREATMENT NOTE      Date:2020  Patient Name: Kavin Prasad  MRN: [id number]  : 1927  Room: [address] [place] [address]     Evaluating OT: Isaac Cook OTR/L [id number]     AM-PAC Daily Activity Raw Score: 15/24     Recommended Adaptive Equipment: TBD     Diagnosis: influenza B. Pt presents to ED from SNF.      Pertinent Medical History: spinal stenosis, polymyalgia rheumatica, scoliosis, osteoporosis, OA, MVP, lung nodule, HTN, DJD, CKD, CHF, CAD, afib  Precautions:  Falls, O2, droplet isolation     Home Living: Pt admitted from SNF. Daughter reports prior to approx 1 week ago pt was 1 person assist in transfers, mobility with [address] and toileting/dressing. Pt lives with daughter and Fence Many a 2 story home with B/ B 2nd floor, stairglide to 2nd floor.    Bathroom setup: tub/shower combo with tub bench      Prior Level of Function: assist with ADLs, assist with IADLs; completed functional mobility with [address]     Pain Level: No c/o pain     Cognition: A&O: Pt alert and oriented grossly with fair ability to follow commands.     Functional Assessment:    Initial Eval Status  Date: 20 Treatment session: 20 Short Term Goals  Treatment frequency: 2-5x/wk PRN x1-3 wks   Feeding Set up       Grooming Min A   Set up   UB Dressing Mod A   Set up   LB Dressing Dependent  Management of B socks ADLs NT due to fatigue post activities.  Mod A    Bathing Max A   Mod A   Toileting Mod A  Use of grab bar at commode for assist in transfer and to maintain balance during completion of toileting care  Min A for commode transfer  Max A for posterior cristopher care   Min A   Bed Mobility  Supine to sit: Max A       Functional Transfers STS: Min A  STS: Min A from arm chair SBA   Functional Mobility Min A with WW initially progressing to Mod A with fatigue  Bed <> bathroom  Min A using ww in room with seated rest break required SBA during ADLs   Balance Sitting: fair at EOB     Standing: fair minus at [address]  Sitting:

## 2020-01-23 NOTE — PROGRESS NOTES
Osteoporosis     Partial bowel obstruction ([address]     Pneumonia 2008    Polymyalgia rheumatica ([address]     Scoliosis     Sinus arrhythmia     1/2014 last ekg, nsr with pacs, epic    Sleep apnea     uses cpap    Spinal stenosis     Thoracic ascending aortic aneurysm ([address]     Thyroid disease     hypothyroidism       Past Surgical History:   Procedure Laterality Date    ARTERY BIOPSY  2008    temporal   [facility],  BONE SPURS    BRONCHOSCOPY N/A 11/18/2019    BRONCHOSCOPY   (BEDSIDE) performed by Lissett Browne MD at [place] [address]  2010    CARDIOVERSION  02/13/2018    CERVICAL FUSION  1994    c3-4    CHOLECYSTECTOMY  1980's    OPEN    COLON SURGERY  1980's    repair of partial bowel obstruction    CORONARY ANGIOPLASTY WITH STENT PLACEMENT  06/2011    Dr. Shon Dodd, ESOPHAGUS      EYE SURGERY Bilateral 2004    CATARACTS WITH LENS IMPLANTS    FOOT SURGERY  2010,2011    ct guided injections of feet, toenail removal    HERNIA REPAIR  3423, 8736    UMBILICAL, RIH    HYSTERECTOMY  1960's    MASTECTOMY Right     TOE SURGERY      Removal of bony outgrowth, right great toe    TRANSESOPHAGEAL ECHOCARDIOGRAM  02/13/2018    WRIST SURGERY Right YRS AGO       Family History   Problem Relation Age of Onset    Brain Cancer Mother     Lung Cancer Brother     Heart Attack Father     Heart Surgery Maternal Aunt     Stroke Maternal Aunt         reports that she has never smoked. She has never used smokeless tobacco. She reports that she does not drink alcohol or use drugs. Allergies:  [place]-d [diphenhydramine hcl];  Benadryl [diphenhydramine]; and Phenergan [promethazine hcl]    Current Medications:    oxyCODONE (ROXICODONE) immediate release tablet 5 mg, Nightly  oxyCODONE (ROXICODONE) immediate release tablet 5 mg, Daily PRN  [START ON 1/24/2020] predniSONE (DELTASONE) tablet 40 mg, Daily  guaiFENesin tablet 400 mg, 4x Daily  sodium Q4H PRN  sodium chloride flush 0.9 % injection 10 mL, BID  rOPINIRole (REQUIP) tablet 0.25 mg, Nightly        Review of Systems:   Pertinent items are noted in HPI. Remainder of a complete review of systems is (-) other than stated in the HPI    Physical exam:   Constitutional:  Elderly female visibly SOB  Vitals: VITALS:  /61   Pulse 78   Temp 97.6 °F (36.4 °C) (Oral)   Resp 16   Ht 5' (1.524 m)   Wt 204 lb 3.2 oz (92.6 kg)   SpO2 94%   BMI 39.88 kg/m²   24HR INTAKE/OUTPUT:      Intake/Output Summary (Last 24 hours) at 1/23/2020 1544  Last data filed at 1/23/2020 1347  Gross per 24 hour   Intake 120 ml   Output 4250 ml   Net -4130 ml     URINARY CATHETER OUTPUT (Chung):  Urethral Catheter Non-latex 16 fr-Output (mL): 500 mL  Skin: erythema of the back and upper thorax, turgor wnl  Heent:  eomi, mmm, nc,at  Neck: no bruits or jvd noted  Cardiovascular: PMI displaced lat Irreg Irreg no S3  Respiratory: Bilat crackles few wheezes  Abdomen:  +bs, soft, nt, nd  Ext: R>L  lower extremity edema2-3+  Neuro: A&O x3 no focal motor neuro deficit    Data:   Labs:  CBC:   Lab Results   Component Value Date    WBC 7.0 01/23/2020    RBC 4.03 01/23/2020    HGB 11.8 01/23/2020    HCT 38.2 01/23/2020    MCV 94.8 01/23/2020    MCH 29.3 01/23/2020    MCHC 30.9 01/23/2020    RDW 12.7 01/23/2020     01/23/2020    MPV 11.1 01/23/2020     BMP:    Lab Results   Component Value Date     01/23/2020    K 3.2 01/23/2020    K 3.8 01/17/2020    CL 92 01/23/2020    CO2 39 01/23/2020    BUN 59 01/23/2020    LABALBU 3.3 01/23/2020    LABALBU 3.5 07/07/2011    CREATININE 1.1 01/23/2020    CALCIUM 8.9 01/23/2020    GFRAA 56 01/23/2020    LABGLOM 46 01/23/2020    GLUCOSE 203 01/23/2020    GLUCOSE 106 10/04/2011     Albumin:    Lab Results   Component Value Date    LABALBU 3.3 01/23/2020    LABALBU 3.5 07/07/2011     Ionized Calcium:  No results found for: IONCA  Magnesium:    Lab Results   Component Value Date    MG 2.4 01/22/2020     Phosphorus:    Lab Results   Component Value Date    PHOS 3.2 01/22/2020     VITAMIN B12: No components found for: B12  FOLATE:    Lab Results   Component Value Date    FOLATE 15.5 11/17/2019     IRON:    Lab Results   Component Value Date    IRON 49 01/20/2020     Iron Saturation:  No components found for: PERCENTFE  TIBC:    Lab Results   Component Value Date    TIBC 243 01/20/2020     FERRITIN:    Lab Results   Component Value Date    FERRITIN 380 01/20/2020        Imaging:  Type of Study      TTE procedure:Echo Complete W/ Dop & Color Flow. Procedure Date  Date: 11/15/2019 Start: 03:03 PM     Study Location: Echo Lab  Technical Quality: Adequate visualization     Indications:Pulmonary embolus.     Patient Status: STAT     Height: 60 inches     HR: 88 bpm BP: 119/59 mmHg      Findings      Left Ventricle   Left ventricle is normal in size . Borderline concentric left ventricular hypertrophy. No regional wall motion abnormalities seen. Normal left ventricular ejection fraction. Ejection fraction is visually estimated at 55%. There is doppler evidence of stage II diastolic dysfunction. Right Ventricle   The right ventricle was not clearly visualized. Normal right ventricular size. Left Atrium   Normal sized left atrium. Interatrial septum appears intact. Right Atrium   Right Atrium is not clearly visualized. Normal right atrium size. Mitral Valve   Structurally normal mitral valve. Mild mitral annular calcification. Mild mitral regurgitation is present. Tricuspid Valve   The tricuspid valve was not well visualized. RVSP is 40-45 mmHg. Pulmonary hypertension is mild . Aortic Valve   The aortic valve leaflets were not well visualized. Mild aortic regurgitation is noted. Pulmonic Valve   The pulmonic valve was not well visualized. Pericardial Effusion   Fat pad versus small effusion cannot be ruled out.       Aorta   Aortic root dimension within normal limits. 2D ECHO 11/15/19   Summary   Technically suboptimal and limited study. Left ventricle is normal in size . Borderline concentric left ventricular hypertrophy. No regional wall motion abnormalities seen. Normal left ventricular ejection fraction. There is doppler evidence of stage II diastolic dysfunction. Mild mitral annular calcification. Mild mitral regurgitation is present. The tricuspid valve was not well visualized. Pulmonary hypertension is mild . 2D ECHO 1/17/20:  Summary   Ejection fraction is visually estimated at 65%. No regional wall motion abnormalities seen. Mild left ventricular concentric hypertrophy noted. Mildly dilated right ventricle with reduced function. TAPSE is 1.5 cm. Mild mitral regurgitation is present. Moderate tricuspid regurgitation. Pulmonary hypertension is moderate. RVSP is 53 mmHg. Assessment/Plan     1. CKD G3A in the setting of HTN, CAD, CHF and afib with presumed microvascular disease.  -Baseline creatinine 1.0-1.1mg/dl with an e-GFR=46-52ml/min  - UA mod blood, glucose >1000, trace LE, mod bacteria WBC 2-5  -cr stable at  1.1mg/dl-follow with  the current diuretics     2.  HTN with CKD I-IV  -BP at goal <130/80  -follow with diuresis    3. Anasarca in the setting of the HFpEF and Hypoalbuminemia  -currently on furosemide 60mg twice daily with diuril 250mg tid with 3.6L vol removal over the last 24hrs    4. Transaminitis- in the setting of the Inflenza [id number]    5. Shortness of breath  -+Influenza B  -On Oseltamivir    6. Sec HPTH of Renal Origin-PTH 49 and Vit D 31-Ca++ and PO4 WNL    7.  Hypokalemic Met Alkalosis sec to the diuresis-received 40meq KCl-repeat K+ low 3.4 will supplement this PM    Thank you Dr. Daily Farley MD for allowing us to participate in care of Inés Kitty Cartwright MD  3:44 PM  1/23/2020

## 2020-01-23 NOTE — PROGRESS NOTES
Date: 1/22/2020    Time: 11:39 PM    Patient Placed On BIPAP/CPAP/ Non-Invasive Ventilation? Yes    If no must comment. Facial area red/color change? No           If YES are Blister/Lesion present? No   If yes must notify nursing staff  BIPAP/CPAP skin barrier?   Yes    Skin barrier type:mepilex       Comments:        Basilia Yang

## 2020-01-23 NOTE — CARE COORDINATION
Social work / Discharge Planning:       Per liaison from [id number], if the patient needs a chest vest for discharge, it needs to be ordered in advance for them to have it in the building prior to patient's arrival.   Social work will will follow. N-17 and ambulette form completed.   Electronically signed by Philmore Spurling, LSW on 1/23/2020 at 1:56 PM

## 2020-01-23 NOTE — PLAN OF CARE
Problem: Falls - Risk of:  Goal: Will remain free from falls  Description  Will remain free from falls  Outcome: Ongoing  Goal: Absence of physical injury  Description  Absence of physical injury  Outcome: Ongoing     Problem: Risk for Impaired Skin Integrity  Goal: Tissue integrity - skin and mucous membranes  Description  Structural intactness and normal physiological function of skin and  mucous membranes.   Outcome: Ongoing     Problem: Pain:  Goal: Pain level will decrease  Description  Pain level will decrease  Outcome: Ongoing  Goal: Control of acute pain  Description  Control of acute pain  Outcome: Ongoing  Goal: Control of chronic pain  Description  Control of chronic pain  Outcome: Ongoing     Problem: HH FLUID RETENTION-CHF  Goal: Absence of fluid overload signs and symptoms  Outcome: Ongoing     Problem: Fluid and Electrolyte Imbalance  Goal: Fluid and electrolyte balance are achieved/maintained  Outcome: Ongoing     Problem: Infection:  Goal: Will show no infection signs and symptoms  Description  Will show no infection signs and symptoms     Outcome: Ongoing     Problem: Wound:  Goal: Will show signs of wound healing; wound closure and no evidence of infection  Description  Will show signs of wound healing; wound closure and no evidence of infection     Outcome: Ongoing

## 2020-01-23 NOTE — PROGRESS NOTES
Date: 1/23/2020    Time: 0110    Patient Placed On BIPAP/CPAP/ Non-Invasive Ventilation? No    If no must comment. Facial area red/color change? If YES are Blister/Lesion present? If yes must notify nursing staff  BIPAP/CPAP skin barrier?   Yes    Skin barrier type:mepilex       Comments:    Already on  Unable to see under mepilex  In red outlet    Harmony Peace RRT

## 2020-01-23 NOTE — PROGRESS NOTES
20 mg, 20 mg, Oral, Daily, Ace Reid MD, 20 mg at 01/22/20 1023    magnesium hydroxide (MILK OF MAGNESIA) 400 MG/5ML suspension 30 mL, 30 mL, Oral, Daily PRN, Ace Reid MD    sodium chloride (OCEAN, BABY AYR) 0.65 % nasal spray 1 spray, 1 spray, Each Nostril, Q4H PRN, Ace Reid MD    sodium chloride flush 0.9 % injection 10 mL, 10 mL, Intravenous, BID, Ace Reid MD, 10 mL at 01/22/20 2014    rOPINIRole (REQUIP) tablet 0.25 mg, 0.25 mg, Oral, Nightly, Ace Reid MD, 0.25 mg at 01/22/20 2015    Physical Exam:  /65   Pulse 72   Temp 97.6 °F (36.4 °C) (Axillary)   Resp 14   Ht 5' (1.524 m)   Wt 204 lb 3.2 oz (92.6 kg)   SpO2 94%   BMI 39.88 kg/m²   Wt Readings from Last 3 Encounters:   01/23/20 204 lb 3.2 oz (92.6 kg)   12/16/19 185 lb (83.9 kg)   12/11/19 193 lb 3.2 oz (87.6 kg)     Appearance: Elderly lady lying in bed, resting, with nasal CPAP, awake, alert, no acute respiratory distress, less coughing  Skin: Intact, no rash  Head: Normocephalic, atraumatic  Eyes: EOMI, no conjunctival erythema  ENMT: No pharyngeal erythema, MMM, no rhinorrhea  Neck: Supple, no elevated JVP, no carotid bruits  Lungs: Bibasilar rales  Cardiac: PMI nondisplaced, irregular rhythm with a normal rate, S1 & S2 normal, no murmurs  Abdomen: Soft, nontender, +bowel sounds  Extremities: Moves all extremities x 4, persistent 2-3+ lower extremity edema  Neurologic: No focal motor deficits apparent, normal mood and affect  Peripheral Pulses: Intact posterior tibial pulses bilaterally    Intake/Output:    Intake/Output Summary (Last 24 hours) at 1/23/2020 0623  Last data filed at 1/23/2020 0551  Gross per 24 hour   Intake 190 ml   Output 3675 ml   Net -3485 ml     I/O this shift:   In: 0   Out: 400 [Urine:400]    Laboratory Tests:  Recent Labs     01/21/20  0342 01/22/20  0402 01/23/20  0357    142 143   K 3.4* 4.0 3.2*   CL 96* 94* 92*   CO2 32* 35* 39*   BUN 41* 57* 59*   CREATININE 1.3* 1.3* 1.1* enlarged. Lung fields demonstrate bilateral interstitial opacities. There is a left pleural effusion, and probable right pleural effusion. The aorta is tortuous and calcified      Impression:       No significant change from the previous exam.     Echocardiogram: 11/15/2019 (Dr. Thaddeus Tate): EF 55%. Technically suboptimal and limited study. Left ventricle is normal in size Borderline concentric left ventricular hypertrophy. No regional wall motion abnormalities seen. Normal left ventricular ejection fraction. There is doppler evidence of stage II diastolic dysfunction. Mild mitral annular calcification. Mild mitral regurgitation is present. The tricuspid valve was not well visualized. Pulmonary hypertension is mild     Echo 1/19/2020   Summary   Ejection fraction is visually estimated at 65%. No regional wall motion abnormalities seen. Mild left ventricular concentric hypertrophy noted. Mildly dilated right ventricle with reduced function. TAPSE is 1.5 cm. Mild mitral regurgitation is present. Moderate tricuspid regurgitation. Pulmonary hypertension is moderate. RVSP is 53 mmHg. Stress test:      Cardiac catheterization: 10/24/2013: Status post remote stent of LAD with demonstration of moderate in-stent restenosis today. 50% ostial 2nd diagonal branch stenosis. Normal left ventricular systolic function. Aortic root dilatation.     ASSESSMENT / PLAN:  1. Acute on chronic heart failure with preserved ejection fraction.  -11.6 L. Still volume overloaded/anasarca  2. Acute influenza B  3. Persistent atrial fibrillation, currently RVR. Diagnosed 2014. PEACE/DCC and amiodarone 2018. On and off AC, resumed 11/2019. A. fib recurred 11/2019 in setting of parainfluenza. Amiodarone continued but remained in A. Fib. 4. CAD, PCI to LAD 2011  5. Valvular heart disease: Mild MR  6. MIKE/CKD creatinine stable 1.1  7.  Comorbid disease: Hypertension, hyperlipidemia, obesity, hypothyroidism, TARIK, LAFB, chronic

## 2020-01-23 NOTE — PROGRESS NOTES
I/O:    Intake/Output Summary (Last 24 hours) at 1/23/2020 1201  Last data filed at 1/23/2020 0845  Gross per 24 hour   Intake 120 ml   Output 3750 ml   Net -3630 ml     CPAP/EPAP: 12 cmH2O     CURRENT MEDS :  Scheduled Meds:   oxyCODONE  5 mg Oral Nightly    albuterol  2.5 mg Nebulization Q4H    [START ON 1/24/2020] predniSONE  40 mg Oral Daily    guaiFENesin  400 mg Oral 4x Daily    sodium chloride (Inhalant)  4 mL Nebulization BID    metoprolol tartrate  37.5 mg Oral BID    simethicone  160 mg Oral TID    methylPREDNISolone  40 mg Intravenous Q12H    ketotifen  1 drop Both Eyes BID    benzonatate  200 mg Oral TID    chlorothiazide (DIURIL) IVPB  250 mg Intravenous TID    furosemide  60 mg Intravenous Q12H    insulin lispro  0-12 Units Subcutaneous TID WC    insulin lispro  0-6 Units Subcutaneous Nightly    rOPINIRole  0.5 mg Oral Dinner    apixaban  5 mg Oral BID    b complex-C-folic acid  1 mg Oral QAM    budesonide  1,000 mcg Nebulization BID    calcium-vitamin D  1 tablet Oral Lunch    vitamin D  2,000 Units Oral Lunch    ferrous sulfate  325 mg Oral Lunch    fluticasone  1 spray Each Nostril Daily    formoterol  20 mcg Nebulization Q12H    hydrocortisone  25 mg Rectal Nightly    levothyroxine  125 mcg Oral Daily    magnesium oxide  400 mg Oral QAM    miconazole   Topical BID    potassium chloride  40 mEq Oral BID    famotidine  20 mg Oral Daily    sodium chloride flush  10 mL Intravenous BID    rOPINIRole  0.25 mg Oral Nightly       Physical Exam:  General Appearance: appears comfortable in no acute distress. obese  HEENT: Normocephalic atraumatic without obvious abnormality   Neck: Lips, mucosa, and tongue normal.  Supple, symmetrical, trachea midline, no adenopathy;thyroid:  no enlargement/tenderness/nodules or JVD. Lung: Breath sounds improved bilateral expiratory wheezing with scattered rhonchi. Respirations   unlabored. Symmetrical expansion.   Heart: RRR, normal S1, pH, Blood Gas Latest Ref Range: 7.350 - 7.450  7.507 (H)   PCO2 Latest Ref Range: 35.0 - 45.0 mmHg 48.7 (H)   pO2 Latest Ref Range: 60.0 - 100.0 mmHg 63.0   HCO3 Latest Ref Range: 22.0 - 26.0 mmol/L 37.7 (H)   Base Excess Latest Ref Range: -3.0 - 3.0 mmol/L 12.9 (H)   O2 Sat Latest Ref Range: 92.0 - 98.5 % 92.7   tHb (est) Latest Ref Range: 11.5 - 16.5 g/dL 12.7   O2Hb Latest Ref Range: 94.0 - 97.0 % 91.9 (L)   COHb Latest Ref Range: 0.0 - 1.5 % 0.9   MetHb Latest Ref Range: 0.0 - 1.5 % 0.0   HHb Latest Ref Range: 0.0 - 5.0 % 7.2 (H)   O2 Content Latest Units: mL/dL 16.4   Pt Temp Latest Units: C 37.0   Critical(s) Notified Unknown . No Critical Values   Time Analyzed Unknown 1015   Mode Unknown NC-  4L        Assessment:    1. Influenza B  2. Basilar atelectasis  3. Acute on chronic respiratory failure with hypoxia and hypercapnia  4. Contraction respiratory alkalosis secondary to diuretic therapy  5. TARIK on CPAP  6. CHF, diastolic  7. Anasarca volume overload  8. Hypoalbuminemia   9. Protein calorie malnutrition  10. Persistent atrial fibrillation  11. Obesity  12. GERD  13. Polymyalgia rheumatica  14. Chronic daily prednisone use 10 mg po       Plan:   1. Oxygen therapy 4 L NC  2. CPAP 12 cm H2O at HS and PRN daytime  3. Completed Tamiflu x 5 total days until 1/22/2020  4. Aerosol treatment: Pulmicort and Perforomist BID, Albuterol Q 4 hrs straight to try 3% saline nebs BID in place of mucomyst  5. Recruitment techniques- EZPAP QID, Chest vest BID, Flutter valve  6. Stopped hycodan, too sedating. Continue guaifenesin increase QID, Tessalon TID for cough. Lidocaine nebulized for cough paroxysms BID as needed. 7. flonase daily saline as needed for nasal congestion  8. To oral prednisone in am   9. Magic cups, nutritional support  10. Continue diuresis per nephrology- Lasix 60 mg IV BID, diuril 250 mg IV TID. 11. Maintain negative fluid balance -12.2 L   12.  ABG demonstrates a metabolic alkalosis secondary to diuresis - would consider reducing loop diuretics-defer to nephrology  13. Repeat echo completed  14. DVT prophylaxis-Eliquis BID for a fib, GI prophylaxis-pepcid     This plan of care was reviewed in collaboration with Dr. Gabriella Newton   Electronically signed by ZHANNA Perez - CNP on 1/23/2020 at 12:01 PM        Attending Addendum   I personally saw, examined, and cared for the patient. Labs, medications, radiographs reviewed.  I agree with history exam and plans detailed in NP note with the following additions:    Doing better today, had a better night sleep  She is on 4L oxygen and nocturnal NIV  Change to oral prednisone as she has had a significant amount of IV steroids which are no longer providing benefit aside from inducing more weakness/myopathy  Bronchodilators via nebs  Would be cautious with ongoing diuresis d/t the development of a metabolic contraction alkalosis     Electronically signed by Jack Hobson MD on 1/23/2020 at 2:48 PM

## 2020-01-24 NOTE — CONSULTS
on 2-3L NC at baseline. EKG in ER showed AFib RVR, CXR showed CHF and bilateral effusions, and influenza screen was positive for flu. Patient transferred to telemetry for further monitoring. Cardiology, pulmonology, nephrology, and ID following. Subjective/Events/Discussions:    Patient up in chair with multiple family members at bedside. Patient responds after her name being called multiple times however falls back asleep very shortly after. She is unable to participate in conversation due to lethargy. Patient has 5 children, daughters- Noe Lacy is healthcare power of . All children are in agreement that patient has been declining since here in hospital.  Noe Lacy explains that three people had to pick patient up to put her into the chair and she is barely able to even move her upper extremities due to weakness. They state at the nursing home they feel her quality of life has declined and she is unable to do the things that she loved to do such as go to lunch with her girlfriends, drive, and even just be able to care for self without depending on others. Friend states that her mother has lived a \"beautiful life \"and she feels watching her struggle is very difficult. Family is all in agreement that they would like to see how things progress over the weekend and are open to speaking with hospice and going with comfort care if patient's condition does not improve. At this time however, they would like to change patient's CODE STATUS to limited code: No intubation, no compressions, no defibrillation, no resuscitative medications. They are all in agreement with this plan. We also discussed symptom management. Armin Tamayo explains that patient has chronic back pain from spinal stenosis. At this time, patient has not been getting pain medication due to increased lethargy. Unable to assess symptoms in patient due to lethargy and unable to verbalize complaints at this time.  Support provided and questions answered. Palliative medicine contact number given to [facility]. - Family Meeting:   Participants:Child, HCPOA and patient   Family meeting was held to discuss:diagnosis, prognosis, treatment options, goals of care, prior expressed wishes, advanced care planning and discharge plan    -Surrogate/Legal NOK: Child and HCPOA    Contacts:  Abelardo Phoenix, daughter/KIMMIE, [phone]     Past Medical History:   Diagnosis Date    AC (acromioclavicular) joint bone spurs     in feet    Acid reflux     Ascending aortic aneurysm (HCC)     4.0 cm,(SEE LETTERS DR. PENALOZA) IS BEING MONITORED    Atrial fibrillation ([address]     CAD (coronary artery disease)     Cancer (HCC) 16 YRS AGO    breast RIGHT    [facility]     Chronic back pain     stenosis, receives epidurals bi-monthly    CKD (chronic kidney disease) stage 4, GFR 15-29 ml/min ([facility]) 8/24/2019    DJD (degenerative joint disease)     Hyperlipidemia     Hypertension     Hypothyroidism     Iron deficiency anemia     Lung nodule     RIGHT LOWER LOBE, BEING MONITORED BY DR. Ivery Nyhan, LAST CT EPIC 3/2014    MVP (mitral valve prolapse)     Osteoarthritis     Osteoporosis     Partial bowel obstruction ([address]     Pneumonia 2008    Polymyalgia rheumatica ([address]     Scoliosis     Sinus arrhythmia     1/2014 last ekg, nsr with pacs, epic    Sleep apnea     uses cpap    Spinal stenosis     Thoracic ascending aortic aneurysm ([address]     Thyroid disease     hypothyroidism       Past Surgical History:   Procedure Laterality Date    ARTERY BIOPSY  2008    temporal   [facility],  BONE SPURS    BRONCHOSCOPY N/A 11/18/2019    BRONCHOSCOPY   (BEDSIDE) performed by Kathy Storey MD at [address] [place] [address]  2010    CARDIOVERSION  02/13/2018    CERVICAL FUSION  1994    c3-4    CHOLECYSTECTOMY  1980's    OPEN    COLON SURGERY  1980's    repair of partial bowel obstruction    CORONARY distress identified     - Bereavement and grief: to be determined    - Discharge planning: to be determined    - Prognosis: depends upon goals    - Referrals to: none today    Time/Communication  Greater than 51% of time spent, total 70 minutes in counseling and coordination of care at the bedside regarding goals of care, symptom management, diagnosis and prognosis and see above. Assessment/Plan   1. Hypoxic respiratory failure-positive influenza, on Tamiflu, pulmonology following, on breathing treatments steroids and O2 as needed  2. Atrial fibrillation with RVR-2D echo stable, cardiology following, anticoagulated on Eliquis  3. CHF-acute on chronic, diuresis per renal  4. Goals: Family would like to watch and wait to see progress over the weekend, if patient does not show signs of improvement they are interested in speaking with hospice and comfort care  5. CODE STATUS: Limited code-no intubation, no compressions, no defibrillation, no resuscitative medications  6. Symptom management: History of chronic back pain due to spinal stenosis, unable to assess current symptoms and patient due to current mental status      Elsa CHAO-AMERICA  Palliative Medicine    Discussed patient and the plan of care with the other IDT members of Palliative Care, and with patient, family and floor nurse. Thank you for allowing Palliative Medicine to participate in the care of Poncho Burciaga. Note: This report was completed using [employer] voiced recognition software. Every effort has been made to ensure accuracy; however, inadvertent computerized transcription errors may be present.

## 2020-01-24 NOTE — PROGRESS NOTES
Nephrology Progress Note  Patient's Name: Kavin Prasad  11:58 AM  1/24/2020    Nephrologist: Dr. Sukh Connor     Reason for Consult:   CKD G3 with Vol Overload  Requesting Physician:  Juan Alvarenga MD    Chief Complaint:  Edema  and SOB    History Obtained From:  patient, relative(s) and past medical records    History of Present Ilness:    Kavin Prasad is a 80 y.o. female with PMH significant for HTN, CHF and CAD who presented to the emergency department for increased edema and SOB. She has gained 10# over the preceding week and is now having difficulty ambulating due to the edema. She complained of associated shortness of breath and tested (+) Influenza B. In the ED the patient was noted to be hypotensive. She is known to the practice from a previous MIKE in August 2019 admission 11/2019 for vol overloadand a She has a history of CKD G3 with baseline serum creat 1.0-1.mg/dl with an e-GFR 46-52ml/min. Upon assessment she states she is feeling a little better today. She still gets SOB with minimal exertion. Her family is at the bedside and were updated on her renal status. 1/23/20: Pt states she feels a little better today able to sleep last night    1/24/20: Up in chair, lethargic, daughter feeding her lunch and states that normally her mother is talkative and outgoing. States she feels ok. Daughter reports large amount of diarrhea yesterday. Past Medical History:   Diagnosis Date    AC (acromioclavicular) joint bone spurs     in feet    Acid reflux     Ascending aortic aneurysm (HCC)     4.0 cm,(SEE LETTERS DR. PENALOZA) IS BEING MONITORED    Atrial fibrillation ([address]     CAD (coronary artery disease)     Cancer (HCC) 16 YRS AGO    breast RIGHT    [facility])     Chronic back pain     stenosis, receives epidurals bi-monthly    CKD (chronic kidney disease) stage 4, GFR 15-29 ml/min ([facility]) 8/24/2019    DJD (degenerative joint disease)     Hyperlipidemia     Hypertension     Hypothyroidism     Iron deficiency anemia     Lung nodule     RIGHT LOWER LOBE, BEING MONITORED BY DR. Jeani Lombard, LAST CT EPIC 3/2014    MVP (mitral valve prolapse)     Osteoarthritis     Osteoporosis     Partial bowel obstruction ([address]     Pneumonia 2008    Polymyalgia rheumatica ([address]     Scoliosis     Sinus arrhythmia     1/2014 last ekg, nsr with pacs, epic    Sleep apnea     uses cpap    Spinal stenosis     Thoracic ascending aortic aneurysm ([address]     Thyroid disease     hypothyroidism       Past Surgical History:   Procedure Laterality Date    ARTERY BIOPSY  2008    temporal   [facility],  BONE SPURS    BRONCHOSCOPY N/A 11/18/2019    BRONCHOSCOPY   (BEDSIDE) performed by Alber Dee MD at [facility]  2010    CARDIOVERSION  02/13/2018    CERVICAL FUSION  1994    c3-4    CHOLECYSTECTOMY  1980's    OPEN    COLON SURGERY  1980's    repair of partial bowel obstruction    CORONARY ANGIOPLASTY WITH STENT PLACEMENT  06/2011    Dr. Jagdish Go, ESOPHAGUS      EYE SURGERY Bilateral 2004    CATARACTS WITH LENS IMPLANTS    FOOT SURGERY  2010,2011    ct guided injections of feet, toenail removal    HERNIA REPAIR  5539, 6792    UMBILICAL, RIH    HYSTERECTOMY  1960's    MASTECTOMY Right     TOE SURGERY      Removal of bony outgrowth, right great toe    TRANSESOPHAGEAL ECHOCARDIOGRAM  02/13/2018    WRIST SURGERY Right YRS AGO       Family History   Problem Relation Age of Onset    Brain Cancer Mother     Lung Cancer Brother     Heart Attack Father     Heart Surgery Maternal Aunt     Stroke Maternal Aunt         reports that she has never smoked. She has never used smokeless tobacco. She reports that she does not drink alcohol or use drugs. Allergies:  [place]field-d [diphenhydramine hcl];  Benadryl [diphenhydramine]; and Phenergan [promethazine hcl]    Current Medications:    HYDROcodone-acetaminophen ([address]) Daily PRN  sodium chloride (OCEAN, BABY AYR) 0.65 % nasal spray 1 spray, Q4H PRN  sodium chloride flush 0.9 % injection 10 mL, BID  rOPINIRole (REQUIP) tablet 0.25 mg, Nightly        Review of Systems:   Pertinent items are noted in HPI. Remainder of a complete review of systems is (-) other than stated in the HPI    Physical exam:   Constitutional:  No acute distress  Vitals: VITALS:  /64   Pulse 75   Temp 96.4 °F (35.8 °C) (Axillary)   Resp 16   Ht 5' (1.524 m)   Wt 204 lb 4.8 oz (92.7 kg)   SpO2 98%   BMI 39.90 kg/m²   24HR INTAKE/OUTPUT:      Intake/Output Summary (Last 24 hours) at 1/24/2020 1158  Last data filed at 1/24/2020 9447  Gross per 24 hour   Intake --   Output 3550 ml   Net -3550 ml     URINARY CATHETER OUTPUT (Chung):  Urethral Catheter Non-latex 16 fr-Output (mL): 250 mL  Skin: warm, erythema of arms less today  Neck: no jvd noted  Cardiovascular: irreg rhythm.   Respiratory: Clear upper lobes, fine crackles in bases  Abdomen: Abd round soft, non-tender, bowel sounds present  Ext: R>L  lower extremity edema2-3+, Edema of b/l arms and hands decreased from yesterday  Neuro: lethargic, arouses easily, oriented x 3    Data:   Labs:  CBC:   Lab Results   Component Value Date    WBC 5.7 01/24/2020    RBC 3.87 01/24/2020    HGB 11.6 01/24/2020    HCT 37.1 01/24/2020    MCV 95.9 01/24/2020    MCH 30.0 01/24/2020    MCHC 31.3 01/24/2020    RDW 12.9 01/24/2020     01/24/2020    MPV 10.9 01/24/2020     BMP:    Lab Results   Component Value Date     01/24/2020    K 3.9 01/24/2020    K 3.8 01/17/2020    CL 89 01/24/2020    CO2 43 01/24/2020    BUN 68 01/24/2020    LABALBU 3.1 01/24/2020    LABALBU 3.5 07/07/2011    CREATININE 1.1 01/24/2020    CALCIUM 8.9 01/24/2020    GFRAA 56 01/24/2020    LABGLOM 46 01/24/2020    GLUCOSE 317 01/24/2020    GLUCOSE 106 10/04/2011     Albumin:    Lab Results   Component Value Date    LABALBU 3.1 01/24/2020    LABALBU 3.5 07/07/2011     Ionized Calcium: Effusion   Fat pad versus small effusion cannot be ruled out. Aorta   Aortic root dimension within normal limits. 2D ECHO 11/15/19   Summary   Technically suboptimal and limited study. Left ventricle is normal in size . Borderline concentric left ventricular hypertrophy. No regional wall motion abnormalities seen. Normal left ventricular ejection fraction. There is doppler evidence of stage II diastolic dysfunction. Mild mitral annular calcification. Mild mitral regurgitation is present. The tricuspid valve was not well visualized. Pulmonary hypertension is mild . 2D ECHO 1/17/20:  Summary   Ejection fraction is visually estimated at 65%. No regional wall motion abnormalities seen. Mild left ventricular concentric hypertrophy noted. Mildly dilated right ventricle with reduced function. TAPSE is 1.5 cm. Mild mitral regurgitation is present. Moderate tricuspid regurgitation. Pulmonary hypertension is moderate. RVSP is 53 mmHg. Assessment/Plan     1. CKD G3A in the setting of HTN, CAD, CHF and afib with presumed microvascular disease.  -Baseline creatinine 1.0-1.1mg/dl with an e-GFR=46-52ml/min  - UA mod blood, glucose >1000, trace LE, mod bacteria WBC 2-5  -cr stable at  1.1mg/dl- follow      2.  HTN with CKD I-IV  -BP at goal <130/80      3. Anasarca in the setting of the HFpEF and Hypoalbuminemia  -diuretics discontinued d/t increased alkalosis  - 3.3 L vol removal over the last 24hrs   - weight down 8-10 lb since admission    4. Transaminitis- in the setting of the Inflenza G-[id number]    5. Shortness of breath  -+Influenza B  -Oseltamivir completed 1/22    6. Sec HPTH of Renal Origin-PTH 49 and Vit D 31-Ca++ and PO4 WNL    7.  Hypokalemic Met Alkalosis sec to diuresis   - additional 40meq KCl-yesterday for hypokalemia - K+ today 3.9   - will monitor closely now that diuretics have been discontinued    Thank you Dr. Marli Garcia MD for allowing us to

## 2020-01-24 NOTE — PROGRESS NOTES
A      Activity Tolerance Poor plus. Increasing fatigue/weakness and SOB with activity. 3L O2 throughout sat post activity 94% Limited with extended time and rest breaks required. SpO2 was 96% during activity standing sergey x5-6 min with fair plus balance during self care tasks         B UE Ex: Instructed pt on 1x5 reps of AROM shoulder flexion, scapular retraction and elbow flexion/ extension requiring mod vc and demos for correct form. Exercises were performed to improve strength during ADLs. Comments: Upon arrival pt was supine in bed with daughter present. At end of session pt was transferred to chair with B LE elevated, alarm on, all lines and tubes intact and call light within reach. Education: Benefits of UE exercises and proper breathing techniques to improve activity tolerance during ADLs. · Pt has made fair progress towards set goals.    · Continue with current plan of care      Total Tx Time: [place] TOVAR/L [id number]

## 2020-01-24 NOTE — CONSULTS
[address] [place] [address] [facility]  Consultation Note     Admit Date: 1/17/2020 10:38 AM    Reason for Consult:   Mastoiditis    Attending Physician:  Jose Vaughn MD    HISTORY OF PRESENT ILLNESS:             The history is obtained from extensive review of available past medical records. The patient is a 80 y.o. female who is known to the ID service. Patient has a history of CHF and atrial fibrillation. She had pulmonary embolism a few months ago and has been anticoagulated. She was admitted from [place] [address] with 10 pound weight gain over the last week on 1/17/2020. She was admitted with respiratory failure, influenza B, CHF, atrial fibrillation and pleural effusion. He was seen in consultation by cardiology and diuresed. Spencer Moots was started. Seen by pulmonary for chronic respiratory failure with hypoxia and placed on a CPAP. She was also treated with steroids. Nephrology was asked to see her chronic kidney disease. Blood cultures were negative. A respiratory panel was never done. Urinary antigens were negative. Transaminases went up slightly and were felt to be secondary to influenza. According to her daughter she has been slightly more lethargic here. She says she was tach taking Oxy IR at the nursing facility and was more awake. The patient has no complaints. There is no headache, earache, drainage from ears. She does have bilateral hearing aids. According to her daughter about 14 L of fluid have been taken off since she was admitted. She has not had any fevers here. Past Medical History:        Diagnosis Date    AC (acromioclavicular) joint bone spurs     in feet    Acid reflux     Ascending aortic aneurysm (HCC)     4.0 cm,(SEE LETTERS DR. PENALOZA) IS BEING MONITORED    Atrial fibrillation ([facility] [address]     CAD (coronary artery disease)     Cancer (HCC) 16 YRS AGO    breast RIGHT    CHF (congestive heart failure) (HCC)     Chronic back pain     stenosis, flush  10 mL Intravenous BID    rOPINIRole  0.25 mg Oral Nightly     Continuous Infusions:   dextrose       PRN Meds:HYDROcodone-acetaminophen, senna, lactulose, glucose, dextrose, glucagon (rDNA), dextrose, acetaminophen, clotrimazole-betamethasone, ondansetron, magnesium hydroxide, sodium chloride    Allergies:  Judit-d [diphenhydramine hcl]; Benadryl [diphenhydramine]; and Phenergan [promethazine hcl]    Social History:   Social History     Socioeconomic History    Marital status:      Spouse name: None    Number of children: None    Years of education: None    Highest education level: None   Occupational History    Occupation: retired-   Social Needs    Financial resource strain: None    Food insecurity:     Worry: None     Inability: None    Transportation needs:     Medical: None     Non-medical: None   Tobacco Use    Smoking status: Never Smoker    Smokeless tobacco: Never Used   Substance and Sexual Activity    Alcohol use: Never     Alcohol/week: 0.0 standard drinks     Frequency: Never    Drug use: Never    Sexual activity: Never     Partners: Male   Lifestyle    Physical activity:     Days per week: None     Minutes per session: None    Stress: None   Relationships    Social connections:     Talks on phone: None     Gets together: None     Attends Buddhism service: None     Active member of club or organization: None     Attends meetings of clubs or organizations: None     Relationship status: None    Intimate partner violence:     Fear of current or ex partner: None     Emotionally abused: None     Physically abused: None     Forced sexual activity: None   Other Topics Concern    None   Social History Narrative    Drinks 3-4 cups of coffee daily. Nursing home resident    Family History:       Problem Relation Age of Onset    Brain Cancer Mother     Lung Cancer Brother     Heart Attack Father     Heart Surgery Maternal Aunt     Stroke Maternal Aunt    . Otherwise non-pertinent to the chief complaint. REVIEW OF SYSTEMS:    Constitutional: Negative for fevers, chills, diaphoresis  Neurologic: Some lethargy but no seizures. No headache. Psychiatric: Negative  Rheumatologic: Negative   Endocrine: Negative  Hematologic: No easy bleeding or bruising. Immunologic: Negative  ENT: No rhinorrhea. No drainage from ears. Hard of hearing. Respiratory: As in the HPI. Dyspnea has improved. Cardiovascular: No chest pain. GI: Poor appetite. No nausea, vomiting or diarrhea  : Negative  Musculoskeletal: Negative  Skin: No rashes. PHYSICAL EXAM:    Vitals:   /64   Pulse 75   Temp 96.4 °F (35.8 °C) (Axillary)   Resp 16   Ht 5' (1.524 m)   Wt 204 lb 4.8 oz (92.7 kg)   SpO2 98%   BMI 39.90 kg/m²   Constitutional: The patient is asleep but arousable. Opens eyes and makes eye contact and smiles. Sitting in a chair. No distress. Family present. Skin: Warm and dry. No rashes were noted. HEENT: Eyes show round, and reactive pupils. No jaundice. Moist mucous membranes, no ulcerations, no thrush. External auditory canals are unremarkable. No drainage. No tenderness to percussion of either mastoid. No erythema over mastoid. Neck: Supple to movements. No lymphadenopathy. Chest: No use of accessory muscles to breathe. Symmetrical expansion. Auscultation reveals no wheezing, crackles, or rhonchi. Cardiovascular: S1 and S2 are rhythmic and regular. No murmurs appreciated. Abdomen: Positive bowel sounds to auscultation. Benign to palpation. Extremities: Moderate to severe edema. Bilateral Tubigrips.   Lines: peripheral      CBC+dif:  Recent Labs     01/22/20  0402 01/23/20  0357 01/24/20  0421   WBC 8.4 7.0 5.7   HGB 11.8 11.8 11.6   HCT 37.4 38.2 37.1   MCV 94.2 94.8 95.9    193 187     Lab Results   Component Value Date    CRP 0.3 02/20/2018    CRP 0.3 06/02/2017    CRP 0.1 03/02/2017      Lab Results   Component Value Date    CRPHS 0.2 05/08/2014    CRPHS 0.1 04/03/2014     Lab Results   Component Value Date    SEDRATE 9 02/20/2018    SEDRATE 12 10/23/2017    SEDRATE 5 06/02/2017     Lab Results   Component Value Date    ALT 46 (H) 01/24/2020    AST 36 (H) 01/24/2020    ALKPHOS 121 (H) 01/24/2020    BILITOT 1.4 (H) 01/24/2020     Lab Results   Component Value Date     01/24/2020    K 3.9 01/24/2020    K 3.8 01/17/2020    CL 89 01/24/2020    CO2 43 01/24/2020    BUN 68 01/24/2020    CREATININE 1.1 01/24/2020    GFRAA 56 01/24/2020    LABGLOM 46 01/24/2020    GLUCOSE 317 01/24/2020    GLUCOSE 106 10/04/2011    PROT 5.3 01/24/2020    LABALBU 3.1 01/24/2020    LABALBU 3.5 07/07/2011    CALCIUM 8.9 01/24/2020    BILITOT 1.4 01/24/2020    ALKPHOS 121 01/24/2020    AST 36 01/24/2020    ALT 46 01/24/2020       Lab Results   Component Value Date    PROTIME 11.6 08/27/2019    PROTIME 12.2 10/04/2011    INR 1.0 08/27/2019       Lab Results   Component Value Date    TSH 0.650 08/30/2019       Lab Results   Component Value Date    COLORU Yellow 01/19/2020    PHUR 5.5 01/19/2020    LABCAST MODERATE 11/15/2019    WBCUA 2-5 01/19/2020    RBCUA 2-5 01/19/2020    RBCUA 0-1 10/24/2013    BACTERIA MODERATE 01/19/2020    CLARITYU Clear 01/19/2020    SPECGRAV 1.010 01/19/2020    LEUKOCYTESUR TRACE 01/19/2020    UROBILINOGEN 0.2 01/19/2020    BILIRUBINUR Negative 01/19/2020    BLOODU MODERATE 01/19/2020    GLUCOSEU >=1000 01/19/2020       Lab Results   Component Value Date    HCO3 36.3 01/24/2020    BE 12.9 01/24/2020    O2SAT 92.2 01/24/2020    PH 7.562 01/24/2020    PCO2 41.3 01/24/2020    PO2 60.4 01/24/2020     Radiology:  Noted    Microbiology:  Pending  No results for input(s): BC in the last 72 hours. No results for input(s): ORG in the last 72 hours. No results for input(s): Leonce Meals in the last 72 hours. No results for input(s): STREPNEUMAGU in the last 72 hours. No results for input(s): LP1UAG in the last 72 hours.   No results for input(s): ASO in the last 72 hours. No results for input(s): CULTRESP in the last 72 hours. No results for input(s): PROCAL in the last 72 hours. Assessment:  · Influenza B infection. Completed a course of Oseltamavir  · Exacerbation congestive heart failure  · Fluid in mastoid. I doubt the patient has mastoiditis. This could be from a viral infection. I do not think the patient has an ongoing bacterial infection    Plan:    · Antibiotics are not warranted at this time  · Check cultures, baseline ESR, CRP  · Monitor labs  · Will follow with you    Thank you for having us see this patient in consultation. Spoke with daughter Karrie Romo. .    Chris Mujica  2:04 PM  1/24/2020

## 2020-01-24 NOTE — PROGRESS NOTES
Physical Therapy  Facility/Department: [facility] MED SURG  Daily Treatment Note  NAME: Cosmo Diaz  : 1927  MRN: [id number]    Date of Service: 2020  Patient Diagnosis(es): The primary encounter diagnosis was Acute respiratory failure with hypoxia ([address] Diagnoses of Influenza B, Acute on chronic diastolic congestive heart failure ([address] Atrial fibrillation, unspecified type ([address] and Pleural effusion, bilateral were also pertinent to this visit. has a past medical history of AC (acromioclavicular) joint bone spurs, Acid reflux, Ascending aortic aneurysm (HCC), Atrial fibrillation ([address] CAD (coronary artery disease), Cancer ([address] CHF (congestive heart failure) ([address] Chronic back pain, CKD (chronic kidney disease) stage 4, GFR 15-29 ml/min ([address] DJD (degenerative joint disease), Hyperlipidemia, Hypertension, Hypothyroidism, Iron deficiency anemia, Lung nodule, MVP (mitral valve prolapse), Osteoarthritis, Osteoporosis, Partial bowel obstruction ([address] Pneumonia, Polymyalgia rheumatica (HCC), Scoliosis, Sinus arrhythmia, Sleep apnea, Spinal stenosis, Thoracic ascending aortic aneurysm ([address] and Thyroid disease. has a past surgical history that includes Artery Biopsy (); cervical fusion (); Toe Surgery; Wrist surgery (Right, YRS AGO); Foot surgery (,); Hysterectomy ('s); Colon surgery (s); Cardiac catheterization (); Coronary angioplasty with stent (2011); eye surgery (Bilateral, ); hernia repair (, ); Cholecystectomy (); back surgery (); Dilatation, esophagus; transesophageal echocardiogram (2018); Cardioversion (2018); Mastectomy (Right); and bronchoscopy (N/A, 2019).   Evaluating Therapist: Annika Lyons PT         Room #: 607  DIAGNOSIS: influenza B   PRECAUTIONS: fall risk, O2, droplet  isolation     Social:  Pt lives with her daughter and son-in-law in a 2 floor plan 2 steps and 1 rails to enter.  Bed and bath on second floor.  Stair glide to the second floor.   Prior to admission pt walked with walker.       Pt admitted from rehab        Initial Evaluation  Date: 1/20/20 Treatment  1/24/2020  Short Term/ Long Term   Goals   Was pt agreeable to Eval/treatment? yes yes      Does pt have pain? None reported        Bed Mobility  Rolling: NT   Supine to sit: Max A  Sit to supine: NT  Scooting: Mod A to sitting EOB Rolling: Max A  Supine to sit: Max A  Scooting: Max A to EOB Min A   Transfers Sit to stand: Min A  Stand to sit: Min A  Stand pivot: Min  A with w/w Sit to stand: Mod A  Stand to sit: Mod A  Stand Pivot: NT CGA   Ambulation    15 feet x 2 with ww min assist, progressing to mod assist with fatigue   18 feet x 1 using WW for support Min/Mod A for balance 25 feet with w/w with CGA   AM-PAC raw score 13/24 11/24        Balance: poor dynamic using WW for support    Pt performed therapeutic exercise of the following: NT    Patient education  Pt was educated on UE usage to assist with transfers, gait promoting posture and staying within [employer] base of support    Patient response to education:   Pt verbalized understanding Pt demonstrated skill Pt requires further education in this area   yes With prompt for transfer safety yes     Additional Comments: Katherine remains slow, inconsistent and laboring, Pt deconditioned, unsteady throughout, required constant hands on assist for balance and safety. Pt short of breath on 02, fatigued after activity, 02 saturation 92%. Pt was left in a recliner chair with call light in reach    Treatment time: 14 minutes  Time out: 0849    Pt is making limited progress toward established Physical Therapy goals as per functional mobility performed. Pt required increased assist for balance this session. Continue with physical therapy current plan of care.     Lorraine Rodriguez PTA   License Number: [id number]

## 2020-01-24 NOTE — PROGRESS NOTES
Date: 1/24/2020    Time: 0035    Patient Placed On BIPAP/CPAP/ Non-Invasive Ventilation? Yes    If no must comment. Facial area red/color change? If YES are Blister/Lesion present? If yes must notify nursing staff  BIPAP/CPAP skin barrier?   Yes    Skin barrier type:mepilex       Comments:    Found off/ replaced and reinforced need to wear  In red outlet    Harmony PeaceRRT

## 2020-01-24 NOTE — PROGRESS NOTES
Family Medicine    Subjective: The patient is more lethargic this AM.     Objective:  BP (!) 153/69   Pulse 78   Temp 97.6 °F (36.4 °C) (Axillary)   Resp 12   Ht 5' (1.524 m)   Wt 204 lb 4.8 oz (92.7 kg)   SpO2 96%   BMI 39.90 kg/m²     HEENT: MMM. Heart: Irregularly irregular. Lungs: Decreased breath sounds. Abd: bowel sounds present, nontender, nondistended, no masses. Extrem:  No clubbing or cyanosis. 2+ edema. Neuro: Intact without deficits. CBC with Differential:    Lab Results   Component Value Date    WBC 5.7 01/24/2020    RBC 3.87 01/24/2020    HGB 11.6 01/24/2020    HCT 37.1 01/24/2020     01/24/2020    MCV 95.9 01/24/2020    MCH 30.0 01/24/2020    MCHC 31.3 01/24/2020    RDW 12.9 01/24/2020    NRBC 0.0 08/22/2019    SEGSPCT 54 11/21/2013    METASPCT 1.0 11/19/2019    LYMPHOPCT 12.9 01/17/2020    MONOPCT 6.9 01/17/2020    BASOPCT 0.5 01/17/2020    MONOSABS 0.60 01/17/2020    LYMPHSABS 1.12 01/17/2020    EOSABS 0.05 01/17/2020    BASOSABS 0.04 01/17/2020     CMP:    Lab Results   Component Value Date     01/24/2020    K 3.9 01/24/2020    K 3.8 01/17/2020    CL 89 01/24/2020    CO2 43 01/24/2020    BUN 68 01/24/2020    CREATININE 1.1 01/24/2020    GFRAA 56 01/24/2020    LABGLOM 46 01/24/2020    GLUCOSE 317 01/24/2020    GLUCOSE 106 10/04/2011    PROT 5.3 01/24/2020    LABALBU 3.1 01/24/2020    LABALBU 3.5 07/07/2011    CALCIUM 8.9 01/24/2020    BILITOT 1.4 01/24/2020    ALKPHOS 121 01/24/2020    AST 36 01/24/2020    ALT 46 01/24/2020     Assessment/Plan:  1. Hypoxic respiratory failure - multifactorial. Secondary to influenza virus. On O2. Slowly improving. 2. Influenza virus - s/p Tamiflu. Pulmonary on case. On nebs, PO steroids, O2, and cough suppressants. 3. Atrial fibrillation with RVR - exacerbated by pulmonary status. 2D Echo stable. Cardiology on case. Rate controlled. Anticoagulated on Eliquis. 4. CHF - acute on chronic diastolic.  Diuresis per renal.   5. CKD - chronic

## 2020-01-25 NOTE — PROGRESS NOTES
OT SESSION ATTEMPT     Date:2020  Patient Name: Beverley Yoon  MRN: [id number]  : 1927  Room: [address] [place] [address]-A     Attempted OT session this date:    [] unavailable due to other medical staff currently with pt   [] on hold per nursing staff   [] on hold per nursing staff secondary to lab / radiology results    [x] declined treatment  this date due to nurse and _family request to let pt rest___. Benefits of participation in therapy reviewed with pt.    [] off unit   [] Other:     Continue with current OT [facility]. [facility] at a later time.     Cali Sky, OTR/L [id number]

## 2020-01-25 NOTE — CARE COORDINATION
1/25/2020  Social Work Discharge Planning:  JOHN spoke to [address] regarding Hospice consult. Alice informed JOHN that she is going to speak with the family first, before hospice choices are given and will get back to JOHN.   Electronically signed by BRIJESH Brooke on 1/25/2020 at 9:22 AM

## 2020-01-25 NOTE — PROGRESS NOTES
[facility] Quality Flow/Interdisciplinary Rounds Progress Note        Quality Flow Rounds held on January 25, 2020    Disciplines Attending:  Bedside Nurse, ,  and Nursing Unit Leadership    Truong Romero was admitted on 1/17/2020 10:38 AM    Anticipated Discharge Date:  Expected Discharge Date: 01/20/20    Disposition:    Davdi Score:  David Scale Score: 18    Readmission Risk              Risk of Unplanned Readmission:        57           Discussed patient goal for the day, patient clinical progression, and barriers to discharge.   The following Goal(s) of the Day/Commitment(s) have been identified:  Other  Continue to monitor      Roz Warner  January 25, 2020

## 2020-01-25 NOTE — PROGRESS NOTES
INPATIENT CARDIOLOGY FOLLOW-UP    Name: Govind Peoples    Age: 80 y.o. Date of Admission: 1/17/2020 10:38 AM    Date of Service: 1/25/2020    Chief Complaint: Follow-up for acute on chronic heart failure with preserved EF, persistent atrial fibrillation, coronary artery disease    Interim History:  States feeling better overall, still coughing. Less short of breath. Denies chest pain. Very tired, slept all day yesterday but was up in the chair. Walked only a few steps. A. fib rates remain well controlled    3 L negative yesterday, total -17.5  Bicarb 43 yesterday, got acetazolamide. Diuretics on hold.   Bicarb down to 41 and creatinine stable    Review of Systems:   Negative except as described above    Problem List:  Patient Active Problem List   Diagnosis    Hypothyroidism    TARIK (obstructive sleep apnea)    Hypertensive disorder    Hypercholesterolemia    CAD in native artery    Acute diastolic CHF (congestive heart failure) [facility])    Restless legs    Acute on chronic diastolic congestive heart failure ([facility])    Essential hypertension    Lung nodule    Paroxysmal atrial fibrillation ([address]    CHF (congestive heart failure), NYHA class I, acute on chronic, combined ([facility])    Congestive heart failure ([facility])    Nonrheumatic aortic valve insufficiency    Non-rheumatic mitral regurgitation    Diastolic CHF ([address]    Cystitis    Intractable back pain    Thoracic ascending aortic aneurysm ([facility])    Polymyalgia rheumatica ([facility])    Congestive heart failure with cardiomyopathy ([address]    Acute diastolic congestive heart failure ([address]    Skin ulcer of right foot, limited to breakdown of skin ([address]    Acute respiratory failure with hypoxia ([address]    Polymyositis ([address]    Pulmonary embolism ([address]    Parainfluenza virus bronchitis    Carcinoma of breast ([address]    Influenza B    Atrial fibrillation with RVR ([facility])    CKD (chronic kidney disease) stage 3, GFR 30-59 ml/min ([address]    Acute on chronic respiratory failure with hypoxia ([facility])       Current Medications:    Current Facility-Administered Medications:     HYDROcodone-acetaminophen (NORCO)  MG per tablet 1 tablet, 1 tablet, Oral, BID PRN, Neymar Pugh MD, 1 tablet at 01/24/20 1344    HYDROcodone-acetaminophen (NORCO)  MG per tablet 1 tablet, 1 tablet, Oral, BID, Neymar Pugh MD, 1 tablet at 01/24/20 0950    potassium chloride (KLOR-CON M) extended release tablet 40 mEq, 40 mEq, Oral, Daily with breakfast, Cortez Ricketts MD    morphine (PF) injection 0.5 mg, 0.5 mg, Intravenous, Q4H PRN, Neymar Pugh MD, 0.5 mg at 01/24/20 2010    predniSONE (DELTASONE) tablet 40 mg, 40 mg, Oral, Daily, ZHANNA Smith - CNP, 40 mg at 01/24/20 3889    guaiFENesin tablet 400 mg, 400 mg, Oral, 4x Daily, ZHANNA Smith - CNP, Stopped at 01/24/20 2015    sodium chloride (Inhalant) 3 % nebulizer solution 4 mL, 4 mL, Nebulization, BID, ZHANNA Smith - CNP, 4 mL at 01/24/20 2141    potassium chloride (KLOR-CON M) extended release tablet 40 mEq, 40 mEq, Oral, Once, Neymar Pugh MD    albuterol (PROVENTIL) nebulizer solution 2.5 mg, 2.5 mg, Nebulization, Q4H, Neymar Pugh MD, 2.5 mg at 01/25/20 7046    metoprolol tartrate (LOPRESSOR) tablet 37.5 mg, 37.5 mg, Oral, BID, Zelalem Roberts MD, Stopped at 01/24/20 2016    simethicone (MYLICON) chewable tablet 160 mg, 160 mg, Oral, TID, Neymar Pugh MD, Stopped at 01/24/20 2016    ketotifen (ZADITOR) 0.025 % ophthalmic solution 1 drop, 1 drop, Both Eyes, BID, Neymar Pugh MD, Stopped at 01/24/20 2015    benzonatate (TESSALON) capsule 200 mg, 200 mg, Oral, TID, ZHANNA Smith - CNP, Stopped at 01/24/20 2014    senna (SENOKOT) tablet 17.2 mg, 2 tablet, Oral, Daily PRN, Neymar Pugh MD    lactulose (CHRONULAC) 10 GM/15ML solution 20 g, 20 g, Oral, BID PRN, Neymar Pugh MD, 20 g at 01/23/20 1337    insulin lispro (HUMALOG) injection vial 0-12 Units, 0-12 Units, Subcutaneous, TID WC, Katy Esquivel DO, 4 Units at 01/25/20 5687    insulin lispro (HUMALOG) injection vial 0-6 Units, 0-6 Units, Subcutaneous, Nightly, Katy Esquivel DO, 3 Units at 01/24/20 2226    glucose (GLUTOSE) 40 % oral gel 15 g, 15 g, Oral, PRN, Katy Esquivel DO    dextrose 50 % IV solution, 12.5 g, Intravenous, PRN, Katy Esquivel DO    glucagon (rDNA) injection 1 mg, 1 mg, Intramuscular, PRN, Katy Esquivel DO    dextrose 5 % solution, 100 mL/hr, Intravenous, PRN, Katy Esquivel DO    rOPINIRole (REQUIP) tablet 0.5 mg, 0.5 mg, Oral, Dinner, Rolfe Boas, MD, 0.5 mg at 01/24/20 1702    acetaminophen (TYLENOL) tablet 500 mg, 500 mg, Oral, Q6H PRN, Rolfe Boas, MD, 500 mg at 01/21/20 0848    apixaban (ELIQUIS) tablet 5 mg, 5 mg, Oral, BID, Rolfe Boas, MD, Stopped at 01/24/20 2014    b complex-C-folic acid (NEPHROCAPS) capsule 1 mg, 1 mg, Oral, QAM, Rolfe Boas, MD, 1 mg at 01/24/20 4711    budesonide (PULMICORT) nebulizer suspension 1,000 mcg, 1,000 mcg, Nebulization, BID, Rolfe Boas, MD, 1,000 mcg at 01/24/20 2141    calcium-vitamin D 500-200 MG-UNIT per tablet 1 tablet, 1 tablet, Oral, Lunch, Rolfe Boas, MD, 1 tablet at 01/24/20 1215    vitamin D tablet 2,000 Units, 2,000 Units, Oral, Lunch, Rolfe Boas, MD, 2,000 Units at 01/24/20 1214    clotrimazole-betamethasone (LOTRISONE) cream, , Topical, BID PRN, Rolfe Boas, MD    ferrous sulfate tablet 325 mg, 325 mg, Oral, Lunch, Rolfe Boas, MD, 325 mg at 01/24/20 1214    fluticasone (FLONASE) 50 MCG/ACT nasal spray 1 spray, 1 spray, Each Nostril, Daily, Rolfe Boas, MD, 1 spray at 01/24/20 1006    formoterol (PERFOROMIST) nebulizer solution 20 mcg, 20 mcg, Nebulization, Q12H, Rolfe Boas, MD, 20 mcg at 01/24/20 2141    hydrocortisone (ANUSOL-HC) suppository 25 mg, 25 mg, Rectal, Nightly, Rolfe Boas, MD, Stopped at 01/24/20 2054    levothyroxine (SYNTHROID) tablet 125 mcg, 125 mcg, Oral, Daily, Claire Hinkle MD, 125 mcg at 01/25/20 2938    magnesium oxide (MAG-OX) tablet 400 mg, 400 mg, Oral, QAM, Claire Hinkle MD, 400 mg at 01/24/20 0951    miconazole (MICOTIN) 2 % powder, , Topical, BID, Claire Hinkle MD    ondansetron [facility]) tablet 4 mg, 4 mg, Oral, Q8H PRN, Claire Hinkle MD    famotidine (PEPCID) tablet 20 mg, 20 mg, Oral, Daily, Claire Hinkle MD, 20 mg at 01/24/20 4106    magnesium hydroxide (MILK OF MAGNESIA) 400 MG/5ML suspension 30 mL, 30 mL, Oral, Daily PRN, Claire Hinkle MD    sodium chloride (OCEAN, BABY AYR) 0.65 % nasal spray 1 spray, 1 spray, Each Nostril, Q4H PRN, Claire Hinkle MD    sodium chloride flush 0.9 % injection 10 mL, 10 mL, Intravenous, BID, Claire Hinkle MD, 10 mL at 01/24/20 2016    rOPINIRole (REQUIP) tablet 0.25 mg, 0.25 mg, Oral, Nightly, Claire Hinkle MD, Stopped at 01/24/20 2016    Physical Exam:  /75   Pulse 74   Temp 98 °F (36.7 °C) (Axillary)   Resp 12   Ht 5' (1.524 m)   Wt 201 lb 4.8 oz (91.3 kg)   SpO2 95%   BMI 39.31 kg/m²   Wt Readings from Last 3 Encounters:   01/25/20 201 lb 4.8 oz (91.3 kg)   12/16/19 185 lb (83.9 kg)   12/11/19 193 lb 3.2 oz (87.6 kg)     Appearance: Elderly lady lying in bed, resting, with nasal CPAP, awake, alert, no acute respiratory distress, less coughing  Skin: Intact, no rash  Head: Normocephalic, atraumatic  Eyes: EOMI, no conjunctival erythema  ENMT: No pharyngeal erythema, MMM, no rhinorrhea  Neck: Supple, no elevated JVP, no carotid bruits  Lungs: Bibasilar rales  Cardiac: PMI nondisplaced, irregular rhythm with a normal rate, S1 & S2 normal, no murmurs  Abdomen: Soft, nontender, +bowel sounds  Extremities: Moves all extremities x 4, persistent 2-3+ lower extremity edema improving  Neurologic: No focal motor deficits apparent, normal mood and affect  Peripheral Pulses: Intact posterior tibial pulses bilaterally    Intake/Output:    Intake/Output Summary (Last 24 hours) at 1/25/2020 LABVLDL, VLDL  No results found for: CHOLHDLRATIO  Recent Labs     01/24/20  0421   PROBNP 4,263*       Cardiac Tests:  ECG: Atrial fibrillation 102 bpm.  Left axis deviation. Nonspecific IVCD QRS duration 112 ms. Nonspecific ST and T wave changes. A lot of artifact on this EKG. Telemetry findings reviewed: A. fib, rates well-controlled    Chest X-ray: 1/19  The heart is enlarged. Lung fields demonstrate bilateral interstitial opacities. There is a left pleural effusion, and probable right pleural effusion. The aorta is tortuous and calcified      Impression:       No significant change from the previous exam.     Echocardiogram: 11/15/2019 (Dr. Pleas Heimlich): EF 55%. Technically suboptimal and limited study. Left ventricle is normal in size Borderline concentric left ventricular hypertrophy. No regional wall motion abnormalities seen. Normal left ventricular ejection fraction. There is doppler evidence of stage II diastolic dysfunction. Mild mitral annular calcification. Mild mitral regurgitation is present. The tricuspid valve was not well visualized. Pulmonary hypertension is mild     Echo 1/19/2020   Summary   Ejection fraction is visually estimated at 65%. No regional wall motion abnormalities seen. Mild left ventricular concentric hypertrophy noted. Mildly dilated right ventricle with reduced function. TAPSE is 1.5 cm. Mild mitral regurgitation is present. Moderate tricuspid regurgitation. Pulmonary hypertension is moderate. RVSP is 53 mmHg. Stress test:      Cardiac catheterization: 10/24/2013: Status post remote stent of LAD with demonstration of moderate in-stent restenosis today. 50% ostial 2nd diagonal branch stenosis. Normal left ventricular systolic function. Aortic root dilatation.     ASSESSMENT / PLAN:  1. Acute on chronic heart failure with preserved ejection fraction.  -17.5 L. Still volume overloaded/anasarca, improving loop diuretics on hold  2.  Acute influenza B  3. Metabolic alkalosis, due to diuresis. got acetazolamide  4. Persistent atrial fibrillation, now rate controlled. Diagnosed 2014. PEACE/DCC and amiodarone 2018. On and off AC, resumed 11/2019. A. fib recurred 11/2019 in setting of parainfluenza. Amiodarone continued but remained in A. Fib since that time  5. CAD, PCI to LAD 2011  6. Valvular heart disease: Mild MR  7. MIKE/CKD creatinine stable 1.1  8.  Comorbid disease: Hypertension, hyperlipidemia, obesity, hypothyroidism, TARIK, LAFB, chronic back pain, GERD, history of right breast CA    Recommendations:    · Diuretics on hold per nephrology, further acetazolamide deferred to Dr. Jazzmine Harmon  · Continue metoprolol tartrate 37.5 mg twice daily  · Remains off amiodarone  · Flu treatment per primary, pulmonary  · Supplement potassium  · Increase activity as able  · Progressing slowly    Christelle Meléndez MD  [facility]) Cardiology

## 2020-01-25 NOTE — PROGRESS NOTES
98%   BMI 39.31 kg/m²   Temp  Av.3 °F (36.3 °C)  Min: 96.4 °F (35.8 °C)  Max: 98.5 °F (36.9 °C)  Constitutional: sleepy but awakens easily. Lying in bed in NAD  Skin: Warm and dry. No rashes were noted. Ecchymotic left arm  HEENT: no mastoid tenderness. Round and reactive pupils. No visible ear drainage. Moist mucous membranes. No ulcerations or thrush. Neck: Supple to movements. Chest: No use of accessory muscles to breathe. Symmetrical expansion. No wheezing, crackles or rhonchi. On O2 via NC  Cardiovascular: S1 and S2 are rhythmic and regular. No murmurs appreciated. Abdomen: Positive bowel sounds to auscultation.  Soft, nontender, nondistended  Extremities: b/l LEs +3 edema  Lines: peripheral    Laboratory and Tests Review:  Lab Results   Component Value Date    WBC 6.9 2020    WBC 5.7 2020    WBC 7.0 2020    HGB 11.0 (L) 2020    HCT 35.6 2020    MCV 95.7 2020     2020     Lab Results   Component Value Date    NEUTROABS 6.73 2020    NEUTROABS 6.31 2019    NEUTROABS 6.15 2019     Lab Results   Component Value Date    CRPHS 0.2 2014    CRPHS 0.1 2014     Lab Results   Component Value Date    ALT 39 (H) 2020    AST 18 2020    ALKPHOS 105 (H) 2020    BILITOT 1.5 (H) 2020     Lab Results   Component Value Date     2020    K 3.1 2020    K 3.8 2020    CL 86 2020    CO2 41 2020    BUN 65 2020    CREATININE 1.0 2020    CREATININE 1.1 2020    CREATININE 1.1 2020    GFRAA >60 2020    LABGLOM 52 2020    GLUCOSE 231 2020    GLUCOSE 106 10/04/2011    PROT 5.1 2020    LABALBU 3.1 2020    LABALBU 3.5 2011    CALCIUM 9.1 2020    BILITOT 1.5 2020    ALKPHOS 105 2020    AST 18 2020    ALT 39 2020     Lab Results   Component Value Date    CRP 0.3 2018    CRP 0.3 2017    CRP 0.1 03/02/2017     Lab Results   Component Value Date    SEDRATE 9 02/20/2018    SEDRATE 12 10/23/2017    SEDRATE 5 06/02/2017     Radiology:      Microbiology:   Influenza B +  1/17 blood cx no growth  Urine legionella Ag neg    ASSESSMENT:  · Influenza B infection. Completed a course of Oseltamavir  · Exacerbation congestive heart failure  · Fluid in mastoid. I doubt the patient has mastoiditis. This could be from a viral infection. I do not think the patient has an ongoing bacterial infection     Plan:    · Antibiotics are not warranted at this time  · Monitor labs  · Will follow with you    Dalia Naranjo  9:06 AM  1/25/2020     Patient seen and examined. I had a face to face encounter with the patient. Agree with exam.  Assessment and plan as outlined above and directed by me. Addition and corrections were done as deemed appropriate. My exam and plan include: According to the family patient has been slightly more responsive but then goes back to sleep. She sounds congested when she does cough. She did have an episode of coughing while I was in the room and he did sounded slightly moist but he does have a weak cough. The prolonged weakness puts her at risk of aspiration and subsequent nosocomial pneumonia. I agree with palliative care. I will also add some Peridex to reduce risk of aspiration and healthcare associated pneumonia. I still do not think that the patient has mastoiditis. Family is asking if the isolation can be discontinued. Guidelines usually state \"for duration of illness\" but the patient may continue to shared virus beyond the treatment. Family is understanding. Rocco Carty came out and asked me to take a look at her left flank. She has been complaining of some abdominal pain. She has ecchymosis over the left flank. She was suggesting to do a CT of the abdomen and pelvis.   I obliged and ordered one without contrast.    Paul Rivera  1/25/2020

## 2020-01-25 NOTE — PLAN OF CARE
Problem: Falls - Risk of:  Goal: Will remain free from falls  Description  Will remain free from falls  Outcome: Met This Shift  Goal: Absence of physical injury  Description  Absence of physical injury  Outcome: Met This Shift     Problem: Infection:  Goal: Will show no infection signs and symptoms  Description  Will show no infection signs and symptoms     Outcome: Met This Shift

## 2020-01-25 NOTE — PROGRESS NOTES
Dr. Margot Skinner notified via [employer] of hematoma noted on left lateral side that Dr. Neel Murphy ordered a CT scan of abd/pelvis. Family requesting IV pain medication due to increase pain until CT scan results come back.

## 2020-01-25 NOTE — PROGRESS NOTES
Attempted to draw Arterial Blood Gases without success.  Daughter, Beatris Wood, at bedside and requesting that no further attempts be made yo obtain them per patient's request.

## 2020-01-25 NOTE — PROGRESS NOTES
Physical Therapy  [facility]  Daily Treatment Note  NAME: Juanis Hartman  : 1927  MRN: [id number]    Date of Service: 2020    Attempted to see pt for PT session, family and nurse in room , requesting to let pt rest today.   Emi WOOD [id number]

## 2020-01-25 NOTE — PROGRESS NOTES
Nephrology Progress Note  Patient's Name: Poncho Burciaga  10:14 AM  1/25/2020    Nephrologist: Dr. Amina Ferguson     Reason for Consult:   CKD G3 with Vol Overload  Requesting Physician:  Kristopher Casper MD    Chief Complaint:  Edema  and SOB    History Obtained From:  patient, relative(s) and past medical records    History of Present Ilness:    Poncho Burciaga is a 80 y.o. female with PMH significant for HTN, CHF and CAD who presented to the emergency department for increased edema and SOB. She has gained 10# over the preceding week and is now having difficulty ambulating due to the edema. She complained of associated shortness of breath and tested (+) Influenza B. In the ED the patient was noted to be hypotensive. She is known to the practice from a previous [facility]KE in August 2019 admission 11/2019 for vol overloadand a She has a history of CKD G3 with baseline serum creat 1.0-1.mg/dl with an e-GFR 46-52ml/min. Upon assessment she states she is feeling a little better today. She still gets SOB with minimal exertion. Her family is at the bedside and were updated on her renal status. 1/23/20: Pt states she feels a little better today able to sleep last night    1/24/20: Up in chair, lethargic, daughter feeding her lunch and states that normally her mother is talkative and outgoing. States she feels ok. Daughter reports large amount of diarrhea yesterday. 1/25/20: Resting in bed, awakens easily to verbal stimuli. Daughter at bedside, states her mother is more alert today and swelling is better. Pt denies CP. Has some SOB and moist cough. Past Medical History:   Diagnosis Date    AC (acromioclavicular) joint bone spurs     in feet    Acid reflux     Ascending aortic aneurysm (HCC)     4.0 cm,(SEE LETTERS DR. PENALOZA) IS BEING MONITORED    Atrial fibrillation ([address]     CAD (coronary artery disease)     Cancer (HCC) 16 YRS AGO    breast RIGHT    CHF (congestive heart failure) (HCC)     Chronic back pain stenosis, receives epidurals bi-monthly    CKD (chronic kidney disease) stage 4, GFR 15-29 ml/min ([facility]) 8/24/2019    DJD (degenerative joint disease)     Hyperlipidemia     Hypertension     Hypothyroidism     Iron deficiency anemia     Lung nodule     RIGHT LOWER LOBE, BEING MONITORED BY DR. Natasha Kraus, LAST CT EPIC 3/2014    MVP (mitral valve prolapse)     Osteoarthritis     Osteoporosis     Partial bowel obstruction ([address]     Pneumonia 2008    Polymyalgia rheumatica ([address]     Scoliosis     Sinus arrhythmia     1/2014 last ekg, nsr with pacs, epic    Sleep apnea     uses cpap    Spinal stenosis     Thoracic ascending aortic aneurysm ([address]     Thyroid disease     hypothyroidism       Past Surgical History:   Procedure Laterality Date    ARTERY BIOPSY  2008    temporal    [address],  BONE SPURS    BRONCHOSCOPY N/A 11/18/2019    BRONCHOSCOPY   (BEDSIDE) performed by Lissett Browne MD at [facility]  2010    CARDIOVERSION  02/13/2018    CERVICAL FUSION  1994    c3-4    CHOLECYSTECTOMY  1980's    OPEN    COLON SURGERY  1980's    repair of partial bowel obstruction    CORONARY ANGIOPLASTY WITH STENT PLACEMENT  06/2011    Dr. Shon Dodd, ESOPHAGUS      EYE SURGERY Bilateral 2004    CATARACTS WITH LENS IMPLANTS    FOOT SURGERY  2010,2011    ct guided injections of feet, toenail removal    HERNIA REPAIR  0330, 1517    UMBILICAL, RIH    HYSTERECTOMY  1960's    MASTECTOMY Right     TOE SURGERY      Removal of bony outgrowth, right great toe    TRANSESOPHAGEAL ECHOCARDIOGRAM  02/13/2018    WRIST SURGERY Right YRS AGO       Family History   Problem Relation Age of Onset    Brain Cancer Mother     Lung Cancer Brother     Heart Attack Father     Heart Surgery Maternal Aunt     Stroke Maternal Aunt         reports that she has never smoked.  She has never used smokeless tobacco. She reports that she does not drink alcohol or QAM  miconazole (MICOTIN) 2 % powder, BID  ondansetron (ZOFRAN) tablet 4 mg, Q8H PRN  famotidine (PEPCID) tablet 20 mg, Daily  magnesium hydroxide (MILK OF MAGNESIA) 400 MG/5ML suspension 30 mL, Daily PRN  sodium chloride (OCEAN, BABY AYR) 0.65 % nasal spray 1 spray, Q4H PRN  sodium chloride flush 0.9 % injection 10 mL, BID  rOPINIRole (REQUIP) tablet 0.25 mg, Nightly        Review of Systems:   Pertinent items are noted in HPI. Remainder of a complete review of systems is (-) other than stated in the HPI    Physical exam:   Constitutional:  No acute distress  Vitals: VITALS:  /77   Pulse 83   Temp 98.5 °F (36.9 °C) (Oral)   Resp 18   Ht 5' (1.524 m)   Wt 201 lb 4.8 oz (91.3 kg)   SpO2 98%   BMI 39.31 kg/m²   24HR INTAKE/OUTPUT:      Intake/Output Summary (Last 24 hours) at 1/25/2020 1014  Last data filed at 1/25/2020 0544  Gross per 24 hour   Intake 240 ml   Output 1950 ml   Net -1710 ml     URINARY CATHETER OUTPUT (Chung):  Urethral Catheter Non-latex 16 fr-Output (mL): 1200 mL  Skin: warm, erythema of arms less today  Neck: no jvd noted  Cardiovascular: irreg rhythm.   Respiratory: Clear upper lobes, fine crackles in bases  Abdomen: Abd round soft, non-tender, bowel sounds present  Ext: R>L  lower extremity edema 2-3+, Edema of b/l arms and hands decreased from yesterday  Neuro: lethargic, arouses easily, oriented x 3    Data:   Labs:  CBC:   Lab Results   Component Value Date    WBC 6.9 01/25/2020    RBC 3.72 01/25/2020    HGB 11.0 01/25/2020    HCT 35.6 01/25/2020    MCV 95.7 01/25/2020    MCH 29.6 01/25/2020    MCHC 30.9 01/25/2020    RDW 12.7 01/25/2020     01/25/2020    MPV 11.0 01/25/2020     BMP:    Lab Results   Component Value Date     01/25/2020    K 3.1 01/25/2020    K 3.8 01/17/2020    CL 86 01/25/2020    CO2 41 01/25/2020    BUN 65 01/25/2020    LABALBU 3.1 01/25/2020    LABALBU 3.5 07/07/2011    CREATININE 1.0 01/25/2020    CALCIUM 9.1 01/25/2020    GFRAA >60 01/25/2020 LABGLOM 52 01/25/2020    GLUCOSE 231 01/25/2020    GLUCOSE 106 10/04/2011     Albumin:    Lab Results   Component Value Date    LABALBU 3.1 01/25/2020    LABALBU 3.5 07/07/2011     Ionized Calcium:  No results found for: IONCA  Magnesium:    Lab Results   Component Value Date    MG 2.5 01/25/2020     Phosphorus:    Lab Results   Component Value Date    PHOS 2.8 01/25/2020     VITAMIN B12: No components found for: B12  FOLATE:    Lab Results   Component Value Date    FOLATE 15.5 11/17/2019     IRON:    Lab Results   Component Value Date    IRON 49 01/20/2020     Iron Saturation:  No components found for: PERCENTFE  TIBC:    Lab Results   Component Value Date    TIBC 243 01/20/2020     FERRITIN:    Lab Results   Component Value Date    FERRITIN 380 01/20/2020        Imaging:  Type of Study      TTE procedure:Echo Complete W/ Dop & Color Flow. Procedure Date  Date: 11/15/2019 Start: 03:03 PM     Study Location: Echo Lab  Technical Quality: Adequate visualization     Indications:Pulmonary embolus.     Patient Status: STAT     Height: 60 inches     HR: 88 bpm BP: 119/59 mmHg      Findings      Left Ventricle   Left ventricle is normal in size . Borderline concentric left ventricular hypertrophy. No regional wall motion abnormalities seen. Normal left ventricular ejection fraction. Ejection fraction is visually estimated at 55%. There is doppler evidence of stage II diastolic dysfunction. Right Ventricle   The right ventricle was not clearly visualized. Normal right ventricular size. Left Atrium   Normal sized left atrium. Interatrial septum appears intact. Right Atrium   Right Atrium is not clearly visualized. Normal right atrium size. Mitral Valve   Structurally normal mitral valve. Mild mitral annular calcification. Mild mitral regurgitation is present. Tricuspid Valve   The tricuspid valve was not well visualized. RVSP is 40-45 mmHg.    Pulmonary hypertension is mild Hypokalemic Met Alkalosis sec to diuresis   -  K+ today 3.1- will adjust supplement and follow   - will monitor closely now that diuretics have been discontinued    Thank you Dr. Poli Rouse MD for allowing us to participate in care of [address] APRN-CNS  10:14 AM  1/25/2020     Patient seen and examined. Multiple family members are present at bedside. Patient is lethargic. She has increased peripheral edema and oral intake has been poor. Will dose with Diamox once again. Discussed with patient's daughter Mauro Major. Agree with above formulation and plan.       Rafael Chisholm MD  Nephrology        Electronically signed by Rafael Chisholm MD on 1/25/2020 at 5:23 PM

## 2020-01-26 NOTE — DISCHARGE SUMMARY
Physician Discharge Summary     Patient ID:  Patricia Rosen  [id number]  3 y.o.  12/28/1927    Admit date: 1/17/2020    Discharge date and time:  1/25/2020    Discharge Diagnoses: Active Problems:    Hypothyroidism    TARIK (obstructive sleep apnea)    Hypercholesterolemia    CAD in native artery    Acute on chronic diastolic congestive heart failure (HCC)    Polymyalgia rheumatica (HCC)    Influenza B    Atrial fibrillation with RVR (HCC)    CKD (chronic kidney disease) stage 3, GFR 30-59 ml/min (HCC)    Acute on chronic respiratory failure with hypoxia (HCC)  Resolved Problems:    * No resolved hospital problems. *      Consults: IP CONSULT TO PRIMARY CARE PROVIDER  IP CONSULT TO CARDIOLOGY  IP CONSULT TO PULMONOLOGY  IP CONSULT TO DIETITIAN  IP CONSULT TO NEPHROLOGY  IP CONSULT TO IV TEAM  IP CONSULT TO PALLIATIVE CARE  IP CONSULT TO INFECTIOUS DISEASES  IP CONSULT TO HOSPICE  IP CONSULT TO HOSPICE    Procedures: NA    Hospital Course: 1. Hypoxic respiratory failure - multifactorial. Secondary to influenza virus. On O2. Slowly improving. 2. Influenza virus - s/p Tamiflu. Pulmonary on case. On nebs, PO steroids, O2, and cough suppressants. 3. Atrial fibrillation with RVR - exacerbated by pulmonary status. 2D Echo stable. Cardiology on case. Rate controlled. Anticoagulated on Eliquis. 4. CHF - acute on chronic diastolic. Diuresis per renal.   5. CKD - chronic disease. Nephrology on case. Creatinine stable. 6. AMS - improved with decreased opioid dose. 7. HTN - stable. 8. Hyperlipidemia - statin. 9. Hypothyroidism - Synthroid. 10. CAD - stable. 11. TARIK - on BiPAP. 12. Thoracic aortic aneurysm - stable. 13. Pulmonary nodule - stable. 14. PMR - on chronic prednisone at 10mg daily. 15. GERD - PPI. 16. LDD - pain control. 17. Metabolic alkalosis - secondary to diuresis. Worsened this AM. D/w renal. Will hold diuretics for now until they see patient.   18.   Acute metabolic encephalopathy-multifactorial. 19. Disposition -hospice house  Patient was having increased abdominal pain and flank ecchymosis CTA of the abdomen was obtained and demonstrated a large hematoma. Family has decided to go with hospice. Discharge Exam:  See progress note from today    Condition:  Poor    Disposition: Hospice house    Patient Instructions:   Discharge Medication List as of 1/25/2020  8:46 PM      CONTINUE these medications which have NOT CHANGED    Details   gabapentin (NEURONTIN) 100 MG capsule Take 100 mg by mouth nightly. Historical Med      oxyCODONE HCl (OXY-IR) 10 MG immediate release tablet Take 10 mg by mouth every 6 hours. Historical Med      !! furosemide (LASIX) 40 MG tablet Take 40 mg by mouth daily as neededHistorical Med      oxyCODONE 5 MG capsule Take 7.5 mg by mouth every 8 hours as needed for Pain (for breakthrough pain). Historical Med      acetaminophen (TYLENOL) 500 MG tablet Take 1,000 mg by mouth every 6 hours as needed for PainHistorical Med      levothyroxine (SYNTHROID) 125 MCG tablet Take 125 mcg by mouth DailyHistorical Med      amiodarone (CORDARONE) 200 MG tablet Take 1 tablet by mouth daily Patient taking Amio daily since 11/16/19., Disp-30 tablet, R-0Adjust Sig      budesonide (PULMICORT) 0.5 MG/2ML nebulizer suspension Take 4 mLs by nebulization 2 times daily, Disp-60 ampule, R-3DC to SNF      formoterol (PERFOROMIST) 20 MCG/2ML nebulizer solution Take 2 mLs by nebulization every 12 hoursDC to SNF      apixaban (ELIQUIS) 5 MG TABS tablet Take 1 tablet by mouth 2 times daily, Disp-60 tabletDC to SNF      rOPINIRole (REQUIP) 0.5 MG tablet Take 1 tablet by mouth daily, Disp-90 tablet, R-3DC to SNF      metoprolol tartrate (LOPRESSOR) 25 MG tablet Take 0.5 tablets by mouth 2 times daily, Disp-60 tablet, R-3DC to SNF      fluticasone (FLONASE) 50 MCG/ACT nasal spray 1 spray by Each Nostril route daily, Disp-1 Bottle, R-3DC to SNF      hydrocortisone (ANUSOL-HC) 25 MG suppository Place 1 suppository Stopping:         clotrimazole-betamethasone (LOTRISONE) 1-0.05 % cream Comments:   Reason for Stopping:         senna (SENOKOT) 8.6 MG tablet Comments:   Reason for Stopping:             Activity: activity as tolerated  Diet: cardiac diet    Follow-up with hospice    Note that over 30 minutes was spent in preparing discharge papers, discussing discharge with patient, staff, consultants, medication review, arranging follow up, etc.    Signed:  Wilman Patrick MD  1/25/2020  8:56 PM

## 2023-06-28 NOTE — PROGRESS NOTES
[facility] Cardiology Inpatient Progress Note    Patient is a 80 y.o. female of Dana Dc MD seen in hospital follow up. Chief complaint: Afib    HPI: Some SOB. No CP.     Patient Active Problem List   Diagnosis    Hypothyroidism    TARIK (obstructive sleep apnea)    Hypertensive disorder    Hypercholesterolemia    CAD in native artery    Acute diastolic [facility]estive [facility]) [facility]    Restless legs    Acute on chronic diastolic congestive heart failure (HCC)    Essential hypertension    Lung nodule    Paroxysmal atrial fibrillation (HCC)    CHF (congestive heart failure), NYHA class I, acute on chronic, combined ([facility])    Congestive heart failure ([facility]    Nonrheumatic aortic valve insufficiency    Non-rheumatic mitral regurgitation    Diastolic CHF ([address]    Cystitis    Intractable back pain    Thoracic ascending aortic aneurysm ([facility])    Polymyalgia rheumatica ([facility])    Congestive heart failure with cardiomyopathy ([address]    Acute diastolic congestive heart failure ([address]    Skin ulcer of right foot, limited to breakdown of skin ([address]    Acute respiratory failure with hypoxia ([address]    Polymyositis ([address]    Pulmonary embolism ([facility])    Parainfluenza virus bronchitis    Carcinoma of breast ([address]    Influenza B    Atrial fibrillation with RVR ([facility]    CKD (chronic kidney disease) stage 3, GFR 30-59 ml/min ([facility])    Acute on chronic respiratory failure with hypoxia ([facility])       Allergies   Allergen Reactions    [place]-D [Diphenhydramine Hcl] Other (See Comments)     Becomes very hyper, \"can't stay still\" dystonia    Benadryl [Diphenhydramine]     Phenergan [Promethazine Hcl]      Dystonia         Current Facility-Administered Medications   Medication Dose Route Frequency Provider Last Rate Last Dose    levalbuterol (XOPENEX) nebulization 0.63 mg  0.63 mg Nebulization Q6H Wilder Marlow DO        rOPINIRole (REQUIP) tablet 0.5 mg  0.5 mg Oral Dinner Dana Dc MD   0.5 mg release tablet 10 mg  10 mg Oral Q6H Kristopher Casper MD   10 mg at 01/18/20 0846    potassium chloride (KLOR-CON M) extended release tablet 40 mEq  40 mEq Oral BID Kristopher Casper MD   40 mEq at 01/18/20 0846    senna (SENOKOT) tablet 17.2 mg  2 tablet Oral QAM Kristopher Casper MD   17.2 mg at 01/18/20 0846    simethicone (MYLICON) chewable tablet 160 mg  160 mg Oral Lunch Kristopher Casper MD   160 mg at 01/18/20 1155    methylPREDNISolone sodium (SOLU-MEDROL) injection 80 mg  80 mg Intravenous Q8H Nathaliechelsea Maxwell APRN - CNP   80 mg at 01/18/20 0514    famotidine (PEPCID) tablet 20 mg  20 mg Oral Daily Kristopher Casper MD   20 mg at 01/18/20 0847    oxyCODONE (ROXICODONE) immediate release tablet 7.5 mg  7.5 mg Oral Q8H PRN Krisotpher Casper MD        magnesium hydroxide (MILK OF MAGNESIA) 400 MG/5ML suspension 30 mL  30 mL Oral Daily PRN Kristopher Casper MD        sodium chloride (OCEAN, BABY AYR) 0.65 % nasal spray 1 spray  1 spray Each Nostril Q4H PRN Kristopher Casper MD        sodium chloride flush 0.9 % injection 10 mL  10 mL Intravenous BID Kristopher Casper MD   10 mL at 01/18/20 0851    oseltamivir (TAMIFLU) capsule 30 mg  30 mg Oral BID Kristopher Casper MD   30 mg at 01/18/20 0846    rOPINIRole (REQUIP) tablet 0.25 mg  0.25 mg Oral Nightly Kristopher Casper MD   0.25 mg at 01/17/20 2034    furosemide (LASIX) injection 40 mg  40 mg Intravenous BID Jose Cutler MD   40 mg at 01/18/20 9120       Review of systems:   Heart: as above   Lungs: as above   Eyes: denies changes in vision or discharge. Ears: denies changes in hearing or pain. Nose: denies epistaxis or masses   Throat: denies sore throat or trouble swallowing. Neuro: denies numbness, tingling, tremors. Skin: denies rashes or itching. : denies hematuria, dysuria   GI: denies vomiting, diarrhea   Psych: denies mood changed, anxiety, depression.     Physical Exam   /68   Pulse 114   Temp 97.8 °F (36.6 °C) (Oral)   Resp 20   Ht 5' (1.524 m) PLAN:    Patient Active Problem List   Diagnosis    Hypothyroidism    TARIK (obstructive sleep apnea)    Hypertensive disorder    Hypercholesterolemia    CAD in native artery    Acute diastolic CHF (congestive heart failure) (HCC)    Restless legs    Acute on chronic diastolic congestive heart failure (HCC)    Essential hypertension    Lung nodule    Paroxysmal atrial fibrillation (HCC)    CHF (congestive heart failure), NYHA class I, acute on chronic, combined ([facility])    Congestive heart failure (HCC)    Nonrheumatic aortic valve insufficiency    Non-rheumatic mitral regurgitation    Diastolic CHF ([address]    Cystitis    Intractable back pain    Thoracic ascending aortic aneurysm ([facility])    Polymyalgia rheumatica ([facility])    Congestive heart failure with cardiomyopathy ([address]    Acute diastolic congestive heart failure ([facility])    Skin ulcer of right foot, limited to breakdown of skin ([address]    Acute respiratory failure with hypoxia ([facility])    Polymyositis ([address]    Pulmonary embolism ([facility])    Parainfluenza virus bronchitis    Carcinoma of breast ([address]    Influenza B    Atrial fibrillation with RVR ([facility])    CKD (chronic kidney disease) stage 3, GFR 30-59 ml/min ([facility])    Acute on chronic respiratory failure with hypoxia ([address]     1. Acute on chronic heart failure with preserved ejection fraction:    Padilla Chung. 2. Persistent atrial fibrillation: Rate control and Eliquis. 3. CAD, PCI to LAD 2011: BB    4. Acute influenza B    5. HTN: Observe. 6. TARIK    Jazmin Hensley D.O.   Cardiologist  Cardiology, [address] [place] [address] Dutasteride Pregnancy And Lactation Text: This medication is absolutely contraindicated in women, especially during pregnancy and breast feeding. Feminization of male fetuses is possible if taking while pregnant.

## 2023-07-27 NOTE — PROGRESS NOTES
CHIEF COMPLAINT  Eye Problem and Office Visit    Marco Luis is a 55 year old female who presents for follow up to lid issue. Patient was seen at our office 7/14/23, symptoms started a few days prior when working outdoors. At that time patient was on Erythromycin, Keflex and Ocuflox. No significant improvement to that point so switched Erythromycin to Maxitrol robyn TID. Saw her PCP Dr. Starkey a week later with still no resolution in symptoms. She ordered labs and CT of orbits which showed \"ill-defined soft tissue inflammation\". Still no fever per patient. Eye is tender to touch mostly on the upper orbital rim and brow. Notes pain on eye movement, mostly when looking up. Rates pain as 5/10.     HISTORY OF PRESENT ILLNESS  I have reviewed and appreciated the technician's history and test results as outlined above.    VISION  Visual Acuity (Snellen - Linear)       Right Left    Dist sc 20/50 -1 20/25 -1    Dist cc 20/60 20/25    Dist ph cc No Improvement          PHYSICAL EXAM  Main Ophthalmology Exam     External Exam       Right Left    External  Normal          Slit Lamp Exam       Right Left    Lids/Lashes 2+ edema, ptosis Normal    Conjunctiva/Sclera 1+ injection Clear    Cornea Clear Clear    Anterior Chamber Deep and quiet Deep and quiet    Iris Round and regular Round and regular    Lens Clear Clear    Vitreous Clear Clear          Fundus Exam       Right Left    Disc Flat, pink with a healthy rim Flat, pink with a healthy rim    Macula Healthy with sharp foveal reflex Healthy with sharp foveal reflex    Vessels Healthy with normal Artery-Vein ratio Healthy with normal Artery-Vein ratio    Periphery Flat, healthy, without tears or detachments Flat, healthy, without tears or detachments                 ASSESSMENT   1. Preseptal cellulitis of right upper eyelid        PLAN   1. No improvement from our initial visit and eye itself does look more inflamed today, more injection and even mild conjunctival chemosis  (ROXICODONE) immediate release tablet 5 mg, 5 mg, Oral, Nightly, Rosalina Plasencia MD, 5 mg at 01/23/20 2001    predniSONE (DELTASONE) tablet 40 mg, 40 mg, Oral, Daily, ZHANNA Alberts - CNP    guaiFENesin tablet 400 mg, 400 mg, Oral, 4x Daily, ZHANNA Alberts CNP, 400 mg at 01/23/20 2001    sodium chloride (Inhalant) 3 % nebulizer solution 4 mL, 4 mL, Nebulization, BID, ZHANNA Alberts CNP, 4 mL at 01/1934    potassium chloride (KLOR-CON M) extended release tablet 40 mEq, 40 mEq, Oral, Once, Rosalina Plasencia MD    oxyCODONE (ROXICODONE) immediate release tablet 5 mg, 5 mg, Oral, BID PRN, Rosalina Plasencia MD, 5 mg at 01/24/20 1909    albuterol (PROVENTIL) nebulizer solution 2.5 mg, 2.5 mg, Nebulization, Q4H, Rosalina Plasencia MD, 2.5 mg at 01/24/20 0425    metoprolol tartrate (LOPRESSOR) tablet 37.5 mg, 37.5 mg, Oral, BID, Purvi Gomez MD, 37.5 mg at 01/23/20 2001    [facility]) chewable tablet 160 mg, 160 mg, Oral, TID, Rosalina Plasencia MD, 160 mg at 01/23/20 2001    lidocaine PF 1 % injection 5 mL, 5 mL, Nebulization, BID PRN, ZHANNA Alberts - CNP    ketotifen (ZADITOR) 0.025 % ophthalmic solution 1 drop, 1 drop, Both Eyes, BID, Rosalina Plasencia MD, 1 drop at 01/23/20 2001    benzonatate (TESSALON) capsule 200 mg, 200 mg, Oral, TID, ZHANNA Alberts - CNP, 200 mg at 01/23/20 2001    senna (SENOKOT) tablet 17.2 mg, 2 tablet, Oral, Daily PRN, Rosalina Plasencia MD    lactulose (CHRONULAC) 10 GM/15ML solution 20 g, 20 g, Oral, BID PRN, Rosalina Plasencia MD, 20 g at 01/23/20 1337    chlorothiazide (DIURIL) 250 mg in sodium chloride 0.9 % 50 mL IVPB, 250 mg, Intravenous, TID, Adama Petty MD, Stopped at 01/24/20 0717    furosemide (LASIX) injection 60 mg, 60 mg, Intravenous, Q12H, Adama Petty MD, 60 mg at 01/24/20 5106    insulin lispro (HUMALOG) injection vial 0-12 Units, 0-12 Units, Subcutaneous, TID WC, Brianna Esquivel DO, 8 Units at 01/24/20 nasally. Recommend evaluation with Dr. Jensen early next week. Differentials now much higher for orbital pseudotumor or other underlying orbital inflammatory condition. Will add 1 gtt Pred Forte every 4 hours until her appointment with Dr. Jensen to help with comfort.     Discussed exam findings with patient.  Patient reassured and has no further questions.        3276    insulin lispro (HUMALOG) injection vial 0-6 Units, 0-6 Units, Subcutaneous, Nightly, Tye Esquivel, DO, 5 Units at 01/23/20 2001    glucose (GLUTOSE) 40 % oral gel 15 g, 15 g, Oral, PRN, Tye Clamp Alvinomer, DO    dextrose 50 % IV solution, 12.5 g, Intravenous, PRN, Tye Clamp Malmer, DO    glucagon (rDNA) injection 1 mg, 1 mg, Intramuscular, PRN, Tye Clamp Alvinomer, DO    dextrose 5 % solution, 100 mL/hr, Intravenous, PRN, Tye Clamp Malmer, DO    rOPINIRole (REQUIP) tablet 0.5 mg, 0.5 mg, Oral, Dinner, Claire Hinkle MD, 0.5 mg at 01/23/20 1646    acetaminophen (TYLENOL) tablet 500 mg, 500 mg, Oral, Q6H PRN, Claire Hinkle MD, 500 mg at 01/21/20 0848    apixaban (ELIQUIS) tablet 5 mg, 5 mg, Oral, BID, Claire Hinkle MD, 5 mg at 01/23/20 2001    b complex-C-folic acid (NEPHROCAPS) capsule 1 mg, 1 mg, Oral, QAM, Claire Hinkle MD, 1 mg at 01/23/20 1023    budesonide (PULMICORT) nebulizer suspension 1,000 mcg, 1,000 mcg, Nebulization, BID, Claire Hinkle MD, 1,000 mcg at 01/23/20 1854    calcium-vitamin D 500-200 MG-UNIT per tablet 1 tablet, 1 tablet, Oral, Lunch, Claire Hinkle MD, 1 tablet at 01/23/20 1250    vitamin D tablet 2,000 Units, 2,000 Units, Oral, Lunch, Claire Hinkle MD, 2,000 Units at 01/23/20 1250    clotrimazole-betamethasone (LOTRISONE) cream, , Topical, BID PRN, Claire Hinkle MD    ferrous sulfate tablet 325 mg, 325 mg, Oral, LunchClaire MD, 325 mg at 01/23/20 1250    fluticasone (FLONASE) 50 MCG/ACT nasal spray 1 spray, 1 spray, Each Nostril, Daily, Claire Hinkle MD, 1 spray at 01/23/20 1024    formoterol (PERFOROMIST) nebulizer solution 20 mcg, 20 mcg, Nebulization, Q12H, Claire Hinkle MD, 20 mcg at 01/23/20 1854    hydrocortisone (ANUSOL-HC) suppository 25 mg, 25 mg, Rectal, Nightly, Claire Hinkle MD, 25 mg at 01/23/20 2001    levothyroxine (SYNTHROID) tablet 125 mcg, 125 mcg, Oral, Daily, Claire Hinkle MD, 125 mcg at 01/24/20 6805    magnesium oxide (MAG-OX) tablet 400 mg, 400 mg, Oral, QAM, Ines Rangel MD, 400 mg at 01/23/20 1022    miconazole (MICOTIN) 2 % powder, , Topical, BID, Ines Rangel MD    ondansetron [facility]) tablet 4 mg, 4 mg, Oral, Q8H PRN, Ines Rangel MD    potassium chloride (KLOR-CON M) extended release tablet 40 mEq, 40 mEq, Oral, BID, Ines Rangel MD, 40 mEq at 01/23/20 2001    famotidine (PEPCID) tablet 20 mg, 20 mg, Oral, Daily, Ines Rangel MD, 20 mg at 01/23/20 1023    magnesium hydroxide (MILK OF MAGNESIA) 400 MG/5ML suspension 30 mL, 30 mL, Oral, Daily PRN, Ines Rangel MD    sodium chloride (OCEAN, BABY AYR) 0.65 % nasal spray 1 spray, 1 spray, Each Nostril, Q4H PRN, Ines Rangel MD    sodium chloride flush 0.9 % injection 10 mL, 10 mL, Intravenous, BID, Ines Rangel MD, 10 mL at 01/23/20 2001    rOPINIRole (REQUIP) tablet 0.25 mg, 0.25 mg, Oral, Nightly, Ines Rangel MD, 0.25 mg at 01/23/20 2134    Physical Exam:  BP (!) 153/69   Pulse 78   Temp 97.6 °F (36.4 °C) (Axillary)   Resp 12   Ht 5' (1.524 m)   Wt 204 lb 4.8 oz (92.7 kg)   SpO2 96%   BMI 39.90 kg/m²   Wt Readings from Last 3 Encounters:   01/24/20 204 lb 4.8 oz (92.7 kg)   12/16/19 185 lb (83.9 kg)   12/11/19 193 lb 3.2 oz (87.6 kg)     Appearance: Elderly lady lying in bed, resting, with nasal CPAP, awake, alert, no acute respiratory distress, less coughing  Skin: Intact, no rash  Head: Normocephalic, atraumatic  Eyes: EOMI, no conjunctival erythema  ENMT: No pharyngeal erythema, MMM, no rhinorrhea  Neck: Supple, no elevated JVP, no carotid bruits  Lungs: Bibasilar rales  Cardiac: PMI nondisplaced, irregular rhythm with a normal rate, S1 & S2 normal, no murmurs  Abdomen: Soft, nontender, +bowel sounds  Extremities: Moves all extremities x 4, persistent 2-3+ lower extremity edema improving  Neurologic: No focal motor deficits apparent, normal mood and affect  Peripheral Pulses: Intact posterior tibial pulses [address] 106 (H) 10/05/2011    [address] 90 07/07/2011     No results found for: LABVLDL, VLDL  No results found for: CHOLHDLRATIO  No results for input(s): PROBNP in the last 72 hours. Cardiac Tests:  ECG: Atrial fibrillation 102 bpm.  Left axis deviation. Nonspecific IVCD QRS duration 112 ms. Nonspecific ST and T wave changes. A lot of artifact on this EKG. Telemetry findings reviewed: A. fib, rates well-controlled    Chest X-ray: 1/19  The heart is enlarged. Lung fields demonstrate bilateral interstitial opacities. There is a left pleural effusion, and probable right pleural effusion. The aorta is tortuous and calcified      Impression:       No significant change from the previous exam.     Echocardiogram: 11/15/2019 (Dr. Elsa Hauser): EF 55%. Technically suboptimal and limited study. Left ventricle is normal in size Borderline concentric left ventricular hypertrophy. No regional wall motion abnormalities seen. Normal left ventricular ejection fraction. There is doppler evidence of stage II diastolic dysfunction. Mild mitral annular calcification. Mild mitral regurgitation is present. The tricuspid valve was not well visualized. Pulmonary hypertension is mild     Echo 1/19/2020   Summary   Ejection fraction is visually estimated at 65%. No regional wall motion abnormalities seen. Mild left ventricular concentric hypertrophy noted. Mildly dilated right ventricle with reduced function. TAPSE is 1.5 cm. Mild mitral regurgitation is present. Moderate tricuspid regurgitation. Pulmonary hypertension is moderate. RVSP is 53 mmHg. Stress test:      Cardiac catheterization: 10/24/2013: Status post remote stent of LAD with demonstration of moderate in-stent restenosis today. 50% ostial 2nd diagonal branch stenosis. Normal left ventricular systolic function. Aortic root dilatation.     ASSESSMENT / PLAN:  1. Acute on chronic heart failure with preserved ejection fraction.  -14.8 L.   Still volume

## (undated) DEVICE — SURGICAL PROCEDURE PACK BRONCH

## (undated) DEVICE — SOLUTION IV IRRIG 500ML 0.9% SODIUM CHL 2F7123